# Patient Record
Sex: FEMALE | Race: BLACK OR AFRICAN AMERICAN | Employment: UNEMPLOYED
[De-identification: names, ages, dates, MRNs, and addresses within clinical notes are randomized per-mention and may not be internally consistent; named-entity substitution may affect disease eponyms.]

---

## 2017-01-11 ENCOUNTER — OFFICE VISIT (OUTPATIENT)
Dept: OBGYN | Facility: CLINIC | Age: 32
End: 2017-01-11

## 2017-01-11 VITALS
HEIGHT: 61 IN | WEIGHT: 139.5 LBS | SYSTOLIC BLOOD PRESSURE: 115 MMHG | DIASTOLIC BLOOD PRESSURE: 76 MMHG | HEART RATE: 86 BPM | RESPIRATION RATE: 19 BRPM | TEMPERATURE: 98.1 F | BODY MASS INDEX: 26.34 KG/M2

## 2017-01-11 DIAGNOSIS — Z01.818 TUBAL LIGATION EVALUATION: ICD-10-CM

## 2017-01-11 DIAGNOSIS — Z00.00 PREVENTATIVE HEALTH CARE: ICD-10-CM

## 2017-01-11 DIAGNOSIS — Z11.3 SCREENING FOR STDS (SEXUALLY TRANSMITTED DISEASES): ICD-10-CM

## 2017-01-11 DIAGNOSIS — Z01.419 WELL WOMAN EXAM: Primary | ICD-10-CM

## 2017-01-11 PROCEDURE — 99395 PREV VISIT EST AGE 18-39: CPT | Performed by: OBSTETRICS & GYNECOLOGY

## 2017-01-11 RX ORDER — CLOTRIMAZOLE AND BETAMETHASONE DIPROPIONATE 10; .64 MG/G; MG/G
CREAM TOPICAL
Qty: 1 TUBE | Refills: 1 | Status: SHIPPED | OUTPATIENT
Start: 2017-01-11 | End: 2017-10-17 | Stop reason: SDUPTHER

## 2017-01-12 RX ORDER — METRONIDAZOLE 500 MG/1
500 TABLET ORAL 2 TIMES DAILY
Qty: 14 TABLET | Refills: 0 | Status: SHIPPED | OUTPATIENT
Start: 2017-01-12 | End: 2017-01-19

## 2017-02-01 RX ORDER — IBUPROFEN 800 MG/1
TABLET ORAL
Qty: 40 TABLET | Refills: 0 | Status: SHIPPED | OUTPATIENT
Start: 2017-02-01 | End: 2018-01-09

## 2017-03-24 ENCOUNTER — OFFICE VISIT (OUTPATIENT)
Dept: OBGYN CLINIC | Age: 32
End: 2017-03-24
Payer: COMMERCIAL

## 2017-03-24 VITALS
BODY MASS INDEX: 27.19 KG/M2 | RESPIRATION RATE: 18 BRPM | WEIGHT: 144 LBS | HEIGHT: 61 IN | DIASTOLIC BLOOD PRESSURE: 81 MMHG | SYSTOLIC BLOOD PRESSURE: 134 MMHG | HEART RATE: 86 BPM

## 2017-03-24 DIAGNOSIS — Z30.09 ENCOUNTER FOR OTHER GENERAL COUNSELING OR ADVICE ON CONTRACEPTION: Primary | ICD-10-CM

## 2017-03-24 PROCEDURE — 99212 OFFICE O/P EST SF 10 MIN: CPT | Performed by: SPECIALIST

## 2017-03-24 ASSESSMENT — ENCOUNTER SYMPTOMS
EYE PAIN: 0
DIARRHEA: 0
COUGH: 0
NAUSEA: 0
ABDOMINAL DISTENTION: 0
APNEA: 0
CONSTIPATION: 0
VOMITING: 0
ABDOMINAL PAIN: 0

## 2017-03-29 ENCOUNTER — TELEPHONE (OUTPATIENT)
Dept: OBGYN CLINIC | Age: 32
End: 2017-03-29

## 2017-04-05 ENCOUNTER — TELEPHONE (OUTPATIENT)
Dept: OBGYN CLINIC | Age: 32
End: 2017-04-05

## 2017-06-08 ENCOUNTER — OFFICE VISIT (OUTPATIENT)
Dept: INTERNAL MEDICINE | Age: 32
End: 2017-06-08
Payer: COMMERCIAL

## 2017-06-08 VITALS
DIASTOLIC BLOOD PRESSURE: 78 MMHG | SYSTOLIC BLOOD PRESSURE: 116 MMHG | WEIGHT: 133 LBS | HEIGHT: 61 IN | BODY MASS INDEX: 25.11 KG/M2 | HEART RATE: 68 BPM

## 2017-06-08 DIAGNOSIS — H61.23 EXCESSIVE EAR WAX, BILATERAL: Primary | ICD-10-CM

## 2017-06-08 PROCEDURE — 99213 OFFICE O/P EST LOW 20 MIN: CPT | Performed by: INTERNAL MEDICINE

## 2017-06-08 RX ORDER — TRIAMCINOLONE ACETONIDE 0.25 MG/G
OINTMENT TOPICAL
Refills: 0 | COMMUNITY
Start: 2017-04-22 | End: 2018-01-09

## 2017-06-08 RX ORDER — CAPSAICIN 0.025 %
CREAM (GRAM) TOPICAL
COMMUNITY
Start: 2016-07-11 | End: 2018-01-09

## 2017-06-08 RX ORDER — TIZANIDINE 4 MG/1
TABLET ORAL
COMMUNITY
Start: 2016-07-11 | End: 2018-01-09

## 2017-06-08 RX ORDER — METRONIDAZOLE 500 MG/1
TABLET ORAL
COMMUNITY
Start: 2016-09-07 | End: 2017-06-28 | Stop reason: SDUPTHER

## 2017-06-08 RX ORDER — SERTRALINE HYDROCHLORIDE 25 MG/1
25 TABLET, FILM COATED ORAL
COMMUNITY
Start: 2016-07-11 | End: 2018-01-09

## 2017-06-08 RX ORDER — TRIAMCINOLONE ACETONIDE 0.25 MG/G
OINTMENT TOPICAL
COMMUNITY
Start: 2016-10-10 | End: 2018-02-27 | Stop reason: ALTCHOICE

## 2017-06-28 ENCOUNTER — HOSPITAL ENCOUNTER (OUTPATIENT)
Age: 32
Setting detail: SPECIMEN
Discharge: HOME | End: 2017-06-28
Payer: COMMERCIAL

## 2017-06-28 ENCOUNTER — OFFICE VISIT (OUTPATIENT)
Dept: OBGYN | Age: 32
End: 2017-06-28
Payer: COMMERCIAL

## 2017-06-28 VITALS
HEART RATE: 78 BPM | BODY MASS INDEX: 25.86 KG/M2 | WEIGHT: 137 LBS | SYSTOLIC BLOOD PRESSURE: 140 MMHG | DIASTOLIC BLOOD PRESSURE: 98 MMHG | HEIGHT: 61 IN

## 2017-06-28 DIAGNOSIS — B96.89 BV (BACTERIAL VAGINOSIS): ICD-10-CM

## 2017-06-28 DIAGNOSIS — I10 ESSENTIAL HYPERTENSION: ICD-10-CM

## 2017-06-28 DIAGNOSIS — L73.9 FOLLICULITIS: Primary | ICD-10-CM

## 2017-06-28 DIAGNOSIS — Z11.3 SCREEN FOR STD (SEXUALLY TRANSMITTED DISEASE): ICD-10-CM

## 2017-06-28 DIAGNOSIS — N76.0 BV (BACTERIAL VAGINOSIS): ICD-10-CM

## 2017-06-28 LAB
DIRECT EXAM: ABNORMAL
HEPATITIS B SURFACE ANTIGEN: NONREACTIVE
HIV AG/AB: NONREACTIVE
Lab: ABNORMAL
SPECIMEN DESCRIPTION: ABNORMAL
STATUS: ABNORMAL
T. PALLIDUM, IGG: NONREACTIVE

## 2017-06-28 PROCEDURE — 87491 CHLMYD TRACH DNA AMP PROBE: CPT

## 2017-06-28 PROCEDURE — 86780 TREPONEMA PALLIDUM: CPT

## 2017-06-28 PROCEDURE — 36415 COLL VENOUS BLD VENIPUNCTURE: CPT

## 2017-06-28 PROCEDURE — 99214 OFFICE O/P EST MOD 30 MIN: CPT | Performed by: OBSTETRICS & GYNECOLOGY

## 2017-06-28 PROCEDURE — 87389 HIV-1 AG W/HIV-1&-2 AB AG IA: CPT

## 2017-06-28 PROCEDURE — 87660 TRICHOMONAS VAGIN DIR PROBE: CPT

## 2017-06-28 PROCEDURE — 87510 GARDNER VAG DNA DIR PROBE: CPT

## 2017-06-28 PROCEDURE — 87340 HEPATITIS B SURFACE AG IA: CPT

## 2017-06-28 PROCEDURE — 87591 N.GONORRHOEAE DNA AMP PROB: CPT

## 2017-06-28 PROCEDURE — 87480 CANDIDA DNA DIR PROBE: CPT

## 2017-06-28 RX ORDER — CLINDAMYCIN PHOSPHATE 10 MG/G
GEL TOPICAL
Qty: 1 TUBE | Refills: 1 | Status: SHIPPED | OUTPATIENT
Start: 2017-06-28 | End: 2017-07-05

## 2017-06-28 RX ORDER — METRONIDAZOLE 500 MG/1
500 TABLET ORAL 2 TIMES DAILY
Qty: 14 TABLET | Refills: 0 | Status: SHIPPED | OUTPATIENT
Start: 2017-06-28 | End: 2017-07-05

## 2017-06-29 LAB
C TRACH DNA GENITAL QL NAA+PROBE: NEGATIVE
N. GONORRHOEAE DNA: NEGATIVE

## 2017-07-10 ENCOUNTER — TELEPHONE (OUTPATIENT)
Dept: OBGYN CLINIC | Age: 32
End: 2017-07-10

## 2017-07-12 ENCOUNTER — TELEPHONE (OUTPATIENT)
Dept: OBGYN CLINIC | Age: 32
End: 2017-07-12

## 2017-07-21 ENCOUNTER — TELEPHONE (OUTPATIENT)
Dept: OBGYN CLINIC | Age: 32
End: 2017-07-21

## 2017-08-29 ENCOUNTER — OFFICE VISIT (OUTPATIENT)
Dept: INTERNAL MEDICINE | Age: 32
End: 2017-08-29
Payer: COMMERCIAL

## 2017-08-29 VITALS
WEIGHT: 135 LBS | DIASTOLIC BLOOD PRESSURE: 81 MMHG | BODY MASS INDEX: 25.51 KG/M2 | HEART RATE: 85 BPM | SYSTOLIC BLOOD PRESSURE: 134 MMHG

## 2017-08-29 DIAGNOSIS — H61.22 IMPACTED CERUMEN OF LEFT EAR: Primary | ICD-10-CM

## 2017-08-29 PROCEDURE — 99213 OFFICE O/P EST LOW 20 MIN: CPT | Performed by: HOSPITALIST

## 2017-10-18 RX ORDER — BENZOYL PEROXIDE 2.5 G/100G
GEL TOPICAL
Qty: 15 G | Refills: 1 | Status: SHIPPED | OUTPATIENT
Start: 2017-10-18 | End: 2018-02-27 | Stop reason: ALTCHOICE

## 2018-01-09 ENCOUNTER — OFFICE VISIT (OUTPATIENT)
Dept: INTERNAL MEDICINE | Age: 33
End: 2018-01-09
Payer: COMMERCIAL

## 2018-01-09 ENCOUNTER — HOSPITAL ENCOUNTER (OUTPATIENT)
Age: 33
Setting detail: SPECIMEN
Discharge: HOME | End: 2018-01-09
Payer: COMMERCIAL

## 2018-01-09 VITALS
WEIGHT: 132 LBS | OXYGEN SATURATION: 100 % | SYSTOLIC BLOOD PRESSURE: 125 MMHG | DIASTOLIC BLOOD PRESSURE: 85 MMHG | HEIGHT: 61 IN | HEART RATE: 87 BPM | BODY MASS INDEX: 24.92 KG/M2

## 2018-01-09 DIAGNOSIS — L70.0 ACNE VULGARIS: ICD-10-CM

## 2018-01-09 DIAGNOSIS — L21.0 SEBORRHEA CAPITIS: Primary | ICD-10-CM

## 2018-01-09 DIAGNOSIS — D50.9 IRON DEFICIENCY ANEMIA, UNSPECIFIED IRON DEFICIENCY ANEMIA TYPE: ICD-10-CM

## 2018-01-09 DIAGNOSIS — R63.4 WEIGHT LOSS: ICD-10-CM

## 2018-01-09 LAB
HCT VFR BLD CALC: 32.4 % (ref 36.3–47.1)
HEMOGLOBIN: 9.9 G/DL (ref 11.9–15.1)
MCH RBC QN AUTO: 24.5 PG (ref 25.2–33.5)
MCHC RBC AUTO-ENTMCNC: 30.6 G/DL (ref 28.4–34.8)
MCV RBC AUTO: 80.2 FL (ref 82.6–102.9)
PDW BLD-RTO: 15.6 % (ref 11.8–14.4)
PLATELET # BLD: 249 K/UL (ref 138–453)
PMV BLD AUTO: 10.8 FL (ref 8.1–13.5)
RBC # BLD: 4.04 M/UL (ref 3.95–5.11)
TSH SERPL DL<=0.05 MIU/L-ACNC: 0.7 MIU/L (ref 0.3–5)
WBC # BLD: 3.4 K/UL (ref 3.5–11.3)

## 2018-01-09 PROCEDURE — 85027 COMPLETE CBC AUTOMATED: CPT

## 2018-01-09 PROCEDURE — 99213 OFFICE O/P EST LOW 20 MIN: CPT | Performed by: INTERNAL MEDICINE

## 2018-01-09 PROCEDURE — G8484 FLU IMMUNIZE NO ADMIN: HCPCS | Performed by: INTERNAL MEDICINE

## 2018-01-09 PROCEDURE — G8420 CALC BMI NORM PARAMETERS: HCPCS | Performed by: INTERNAL MEDICINE

## 2018-01-09 PROCEDURE — 84443 ASSAY THYROID STIM HORMONE: CPT

## 2018-01-09 PROCEDURE — 1036F TOBACCO NON-USER: CPT | Performed by: INTERNAL MEDICINE

## 2018-01-09 PROCEDURE — 36415 COLL VENOUS BLD VENIPUNCTURE: CPT

## 2018-01-09 PROCEDURE — G8427 DOCREV CUR MEDS BY ELIG CLIN: HCPCS | Performed by: INTERNAL MEDICINE

## 2018-01-09 RX ORDER — KETOCONAZOLE 20 MG/ML
SHAMPOO TOPICAL
Qty: 1 BOTTLE | Refills: 3 | Status: SHIPPED | OUTPATIENT
Start: 2018-01-09 | End: 2020-08-05

## 2018-01-09 ASSESSMENT — PATIENT HEALTH QUESTIONNAIRE - PHQ9
9. THOUGHTS THAT YOU WOULD BE BETTER OFF DEAD, OR OF HURTING YOURSELF: 0
8. MOVING OR SPEAKING SO SLOWLY THAT OTHER PEOPLE COULD HAVE NOTICED. OR THE OPPOSITE, BEING SO FIGETY OR RESTLESS THAT YOU HAVE BEEN MOVING AROUND A LOT MORE THAN USUAL: 0
6. FEELING BAD ABOUT YOURSELF - OR THAT YOU ARE A FAILURE OR HAVE LET YOURSELF OR YOUR FAMILY DOWN: 0
3. TROUBLE FALLING OR STAYING ASLEEP: 0
10. IF YOU CHECKED OFF ANY PROBLEMS, HOW DIFFICULT HAVE THESE PROBLEMS MADE IT FOR YOU TO DO YOUR WORK, TAKE CARE OF THINGS AT HOME, OR GET ALONG WITH OTHER PEOPLE: 0
1. LITTLE INTEREST OR PLEASURE IN DOING THINGS: 0
SUM OF ALL RESPONSES TO PHQ QUESTIONS 1-9: 0
5. POOR APPETITE OR OVEREATING: 0
7. TROUBLE CONCENTRATING ON THINGS, SUCH AS READING THE NEWSPAPER OR WATCHING TELEVISION: 0
SUM OF ALL RESPONSES TO PHQ9 QUESTIONS 1 & 2: 0
4. FEELING TIRED OR HAVING LITTLE ENERGY: 0
2. FEELING DOWN, DEPRESSED OR HOPELESS: 0

## 2018-01-09 NOTE — PROGRESS NOTES
[facility]    Progress Note    Date of patient's visit: 2018    Patient's Name:  Shelby Rodriguez    YOB: 1985            Patient Care Team:  Eleazar Mckoy MD as PCP - General (Internal Medicine)    REASON FOR VISIT: Routine outpatient follow     Chief Complaint   Patient presents with    Skin Problem     patient would like to see a dermatologist for acne and breakouts     Medication Check     ximena states that she takes her meds sometimes but not currently taking    [facility] Maintenance     patient refused flu vaccine          HISTORY OF PRESENT ILLNESS:    History was obtained from the patient. Shelby Rodriguez is a 33 y.o. is here for Follow-up. She denies any ear pain. She is mostly concerned about her acne and breakouts and wants to see a dermatologist.  She says it's usually around her menstrual cycles that she gets a lot of pimples. She is not taking any medications currently. She is denying depression. She does have a lot of stress. She lost her job. She has been losing weight. She is trying to find another job but has not been successful. She says the father of her daughter is helping with the bills. She has poor appetite and eats only one or 2 times a day. She is complaining of dandruff which has not resolved with over-the-counter head and shoulders and other shampoos.         Past Medical History:   Diagnosis Date    Benign essential hypertension antepartum 2013    Family history of uterine cancer     PGM    Iron deficiency anemia 2016    Prothrombin gene mutation [facility])        Past Surgical History:   Procedure Laterality Date     SECTION  1/10/2007, 2007, 2012    LTCS and classical CS on 12 @ 23 weeks         ALLERGIES    No Known Allergies    MEDICATIONS:      Current Outpatient Prescriptions on File Prior to Visit   Medication Sig Dispense Refill    ferrous sulfate (HARIS-ROSALIND) 325 (65 FE)

## 2018-01-14 ASSESSMENT — ENCOUNTER SYMPTOMS
EYE REDNESS: 0
COUGH: 0
NAUSEA: 0
ABDOMINAL PAIN: 0
HEARTBURN: 0
DIARRHEA: 0
BLURRED VISION: 0
SPUTUM PRODUCTION: 0
CONSTIPATION: 0

## 2018-02-27 ENCOUNTER — OFFICE VISIT (OUTPATIENT)
Dept: DERMATOLOGY | Age: 33
End: 2018-02-27
Payer: COMMERCIAL

## 2018-02-27 VITALS
BODY MASS INDEX: 24.66 KG/M2 | HEART RATE: 95 BPM | OXYGEN SATURATION: 100 % | DIASTOLIC BLOOD PRESSURE: 81 MMHG | SYSTOLIC BLOOD PRESSURE: 122 MMHG | HEIGHT: 61 IN | WEIGHT: 130.6 LBS

## 2018-02-27 DIAGNOSIS — L81.0 POST-INFLAMMATORY HYPERPIGMENTATION: ICD-10-CM

## 2018-02-27 DIAGNOSIS — L70.0 ACNE VULGARIS: Primary | ICD-10-CM

## 2018-02-27 PROCEDURE — G8427 DOCREV CUR MEDS BY ELIG CLIN: HCPCS | Performed by: DERMATOLOGY

## 2018-02-27 PROCEDURE — 99202 OFFICE O/P NEW SF 15 MIN: CPT | Performed by: DERMATOLOGY

## 2018-02-27 PROCEDURE — 1036F TOBACCO NON-USER: CPT | Performed by: DERMATOLOGY

## 2018-02-27 PROCEDURE — G8484 FLU IMMUNIZE NO ADMIN: HCPCS | Performed by: DERMATOLOGY

## 2018-02-27 PROCEDURE — G8420 CALC BMI NORM PARAMETERS: HCPCS | Performed by: DERMATOLOGY

## 2018-02-27 RX ORDER — CLINDAMYCIN PHOSPHATE 10 UG/ML
LOTION TOPICAL
Qty: 60 ML | Refills: 3 | Status: SHIPPED | OUTPATIENT
Start: 2018-02-27 | End: 2018-07-02 | Stop reason: SDUPTHER

## 2018-02-27 NOTE — PROGRESS NOTES
to be applied in a thin layer everywhere the acne comes and not just to the pimples you see. Follow these steps:  Put a chocolate chip size amount of medication cream/gel onto your finger. Dab the medicine onto your forehead, nose, chin, and each cheek. Then gently spread a thin layer across the entire face. Do not wash off this medicine. These medications can also be used on your chest, back and shoulder areas. Do not use over the counter acne creams or gels in addition to your prescribed medications unless directed by your doctor. Topical acne medications can cause irritation, redness, and dryness, especially when you first start them. If your prescribed topical cream or gel is too irritating at first, try using it every other day or once every 3 days instead of every day. As your skin becomes less sensitive, you may be able to start to use it every day. To help soothe the dryness, you can use an oil-free, \"non-comedogenic\" moisturizer. Many acne medications also make the skin more sensitive to sunlight, so it is important to use an oil-free, \"non-comedogenic\" sunscreen if you will be out in the sun for work or fun. Some products available include: Oil of Olay Complete Defense for Sensitive Skin, Purpose Facial Lotion, Cetaphil Lotion, Neutrogenia Moisturizer and Aveeno Moisturizer. Some products contain both moisturizer and sunscreen. Topical acne medications and washes containing Benzoyl Peroxide may bleach your clothing, towels, and bedding. Take care when using these products so that your things don't get ruined. You may want to use white towels and sheets to minimize the bleaching effect. Oral Antibiotics (Pills) for Acne:  Antibiotics are an important part of treating acne. They work from the inside of your body to kill the bacteria that cause the red, inflamed bumps and pus-filled bumps. Oral antibiotics are often used in addition to topical creams and gels.   Many antibiotics can cause nausea, stomach aches, vomiting or diarrhea. It is important to take your antibiotic with a large glass of water. Taking the antibiotic with food may be helpful, except for Tetracycline which must be taken on an empty stomach to be effective. Any antibiotic can cause an allergic reaction or rash. Your doctor will regularly ask you if you are having any side effects. Commonly used acne antibiotic pills include:    Erythromycin                                        Bactrim                                        Doxycycline  Clindamycin                                         Tetracycline                                 Minocycline    If you are on Minocycline, please report any problems with headaches, blurry vision, ringing ears, joint aches or new blue-gray spots on your skin. If you are on Tetracycline or Doxycycline, your skin will be very sensitive to sunlight and will burn easily so sun protection is important. If you are on Clindamycin, please report any problems with persistent diarrhea. Sun Protection     There are two types of sun rays that are harmful to the skin. UVA rays cause skin aging and skin cancer, such as melanoma. UVB rays cause sunburns, cataracts, and also contribute to skin cancer. The American-Academy of Dermatology recommends that all kids wear a broad spectrum, waterproof sunscreen with a Sun Protection Factor (SPF) of 30 or higher. It is important to check the ingredient label to be sure the sunscreen will protect the skin from both UVA and UVB sunrays. Your sunscreen should contain at least one of the following ingredients: titanium dioxide, zinc oxide, or avobenzone. Sunscreen will not be effective unless it is applied to all exposed skin. Sunscreens work best if they are applied 30 minutes before sun exposure. They should be reapplied every 2 hours and after any water exposure. Sunscreen is not perfect.   It is important to use other methods to protect the

## 2018-02-27 NOTE — LETTER
[facility]) Dermatology   [address] [facility]  [address]  [address] [place] [address]  Phone: [phone]  Fax: [phone]     James Montiel MD       February 27, 2018     [facility], [address]     Patient: Puneet Zarco   MR Number: [id number]   YOB: 1985   Date of Visit: 2/27/2018     Dear Dr. Carmen Samuels: Thank you for the request for consultation for Ms. Saleem to me for evaluation. Below are the relevant portions of my assessment and plan of care. 1. Acne vulgaris  1. Start benzoyl peroxide wash every morning  2. Start clindamycin lotion every morning  3. Start tretinoin 0.05% daily at bedtime  4. Follow-up in 3 months. 2. Post-inflammatory hyperpigmentation  Discussed that hydroquinone sometimes helps (she didn't feel it did), but generally sunscreen and time are most effective       Patient Instructions   It is possible your prescription(s) from today's visit will require a prior authorization. If your insurance requires a prior authorization or is too costly to fill, please notify our office as soon as possible so we may take the appropriate action. 1. Can try OTC Cerave Am and PM for moisturizer  2. Use benzoyl peroxide wash and clindamycin wash in groin area also    Your Acne Treatment Prescribed by your Doctor: It is important to remember that oil glands respond very slowly and your skin will not change overnight. Your skin may get worse during the first weeks of treatment because all of the clogged pores are opening to the surface. Try not to get frustrated with your skin's slow response. Try to be consistent and follow these instructions from your doctor. If your acne has not responded to these treatments in 2-3 months, your doctor may recommend other medications. MORNING  Wash your face and other areas of acne with benzoyl peroxide. Apply clindamycin (Cleocin, Clindagel) to areas of acne in a thin layer. gently spread a thin layer across the entire face. Do not wash off this medicine. These medications can also be used on your chest, back and shoulder areas. Do not use over the counter acne creams or gels in addition to your prescribed medications unless directed by your doctor. Topical acne medications can cause irritation, redness, and dryness, especially when you first start them. If your prescribed topical cream or gel is too irritating at first, try using it every other day or once every 3 days instead of every day. As your skin becomes less sensitive, you may be able to start to use it every day. To help soothe the dryness, you can use an oil-free, \"non-comedogenic\" moisturizer. Many acne medications also make the skin more sensitive to sunlight, so it is important to use an oil-free, \"non-comedogenic\" sunscreen if you will be out in the sun for work or fun. Some products available include: Oil of Olay Complete Defense for Sensitive Skin, Purpose Facial Lotion, Cetaphil Lotion, Neutrogenia Moisturizer and Aveeno Moisturizer. Some products contain both moisturizer and sunscreen. Topical acne medications and washes containing Benzoyl Peroxide may bleach your clothing, towels, and bedding. Take care when using these products so that your things don't get ruined. You may want to use white towels and sheets to minimize the bleaching effect. Oral Antibiotics (Pills) for Acne:  Antibiotics are an important part of treating acne. They work from the inside of your body to kill the bacteria that cause the red, inflamed bumps and pus-filled bumps. Oral antibiotics are often used in addition to topical creams and gels. Many antibiotics can cause nausea, stomach aches, vomiting or diarrhea. It is important to take your antibiotic with a large glass of water.   Taking the antibiotic with food may be helpful, except for Tetracycline which must be taken on an empty stomach to be effective. Any antibiotic can cause an allergic reaction or rash. Your doctor will regularly ask you if you are having any side effects. Commonly used acne antibiotic pills include:    Erythromycin                                        Bactrim                                        Doxycycline  Clindamycin                                         Tetracycline                                 Minocycline    If you are on Minocycline, please report any problems with headaches, blurry vision, ringing ears, joint aches or new blue-gray spots on your skin. If you are on Tetracycline or Doxycycline, your skin will be very sensitive to sunlight and will burn easily so sun protection is important. If you are on Clindamycin, please report any problems with persistent diarrhea. Sun Protection     There are two types of sun rays that are harmful to the skin. UVA rays cause skin aging and skin cancer, such as melanoma. UVB rays cause sunburns, cataracts, and also contribute to skin cancer. The American-Academy of Dermatology recommends that all kids wear a broad spectrum, waterproof sunscreen with a Sun Protection Factor (SPF) of 30 or higher. It is important to check the ingredient label to be sure the sunscreen will protect the skin from both UVA and UVB sunrays. Your sunscreen should contain at least one of the following ingredients: titanium dioxide, zinc oxide, or avobenzone. Sunscreen will not be effective unless it is applied to all exposed skin. Sunscreens work best if they are applied 30 minutes before sun exposure. They should be reapplied every 2 hours and after any water exposure. Sunscreen is not perfect. It is important to use other methods to protect the skin from sun exposure also. Wear hats, sunglasses and other sun protective clothing when outdoors.   Stay in the shade during the peak hours of sun exposure between 10 AM and 4 PM.

## 2018-02-27 NOTE — PATIENT INSTRUCTIONS
It is possible your prescription(s) from today's visit will require a prior authorization. If your insurance requires a prior authorization or is too costly to fill, please notify our office as soon as possible so we may take the appropriate action. 1. Can try OTC Cerave Am and PM for moisturizer  2. Use benzoyl peroxide wash and clindamycin wash in groin area also    Your Acne Treatment Prescribed by your Doctor: It is important to remember that oil glands respond very slowly and your skin will not change overnight. Your skin may get worse during the first weeks of treatment because all of the clogged pores are opening to the surface. Try not to get frustrated with your skin's slow response. Try to be consistent and follow these instructions from your doctor. If your acne has not responded to these treatments in 2-3 months, your doctor may recommend other medications. MORNING  Wash your face and other areas of acne with benzoyl peroxide. Apply clindamycin (Cleocin, Clindagel) to areas of acne in a thin layer. May include face, shoulders, back, and chest.    NIGHT    Apply tretinoin (Avita, Retin-A) to areas of acne in a thin layer. May include face, shoulders, back, and chest.    It is important to apply the right topical medication (cream or gel) at the right time of the day, so please follow the instructions written above. Products with benzoyl peroxide in them can bleach towels and clothes, so use them carefully. It is important to avoid moisturizers, make-up, hair products and sunscreens made with oil. These products can contribute to acne breakouts by plugging your pores. Look for \"oil-free\" and \"non-comedogenic\" products. Many brands such as Neutrogena, Aveeno, Cetaphil, Purpose, Eucerin and Olay make moisturizers and sunscreens that are oil-free. Washing your Face:  Wash your face 2 times a day with a mild soap or prescribed facial cleanser.   Be gentle in washing your face and

## 2018-05-07 ENCOUNTER — OFFICE VISIT (OUTPATIENT)
Dept: INTERNAL MEDICINE | Age: 33
End: 2018-05-07
Payer: COMMERCIAL

## 2018-05-07 VITALS
DIASTOLIC BLOOD PRESSURE: 82 MMHG | BODY MASS INDEX: 24.34 KG/M2 | WEIGHT: 128.8 LBS | SYSTOLIC BLOOD PRESSURE: 126 MMHG | HEART RATE: 80 BPM

## 2018-05-07 DIAGNOSIS — H11.32 TRAUMATIC SUBCONJUNCTIVAL HEMORRHAGE OF LEFT EYE: Primary | ICD-10-CM

## 2018-05-07 DIAGNOSIS — S05.92XA LEFT EYE INJURY, INITIAL ENCOUNTER: ICD-10-CM

## 2018-05-07 PROCEDURE — G8420 CALC BMI NORM PARAMETERS: HCPCS | Performed by: INTERNAL MEDICINE

## 2018-05-07 PROCEDURE — 99212 OFFICE O/P EST SF 10 MIN: CPT | Performed by: INTERNAL MEDICINE

## 2018-05-07 PROCEDURE — G8427 DOCREV CUR MEDS BY ELIG CLIN: HCPCS | Performed by: INTERNAL MEDICINE

## 2018-05-07 PROCEDURE — 1036F TOBACCO NON-USER: CPT | Performed by: INTERNAL MEDICINE

## 2018-05-07 PROCEDURE — 99213 OFFICE O/P EST LOW 20 MIN: CPT | Performed by: INTERNAL MEDICINE

## 2018-05-07 RX ORDER — ACETAMINOPHEN 500 MG
500 TABLET ORAL EVERY 6 HOURS PRN
Qty: 120 TABLET | Refills: 0 | Status: SHIPPED | OUTPATIENT
Start: 2018-05-07 | End: 2018-10-14

## 2018-05-10 ENCOUNTER — TELEPHONE (OUTPATIENT)
Dept: INTERNAL MEDICINE | Age: 33
End: 2018-05-10

## 2018-05-10 DIAGNOSIS — R21 RASH: Primary | ICD-10-CM

## 2018-05-11 RX ORDER — CLOTRIMAZOLE 1 %
CREAM (GRAM) TOPICAL
Qty: 30 G | Refills: 1 | Status: SHIPPED | OUTPATIENT
Start: 2018-05-11 | End: 2018-05-18

## 2018-05-13 ASSESSMENT — ENCOUNTER SYMPTOMS
EYE REDNESS: 1
ABDOMINAL PAIN: 0
DOUBLE VISION: 0
EYE PAIN: 1
SHORTNESS OF BREATH: 0
PHOTOPHOBIA: 0
BLURRED VISION: 0
COUGH: 0
NAUSEA: 0
SPUTUM PRODUCTION: 0
EYE DISCHARGE: 0

## 2018-07-02 ENCOUNTER — OFFICE VISIT (OUTPATIENT)
Dept: DERMATOLOGY | Age: 33
End: 2018-07-02
Payer: COMMERCIAL

## 2018-07-02 VITALS
WEIGHT: 126 LBS | BODY MASS INDEX: 23.79 KG/M2 | HEIGHT: 61 IN | HEART RATE: 77 BPM | DIASTOLIC BLOOD PRESSURE: 81 MMHG | SYSTOLIC BLOOD PRESSURE: 122 MMHG | OXYGEN SATURATION: 98 %

## 2018-07-02 DIAGNOSIS — L70.0 ACNE VULGARIS: Primary | ICD-10-CM

## 2018-07-02 PROCEDURE — 99212 OFFICE O/P EST SF 10 MIN: CPT | Performed by: DERMATOLOGY

## 2018-07-02 PROCEDURE — G8427 DOCREV CUR MEDS BY ELIG CLIN: HCPCS | Performed by: DERMATOLOGY

## 2018-07-02 PROCEDURE — G8420 CALC BMI NORM PARAMETERS: HCPCS | Performed by: DERMATOLOGY

## 2018-07-02 PROCEDURE — 1036F TOBACCO NON-USER: CPT | Performed by: DERMATOLOGY

## 2018-07-02 RX ORDER — CLINDAMYCIN PHOSPHATE 10 UG/ML
LOTION TOPICAL
Qty: 60 ML | Refills: 3 | Status: SHIPPED | OUTPATIENT
Start: 2018-07-02 | End: 2019-03-25 | Stop reason: SDUPTHER

## 2018-07-02 RX ORDER — TRETINOIN 0.5 MG/G
CREAM TOPICAL
Qty: 20 G | Refills: 3 | Status: SHIPPED | OUTPATIENT
Start: 2018-07-02 | End: 2019-03-25 | Stop reason: SDUPTHER

## 2018-07-02 NOTE — PATIENT INSTRUCTIONS
1. Continue Benzoyl peroxide wash in the morning  2. Continue Clindamycin lotion in the morning  3.  Increase to tretinoin 0.05% cream at bedtme

## 2018-07-03 NOTE — PROGRESS NOTES
Dermatology Patient Note  [facility] DERMATOLOGY  [address] [facility] [address]  [address] [place] [address] [place] [place] [address]  Dept: [phone]  Dept Fax: [phone]      VISIT DATE: 7/2/2018   REFERRING PROVIDER: No ref. provider found      Ronnie Fiore is a 29 y.o. female  who presents today in the office for:    3 Month Follow-Up (acne still the same no changes.)      HISTORY OF PRESENT ILLNESS:  HPI Acne Followup    Taina Carbone was seen today for follow-up evaluation of acne. Interim Course: Improving    Areas of Involvement: face    Associated Symptoms: Pustules/Boils     Exacerbating Factors: Menstrual Cycles    Current Skin Care Regimen: BPO, clindamycin, tretinoin    Acne Treatment Compliance:  Using all topical medications as prescribed at last visit    Side Effects from Topical Treatments: non    Side Effects from Systemic Treatments: oni    Interim Laboratory/Radiologic Evaluation: None          CURRENT MEDICATIONS:   Current Outpatient Prescriptions   Medication Sig Dispense Refill    benzoyl peroxide 5 % external liquid Wash face, chest and back 1-2 times daily 227 g 3    clindamycin (CLEOCIN T) 1 % lotion Apply to face, chest and back daily 60 mL 3    tretinoin (RETIN-A) 0.05 % cream Apply a pea sized amount to face, chest and back nightly 20 g 3    acetaminophen (APAP EXTRA STRENGTH) 500 MG tablet Take 1 tablet by mouth every 6 hours as needed for Pain 120 tablet 0    Prenatal Multivit-Min-Fe-FA (MYNATAL) CAPS Take 1 tablet by mouth daily 30 capsule 12    Carboxymethylcellul-Glycerin (EQ LUBRICATING EYE DROPS) 0.5-0.9 % SOLN To left eye as needed 1 Bottle 1    ketoconazole (NIZORAL) 2 % shampoo Apply topically daily as needed. 1 Bottle 3    ferrous sulfate (HARIS-ROSALIND) 325 (65 FE) MG tablet Take 1 tablet by mouth 3 times daily (with meals) 90 tablet 6     No current facility-administered medications for this visit.         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

## 2018-07-26 ENCOUNTER — OFFICE VISIT (OUTPATIENT)
Dept: OBGYN | Age: 33
End: 2018-07-26
Payer: COMMERCIAL

## 2018-07-26 ENCOUNTER — HOSPITAL ENCOUNTER (OUTPATIENT)
Age: 33
Setting detail: SPECIMEN
Discharge: HOME | End: 2018-07-26
Payer: COMMERCIAL

## 2018-07-26 VITALS
TEMPERATURE: 98.9 F | HEART RATE: 78 BPM | DIASTOLIC BLOOD PRESSURE: 77 MMHG | SYSTOLIC BLOOD PRESSURE: 121 MMHG | WEIGHT: 129.1 LBS | BODY MASS INDEX: 24.37 KG/M2 | HEIGHT: 61 IN

## 2018-07-26 DIAGNOSIS — Z11.3 SCREENING FOR STD (SEXUALLY TRANSMITTED DISEASE): ICD-10-CM

## 2018-07-26 DIAGNOSIS — N96 MULTIPLE PREGNANCY LOSS, NOT CURRENTLY PREGNANT: ICD-10-CM

## 2018-07-26 DIAGNOSIS — Z01.419 WELL WOMAN EXAM WITH ROUTINE GYNECOLOGICAL EXAM: Primary | ICD-10-CM

## 2018-07-26 DIAGNOSIS — D50.9 IRON DEFICIENCY ANEMIA, UNSPECIFIED IRON DEFICIENCY ANEMIA TYPE: ICD-10-CM

## 2018-07-26 LAB
HAV IGM SER IA-ACNC: NONREACTIVE
HEPATITIS B CORE IGM ANTIBODY: NONREACTIVE
HEPATITIS B SURFACE ANTIGEN: NONREACTIVE
HEPATITIS C ANTIBODY: NONREACTIVE
HIV AG/AB: NONREACTIVE
T. PALLIDUM, IGG: NONREACTIVE

## 2018-07-26 PROCEDURE — 86780 TREPONEMA PALLIDUM: CPT

## 2018-07-26 PROCEDURE — 36415 COLL VENOUS BLD VENIPUNCTURE: CPT

## 2018-07-26 PROCEDURE — 87389 HIV-1 AG W/HIV-1&-2 AB AG IA: CPT

## 2018-07-26 PROCEDURE — 87591 N.GONORRHOEAE DNA AMP PROB: CPT

## 2018-07-26 PROCEDURE — 80074 ACUTE HEPATITIS PANEL: CPT

## 2018-07-26 PROCEDURE — 87660 TRICHOMONAS VAGIN DIR PROBE: CPT

## 2018-07-26 PROCEDURE — 87491 CHLMYD TRACH DNA AMP PROBE: CPT

## 2018-07-26 PROCEDURE — 87510 GARDNER VAG DNA DIR PROBE: CPT

## 2018-07-26 PROCEDURE — 99395 PREV VISIT EST AGE 18-39: CPT | Performed by: OBSTETRICS & GYNECOLOGY

## 2018-07-26 PROCEDURE — 87480 CANDIDA DNA DIR PROBE: CPT

## 2018-07-26 PROCEDURE — 99213 OFFICE O/P EST LOW 20 MIN: CPT | Performed by: OBSTETRICS & GYNECOLOGY

## 2018-07-26 RX ORDER — CLOTRIMAZOLE AND BETAMETHASONE DIPROPIONATE 10; .64 MG/G; MG/G
CREAM TOPICAL
Qty: 1 TUBE | Refills: 1 | Status: SHIPPED | OUTPATIENT
Start: 2018-07-26 | End: 2019-03-25 | Stop reason: ALTCHOICE

## 2018-07-26 RX ORDER — PRENATAL VIT/IRON FUM/FOLIC AC 60 MG-1 MG
1 TABLET ORAL DAILY
Qty: 30 CAPSULE | Refills: 12 | Status: SHIPPED | OUTPATIENT
Start: 2018-07-26 | End: 2020-08-05

## 2018-07-26 RX ORDER — CLOTRIMAZOLE 1 %
CREAM (GRAM) TOPICAL
Refills: 0 | COMMUNITY
Start: 2018-05-28 | End: 2019-03-25 | Stop reason: ALTCHOICE

## 2018-07-26 RX ORDER — FERROUS SULFATE 325(65) MG
325 TABLET ORAL
Qty: 90 TABLET | Refills: 6 | Status: SHIPPED | OUTPATIENT
Start: 2018-07-26 | End: 2019-08-30
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
## 2018-07-27 LAB
C TRACH DNA GENITAL QL NAA+PROBE: NEGATIVE
DIRECT EXAM: ABNORMAL
Lab: ABNORMAL
N. GONORRHOEAE DNA: NEGATIVE
SPECIMEN DESCRIPTION: ABNORMAL
STATUS: ABNORMAL

## 2018-07-30 ENCOUNTER — TELEPHONE (OUTPATIENT)
Dept: OBGYN | Age: 33
End: 2018-07-30

## 2018-07-30 RX ORDER — METRONIDAZOLE 500 MG/1
500 TABLET ORAL 2 TIMES DAILY
Qty: 14 TABLET | Refills: 0 | Status: SHIPPED | OUTPATIENT
Start: 2018-07-30 | End: 2018-08-06

## 2018-10-14 ENCOUNTER — APPOINTMENT (OUTPATIENT)
Dept: GENERAL RADIOLOGY | Age: 33
End: 2018-10-14
Payer: COMMERCIAL

## 2018-10-14 ENCOUNTER — HOSPITAL ENCOUNTER (EMERGENCY)
Age: 33
Discharge: HOME | End: 2018-10-14
Attending: EMERGENCY MEDICINE
Payer: COMMERCIAL

## 2018-10-14 VITALS
SYSTOLIC BLOOD PRESSURE: 130 MMHG | HEART RATE: 86 BPM | BODY MASS INDEX: 24.55 KG/M2 | OXYGEN SATURATION: 100 % | DIASTOLIC BLOOD PRESSURE: 92 MMHG | RESPIRATION RATE: 17 BRPM | HEIGHT: 61 IN | TEMPERATURE: 98.1 F | WEIGHT: 130 LBS

## 2018-10-14 DIAGNOSIS — S05.92XA LEFT EYE INJURY, INITIAL ENCOUNTER: ICD-10-CM

## 2018-10-14 DIAGNOSIS — M25.512 ACUTE PAIN OF LEFT SHOULDER: ICD-10-CM

## 2018-10-14 DIAGNOSIS — H11.32 TRAUMATIC SUBCONJUNCTIVAL HEMORRHAGE OF LEFT EYE: ICD-10-CM
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
## 2018-10-14 PROCEDURE — 73080 X-RAY EXAM OF ELBOW: CPT

## 2018-10-14 PROCEDURE — 73110 X-RAY EXAM OF WRIST: CPT

## 2018-10-14 PROCEDURE — 73030 X-RAY EXAM OF SHOULDER: CPT

## 2018-10-14 PROCEDURE — G0383 LEV 4 HOSP TYPE B ED VISIT: HCPCS

## 2018-10-14 PROCEDURE — 6370000000 HC RX 637 (ALT 250 FOR IP): Performed by: STUDENT IN AN ORGANIZED HEALTH CARE EDUCATION/TRAINING PROGRAM

## 2018-10-14 PROCEDURE — 6360000002 HC RX W HCPCS: Performed by: STUDENT IN AN ORGANIZED HEALTH CARE EDUCATION/TRAINING PROGRAM

## 2018-10-14 PROCEDURE — 73090 X-RAY EXAM OF FOREARM: CPT

## 2018-10-14 RX ORDER — ACETAMINOPHEN 500 MG
500 TABLET ORAL EVERY 6 HOURS PRN
Qty: 90 TABLET | Refills: 0 | Status: SHIPPED | OUTPATIENT
Start: 2018-10-14 | End: 2020-02-17 | Stop reason: SDUPTHER

## 2018-10-14 RX ORDER — KETOROLAC TROMETHAMINE 15 MG/ML
15 INJECTION, SOLUTION INTRAMUSCULAR; INTRAVENOUS ONCE
Status: DISCONTINUED | OUTPATIENT
Start: 2018-10-14 | End: 2018-10-14

## 2018-10-14 RX ORDER — ACETAMINOPHEN 325 MG/1
650 TABLET ORAL ONCE
Status: COMPLETED | OUTPATIENT
Start: 2018-10-14 | End: 2018-10-14

## 2018-10-14 RX ORDER — KETOROLAC TROMETHAMINE 30 MG/ML
30 INJECTION, SOLUTION INTRAMUSCULAR; INTRAVENOUS ONCE
Status: DISCONTINUED | OUTPATIENT
Start: 2018-10-14 | End: 2018-10-14

## 2018-10-14 RX ORDER — IBUPROFEN 400 MG/1
400 TABLET ORAL EVERY 8 HOURS PRN
Qty: 90 TABLET | Refills: 0 | Status: SHIPPED | OUTPATIENT
Start: 2018-10-14 | End: 2019-01-15 | Stop reason: ALTCHOICE

## 2018-10-14 RX ORDER — CYCLOBENZAPRINE HCL 10 MG
10 TABLET ORAL 2 TIMES DAILY PRN
Qty: 10 TABLET | Refills: 0 | Status: SHIPPED | OUTPATIENT
Start: 2018-10-14 | End: 2018-10-24

## 2018-10-14 RX ORDER — KETOROLAC TROMETHAMINE 15 MG/ML
15 INJECTION, SOLUTION INTRAMUSCULAR; INTRAVENOUS ONCE
Status: COMPLETED | OUTPATIENT
Start: 2018-10-14 | End: 2018-10-14

## 2018-10-14 RX ADMIN — KETOROLAC TROMETHAMINE 15 MG: 15 INJECTION, SOLUTION INTRAMUSCULAR; INTRAVENOUS at 14:31

## 2018-10-14 RX ADMIN — ACETAMINOPHEN 650 MG: 325 TABLET ORAL at 14:30

## 2018-10-14 ASSESSMENT — ENCOUNTER SYMPTOMS
BACK PAIN: 0
DIARRHEA: 0
NAUSEA: 0
VOICE CHANGE: 0
TROUBLE SWALLOWING: 0
SHORTNESS OF BREATH: 0
VOMITING: 0
ABDOMINAL PAIN: 0
WHEEZING: 0
COUGH: 0
CONSTIPATION: 0

## 2018-10-14 ASSESSMENT — PAIN DESCRIPTION - FREQUENCY: FREQUENCY: CONTINUOUS

## 2018-10-14 ASSESSMENT — PAIN DESCRIPTION - PROGRESSION: CLINICAL_PROGRESSION: GRADUALLY WORSENING

## 2018-10-14 ASSESSMENT — PAIN DESCRIPTION - ORIENTATION: ORIENTATION: LEFT

## 2018-10-14 ASSESSMENT — PAIN DESCRIPTION - PAIN TYPE: TYPE: ACUTE PAIN

## 2018-10-14 ASSESSMENT — PAIN DESCRIPTION - ONSET: ONSET: PROGRESSIVE

## 2018-10-14 ASSESSMENT — PAIN DESCRIPTION - DESCRIPTORS: DESCRIPTORS: SHARP;SORE

## 2018-10-14 ASSESSMENT — PAIN SCALES - GENERAL
PAINLEVEL_OUTOF10: 10

## 2018-10-14 ASSESSMENT — PAIN DESCRIPTION - LOCATION: LOCATION: ARM;HEAD;SHOULDER

## 2018-10-14 NOTE — ED NOTES
Pt resting on cart with call light within reach. NAD noted, RR even and NL.  Will continue to monitor     Sophy Caldera RN  10/14/18 [id number]

## 2018-10-14 NOTE — ED NOTES
Writer at bedside, pt discharged home at this time  Pt given script for motrin, tylenol and flexeril      Dirk Hutton RN  10/14/18 6092
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
## 2018-10-14 NOTE — ED NOTES
Pt resting on cart with call light within reach. NAD noted, RR even and NL.  Will continue to monitor     Bimal Sexton RN  10/14/18 [id number]

## 2018-10-16 ENCOUNTER — OFFICE VISIT (OUTPATIENT)
Dept: ORTHOPEDIC SURGERY | Age: 33
End: 2018-10-16
Payer: OTHER MISCELLANEOUS

## 2018-10-16 VITALS — WEIGHT: 120 LBS | BODY MASS INDEX: 22.66 KG/M2 | HEIGHT: 61 IN

## 2018-10-16 DIAGNOSIS — S62.015A CLOSED NONDISPLACED FRACTURE OF DISTAL POLE OF SCAPHOID BONE OF LEFT WRIST, INITIAL ENCOUNTER: Primary | ICD-10-CM

## 2018-10-16 PROCEDURE — 25622 CLTX CARPL SCPHD FX W/O MNPJ: CPT | Performed by: ORTHOPAEDIC SURGERY

## 2018-10-16 PROCEDURE — 99024 POSTOP FOLLOW-UP VISIT: CPT | Performed by: ORTHOPAEDIC SURGERY

## 2018-10-24 ENCOUNTER — OFFICE VISIT (OUTPATIENT)
Dept: INTERNAL MEDICINE | Age: 33
End: 2018-10-24
Payer: COMMERCIAL

## 2018-10-24 VITALS
WEIGHT: 124 LBS | SYSTOLIC BLOOD PRESSURE: 127 MMHG | HEIGHT: 61 IN | BODY MASS INDEX: 23.41 KG/M2 | DIASTOLIC BLOOD PRESSURE: 85 MMHG | HEART RATE: 74 BPM

## 2018-10-24 DIAGNOSIS — M54.9 UPPER BACK PAIN ON LEFT SIDE: ICD-10-CM
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
## 2018-10-24 DIAGNOSIS — M25.512 ACUTE PAIN OF LEFT SHOULDER: ICD-10-CM

## 2018-10-24 PROCEDURE — G8484 FLU IMMUNIZE NO ADMIN: HCPCS | Performed by: INTERNAL MEDICINE

## 2018-10-24 PROCEDURE — 99213 OFFICE O/P EST LOW 20 MIN: CPT | Performed by: INTERNAL MEDICINE

## 2018-10-24 PROCEDURE — 99211 OFF/OP EST MAY X REQ PHY/QHP: CPT | Performed by: INTERNAL MEDICINE

## 2018-10-24 PROCEDURE — G8427 DOCREV CUR MEDS BY ELIG CLIN: HCPCS | Performed by: INTERNAL MEDICINE

## 2018-10-24 PROCEDURE — 1036F TOBACCO NON-USER: CPT | Performed by: INTERNAL MEDICINE

## 2018-10-24 PROCEDURE — G8420 CALC BMI NORM PARAMETERS: HCPCS | Performed by: INTERNAL MEDICINE

## 2018-10-24 RX ORDER — HYDROCODONE BITARTRATE AND ACETAMINOPHEN 5; 325 MG/1; MG/1
1 TABLET ORAL EVERY 8 HOURS PRN
Qty: 15 TABLET | Refills: 0 | Status: SHIPPED | OUTPATIENT
Start: 2018-10-24 | End: 2018-10-29

## 2018-10-24 ASSESSMENT — ENCOUNTER SYMPTOMS: BACK PAIN: 1
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
## 2018-10-28 ASSESSMENT — ENCOUNTER SYMPTOMS
PHOTOPHOBIA: 0
BLOOD IN STOOL: 0
CONSTIPATION: 0
EYE REDNESS: 0
COUGH: 0
SHORTNESS OF BREATH: 0
EYE PAIN: 0
WHEEZING: 0
ABDOMINAL PAIN: 0

## 2018-10-30 PROBLEM — S62.015A CLOSED NONDISPLACED FRACTURE OF DISTAL POLE OF NAVICULAR BONE OF LEFT WRIST: Status: ACTIVE | Noted: 2018-10-30
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
## 2018-11-06 ENCOUNTER — OFFICE VISIT (OUTPATIENT)
Dept: ORTHOPEDIC SURGERY | Age: 33
End: 2018-11-06

## 2018-11-06 ENCOUNTER — HOSPITAL ENCOUNTER (OUTPATIENT)
Dept: PHYSICAL THERAPY | Age: 33
Setting detail: THERAPIES SERIES
Discharge: HOME | End: 2018-11-06
Payer: COMMERCIAL

## 2018-11-06 VITALS — WEIGHT: 123.46 LBS | BODY MASS INDEX: 23.31 KG/M2 | HEIGHT: 61 IN

## 2018-11-06 DIAGNOSIS — S62.015A CLOSED NONDISPLACED FRACTURE OF DISTAL POLE OF SCAPHOID BONE OF LEFT WRIST, INITIAL ENCOUNTER: Primary | ICD-10-CM

## 2018-11-06 PROCEDURE — 99024 POSTOP FOLLOW-UP VISIT: CPT | Performed by: ORTHOPAEDIC SURGERY

## 2018-11-06 PROCEDURE — 97110 THERAPEUTIC EXERCISES: CPT

## 2018-11-06 PROCEDURE — 97161 PT EVAL LOW COMPLEX 20 MIN: CPT
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
## 2018-11-15 ENCOUNTER — HOSPITAL ENCOUNTER (OUTPATIENT)
Dept: PHYSICAL THERAPY | Age: 33
Setting detail: THERAPIES SERIES
End: 2018-11-15
Payer: COMMERCIAL

## 2018-11-20 ENCOUNTER — HOSPITAL ENCOUNTER (OUTPATIENT)
Dept: PHYSICAL THERAPY | Age: 33
Setting detail: THERAPIES SERIES
Discharge: HOME | End: 2018-11-20
Payer: COMMERCIAL

## 2018-11-20 PROCEDURE — 97140 MANUAL THERAPY 1/> REGIONS: CPT

## 2018-11-20 PROCEDURE — 97110 THERAPEUTIC EXERCISES: CPT

## 2018-11-20 NOTE — FLOWSHEET NOTE
[x] Karina Wills       Outpatient Physical        Therapy       [address]       Phone: [phone]       Fax: [phone]     Physical Therapy Daily Treatment Note    Date:  2018  Patient Name:  Odilia Morales      :  1985    MRN: [id number]  Physician: Yenni Brock MD                   Insurance: [facility]  Medical Diagnosis: Acute pain or left shoulder M25.512                  Rehab Codes: M25.512  Onset Date: 10/14/18                                  Next 's appt: 18 Ortho    Visit# / total visits: 2    Cancels/No Shows: 0/1    Subjective:    Pain:  [x] Yes  [] No Location: mid/low trap (scapular region)   Pain Rating: (0-10 scale) 6/10  Pain altered Tx:  [x] No  [] Yes  Action:  Comments: pt reports pain present in back (points to upper/iner scapula. Objective:  Modalities: Cervical HP to L UT, seated at end of Tx  Precautions: left distal pole scaphoid fracture - in cast  Exercises:  Exercise Reps/ Time Weight/ Level Comments   UBE 2 min  Stopped due to pain reproted         Seated      Shoulder Flexion 10x 1 lb Ankle weight around wrist   Shoulder Abduction 10x 1 lb Ankle weight around wrist         Supine      Shoulder Flexion 10x 1 lb Ankle weight around wrist   Horizontal Abduction 10x 1 lb Ankle weight around wrist   Chest Press 10x 1 lb Ankle weight around wrist   Tricep Press 10x 1 lb Ankle weight around wrist   Protraction 10x 1 lb Ankle weight around wrist         Sidelying      Abduction 10x 1 lb Ankle weight around wrist   Horizontal Abduction 10x 1 lb Ankle weight around wrist   ER 10x 1 lb Ankle weight around wrist   Manual X           Other:    Manual: scapular mobs, STM pericapular mm's/UT/levator    Specific Instructions for next treatment: Shoulder ROM and strengthening, Manual therapy and modalities as needed.     Treatment Charges: Mins Units   []  HP 10 1   [x]  Ther Exercise 30 2   []  Manual Therapy 10 1   []  Ther Activities     []  [employer]

## 2018-11-25 ENCOUNTER — HOSPITAL ENCOUNTER (EMERGENCY)
Age: 33
Discharge: HOME | End: 2018-11-25
Attending: EMERGENCY MEDICINE
Payer: COMMERCIAL

## 2018-11-25 VITALS
SYSTOLIC BLOOD PRESSURE: 124 MMHG | RESPIRATION RATE: 17 BRPM | TEMPERATURE: 98.1 F | HEART RATE: 76 BPM | OXYGEN SATURATION: 99 % | DIASTOLIC BLOOD PRESSURE: 80 MMHG

## 2018-11-25 DIAGNOSIS — L02.91 ABSCESS: Primary | ICD-10-CM

## 2018-11-25 PROCEDURE — 99282 EMERGENCY DEPT VISIT SF MDM: CPT

## 2018-11-25 PROCEDURE — 6370000000 HC RX 637 (ALT 250 FOR IP): Performed by: PHYSICIAN ASSISTANT

## 2018-11-25 PROCEDURE — 10060 I&D ABSCESS SIMPLE/SINGLE: CPT

## 2018-11-25 RX ORDER — SULFAMETHOXAZOLE AND TRIMETHOPRIM 800; 160 MG/1; MG/1
1 TABLET ORAL 2 TIMES DAILY
Qty: 14 TABLET | Refills: 0 | Status: SHIPPED | OUTPATIENT
Start: 2018-11-25 | End: 2018-12-02

## 2018-11-25 RX ORDER — CEPHALEXIN 500 MG/1
500 CAPSULE ORAL 4 TIMES DAILY
Qty: 28 CAPSULE | Refills: 0 | Status: SHIPPED | OUTPATIENT
Start: 2018-11-25 | End: 2018-12-02

## 2018-11-25 RX ADMIN — Medication: at 11:37

## 2018-11-25 ASSESSMENT — PAIN DESCRIPTION - ORIENTATION: ORIENTATION: LEFT

## 2018-11-25 ASSESSMENT — PAIN DESCRIPTION - FREQUENCY: FREQUENCY: CONTINUOUS

## 2018-11-25 ASSESSMENT — ENCOUNTER SYMPTOMS
COUGH: 0
SORE THROAT: 0
ABDOMINAL PAIN: 0
DIARRHEA: 0
SHORTNESS OF BREATH: 0
BACK PAIN: 0
VOMITING: 0
COLOR CHANGE: 0
NAUSEA: 0

## 2018-11-25 ASSESSMENT — PAIN DESCRIPTION - ONSET: ONSET: PROGRESSIVE

## 2018-11-25 ASSESSMENT — PAIN DESCRIPTION - PROGRESSION: CLINICAL_PROGRESSION: GRADUALLY WORSENING

## 2018-11-25 ASSESSMENT — PAIN DESCRIPTION - PAIN TYPE: TYPE: ACUTE PAIN

## 2018-11-25 ASSESSMENT — PAIN DESCRIPTION - DESCRIPTORS: DESCRIPTORS: SORE

## 2018-11-25 ASSESSMENT — PAIN SCALES - GENERAL: PAINLEVEL_OUTOF10: 6

## 2018-11-25 ASSESSMENT — PAIN DESCRIPTION - LOCATION: LOCATION: EAR

## 2018-11-25 NOTE — ED PROVIDER NOTES
SURGICAL HISTORY           Procedure Laterality Date     SECTION  1/10/2007, 2007, 2012    LTCS and classical CS on 12 @ 23 weeks       CURRENT MEDICATIONS       Discharge Medication List as of 2018 11:23 AM      CONTINUE these medications which have NOT CHANGED    Details   acetaminophen (APAP EXTRA STRENGTH) 500 MG tablet Take 1 tablet by mouth every 6 hours as needed for Pain, Disp-90 tablet, R-0Print      ibuprofen (ADVIL;MOTRIN) 400 MG tablet Take 1 tablet by mouth every 8 hours as needed for Pain or Fever, Disp-90 tablet, R-0Print      clotrimazole (LOTRIMIN) 1 % cream apply to affected area twice a day, R-0, Historical Med      clotrimazole-betamethasone (LOTRISONE) 1-0.05 % cream Apply to affected area bid externally x 4 days then daily for 3 days, Disp-1 Tube, R-1, Normal      Prenatal Multivit-Min-Fe-FA (MYNATAL) CAPS Take 1 tablet by mouth daily, Disp-30 capsule, R-12Normal      ferrous sulfate (HARIS-ROSALIND) 325 (65 Fe) MG tablet Take 1 tablet by mouth 3 times daily (with meals), Disp-90 tablet, R-6Normal      benzoyl peroxide 5 % external liquid Wash face, chest and back 1-2 times daily, Disp-227 g, R-3, Normal      clindamycin (CLEOCIN T) 1 % lotion Apply to face, chest and back daily, Disp-60 mL, R-3, Normal      tretinoin (RETIN-A) 0.05 % cream Apply a pea sized amount to face, chest and back nightly, Disp-20 g, R-3, Normal      Carboxymethylcellul-Glycerin (EQ LUBRICATING EYE DROPS) 0.5-0.9 % SOLN To left eye as needed, Disp-1 Bottle, R-1Normal      ketoconazole (NIZORAL) 2 % shampoo Apply topically daily as needed. , Disp-1 Bottle, R-3, Normal             ALLERGIES     Patient has no known allergies.   Reviewed  FAMILY HISTORY       Family History   Problem Relation Age of Onset    Diabetes Paternal Grandmother     Endometrial Cancer Paternal Grandmother     Hypertension Maternal Grandmother     Breast Cancer Neg Hx     Cancer Neg Hx     Colon Cancer Neg Hx     Eclampsia Neg Hx     Ovarian Cancer Neg Hx      Labor Neg Hx     Spont Abortions Neg Hx     Stroke Neg Hx      Family Status   Relation Status    PGM     Mother Alive    Father Alive   [place] Brother Alive    Sister Alive    MGM (Not Specified)    Neg Hx (Not Specified)        SOCIAL HISTORY      reports that she has never smoked. She has never used smokeless tobacco. She reports that she drinks alcohol. She reports that she does not use drugs. PHYSICAL EXAM    (up to 7 for level 4, 8 or more for level 5)     Vitals:    18 1101   BP: 124/80   Pulse: 76   Resp: 17   Temp: 98.1 °F (36.7 °C)   TempSrc: Oral   SpO2: 99%       Physical Exam   Constitutional: She is oriented to person, place, and time. She appears well-developed and well-nourished. No distress. HENT:   Head: Normocephalic and atraumatic. Right Ear: Hearing, tympanic membrane and ear canal normal. Tympanic membrane is not erythematous. Left Ear: Hearing, tympanic membrane and ear canal normal. Tympanic membrane is not erythematous. Ears:    Mouth/Throat: Oropharynx is clear and moist.   Eyes: Pupils are equal, round, and reactive to light. Conjunctivae and EOM are normal.   Neck: Normal range of motion. Neck supple. Cardiovascular: Normal rate, regular rhythm, normal heart sounds and intact distal pulses. Exam reveals no gallop and no friction rub. No murmur heard. Pulmonary/Chest: Effort normal and breath sounds normal. No respiratory distress. She has no wheezes. She has no rales. Abdominal: Soft. Bowel sounds are normal. She exhibits no distension and no mass. There is no tenderness. There is no rebound and no guarding. No hernia. Musculoskeletal: Normal range of motion. Neurological: She is alert and oriented to person, place, and time. No cranial nerve deficit. Skin: Skin is warm and dry. Capillary refill takes less than 2 seconds. She is not diaphoretic.    Psychiatric: She has a normal mood and

## 2018-11-28 ENCOUNTER — HOSPITAL ENCOUNTER (OUTPATIENT)
Dept: PHYSICAL THERAPY | Age: 33
Setting detail: THERAPIES SERIES
Discharge: HOME | End: 2018-11-28
Payer: COMMERCIAL

## 2018-12-03 ENCOUNTER — HOSPITAL ENCOUNTER (OUTPATIENT)
Dept: PHYSICAL THERAPY | Age: 33
Setting detail: THERAPIES SERIES
Discharge: HOME | End: 2018-12-03
Payer: COMMERCIAL

## 2018-12-03 PROCEDURE — 97110 THERAPEUTIC EXERCISES: CPT

## 2018-12-03 NOTE — FLOWSHEET NOTE
12/3   Manual 8 mins      educaiton  x   posture/positing with cell phone use for neutral spine and level eye contact with screen. Held cervical roll in pillow for sleeping due to prone sleeper. Other: * added 12/3/18. Wanted to wait til 12/6 session to schedule for wk of 12/10 due to work training schedule. Manual: L UT TPR,  scapular mobs, STM pericapular mm's/UT/levator. Education  on self TRP of upper traps. x 8 mins    Specific Instructions for next treatment: Shoulder ROM and strengthening, Manual therapy and modalities as needed. VERIFY AND OR ISSUE WRITTEN  HEP FOR SITTING NECK/SCAPULAR EXERCISES. Treatment Charges: Mins Units   []  HP      [x]  Ther Exercise 28 2   [x]  Manual Therapy   8 -   []  Ther Activities     []  Aquatics     []  Vasocompression     []  Other     Total Treatment time  36 2       Assessment: [x] Progressing toward goals added scapular exercises and UT stretching and TPR,  Progressed strengthening exercises a listed in log. Encouragement to complete reps. [] No change. [x] Other pt with flat affect throughout session. Demonstrated fwd head and torso with cell phone use. Held some exercises due to pt request for shorter session. STG: (to be met in 8 treatments)  1. ? Pain: 4/10 L shoulder pain with AROM. 2. ? Strength: 4+/5 L shoulder flexion, abduction, and scapular strength to improve lifting. 3. ? Function: Optimal 30% impaired   4. Independent with Home Exercise Programs     LTG: (to be met in 12 treatments)  1. Patient will be able to lift and carry at work. 2. Patient will be able to care for chil with left UE.      Patient goals: Reduce shoulder pain     Pt. Education:  [x] Yes  [] No  [x] Reviewed Prior HEP/Ed  Method of Education: [x] Verbal  [x] Demo  [x] Written  Comprehension of Education:  [x] Verbalizes understanding. [] Demonstrates understanding. [x] Needs review.   [] Demonstrates/verbalizes HEP/Ed previously

## 2018-12-04 ENCOUNTER — OFFICE VISIT (OUTPATIENT)
Dept: ORTHOPEDIC SURGERY | Age: 33
End: 2018-12-04

## 2018-12-04 DIAGNOSIS — S62.015A CLOSED NONDISPLACED FRACTURE OF DISTAL POLE OF SCAPHOID BONE OF LEFT WRIST, INITIAL ENCOUNTER: Primary | ICD-10-CM

## 2018-12-04 PROCEDURE — 99024 POSTOP FOLLOW-UP VISIT: CPT | Performed by: ORTHOPAEDIC SURGERY

## 2018-12-04 NOTE — PROGRESS NOTES
[address] [place] [address]  Dept: [phone]  Dept Fax: [phone]        Ambulatory Follow Up      Subjective:   Olga Montero is a 29y.o. year old female who presents to our office today for routine followup regarding her   1. Closed nondisplaced fracture of distal pole of scaphoid bone of left wrist, initial encounter    . Chief Complaint   Patient presents with    Wrist Pain       HPI   Patient is here for follow-up regarding her left scaphoid fracture. Patient has been doing well in a cast. Cast is removed in clinic today. Patient is still complaining of pain over her radial styloid and snuffbox. She denies any numbness or tingling. Denies any other orthopedic complaints. Review of Systems    I have reviewed the CC, HPI, ROS, PMH, FHX, Social History. I agree with the documentation provided by other staff, residents, and/or medical students and have reviewed their documentation prior to providing my signature indicating agreement. Objective :   General: Olga Montero is a 29 y.o. female who is alert and oriented and sitting comfortably in our office. Neuro: alert. oriented  Eyes: Extra-ocular muscles intact  Mouth: Oral mucosa moist. No perioral lesions  Pulm: Respirations unlabored and regular. Skin: warm, well perfused  Psych:   Patient has good fund of knowledge and displays understanging of exam, diagnosis, and plan. MS:  Tenderness to palpation over the anatomic snuffbox. Wrist motion limited due to pain but able to flex and extend her wrists. 10° of extension and 10° of flexion noted. Pain with radial deviation. Sensation grossly intact to the thumb. Radial pulse 2+. Radiology:     History:   See above    Findings:   Views: 3 views of the left wrist  Findings: Demonstrates evidence of distal pole scaphoid fracture. Fracture unchanged from previous images. No other gross osseous abnormalities noted.   Previous

## 2018-12-06 ENCOUNTER — HOSPITAL ENCOUNTER (OUTPATIENT)
Dept: PHYSICAL THERAPY | Age: 33
Setting detail: THERAPIES SERIES
Discharge: HOME | End: 2018-12-06

## 2018-12-11 ENCOUNTER — HOSPITAL ENCOUNTER (OUTPATIENT)
Dept: PHYSICAL THERAPY | Age: 33
Setting detail: THERAPIES SERIES
Discharge: HOME | End: 2018-12-11
Payer: COMMERCIAL

## 2018-12-11 PROCEDURE — 97110 THERAPEUTIC EXERCISES: CPT

## 2018-12-13 ENCOUNTER — HOSPITAL ENCOUNTER (OUTPATIENT)
Dept: PHYSICAL THERAPY | Age: 33
Setting detail: THERAPIES SERIES
Discharge: HOME | End: 2018-12-13
Payer: COMMERCIAL

## 2018-12-13 PROCEDURE — 97110 THERAPEUTIC EXERCISES: CPT

## 2018-12-13 NOTE — FLOWSHEET NOTE
strength to improve lifting. 3. ? Function: Optimal 30% impaired   4. Independent with Home Exercise Programs     LTG: (to be met in 12 treatments)  1. Patient will be able to lift and carry at work. 2. Patient will be able to care for chil with left UE.      Patient goals: Reduce shoulder pain     Pt. Education:  [x] Yes  [] No  [x] Reviewed Prior HEP/Ed  Method of Education: [x] Verbal  [x] Demo  [x] Written  Comprehension of Education:  [x] Verbalizes understanding. [] Demonstrates understanding. [x] Needs review. [] Demonstrates/verbalizes HEP/Ed previously given. 11/20/18 - Issued written HO for UT stretches, cervical ROM, shoulder shrugs, scapular retraction, supine/sidelying shoulder ex's       Plan: [x] Continue per plan of care. [x] Other: pt states she will schedule Thursday.    Frequency:  2 x/week for 12 visits      Time In: 158 PM            Time Out: 228 PM    Electronically signed by:  Slime Burdick PTA

## 2018-12-18 ENCOUNTER — HOSPITAL ENCOUNTER (OUTPATIENT)
Dept: PHYSICAL THERAPY | Age: 33
Setting detail: THERAPIES SERIES
Discharge: HOME | End: 2018-12-18
Payer: COMMERCIAL

## 2018-12-20 ENCOUNTER — HOSPITAL ENCOUNTER (OUTPATIENT)
Dept: PHYSICAL THERAPY | Age: 33
Setting detail: THERAPIES SERIES
Discharge: HOME | End: 2018-12-20
Payer: COMMERCIAL

## 2018-12-27 ENCOUNTER — HOSPITAL ENCOUNTER (OUTPATIENT)
Dept: PHYSICAL THERAPY | Age: 33
Setting detail: THERAPIES SERIES
Discharge: HOME | End: 2018-12-27
Payer: COMMERCIAL

## 2018-12-27 ENCOUNTER — OFFICE VISIT (OUTPATIENT)
Dept: INTERNAL MEDICINE | Age: 33
End: 2018-12-27
Payer: COMMERCIAL

## 2018-12-27 VITALS
DIASTOLIC BLOOD PRESSURE: 84 MMHG | HEART RATE: 81 BPM | BODY MASS INDEX: 24.17 KG/M2 | HEIGHT: 61 IN | WEIGHT: 128 LBS | TEMPERATURE: 97.6 F | SYSTOLIC BLOOD PRESSURE: 128 MMHG

## 2018-12-27 DIAGNOSIS — H61.23 BILATERAL IMPACTED CERUMEN: Primary | ICD-10-CM

## 2018-12-27 DIAGNOSIS — H60.503 ACUTE OTITIS EXTERNA OF BOTH EARS, UNSPECIFIED TYPE: ICD-10-CM

## 2018-12-27 PROCEDURE — 1036F TOBACCO NON-USER: CPT | Performed by: STUDENT IN AN ORGANIZED HEALTH CARE EDUCATION/TRAINING PROGRAM

## 2018-12-27 PROCEDURE — 99211 OFF/OP EST MAY X REQ PHY/QHP: CPT | Performed by: INTERNAL MEDICINE

## 2018-12-27 PROCEDURE — 99213 OFFICE O/P EST LOW 20 MIN: CPT | Performed by: STUDENT IN AN ORGANIZED HEALTH CARE EDUCATION/TRAINING PROGRAM

## 2018-12-27 PROCEDURE — 4130F TOPICAL PREP RX AOE: CPT | Performed by: STUDENT IN AN ORGANIZED HEALTH CARE EDUCATION/TRAINING PROGRAM

## 2018-12-27 PROCEDURE — G8484 FLU IMMUNIZE NO ADMIN: HCPCS | Performed by: STUDENT IN AN ORGANIZED HEALTH CARE EDUCATION/TRAINING PROGRAM

## 2018-12-27 PROCEDURE — G8427 DOCREV CUR MEDS BY ELIG CLIN: HCPCS | Performed by: STUDENT IN AN ORGANIZED HEALTH CARE EDUCATION/TRAINING PROGRAM

## 2018-12-27 PROCEDURE — G8420 CALC BMI NORM PARAMETERS: HCPCS | Performed by: STUDENT IN AN ORGANIZED HEALTH CARE EDUCATION/TRAINING PROGRAM

## 2018-12-27 ASSESSMENT — ENCOUNTER SYMPTOMS
ABDOMINAL DISTENTION: 0
EYE DISCHARGE: 0
COLOR CHANGE: 0
EYE ITCHING: 0
EYE REDNESS: 0
VOMITING: 0
CHEST TIGHTNESS: 0
ANAL BLEEDING: 0
BLOOD IN STOOL: 0
WHEEZING: 0
SINUS PAIN: 0
COUGH: 0
DIARRHEA: 0
STRIDOR: 0
NAUSEA: 0
SHORTNESS OF BREATH: 0
CONSTIPATION: 0
RHINORRHEA: 0
EYE PAIN: 0
BACK PAIN: 0
ABDOMINAL PAIN: 0
CHOKING: 0
APNEA: 0
SORE THROAT: 0
SINUS PRESSURE: 0
TROUBLE SWALLOWING: 0
PHOTOPHOBIA: 0

## 2018-12-27 NOTE — PROGRESS NOTES
90 tablet 0    ibuprofen (ADVIL;MOTRIN) 400 MG tablet Take 1 tablet by mouth every 8 hours as needed for Pain or Fever 90 tablet 0    clotrimazole (LOTRIMIN) 1 % cream apply to affected area twice a day  0    clotrimazole-betamethasone (LOTRISONE) 1-0.05 % cream Apply to affected area bid externally x 4 days then daily for 3 days 1 Tube 1    Prenatal Multivit-Min-Fe-FA (MYNATAL) CAPS Take 1 tablet by mouth daily 30 capsule 12    ferrous sulfate (HARIS-ROSALIND) 325 (65 Fe) MG tablet Take 1 tablet by mouth 3 times daily (with meals) 90 tablet 6    benzoyl peroxide 5 % external liquid Wash face, chest and back 1-2 times daily 227 g 3    clindamycin (CLEOCIN T) 1 % lotion Apply to face, chest and back daily 60 mL 3    tretinoin (RETIN-A) 0.05 % cream Apply a pea sized amount to face, chest and back nightly 20 g 3    Carboxymethylcellul-Glycerin (EQ LUBRICATING EYE DROPS) 0.5-0.9 % SOLN To left eye as needed 1 Bottle 1    ketoconazole (NIZORAL) 2 % shampoo Apply topically daily as needed. 1 Bottle 3     No current facility-administered medications for this visit. Social History   Substance Use Topics    Smoking status: Never Smoker    Smokeless tobacco: Never Used    Alcohol use 0.0 oz/week      Comment: occasionally       Family History   Problem Relation Age of Onset    Diabetes Paternal Grandmother     Endometrial Cancer Paternal Grandmother     Hypertension Maternal Grandmother     Breast Cancer Neg Hx     Cancer Neg Hx     Colon Cancer Neg Hx     Eclampsia Neg Hx     Ovarian Cancer Neg Hx      Labor Neg Hx     Spont Abortions Neg Hx     Stroke Neg Hx         REVIEW OF SYSTEMS:  Review of Systems   Constitutional: Negative for activity change, appetite change, fatigue and fever. HENT: Positive for hearing loss. Negative for congestion, dental problem, ear discharge, ear pain, postnasal drip, rhinorrhea, sinus pain, sinus pressure, sore throat, tinnitus and trouble swallowing.

## 2018-12-28 ENCOUNTER — TELEPHONE (OUTPATIENT)
Dept: INTERNAL MEDICINE | Age: 33
End: 2018-12-28

## 2018-12-28 DIAGNOSIS — H66.90 EAR INFECTION: Primary | ICD-10-CM

## 2018-12-28 RX ORDER — OFLOXACIN 3 MG/ML
10 SOLUTION AURICULAR (OTIC) DAILY
Qty: 1 BOTTLE | Refills: 0 | Status: SHIPPED | OUTPATIENT
Start: 2018-12-28 | End: 2019-01-04

## 2019-01-03 ENCOUNTER — HOSPITAL ENCOUNTER (OUTPATIENT)
Dept: PHYSICAL THERAPY | Age: 34
Setting detail: THERAPIES SERIES
Discharge: HOME | End: 2019-01-03
Payer: COMMERCIAL

## 2019-01-03 PROCEDURE — 97110 THERAPEUTIC EXERCISES: CPT

## 2019-01-10 ENCOUNTER — HOSPITAL ENCOUNTER (OUTPATIENT)
Dept: PHYSICAL THERAPY | Age: 34
Setting detail: THERAPIES SERIES
Discharge: HOME | End: 2019-01-10
Payer: COMMERCIAL

## 2019-01-11 DIAGNOSIS — S62.015D CLOSED NONDISPLACED FRACTURE OF DISTAL POLE OF SCAPHOID OF LEFT WRIST WITH ROUTINE HEALING, SUBSEQUENT ENCOUNTER: Primary | ICD-10-CM

## 2019-01-15 ENCOUNTER — HOSPITAL ENCOUNTER (OUTPATIENT)
Dept: PHYSICAL THERAPY | Age: 34
Setting detail: THERAPIES SERIES
Discharge: HOME | End: 2019-01-15
Payer: COMMERCIAL

## 2019-01-15 ENCOUNTER — OFFICE VISIT (OUTPATIENT)
Dept: ORTHOPEDIC SURGERY | Age: 34
End: 2019-01-15
Payer: COMMERCIAL

## 2019-01-15 VITALS — WEIGHT: 128.09 LBS | HEIGHT: 61 IN | BODY MASS INDEX: 24.18 KG/M2

## 2019-01-15 DIAGNOSIS — S62.015D CLOSED NONDISPLACED FRACTURE OF DISTAL POLE OF SCAPHOID OF LEFT WRIST WITH ROUTINE HEALING, SUBSEQUENT ENCOUNTER: Primary | ICD-10-CM

## 2019-01-15 PROCEDURE — G8484 FLU IMMUNIZE NO ADMIN: HCPCS | Performed by: ORTHOPAEDIC SURGERY

## 2019-01-15 PROCEDURE — G8420 CALC BMI NORM PARAMETERS: HCPCS | Performed by: ORTHOPAEDIC SURGERY

## 2019-01-15 PROCEDURE — 99212 OFFICE O/P EST SF 10 MIN: CPT | Performed by: ORTHOPAEDIC SURGERY

## 2019-01-15 PROCEDURE — 1036F TOBACCO NON-USER: CPT | Performed by: ORTHOPAEDIC SURGERY

## 2019-01-15 PROCEDURE — G8427 DOCREV CUR MEDS BY ELIG CLIN: HCPCS | Performed by: ORTHOPAEDIC SURGERY

## 2019-01-15 PROCEDURE — 97110 THERAPEUTIC EXERCISES: CPT

## 2019-02-05 ENCOUNTER — OFFICE VISIT (OUTPATIENT)
Dept: INTERNAL MEDICINE | Age: 34
End: 2019-02-05
Payer: COMMERCIAL

## 2019-02-05 VITALS
DIASTOLIC BLOOD PRESSURE: 68 MMHG | WEIGHT: 130.4 LBS | BODY MASS INDEX: 24.62 KG/M2 | HEIGHT: 61 IN | SYSTOLIC BLOOD PRESSURE: 103 MMHG | HEART RATE: 76 BPM

## 2019-02-05 DIAGNOSIS — G89.29 CHRONIC BILATERAL LOW BACK PAIN WITHOUT SCIATICA: Primary | ICD-10-CM

## 2019-02-05 DIAGNOSIS — M54.50 CHRONIC BILATERAL LOW BACK PAIN WITHOUT SCIATICA: Primary | ICD-10-CM

## 2019-02-05 PROCEDURE — 99213 OFFICE O/P EST LOW 20 MIN: CPT | Performed by: INTERNAL MEDICINE

## 2019-02-05 PROCEDURE — 1036F TOBACCO NON-USER: CPT | Performed by: INTERNAL MEDICINE

## 2019-02-05 PROCEDURE — G8484 FLU IMMUNIZE NO ADMIN: HCPCS | Performed by: INTERNAL MEDICINE

## 2019-02-05 PROCEDURE — G8427 DOCREV CUR MEDS BY ELIG CLIN: HCPCS | Performed by: INTERNAL MEDICINE

## 2019-02-05 PROCEDURE — G8420 CALC BMI NORM PARAMETERS: HCPCS | Performed by: INTERNAL MEDICINE

## 2019-02-05 ASSESSMENT — PATIENT HEALTH QUESTIONNAIRE - PHQ9
2. FEELING DOWN, DEPRESSED OR HOPELESS: 0
SUM OF ALL RESPONSES TO PHQ QUESTIONS 1-9: 0
1. LITTLE INTEREST OR PLEASURE IN DOING THINGS: 0
SUM OF ALL RESPONSES TO PHQ QUESTIONS 1-9: 0
SUM OF ALL RESPONSES TO PHQ9 QUESTIONS 1 & 2: 0

## 2019-02-11 ASSESSMENT — ENCOUNTER SYMPTOMS
SHORTNESS OF BREATH: 0
BACK PAIN: 1
ABDOMINAL PAIN: 0
NAUSEA: 0
CONSTIPATION: 0
WHEEZING: 0
COUGH: 0
BLOOD IN STOOL: 0

## 2019-02-20 ENCOUNTER — HOSPITAL ENCOUNTER (EMERGENCY)
Age: 34
Discharge: HOME | End: 2019-02-20
Attending: EMERGENCY MEDICINE
Payer: COMMERCIAL

## 2019-02-20 VITALS
BODY MASS INDEX: 24.29 KG/M2 | TEMPERATURE: 98.1 F | HEART RATE: 77 BPM | WEIGHT: 132 LBS | SYSTOLIC BLOOD PRESSURE: 118 MMHG | DIASTOLIC BLOOD PRESSURE: 74 MMHG | OXYGEN SATURATION: 98 % | RESPIRATION RATE: 18 BRPM | HEIGHT: 62 IN

## 2019-02-20 DIAGNOSIS — N76.0 BV (BACTERIAL VAGINOSIS): Primary | ICD-10-CM

## 2019-02-20 DIAGNOSIS — R10.2 SUPRAPUBIC ABDOMINAL PAIN: ICD-10-CM

## 2019-02-20 DIAGNOSIS — B96.89 BV (BACTERIAL VAGINOSIS): Primary | ICD-10-CM

## 2019-02-20 LAB
BILIRUBIN URINE: NEGATIVE
COLOR: YELLOW
COMMENT UA: ABNORMAL
DIRECT EXAM: ABNORMAL
GLUCOSE URINE: NEGATIVE
HCG(URINE) PREGNANCY TEST: NEGATIVE
KETONES, URINE: ABNORMAL
LEUKOCYTE ESTERASE, URINE: NEGATIVE
Lab: ABNORMAL
NITRITE, URINE: NEGATIVE
PH UA: 6.5 (ref 5–8)
PROTEIN UA: NEGATIVE
SPECIFIC GRAVITY UA: 1.02 (ref 1–1.03)
SPECIMEN DESCRIPTION: ABNORMAL
TURBIDITY: CLEAR
URINE HGB: NEGATIVE
UROBILINOGEN, URINE: NORMAL

## 2019-02-20 PROCEDURE — 81003 URINALYSIS AUTO W/O SCOPE: CPT

## 2019-02-20 PROCEDURE — 87086 URINE CULTURE/COLONY COUNT: CPT

## 2019-02-20 PROCEDURE — 87591 N.GONORRHOEAE DNA AMP PROB: CPT

## 2019-02-20 PROCEDURE — 87491 CHLMYD TRACH DNA AMP PROBE: CPT

## 2019-02-20 PROCEDURE — 87660 TRICHOMONAS VAGIN DIR PROBE: CPT

## 2019-02-20 PROCEDURE — 84703 CHORIONIC GONADOTROPIN ASSAY: CPT

## 2019-02-20 PROCEDURE — 87510 GARDNER VAG DNA DIR PROBE: CPT

## 2019-02-20 PROCEDURE — 87480 CANDIDA DNA DIR PROBE: CPT

## 2019-02-20 PROCEDURE — G0383 LEV 4 HOSP TYPE B ED VISIT: HCPCS

## 2019-02-20 RX ORDER — METRONIDAZOLE 500 MG/1
500 TABLET ORAL 2 TIMES DAILY
Qty: 14 TABLET | Refills: 0 | Status: SHIPPED | OUTPATIENT
Start: 2019-02-20 | End: 2019-02-27

## 2019-02-20 RX ORDER — IBUPROFEN 800 MG/1
800 TABLET ORAL EVERY 8 HOURS PRN
Qty: 20 TABLET | Refills: 0 | Status: SHIPPED | OUTPATIENT
Start: 2019-02-20 | End: 2020-02-17 | Stop reason: ALTCHOICE

## 2019-02-20 ASSESSMENT — ENCOUNTER SYMPTOMS
EYE PAIN: 0
EYE DISCHARGE: 0
BACK PAIN: 0
SORE THROAT: 0
WHEEZING: 0
NAUSEA: 0
EYE ITCHING: 0
ABDOMINAL PAIN: 1
COUGH: 0
VOMITING: 0
COLOR CHANGE: 0
RHINORRHEA: 0

## 2019-02-20 ASSESSMENT — PAIN DESCRIPTION - LOCATION: LOCATION: PELVIS

## 2019-02-20 ASSESSMENT — PAIN DESCRIPTION - ORIENTATION: ORIENTATION: MID;LEFT

## 2019-02-20 ASSESSMENT — PAIN SCALES - GENERAL: PAINLEVEL_OUTOF10: 10

## 2019-02-20 ASSESSMENT — PAIN DESCRIPTION - ONSET: ONSET: SUDDEN

## 2019-02-20 ASSESSMENT — PAIN DESCRIPTION - PAIN TYPE: TYPE: ACUTE PAIN

## 2019-02-20 ASSESSMENT — PAIN DESCRIPTION - DESCRIPTORS: DESCRIPTORS: STABBING

## 2019-02-20 ASSESSMENT — PAIN DESCRIPTION - FREQUENCY: FREQUENCY: CONTINUOUS

## 2019-02-21 ENCOUNTER — CARE COORDINATION (OUTPATIENT)
Dept: CARE COORDINATION | Age: 34
End: 2019-02-21

## 2019-02-21 LAB
C TRACH DNA GENITAL QL NAA+PROBE: NEGATIVE
CULTURE: NORMAL
Lab: NORMAL
N. GONORRHOEAE DNA: NEGATIVE
SPECIMEN DESCRIPTION: NORMAL
SPECIMEN DESCRIPTION: NORMAL

## 2019-03-25 ENCOUNTER — OFFICE VISIT (OUTPATIENT)
Dept: DERMATOLOGY | Age: 34
End: 2019-03-25
Payer: COMMERCIAL

## 2019-03-25 VITALS
SYSTOLIC BLOOD PRESSURE: 130 MMHG | WEIGHT: 131.2 LBS | BODY MASS INDEX: 24.77 KG/M2 | HEIGHT: 61 IN | OXYGEN SATURATION: 97 % | HEART RATE: 73 BPM | DIASTOLIC BLOOD PRESSURE: 85 MMHG

## 2019-03-25 DIAGNOSIS — L70.0 ACNE VULGARIS: Primary | ICD-10-CM

## 2019-03-25 PROCEDURE — G8484 FLU IMMUNIZE NO ADMIN: HCPCS | Performed by: DERMATOLOGY

## 2019-03-25 PROCEDURE — 1036F TOBACCO NON-USER: CPT | Performed by: DERMATOLOGY

## 2019-03-25 PROCEDURE — 99213 OFFICE O/P EST LOW 20 MIN: CPT | Performed by: DERMATOLOGY

## 2019-03-25 PROCEDURE — G8420 CALC BMI NORM PARAMETERS: HCPCS | Performed by: DERMATOLOGY

## 2019-03-25 PROCEDURE — G8427 DOCREV CUR MEDS BY ELIG CLIN: HCPCS | Performed by: DERMATOLOGY

## 2019-03-25 RX ORDER — SPIRONOLACTONE 50 MG/1
TABLET, FILM COATED ORAL
Qty: 30 TABLET | Refills: 3 | Status: SHIPPED | OUTPATIENT
Start: 2019-03-25 | End: 2019-08-30

## 2019-03-25 RX ORDER — CLINDAMYCIN PHOSPHATE 10 UG/ML
LOTION TOPICAL
Qty: 60 ML | Refills: 3 | Status: SHIPPED | OUTPATIENT
Start: 2019-03-25 | End: 2019-08-30 | Stop reason: ALTCHOICE

## 2019-03-25 RX ORDER — TRETINOIN 0.5 MG/G
CREAM TOPICAL
Qty: 20 G | Refills: 3 | Status: SHIPPED | OUTPATIENT
Start: 2019-03-25 | End: 2019-08-30 | Stop reason: ALTCHOICE

## 2019-06-06 ENCOUNTER — NURSE ONLY (OUTPATIENT)
Dept: OBGYN | Age: 34
End: 2019-06-06
Payer: COMMERCIAL

## 2019-06-06 ENCOUNTER — HOSPITAL ENCOUNTER (OUTPATIENT)
Age: 34
Setting detail: SPECIMEN
Discharge: HOME | End: 2019-06-06
Payer: COMMERCIAL

## 2019-06-06 VITALS — WEIGHT: 133.8 LBS | BODY MASS INDEX: 25.26 KG/M2 | HEIGHT: 61 IN

## 2019-06-06 DIAGNOSIS — N91.2 AMENORRHEA: Primary | ICD-10-CM

## 2019-06-06 DIAGNOSIS — N91.2 AMENORRHEA: ICD-10-CM

## 2019-06-06 LAB
CONTROL: PRESENT
HCG QUANTITATIVE: <1 IU/L
PREGNANCY TEST URINE, POC: NEGATIVE

## 2019-06-06 PROCEDURE — 84702 CHORIONIC GONADOTROPIN TEST: CPT

## 2019-06-06 PROCEDURE — 81025 URINE PREGNANCY TEST: CPT

## 2019-06-06 PROCEDURE — 99211 OFF/OP EST MAY X REQ PHY/QHP: CPT | Performed by: OBSTETRICS & GYNECOLOGY

## 2019-06-06 PROCEDURE — 36415 COLL VENOUS BLD VENIPUNCTURE: CPT

## 2019-08-30 ENCOUNTER — OFFICE VISIT (OUTPATIENT)
Dept: DERMATOLOGY | Age: 34
End: 2019-08-30
Payer: COMMERCIAL

## 2019-08-30 VITALS
BODY MASS INDEX: 26.13 KG/M2 | OXYGEN SATURATION: 98 % | HEART RATE: 80 BPM | SYSTOLIC BLOOD PRESSURE: 133 MMHG | DIASTOLIC BLOOD PRESSURE: 86 MMHG | HEIGHT: 61 IN | WEIGHT: 138.4 LBS

## 2019-08-30 DIAGNOSIS — L70.0 ACNE VULGARIS: Primary | ICD-10-CM

## 2019-08-30 DIAGNOSIS — L82.1 DERMATOSIS PAPULOSA NIGRA: ICD-10-CM

## 2019-08-30 PROCEDURE — 99213 OFFICE O/P EST LOW 20 MIN: CPT | Performed by: DERMATOLOGY

## 2019-08-30 PROCEDURE — 1036F TOBACCO NON-USER: CPT | Performed by: DERMATOLOGY

## 2019-08-30 PROCEDURE — G8427 DOCREV CUR MEDS BY ELIG CLIN: HCPCS | Performed by: DERMATOLOGY

## 2019-08-30 PROCEDURE — G8419 CALC BMI OUT NRM PARAM NOF/U: HCPCS | Performed by: DERMATOLOGY

## 2019-08-30 RX ORDER — SPIRONOLACTONE 50 MG/1
TABLET, FILM COATED ORAL
Qty: 30 TABLET | Refills: 6 | Status: SHIPPED | OUTPATIENT
Start: 2019-08-30 | End: 2020-08-05

## 2019-08-31 NOTE — PROGRESS NOTES
this visit. ALLERGIES:   No Known Allergies    SOCIAL HISTORY:  Social History     Tobacco Use    Smoking status: Never Smoker    Smokeless tobacco: Never Used   Substance Use Topics    Alcohol use: Yes     Alcohol/week: 0.0 standard drinks     Comment: occasionally       REVIEW OF SYSTEMS:  Review of Systems   Constitutional: Negative. Skin:Denies any new changing, growing or bleeding lesions or rashes except as described in the HPI     PHYSICAL EXAM:   /86 (Site: Left Upper Arm, Position: Sitting, Cuff Size: Medium Adult)   Pulse 80   Ht 5' 1\" (1.549 m)   Wt 138 lb 6.4 oz (62.8 kg)   LMP 08/30/2019   SpO2 98%   BMI 26.15 kg/m²     General Exam:  General Appearance: No acute distress, Well nourished     Neuro: Alert and oriented to person, place and time  Psych: Normal affect   Lymph Node: Not performed    Cutaneous Exam: Performed as documented in clinic note below. Sun-exposed skin,which includes the head/face, neck, both arms, digits and/or nails was examined. Pertinent Physical Exam Findings:  Physical Exam   Skin:            Medical Necessity of Exam Performed:   Distribution of patient concerns    Additional Diagnostic Testing performed during exam: Not performed ,  Not performed    ASSESSMENT:   Diagnosis Orders   1. Acne vulgaris     2. Dermatosis papulosa nigra         Plan of Action is as Follows:  Assessment 1. Acne vulgaris  - Overall well controlled with 2 acneiform papules on exam today and no SE of aldactone reported - likely could be even better with more regular use of medications/better compliance  -Continue Benzoyl Peroxide wash. -Discontinue the Clindamycin.   -Continue the Tretinoin.   -Continue the Spironolactone reviewed side effects including lightheadedness, dizziness, menstrual spotting, breast tenderness, increased urination and teratogenicity as well as elevated potassium and the need to stay away from potassium supplements while on the medication    2.

## 2019-12-06 ENCOUNTER — TELEPHONE (OUTPATIENT)
Dept: OBGYN | Age: 34
End: 2019-12-06

## 2020-02-17 ENCOUNTER — HOSPITAL ENCOUNTER (EMERGENCY)
Age: 35
Discharge: HOME | End: 2020-02-17
Attending: EMERGENCY MEDICINE
Payer: COMMERCIAL

## 2020-02-17 ENCOUNTER — APPOINTMENT (OUTPATIENT)
Dept: GENERAL RADIOLOGY | Age: 35
End: 2020-02-17
Payer: COMMERCIAL

## 2020-02-17 VITALS
BODY MASS INDEX: 27.94 KG/M2 | DIASTOLIC BLOOD PRESSURE: 78 MMHG | OXYGEN SATURATION: 98 % | WEIGHT: 148 LBS | RESPIRATION RATE: 18 BRPM | HEIGHT: 61 IN | SYSTOLIC BLOOD PRESSURE: 130 MMHG | HEART RATE: 97 BPM | TEMPERATURE: 99.1 F

## 2020-02-17 LAB
ABSOLUTE EOS #: 0 K/UL (ref 0–0.4)
ABSOLUTE IMMATURE GRANULOCYTE: 0 K/UL (ref 0–0.3)
ABSOLUTE LYMPH #: 0.3 K/UL (ref 1–4.8)
ABSOLUTE MONO #: 0.18 K/UL (ref 0.1–0.8)
ALBUMIN SERPL-MCNC: 4 G/DL (ref 3.5–5.2)
ALBUMIN/GLOBULIN RATIO: 1.1 (ref 1–2.5)
ALP BLD-CCNC: 43 U/L (ref 35–104)
ALT SERPL-CCNC: 13 U/L (ref 5–33)
ANION GAP SERPL CALCULATED.3IONS-SCNC: 13 MMOL/L (ref 9–17)
AST SERPL-CCNC: 25 U/L
BASOPHILS # BLD: 0 % (ref 0–2)
BASOPHILS ABSOLUTE: 0 K/UL (ref 0–0.2)
BILIRUB SERPL-MCNC: 1.01 MG/DL (ref 0.3–1.2)
BUN BLDV-MCNC: 9 MG/DL (ref 6–20)
BUN/CREAT BLD: NORMAL (ref 9–20)
CALCIUM SERPL-MCNC: 9.1 MG/DL (ref 8.6–10.4)
CHLORIDE BLD-SCNC: 101 MMOL/L (ref 98–107)
CO2: 22 MMOL/L (ref 20–31)
CREAT SERPL-MCNC: 0.65 MG/DL (ref 0.5–0.9)
DIFFERENTIAL TYPE: ABNORMAL
EOSINOPHILS RELATIVE PERCENT: 0 % (ref 1–4)
GFR AFRICAN AMERICAN: >60 ML/MIN
GFR NON-AFRICAN AMERICAN: >60 ML/MIN
GFR SERPL CREATININE-BSD FRML MDRD: NORMAL ML/MIN/{1.73_M2}
GFR SERPL CREATININE-BSD FRML MDRD: NORMAL ML/MIN/{1.73_M2}
GLUCOSE BLD-MCNC: 88 MG/DL (ref 70–99)
HCG QUALITATIVE: NEGATIVE
HCT VFR BLD CALC: 29.7 % (ref 36.3–47.1)
HEMOGLOBIN: 8.8 G/DL (ref 11.9–15.1)
IMMATURE GRANULOCYTES: 0 %
LYMPHOCYTES # BLD: 5 % (ref 24–44)
MCH RBC QN AUTO: 21.7 PG (ref 25.2–33.5)
MCHC RBC AUTO-ENTMCNC: 29.6 G/DL (ref 28.4–34.8)
MCV RBC AUTO: 73.3 FL (ref 82.6–102.9)
MONOCYTES # BLD: 3 % (ref 1–7)
MORPHOLOGY: ABNORMAL
NRBC AUTOMATED: 0 PER 100 WBC
PDW BLD-RTO: 17.2 % (ref 11.8–14.4)
PLATELET # BLD: 207 K/UL (ref 138–453)
PLATELET ESTIMATE: ABNORMAL
PMV BLD AUTO: 10.1 FL (ref 8.1–13.5)
POTASSIUM SERPL-SCNC: 3.8 MMOL/L (ref 3.7–5.3)
RBC # BLD: 4.05 M/UL (ref 3.95–5.11)
RBC # BLD: ABNORMAL 10*6/UL
SEG NEUTROPHILS: 92 % (ref 36–66)
SEGMENTED NEUTROPHILS ABSOLUTE COUNT: 5.42 K/UL (ref 1.8–7.7)
SODIUM BLD-SCNC: 136 MMOL/L (ref 135–144)
TOTAL PROTEIN: 7.6 G/DL (ref 6.4–8.3)
TROPONIN INTERP: NORMAL
TROPONIN INTERP: NORMAL
TROPONIN T: NORMAL NG/ML
TROPONIN T: NORMAL NG/ML
TROPONIN, HIGH SENSITIVITY: <6 NG/L (ref 0–14)
TROPONIN, HIGH SENSITIVITY: <6 NG/L (ref 0–14)
WBC # BLD: 5.9 K/UL (ref 3.5–11.3)
WBC # BLD: ABNORMAL 10*3/UL

## 2020-02-17 PROCEDURE — 96375 TX/PRO/DX INJ NEW DRUG ADDON: CPT

## 2020-02-17 PROCEDURE — 84703 CHORIONIC GONADOTROPIN ASSAY: CPT

## 2020-02-17 PROCEDURE — 99285 EMERGENCY DEPT VISIT HI MDM: CPT

## 2020-02-17 PROCEDURE — 6370000000 HC RX 637 (ALT 250 FOR IP): Performed by: NURSE PRACTITIONER

## 2020-02-17 PROCEDURE — 84484 ASSAY OF TROPONIN QUANT: CPT

## 2020-02-17 PROCEDURE — 6360000002 HC RX W HCPCS: Performed by: NURSE PRACTITIONER

## 2020-02-17 PROCEDURE — 93005 ELECTROCARDIOGRAM TRACING: CPT | Performed by: NURSE PRACTITIONER

## 2020-02-17 PROCEDURE — 80053 COMPREHEN METABOLIC PANEL: CPT

## 2020-02-17 PROCEDURE — 71046 X-RAY EXAM CHEST 2 VIEWS: CPT

## 2020-02-17 PROCEDURE — 85025 COMPLETE CBC W/AUTO DIFF WBC: CPT

## 2020-02-17 PROCEDURE — 96374 THER/PROPH/DIAG INJ IV PUSH: CPT

## 2020-02-17 RX ORDER — BENZONATATE 100 MG/1
100 CAPSULE ORAL 3 TIMES DAILY PRN
Qty: 21 CAPSULE | Refills: 0 | Status: SHIPPED | OUTPATIENT
Start: 2020-02-17 | End: 2020-02-24

## 2020-02-17 RX ORDER — KETOROLAC TROMETHAMINE 15 MG/ML
15 INJECTION, SOLUTION INTRAMUSCULAR; INTRAVENOUS ONCE
Status: COMPLETED | OUTPATIENT
Start: 2020-02-17 | End: 2020-02-17

## 2020-02-17 RX ORDER — IBUPROFEN 800 MG/1
800 TABLET ORAL EVERY 8 HOURS PRN
Qty: 30 TABLET | Refills: 0 | Status: SHIPPED | OUTPATIENT
Start: 2020-02-17 | End: 2020-07-09

## 2020-02-17 RX ORDER — HYDROXYZINE HYDROCHLORIDE 25 MG/1
25 TABLET, FILM COATED ORAL ONCE
Status: COMPLETED | OUTPATIENT
Start: 2020-02-17 | End: 2020-02-17

## 2020-02-17 RX ORDER — ACETAMINOPHEN 500 MG
500 TABLET ORAL EVERY 6 HOURS PRN
Qty: 25 TABLET | Refills: 0 | Status: SHIPPED | OUTPATIENT
Start: 2020-02-17 | End: 2020-07-09

## 2020-02-17 RX ORDER — ONDANSETRON 2 MG/ML
4 INJECTION INTRAMUSCULAR; INTRAVENOUS ONCE
Status: COMPLETED | OUTPATIENT
Start: 2020-02-17 | End: 2020-02-17

## 2020-02-17 RX ORDER — ONDANSETRON 4 MG/1
4 TABLET, FILM COATED ORAL DAILY PRN
Qty: 15 TABLET | Refills: 0 | Status: SHIPPED | OUTPATIENT
Start: 2020-02-17 | End: 2021-02-04

## 2020-02-17 RX ADMIN — ONDANSETRON 4 MG: 2 INJECTION, SOLUTION INTRAMUSCULAR; INTRAVENOUS at 17:41

## 2020-02-17 RX ADMIN — HYDROXYZINE HYDROCHLORIDE 25 MG: 25 TABLET ORAL at 18:16

## 2020-02-17 RX ADMIN — KETOROLAC TROMETHAMINE 15 MG: 15 INJECTION, SOLUTION INTRAMUSCULAR; INTRAVENOUS at 17:41

## 2020-02-17 ASSESSMENT — ENCOUNTER SYMPTOMS
STRIDOR: 0
FACIAL SWELLING: 0
SORE THROAT: 0
DIARRHEA: 0
CHEST TIGHTNESS: 0
SHORTNESS OF BREATH: 0
ABDOMINAL DISTENTION: 0
VOMITING: 0
TROUBLE SWALLOWING: 0
BLOOD IN STOOL: 0
ABDOMINAL PAIN: 0
CONSTIPATION: 0
EYE PAIN: 0
BACK PAIN: 0
RHINORRHEA: 0
NAUSEA: 1
VOICE CHANGE: 0
PHOTOPHOBIA: 0
COUGH: 1
WHEEZING: 0

## 2020-02-17 ASSESSMENT — PAIN DESCRIPTION - ONSET: ONSET: ON-GOING

## 2020-02-17 ASSESSMENT — PAIN SCALES - GENERAL
PAINLEVEL_OUTOF10: 7
PAINLEVEL_OUTOF10: 8

## 2020-02-17 ASSESSMENT — PAIN DESCRIPTION - DESCRIPTORS: DESCRIPTORS: ACHING;CRAMPING

## 2020-02-17 ASSESSMENT — PAIN DESCRIPTION - PROGRESSION: CLINICAL_PROGRESSION: GRADUALLY WORSENING

## 2020-02-17 ASSESSMENT — PAIN DESCRIPTION - ORIENTATION: ORIENTATION: UPPER

## 2020-02-17 ASSESSMENT — PAIN DESCRIPTION - PAIN TYPE: TYPE: ACUTE PAIN

## 2020-02-17 ASSESSMENT — PAIN DESCRIPTION - LOCATION: LOCATION: BACK;HEAD;CHEST

## 2020-02-17 ASSESSMENT — PAIN DESCRIPTION - FREQUENCY: FREQUENCY: INTERMITTENT

## 2020-02-17 NOTE — ED NOTES
Pt presents to the ED with complaints of chest pressure and feeling stressed out, she states she has been having these symptoms for a few days now. She has many home stressors and feels overwhelmed but denies any SI/HI. VSS. Line, lab and EKG done.       Hanna Dacosta RN  02/17/20 50 [facility], JESUS MANUEL  02/17/20 [id number]

## 2020-02-17 NOTE — ED PROVIDER NOTES
[facility]  ED  Emergency Department Encounter  Advanced Practice Provider     Pt Name: Giovany Ryan  MRN: [id number]  [place] 1985  Date of evaluation: 20  PCP:  Jaskaran Baldwin MD    [address] [place] [address]       Chief Complaint   Patient presents with    Chest Pain     since last night    Stress       HISTORY OF PRESENT ILLNESS  (Location/Symptom, Timing/Onset,Context/Setting, Quality, Duration, Modifying Factors, Severity.)      Giovany Ryan is a 36 y.o. female who presents with midsternal chest pain, dry cough and ongoing stress. Patient states the chest pain started last night and went on throughout the day. Patient also reporting frontal headache that is not getting any better. She states she took some Tylenol which did not help. Patient denies any vision changes or photophobia. She reports some nausea but no vomiting or abdominal pain. No dizziness or numbness and tingling. Patient states the chest pain is in the center of her chest and does not radiate. She states that in the past when she has had this it was due to stress. She states she is not a smoker nor is she on any birth control. PAST MEDICAL /SURGICAL / SOCIAL / FAMILY HISTORY      has a past medical history of Benign essential hypertension antepartum, Closed nondisplaced fracture of distal pole of navicular bone of left wrist, Family history of uterine cancer, Iron deficiency anemia, and Prothrombin gene mutation ([facility] [address] has a past surgical history that includes  section (1/10/2007, 2007, 2012).     Social History     Socioeconomic History    Marital status: Single     Spouse name: Not on file    Number of children: Not on file    Years of education: Not on file    Highest education level: Not on file   Occupational History    Not on file   Social Needs    Financial resource strain: Not on file    Food insecurity:     Worry: Not on file     Inability: Not on file   [facility] Allergic/Immunologic: Negative for immunocompromised state. Neurological: Positive for headaches. Negative for dizziness, seizures, syncope, facial asymmetry, weakness, light-headedness and numbness. Hematological: Negative for adenopathy. Psychiatric/Behavioral: Negative for behavioral problems, confusion and decreased concentration. PHYSICAL EXAM  (up to 7 for level 4, 8 or more for level 5)      INITIAL VITALS:  height is 5' 1\" (1.549 m) and weight is 148 lb (67.1 kg). Her oral temperature is 99.1 °F (37.3 °C). Her blood pressure is 130/78 and her pulse is 97. Her respiration is 18 and oxygen saturation is 98%. Physical Exam  Vitals signs and nursing note reviewed. Constitutional:       General: She is not in acute distress. Appearance: Normal appearance. She is well-developed. She is not ill-appearing, toxic-appearing or diaphoretic. HENT:      Head: Normocephalic and atraumatic. Right Ear: Hearing and external ear normal.      Left Ear: Hearing and external ear normal.      Nose: Nose normal.      Mouth/Throat:      Pharynx: Uvula midline. Eyes:      General: Lids are normal.      Conjunctiva/sclera: Conjunctivae normal.   Neck:      Musculoskeletal: Full passive range of motion without pain and normal range of motion. Normal range of motion. Trachea: Trachea normal. No tracheal deviation. Cardiovascular:      Rate and Rhythm: Normal rate and regular rhythm. Pulses: Normal pulses. Heart sounds: Normal heart sounds. Comments: Normal EKG  Pulmonary:      Effort: Pulmonary effort is normal. No respiratory distress. Breath sounds: Normal breath sounds and air entry. No stridor or decreased air movement. No decreased breath sounds, rhonchi or rales. Chest:      Chest wall: No tenderness. Abdominal:      General: Abdomen is flat. Bowel sounds are normal. There is no distension. Palpations: Abdomen is soft. Tenderness:  There is no abdominal completed with a voice recognition program.  Efforts were made to edit thedictations but occasionally words are mis-transcribed.)       KATELYN Camarillo - TIMO  02/17/20 2019

## 2020-02-18 LAB
EKG ATRIAL RATE: 100 BPM
EKG P AXIS: 50 DEGREES
EKG P-R INTERVAL: 162 MS
EKG Q-T INTERVAL: 376 MS
EKG QRS DURATION: 76 MS
EKG QTC CALCULATION (BAZETT): 485 MS
EKG R AXIS: 57 DEGREES
EKG T AXIS: 30 DEGREES
EKG VENTRICULAR RATE: 100 BPM

## 2020-06-24 ENCOUNTER — TELEPHONE (OUTPATIENT)
Dept: OBGYN | Age: 35
End: 2020-06-24

## 2020-06-24 NOTE — TELEPHONE ENCOUNTER
Patient no-showed today's appointment; provider notified for review of record, patient agrees to reschedule missed appointment.

## 2020-06-26 RX ORDER — SPIRONOLACTONE 50 MG/1
TABLET, FILM COATED ORAL
Qty: 30 TABLET | Refills: 6 | OUTPATIENT
Start: 2020-06-26

## 2020-07-09 ENCOUNTER — HOSPITAL ENCOUNTER (EMERGENCY)
Age: 35
Discharge: HOME | End: 2020-07-09
Attending: EMERGENCY MEDICINE
Payer: OTHER MISCELLANEOUS

## 2020-07-09 VITALS
DIASTOLIC BLOOD PRESSURE: 75 MMHG | HEIGHT: 61 IN | RESPIRATION RATE: 19 BRPM | OXYGEN SATURATION: 99 % | TEMPERATURE: 98.2 F | WEIGHT: 155 LBS | HEART RATE: 74 BPM | SYSTOLIC BLOOD PRESSURE: 129 MMHG | BODY MASS INDEX: 29.27 KG/M2

## 2020-07-09 PROCEDURE — 6370000000 HC RX 637 (ALT 250 FOR IP): Performed by: STUDENT IN AN ORGANIZED HEALTH CARE EDUCATION/TRAINING PROGRAM

## 2020-07-09 PROCEDURE — 99283 EMERGENCY DEPT VISIT LOW MDM: CPT

## 2020-07-09 RX ORDER — CYCLOBENZAPRINE HCL 5 MG
5 TABLET ORAL 2 TIMES DAILY PRN
Qty: 10 TABLET | Refills: 0 | Status: SHIPPED | OUTPATIENT
Start: 2020-07-09 | End: 2020-07-19

## 2020-07-09 RX ORDER — CYCLOBENZAPRINE HCL 10 MG
10 TABLET ORAL ONCE
Status: COMPLETED | OUTPATIENT
Start: 2020-07-09 | End: 2020-07-09

## 2020-07-09 RX ORDER — ACETAMINOPHEN 325 MG/1
650 TABLET ORAL ONCE
Status: COMPLETED | OUTPATIENT
Start: 2020-07-09 | End: 2020-07-09

## 2020-07-09 RX ORDER — IBUPROFEN 600 MG/1
600 TABLET ORAL EVERY 6 HOURS PRN
Qty: 30 TABLET | Refills: 0 | Status: SHIPPED | OUTPATIENT
Start: 2020-07-09 | End: 2020-10-13 | Stop reason: SDUPTHER

## 2020-07-09 RX ORDER — ACETAMINOPHEN 500 MG
500 TABLET ORAL 4 TIMES DAILY PRN
Qty: 30 TABLET | Refills: 0 | Status: SHIPPED | OUTPATIENT
Start: 2020-07-09 | End: 2020-10-13 | Stop reason: SDUPTHER

## 2020-07-09 RX ORDER — IBUPROFEN 800 MG/1
800 TABLET ORAL ONCE
Status: COMPLETED | OUTPATIENT
Start: 2020-07-09 | End: 2020-07-09

## 2020-07-09 RX ADMIN — ACETAMINOPHEN 650 MG: 325 TABLET ORAL at 20:30

## 2020-07-09 RX ADMIN — CYCLOBENZAPRINE 10 MG: 10 TABLET, FILM COATED ORAL at 20:30

## 2020-07-09 RX ADMIN — IBUPROFEN 800 MG: 800 TABLET, FILM COATED ORAL at 20:30

## 2020-07-09 ASSESSMENT — PAIN SCALES - GENERAL: PAINLEVEL_OUTOF10: 8

## 2020-07-09 ASSESSMENT — PAIN DESCRIPTION - PAIN TYPE: TYPE: ACUTE PAIN

## 2020-07-09 ASSESSMENT — PAIN DESCRIPTION - ORIENTATION: ORIENTATION: LOWER

## 2020-07-09 ASSESSMENT — PAIN DESCRIPTION - LOCATION: LOCATION: BACK
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
## 2020-07-10 NOTE — ED PROVIDER NOTES
movement. Chest is benign. Abdomen is soft and nontender. Elbows nontender. Motor strength 5/5. She has tenderness in lumbar spine approximately L2 and 3 in the midline greater in the paraspinous muscles. Impression is lumbar strain rule out fracture. I recommend pregnancy testing and CT lumbar spine. Patient refuses this and states she has been here for too long will go to see her doctor tomorrow. She has decision-making capacity understands that she could have lumbar spine fracture that could lead to loss of strength or bowel control. She is willing to take this risks and wants to leave prior to full evaluation. Rick Jeffers.  Tatyana Bagley MD, [address] [facility]  Attending Emergency  Physician                Lanette Miner MD  07/09/20 9723
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
## 2020-07-24 ENCOUNTER — TELEPHONE (OUTPATIENT)
Dept: INTERNAL MEDICINE | Age: 35
End: 2020-07-24

## 2020-07-28 ENCOUNTER — OFFICE VISIT (OUTPATIENT)
Dept: INTERNAL MEDICINE | Age: 35
End: 2020-07-28
Payer: COMMERCIAL

## 2020-07-28 VITALS
HEIGHT: 61 IN | SYSTOLIC BLOOD PRESSURE: 120 MMHG | DIASTOLIC BLOOD PRESSURE: 79 MMHG | WEIGHT: 159.4 LBS | BODY MASS INDEX: 30.09 KG/M2 | HEART RATE: 79 BPM

## 2020-07-28 PROCEDURE — 1036F TOBACCO NON-USER: CPT | Performed by: STUDENT IN AN ORGANIZED HEALTH CARE EDUCATION/TRAINING PROGRAM

## 2020-07-28 PROCEDURE — G8417 CALC BMI ABV UP PARAM F/U: HCPCS | Performed by: STUDENT IN AN ORGANIZED HEALTH CARE EDUCATION/TRAINING PROGRAM

## 2020-07-28 PROCEDURE — G8427 DOCREV CUR MEDS BY ELIG CLIN: HCPCS | Performed by: STUDENT IN AN ORGANIZED HEALTH CARE EDUCATION/TRAINING PROGRAM

## 2020-07-28 PROCEDURE — 99213 OFFICE O/P EST LOW 20 MIN: CPT | Performed by: STUDENT IN AN ORGANIZED HEALTH CARE EDUCATION/TRAINING PROGRAM

## 2020-07-28 RX ORDER — MELOXICAM 7.5 MG/1
7.5 TABLET ORAL DAILY PRN
Qty: 90 TABLET | Refills: 1 | Status: SHIPPED | OUTPATIENT
Start: 2020-07-28 | End: 2021-02-04

## 2020-07-28 ASSESSMENT — PATIENT HEALTH QUESTIONNAIRE - PHQ9
SUM OF ALL RESPONSES TO PHQ QUESTIONS 1-9: 0
SUM OF ALL RESPONSES TO PHQ9 QUESTIONS 1 & 2: 0
1. LITTLE INTEREST OR PLEASURE IN DOING THINGS: 0
SUM OF ALL RESPONSES TO PHQ QUESTIONS 1-9: 0
2. FEELING DOWN, DEPRESSED OR HOPELESS: 0

## 2020-07-28 NOTE — PROGRESS NOTES
Visit Information    Have you changed or started any medications since your last visit including any over-the-counter medicines, vitamins, or herbal medicines? no   Have you stopped taking any of your medications? Is so, why? -  no  Are you having any side effects from any of your medications? - no    Have you seen any other physician or provider since your last visit?  no   Have you had any other diagnostic tests since your last visit?  no   Have you been seen in the emergency room and/or had an admission in a hospital since we last saw you?  yes -    Have you had your routine dental cleaning in the past 6 months?  no     Do you have an active MyChart account? If no, what is the barrier?   Yes    Patient Care Team:  Harvey Abel MD as PCP - General (Internal Medicine)  Harvey Abel MD as PCP - [facility]    Medical History Review  Past Medical, Family, and Social History reviewed and does contribute to the patient presenting condition    Health Maintenance   Topic Date Due    Varicella vaccine (1 of 2 - 2-dose childhood series) 06/17/1987    Flu vaccine (1) 09/01/2020    Potassium monitoring  02/17/2021    Creatinine monitoring  02/17/2021    Cervical cancer screen  01/11/2022    DTaP/Tdap/Td vaccine (8 - Td) 07/19/2023    Hepatitis B vaccine  Completed    Hib vaccine  Completed    HIV screen  Completed    Hepatitis A vaccine  Aged Out    Meningococcal (ACWY) vaccine  Aged Out    Pneumococcal 0-64 years Vaccine  Aged Out

## 2020-07-28 NOTE — PROGRESS NOTES
Visit Information    Have you changed or started any medications since your last visit including any over-the-counter medicines, vitamins, or herbal medicines? {YES/NO DEFAULT:21114}   Have you stopped taking any of your medications? Is so, why? -  {YES/NO DEFAULT:21114}  Are you having any side effects from any of your medications? - {YES/NO DEFAULT:21114}    Have you seen any other physician or provider since your last visit? {YES/NO DEFAULT:21114}   Have you had any other diagnostic tests since your last visit? {YES/NO DEFAULT:21114}   Have you been seen in the emergency room and/or had an admission in a hospital since we last saw you? {YES/NO DEFAULT:21114}   Have you had your routine dental cleaning in the past 6 months? {YES/NO DEFAULT:21114}     Do you have an active MyChart account? If no, what is the barrier? {yes no:[id number]::\"Yes\"}    Patient Care Team:  Dennis Hernandez MD as PCP - General (Internal Medicine)  Dennis Hernandez MD as PCP - [facility] Provider    Medical History Review  Past Medical, Family, and Social History reviewed and {DOES/NOT:73518} contribute to the patient presenting condition    Health Maintenance   Topic Date Due    Varicella vaccine (1 of 2 - 2-dose childhood series) 06/17/1987    Flu vaccine (1) 09/01/2020    Potassium monitoring  02/17/2021    Creatinine monitoring  02/17/2021    Cervical cancer screen  01/11/2022    DTaP/Tdap/Td vaccine (8 - Td) 07/19/2023    Hepatitis B vaccine  Completed    Hib vaccine  Completed    HIV screen  Completed    Hepatitis A vaccine  Aged Out    Meningococcal (ACWY) vaccine  Aged Out    Pneumococcal 0-64 years Vaccine  Aged Out           Visit Information    Have you changed or started any medications since your last visit including any over-the-counter medicines, vitamins, or herbal medicines? {YES/NO DEFAULT:21114}   Have you stopped taking any of your medications?  Is so, why? -  {YES/NO DEFAULT:21114}  Are you having any side effects from any of your medications? - {YES/NO DEFAULT:21114}    Have you seen any other physician or provider since your last visit? {YES/NO DEFAULT:21114}   Have you had any other diagnostic tests since your last visit? {YES/NO DEFAULT:21114}   Have you been seen in the emergency room and/or had an admission in a hospital since we last saw you? {YES/NO DEFAULT:21114}   Have you had your routine dental cleaning in the past 6 months? {YES/NO DEFAULT:21114}     Do you have an active MyChart account? If no, what is the barrier?   {yes no:[id number]::\"Yes\"}    Patient Care Team:  Radha Newton MD as PCP - General (Internal Medicine)  Radha Newton MD as PCP - [facility] Provider    Medical History Review  Past Medical, Family, and Social History reviewed and {DOES/NOT:19883} contribute to the patient presenting condition    Health Maintenance   Topic Date Due    Varicella vaccine (1 of 2 - 2-dose childhood series) 06/17/1987    Flu vaccine (1) 09/01/2020    Potassium monitoring  02/17/2021    Creatinine monitoring  02/17/2021    Cervical cancer screen  01/11/2022    DTaP/Tdap/Td vaccine (8 - Td) 07/19/2023    Hepatitis B vaccine  Completed    Hib vaccine  Completed    HIV screen  Completed    Hepatitis A vaccine  Aged Out    Meningococcal (ACWY) vaccine  Aged Out    Pneumococcal 0-64 years Vaccine  Aged Out
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
## 2020-07-28 NOTE — PATIENT INSTRUCTIONS
Patient Education        Rhomboid Muscle Strain: Rehab Exercises  Introduction  Here are some examples of exercises for you to try. The exercises may be suggested for a condition or for rehabilitation. Start each exercise slowly. Ease off the exercises if you start to have pain. You will be told when to start these exercises and which ones will work best for you. How to do the exercises  Lower neck and upper back (rhomboid) stretch   1. Stretch your arms out in front of your body. Clasp one hand on top of your other hand. 2. Gently reach out so that you feel your shoulder blades stretching away from each other. 3. Gently bend your head forward. 4. Hold for 15 to 30 seconds. 5. Repeat 2 to 4 times. Resisted rows   For this exercise, you will need elastic exercise material, such as surgical tubing or Thera-Band. 1. Put the band around a solid object, such as a bedpost, at about waist level. Stand facing where you have placed the band. Hold equal lengths of the band in each hand. 2. Start with your arms held out in front of you. 3. Pull the bands back, and move your shoulder blades together. As you finish, your elbows should be at your side and bent at 90 degrees (like the angle of the letter \"L\"). 4. Return to the starting position. 5. Repeat 8 to 12 times. Neck stretches   1. Look straight ahead, and tip your right ear to your right shoulder. Do not let your left shoulder rise as you tip your head to the right. 2. Hold for 15 to 30 seconds. 3. Tilt your head to the left. Do not let your right shoulder rise as you tip your head to the left. 4. Hold for 15 to 30 seconds. 5. Repeat 2 to 4 times to each side. Neck rotation   1. Sit in a firm chair, or stand up straight. 2. Keeping your chin level, turn your head to the right, and hold for 15 to 30 seconds. 3. Turn your head to the left, and hold for 15 to 30 seconds. 4. Repeat 2 to 4 times to each side.     Follow-up care is a key part of your treatment and safety. Be sure to make and go to all appointments, and call your doctor if you are having problems. It's also a good idea to know your test results and keep a list of the medicines you take. Where can you learn more? Go to https://toño.[employer]. org and sign in to your [employer] account. Enter G080 in the [employer] box to learn more about \"Rhomboid Muscle Strain: Rehab Exercises. \"     If you do not have an account, please click on the \"Sign Up Now\" link. Current as of: March 2, 2020               Content Version: 12.5  © 3243-5849 [employer]. Care instructions adapted under license by [facility] ([facility]). If you have questions about a medical condition or this instruction, always ask your healthcare professional. [address] any warranty or liability for your use of this information. Patient Education        Learning About Relief for Back Pain  What is back strain? Back strain is an injury that happens when you overstretch, or pull, a muscle in your back. You may hurt your back in an accident or when you exercise or lift something. Most back pain gets better with rest and time. You can take care of yourself at home to help your back heal.  What can you do first to relieve back pain? When you first feel back pain, try these steps:  · Walk. Take a short walk (10 to 20 minutes) on a level surface (no slopes, hills, or stairs) every 2 to 3 hours. Walk only distances you can manage without pain, especially leg pain. · Relax. Find a comfortable position for rest. Some people are comfortable on the floor or a medium-firm bed with a small pillow under their head and another under their knees. Some people prefer to lie on their side with a pillow between their knees. Don't stay in one position for too long. · Try heat or ice.  Try using a heating pad on a low or medium setting, or take a warm shower, for 15 to 20 minutes every 2 to 3 [employer], [employer] disclaims any warranty or liability for your use of this information. Medications e-scribe to pharmacy of pt's choice. Referral for Physical Therapy given to pt along with some locations, pt will have to chose location and make their own appt. Please take Referral to appt. Patient to return to clinic (office will call and schedule appt). AVS reviewed and given to pt. It is very important for your care that you keep your appointment. If for some reason you are unable to keep your appointment it is equally important that you call our office at [phone] to cancel your appointment and reschedule. Failure to do so may result in your termination from our practice.   MB

## 2020-08-05 ENCOUNTER — OFFICE VISIT (OUTPATIENT)
Dept: DERMATOLOGY | Age: 35
End: 2020-08-05
Payer: COMMERCIAL

## 2020-08-05 VITALS
WEIGHT: 160 LBS | TEMPERATURE: 99.3 F | HEIGHT: 61 IN | SYSTOLIC BLOOD PRESSURE: 131 MMHG | HEART RATE: 75 BPM | DIASTOLIC BLOOD PRESSURE: 81 MMHG | BODY MASS INDEX: 30.21 KG/M2

## 2020-08-05 PROCEDURE — 1036F TOBACCO NON-USER: CPT | Performed by: DERMATOLOGY

## 2020-08-05 PROCEDURE — G8427 DOCREV CUR MEDS BY ELIG CLIN: HCPCS | Performed by: DERMATOLOGY

## 2020-08-05 PROCEDURE — G8417 CALC BMI ABV UP PARAM F/U: HCPCS | Performed by: DERMATOLOGY

## 2020-08-05 PROCEDURE — 99213 OFFICE O/P EST LOW 20 MIN: CPT | Performed by: DERMATOLOGY

## 2020-08-05 RX ORDER — TRETINOIN 0.5 MG/G
CREAM TOPICAL
Qty: 45 G | Refills: 11 | Status: SHIPPED | OUTPATIENT
Start: 2020-08-05 | End: 2021-02-04 | Stop reason: SDUPTHER

## 2020-08-05 RX ORDER — SPIRONOLACTONE 50 MG/1
TABLET, FILM COATED ORAL
Qty: 30 TABLET | Refills: 6 | Status: SHIPPED | OUTPATIENT
Start: 2020-08-05 | End: 2020-11-12

## 2020-08-05 NOTE — PATIENT INSTRUCTIONS
Your Acne Treatment Prescribed by your Doctor: It is important to remember that oil glands respond very slowly and your skin will not change overnight. Your skin may get worse during the first weeks of treatment because all of the clogged pores are opening to the surface. Try not to get frustrated with your skin's slow response. Try to be consistent and follow these instructions from your doctor. If your acne has not responded to these treatments in 2-3 months, your doctor may recommend other medications. MORNING  Wash your face and other areas of acne with benzoyl peroxide. NIGHT  Wash your face and other areas of acne with a gentle wash. Apply tretinoin (Avita, Retin-A) to areas of acne in a thin layer. May include face, shoulders, back, and chest.    Take spironlactone  by mouth. It is important to apply the right topical medication (cream or gel) at the right time of the day, so please follow the instructions written above. Products with benzoyl peroxide in them can bleach towels and clothes, so use them carefully. It is important to avoid moisturizers, make-up, hair products and sunscreens made with oil. These products can contribute to acne breakouts by plugging your pores. Look for \"oil-free\" and \"non-comedogenic\" products. Many brands such as Neutrogena, Aveeno, Cetaphil, Purpose, Eucerin and Olay make moisturizers and sunscreens that are oil-free. Washing your Face:  Wash your face 2 times a day with a mild soap or prescribed facial cleanser. Be gentle in washing your face and follow these steps:  Splash water onto your face  Lather the soap in your hands and gently rub onto your face. You do not need to use a washcloth. Splash the face to rinse off the cleanser. Pat your face dry with a towel and, if it is time to, apply your prescription medication. Avoid Astringents.     Please have your pharmacist call our office if you are having trouble getting your You may want to use white towels and sheets to minimize the bleaching effect. Oral Antibiotics (Pills) for Acne:  Antibiotics are an important part of treating acne. They work from the inside of your body to kill the bacteria that cause the red, inflamed bumps and pus-filled bumps. Oral antibiotics are often used in addition to topical creams and gels. Many antibiotics can cause nausea, stomach aches, vomiting or diarrhea. It is important to take your antibiotic with a large glass of water. Taking the antibiotic with food may be helpful, except for Tetracycline which must be taken on an empty stomach to be effective. Any antibiotic can cause an allergic reaction or rash. Your doctor will regularly ask you if you are having any side effects. Commonly used acne antibiotic pills include:    Erythromycin                                        Bactrim                                        Doxycycline  Clindamycin                                         Tetracycline                                 Minocycline    If you are on Minocycline, please report any problems with headaches, blurry vision, ringing ears, joint aches or new blue-gray spots on your skin. If you are on Tetracycline or Doxycycline, your skin will be very sensitive to sunlight and will burn easily so sun protection is important. If you are on Clindamycin, please report any problems with persistent diarrhea.

## 2020-08-05 NOTE — PROGRESS NOTES
Dermatology Patient Note  [facility]  [address]  [address] [facility] [address]  Dept: [phone]  Dept Fax: [phone]      VISIT DATE: 8/5/2020   REFERRING PROVIDER: No ref. provider found      Faheem Lino is a 30 y.o. female  who presents today in the office for:    Follow-up (bpo, tretinoin and spironolactone; having breakthru breakouts and requests something stronger)      HISTORY OF PRESENT ILLNESS:  HPI Acne Followup    Huey Babinski was seen today for follow-up evaluation of acne.     Interim Course: was on BPO, tretinoin and aldactone, but due to missed appts is only on BPO/tretinoin - aldactone helped some with no SE    Areas of Involvement: face    Associated Symptoms: Pustules/Boils     Exacerbating Factors: Menstrual Cycles    Current Skin Care Regimen: BPO, tretinoin    Acne Treatment Compliance:  Not using topical and systemic medications as prescribed at last visit    Side Effects from Topical Treatments: none    Side Effects from Systemic Treatments: none    Interim Laboratory/Radiologic Evaluation: None          CURRENT MEDICATIONS:   Current Outpatient Medications   Medication Sig Dispense Refill    benzoyl peroxide 5 % external liquid Wash face, chest and back 1-2 times daily 227 g 11    spironolactone (ALDACTONE) 50 MG tablet Take 1 pill daily 30 tablet 6    tretinoin (RETIN-A) 0.05 % cream Apply a pea sized amount to face, chest and back nightly 45 g 11    meloxicam (MOBIC) 7.5 MG tablet Take 1 tablet by mouth daily as needed for Pain 90 tablet 1    acetaminophen (TYLENOL) 500 MG tablet Take 1 tablet by mouth 4 times daily as needed for Pain 30 tablet 0    ibuprofen (ADVIL;MOTRIN) 600 MG tablet Take 1 tablet by mouth every 6 hours as needed for Pain 30 tablet 0    tretinoin (RETIN-A) 0.025 % cream Apply pea sized amount to face, chest and back nightly 45 g 6    ondansetron (ZOFRAN) 4 MG tablet Take 1 tablet by mouth daily as needed for Nausea or Vomiting 15 tablet 0     No current facility-administered medications for this visit. ALLERGIES:   No Known Allergies    SOCIAL HISTORY:  Social History     Tobacco Use    Smoking status: Never Smoker    Smokeless tobacco: Never Used   Substance Use Topics    Alcohol use: Yes     Alcohol/week: 0.0 standard drinks     Comment: occasionally       REVIEW OF SYSTEMS:  Review of Systems   Constitutional: Negative. Skin:Denies any new changing, growing or bleeding lesions or rashes except as described in the HPI     PHYSICAL EXAM:   /81 (Site: Left Upper Arm, Position: Sitting, Cuff Size: Medium Adult)   Pulse 75   Temp 99.3 °F (37.4 °C)   Ht 5' 1\" (1.549 m)   Wt 160 lb (72.6 kg)   BMI 30.23 kg/m²     General Exam:  General Appearance: No acute distress, Well nourished     Neuro: Alert and oriented to person, place and time  Psych: Normal affect   Lymph Node: Not performed    Cutaneous Exam: Performed as documented in clinic note below. Sun-exposed skin,which includes the head/face, neck, both arms, digits and/or nails was examined. Pertinent Physical Exam Findings:  Physical Exam  Skin:     Comments: Focal inflammatory acne of cheeks         Medical Necessity of Exam Performed:   Distribution of patient concerns    Additional Diagnostic Testing performed during exam: Not performed ,  Not performed    ASSESSMENT:   Diagnosis Orders   1. Acne vulgaris         Plan of Action is as Follows:  Assessment 1. Acne vulgaris  - BPO wash qAM  - Tretinoin (increase to 0.05%) qHS  - Aldactone 50 mg nightly (restart) reviewed side effects including lightheadedness, dizziness, menstrual spotting, breast tenderness, increased urination and teratogenicity as well as elevated potassium and the need to stay away from potassium supplements while on the medication  - Follow up 6 months          Patient Instructions   Your Acne Treatment Prescribed by your Doctor:     It is important to remember that oil glands respond very slowly and your skin will not change overnight. Your skin may get worse during the first weeks of treatment because all of the clogged pores are opening to the surface. Try not to get frustrated with your skin's slow response. Try to be consistent and follow these instructions from your doctor. If your acne has not responded to these treatments in 2-3 months, your doctor may recommend other medications. MORNING  Wash your face and other areas of acne with benzoyl peroxide. NIGHT  Wash your face and other areas of acne with a gentle wash. Apply tretinoin (Avita, Retin-A) to areas of acne in a thin layer. May include face, shoulders, back, and chest.    Take spironlactone  by mouth. It is important to apply the right topical medication (cream or gel) at the right time of the day, so please follow the instructions written above. Products with benzoyl peroxide in them can bleach towels and clothes, so use them carefully. It is important to avoid moisturizers, make-up, hair products and sunscreens made with oil. These products can contribute to acne breakouts by plugging your pores. Look for \"oil-free\" and \"non-comedogenic\" products. Many brands such as Neutrogena, Aveeno, Cetaphil, Purpose, Eucerin and Olay make moisturizers and sunscreens that are oil-free. Washing your Face:  Wash your face 2 times a day with a mild soap or prescribed facial cleanser. Be gentle in washing your face and follow these steps:  Splash water onto your face  Lather the soap in your hands and gently rub onto your face. You do not need to use a washcloth. Splash the face to rinse off the cleanser. Pat your face dry with a towel and, if it is time to, apply your prescription medication. Avoid Astringents. Please have your pharmacist call our office if you are having trouble getting your medications or need refills.     Medications for Acne  *Topical and oral acne medications are not safe for pregnant women. No acne medications should be used by pregnant women without first consulting their physician *    Topical Medications (Creams and Gels) for Acne:  Topical medications are those applied to the outside of your skin. For these medications to work well, they need to be applied in a thin layer everywhere the acne comes and not just to the pimples you see. Follow these steps:  Put a chocolate chip size amount of medication cream/gel onto your finger. Dab the medicine onto your forehead, nose, chin, and each cheek. Then gently spread a thin layer across the entire face. Do not wash off this medicine. These medications can also be used on your chest, back and shoulder areas. Do not use over the counter acne creams or gels in addition to your prescribed medications unless directed by your doctor. Topical acne medications can cause irritation, redness, and dryness, especially when you first start them. If your prescribed topical cream or gel is too irritating at first, try using it every other day or once every 3 days instead of every day. As your skin becomes less sensitive, you may be able to start to use it every day. To help soothe the dryness, you can use an oil-free, \"non-comedogenic\" moisturizer. Many acne medications also make the skin more sensitive to sunlight, so it is important to use an oil-free, \"non-comedogenic\" sunscreen if you will be out in the sun for work or fun. Some products available include: Oil of Olay Complete Defense for Sensitive Skin, Purpose Facial Lotion, Cetaphil Lotion, Neutrogenia Moisturizer and Aveeno Moisturizer. Some products contain both moisturizer and sunscreen. Topical acne medications and washes containing Benzoyl Peroxide may bleach your clothing, towels, and bedding. Take care when using these products so that your things don't get ruined. You may want to use white towels and sheets to minimize the bleaching effect.     Oral Antibiotics

## 2020-10-13 RX ORDER — IBUPROFEN 600 MG/1
600 TABLET ORAL EVERY 8 HOURS PRN
Qty: 30 TABLET | Refills: 0 | Status: SHIPPED | OUTPATIENT
Start: 2020-10-13 | End: 2021-08-17

## 2020-10-13 RX ORDER — ACETAMINOPHEN 500 MG
500 TABLET ORAL 4 TIMES DAILY PRN
Qty: 30 TABLET | Refills: 0 | Status: SHIPPED | OUTPATIENT
Start: 2020-10-13 | End: 2021-09-07 | Stop reason: SDUPTHER

## 2020-11-12 ENCOUNTER — NURSE ONLY (OUTPATIENT)
Dept: OBGYN | Age: 35
End: 2020-11-12
Payer: COMMERCIAL

## 2020-11-12 ENCOUNTER — TELEPHONE (OUTPATIENT)
Dept: OBGYN | Age: 35
End: 2020-11-12

## 2020-11-12 VITALS
DIASTOLIC BLOOD PRESSURE: 88 MMHG | BODY MASS INDEX: 31.45 KG/M2 | HEIGHT: 61 IN | HEART RATE: 82 BPM | WEIGHT: 166.56 LBS | SYSTOLIC BLOOD PRESSURE: 126 MMHG

## 2020-11-12 LAB
CONTROL: PRESENT
PREGNANCY TEST URINE, POC: NEGATIVE

## 2020-11-12 PROCEDURE — 81025 URINE PREGNANCY TEST: CPT

## 2020-11-12 NOTE — TELEPHONE ENCOUNTER
Bridget Morrison was in today for a urine pregnancy test and is asking for a refill on a cream for folliculitis. Called patient. He states he will stop in today to have me look at his medication bottle.

## 2021-02-04 ENCOUNTER — OFFICE VISIT (OUTPATIENT)
Dept: DERMATOLOGY | Age: 36
End: 2021-02-04
Payer: COMMERCIAL

## 2021-02-04 VITALS
DIASTOLIC BLOOD PRESSURE: 88 MMHG | HEIGHT: 61 IN | OXYGEN SATURATION: 97 % | BODY MASS INDEX: 31.15 KG/M2 | WEIGHT: 165 LBS | HEART RATE: 85 BPM | TEMPERATURE: 98.8 F | SYSTOLIC BLOOD PRESSURE: 124 MMHG

## 2021-02-04 DIAGNOSIS — L70.0 ACNE VULGARIS: ICD-10-CM

## 2021-02-04 DIAGNOSIS — L81.0 POSTINFLAMMATORY HYPERPIGMENTATION: Primary | ICD-10-CM

## 2021-02-04 PROCEDURE — G8427 DOCREV CUR MEDS BY ELIG CLIN: HCPCS | Performed by: DERMATOLOGY

## 2021-02-04 PROCEDURE — G8417 CALC BMI ABV UP PARAM F/U: HCPCS | Performed by: DERMATOLOGY

## 2021-02-04 PROCEDURE — 99214 OFFICE O/P EST MOD 30 MIN: CPT | Performed by: DERMATOLOGY

## 2021-02-04 PROCEDURE — G8484 FLU IMMUNIZE NO ADMIN: HCPCS | Performed by: DERMATOLOGY

## 2021-02-04 PROCEDURE — 1036F TOBACCO NON-USER: CPT | Performed by: DERMATOLOGY

## 2021-02-04 RX ORDER — HYDROQUINONE 40 MG/G
CREAM TOPICAL
Qty: 28 G | Refills: 2 | Status: SHIPPED | OUTPATIENT
Start: 2021-02-04 | End: 2021-08-05 | Stop reason: SDUPTHER

## 2021-02-04 RX ORDER — TRETINOIN 0.5 MG/G
CREAM TOPICAL
Qty: 45 G | Refills: 11 | Status: SHIPPED | OUTPATIENT
Start: 2021-02-04 | End: 2021-08-05 | Stop reason: SDUPTHER

## 2021-02-04 RX ORDER — SPIRONOLACTONE 100 MG/1
100 TABLET, FILM COATED ORAL DAILY
Qty: 30 TABLET | Refills: 5 | Status: SHIPPED | OUTPATIENT
Start: 2021-02-04 | End: 2021-08-05 | Stop reason: SDUPTHER

## 2021-02-04 NOTE — PATIENT INSTRUCTIONS
- Apply hydroquinone focally to dark spots; 2 months on, 2 months off. NOT COVERED BY INSURANCE  - Be consistent with morning/night routine for best results  - Follow up in 6 months      Mornin. Wash with over the counter benzoyl peroxide wash (examples include: Panoxyl Wash, Acne Free brand oil-free acne cleanser, Neutrogena Clear Pore Cleanser/Mask, Clean and Clear advantage 3 in 1 exfoliating cleanser, Clean and Clear Continuous Control Acne Cleanser, Oxy maximum face wash). Dry your face with white towel to prevent bleaching of clothing. 2. Apply clindamycin 1% lotion to the face. 3. Apply an oil-free, non-comedogenic moisturizer, ideally with SPF 15+ in it. Night time:   1. Wash with a gentle face wash (such as Cerave or Cetaphil)  2. Apply a pea-sized amount of Tretinoin 0.025% cream onto your finger. Dab the medicine onto your forehead, nose, chin, and each cheek. Then gently spread a thin layer across the entire face. Do not wash off this medicine. These medications can also be used on your chest, back and shoulder areas. 3. Apply an oil-free, non-comedogenic moisturizer. 4. Take spironolactone by mouth. Common side effects of spironolactone include increased urination, irregular periods with mid-cycle spotting, breast tenderness, decreased sex drive, fatigue, headache, and dizziness. Stop taking medication and to immediately report any new onset muscle cramps or weakness, or palpitations. Pregnancy needs to be avoided by use of birth control in order to prevent feminization of a male fetus. Stay away from potassium supplements while on the medication as there is a risk of high potassium levels. It is important to remember that oil glands respond very slowly and your skin will not change overnight. Your skin may get worse during the first weeks of treatment because all of the clogged pores are opening to the surface. Try to be consistent and follow these instructions from your doctor. If your acne has not responded to these treatments in 2-3 months, your doctor may recommend other medications. Topical acne medications can cause irritation, redness, and dryness, especially when you first start them. If your prescribed topical cream or gel is too irritating at first, try using it every other day or once every 3 days instead of every day. As your skin becomes less sensitive, you may be able to start to use it every day. To help soothe the dryness, you can use an oil-free, \"non-comedogenic\" moisturizer, ideally with SPF in it. Some products available include: Cetaphil Lotion, Cerave Lotion, Neutrogenia Moisturizer and Aveeno Moisturizer. Some people find that applying their moisturizer immediately prior to the topical medication helps, and studies suggest that it does not reduce effectiveness. Please have your pharmacist call our office if you are having trouble getting your medications or need refills. Some insurance companies will not cover tretinoin; however, you may shop around for the best out-of-pocket price using the coupon website goodrx.com. Alternatively, you may purchase Differin (adapalene) gel over the counter and use in the same way. Topical and oral acne medications are not safe for pregnant women. No acne medications should be used by pregnant women without first consulting their physician.

## 2021-02-07 NOTE — PROGRESS NOTES
Dermatology Patient Note  [facility] [facility]  [address]  [address]  Dept: [phone]  Dept Fax: [phone]      VISITDATE: 2/4/2021   REFERRING PROVIDER: No ref. provider found      Augustine Watson is a 30 y.o. female  who presents today in the office for:    Follow-up (BPO, aldactone and tretinoin with no relief; needs refills)      PERTINENT HISTORY NOT LISTED ABOVE:  Patient with acne vulgaris presents for f/u  She is currently taking spironolactone 50 mg daily, bpo wash, and tretinoin cream. Thinks it isn't working well. Needs refills. Still flaring around her period    CURRENT MEDICATIONS:   Current Outpatient Medications   Medication Sig Dispense Refill    hydroquinone 4 % cream Apply twice daily 2 months on, 2 months off to dark spots 28 g 2    tretinoin (RETIN-A) 0.05 % cream Apply a pea sized amount to face, chest and back nightly 45 g 11    benzoyl peroxide 5 % external liquid Wash face, chest and back 1-2 times daily 227 g 11    spironolactone (ALDACTONE) 100 MG tablet Take 1 tablet by mouth daily 30 tablet 5    ibuprofen (ADVIL;MOTRIN) 600 MG tablet Take 1 tablet by mouth every 8 hours as needed for Pain 30 tablet 0    acetaminophen (TYLENOL) 500 MG tablet Take 1 tablet by mouth 4 times daily as needed for Pain 30 tablet 0     No current facility-administered medications for this visit. ALLERGIES:   No Known Allergies    SOCIAL HISTORY:  Social History     Tobacco Use    Smoking status: Never Smoker    Smokeless tobacco: Never Used   Substance Use Topics    Alcohol use: Yes     Alcohol/week: 0.0 standard drinks     Comment: occasionally       Pertinent ROS:  Review of Systems  Skin: Denies any new changing, growing or bleeding lesions or rashes except as described in the HPI   Constitutional: Denies fevers, chills, and malaise.     PHYSICAL EXAM: /88   Pulse 85   Temp 98.8 °F (37.1 °C)   Ht 5' 1\" (1.549 m)   Wt 165 lb (74.8 kg)   LMP 01/19/2021 (Exact Date)   SpO2 97%   BMI 31.18 kg/m²     The patient is generally well appearing, well nourished, alert and conversational. Affect is normal.    Cutaneous Exam:  Physical Exam  Sun-exposed skin, which includes the head/face, neck, both arms, digits and/or nails was examined. Diagnoses/exam findings/medical history pertinent to this visit are listed below:    Assessment and Plan:  Assessment   1. Postinflammatory hyperpigmentation due to acne  - discussed diagnosis, etiology, natural course, and treatment options   Counseled on sun protection: avoidance, seek shade; use clothing/hats/scarves; use generous quantity of sunscreen, re-apply every 2 hours. - discussed risk of ochronosis  - hydroquinone 4 % cream; Apply twice daily 2 months on, 2 months off to dark spots  Dispense: 28 g; Refill: 2    2. Acne vulgaris, lower face with papules and menstrual flares, significant PIH  - chronic illness, responding to treatment but not yet at goal   - increase dose of spironolactone to 100 mg daily, reviewed SE  - tretinoin (RETIN-A) 0.05 % cream; Apply a pea sized amount to face, chest and back nightly  Dispense: 45 g; Refill: 11  - benzoyl peroxide 5 % external liquid; Wash face, chest and back 1-2 times daily  Dispense: 227 g; Refill: 11  - spironolactone (ALDACTONE) 100 MG tablet; Take 1 tablet by mouth daily  Dispense: 30 tablet; Refill: 5          RTC 6 months    Problems:  Moderate: 1 or more chronic illnesses with exacerbation, progression, or side effects of treatment  Risk: Moderate risk of morbidity from additional diagnostic testing or treatment (  Risk of permanent scarring and/or disfigurement, and psychosocial distress if untreated,\" \"Risk of chronic pruritus and/or pain, and psychosocial distress if untreated      Patient Instructions - Apply hydroquinone focally to dark spots; 2 months on, 2 months off. NOT COVERED BY INSURANCE  - Be consistent with morning/night routine for best results  - Follow up in 6 months      Mornin. Wash with over the counter benzoyl peroxide wash (examples include: Panoxyl Wash, Acne Free brand oil-free acne cleanser, Neutrogena Clear Pore Cleanser/Mask, Clean and Clear advantage 3 in 1 exfoliating cleanser, Clean and Clear Continuous Control Acne Cleanser, Oxy maximum face wash). Dry your face with white towel to prevent bleaching of clothing. 2. Apply clindamycin 1% lotion to the face. 3. Apply an oil-free, non-comedogenic moisturizer, ideally with SPF 15+ in it. Night time:   1. Wash with a gentle face wash (such as Cerave or Cetaphil)  2. Apply a pea-sized amount of Tretinoin 0.025% cream onto your finger. Dab the medicine onto your forehead, nose, chin, and each cheek. Then gently spread a thin layer across the entire face. Do not wash off this medicine. These medications can also be used on your chest, back and shoulder areas. 3. Apply an oil-free, non-comedogenic moisturizer. 4. Take spironolactone by mouth. Common side effects of spironolactone include increased urination, irregular periods with mid-cycle spotting, breast tenderness, decreased sex drive, fatigue, headache, and dizziness. Stop taking medication and to immediately report any new onset muscle cramps or weakness, or palpitations. Pregnancy needs to be avoided by use of birth control in order to prevent feminization of a male fetus. Stay away from potassium supplements while on the medication as there is a risk of high potassium levels. It is important to remember that oil glands respond very slowly and your skin will not change overnight. Your skin may get worse during the first weeks of treatment because all of the clogged pores are opening to the surface. Try to be consistent and follow these instructions from your doctor. If your acne has not responded to these treatments in 2-3 months, your doctor may recommend other medications. Topical acne medications can cause irritation, redness, and dryness, especially when you first start them. If your prescribed topical cream or gel is too irritating at first, try using it every other day or once every 3 days instead of every day. As your skin becomes less sensitive, you may be able to start to use it every day. To help soothe the dryness, you can use an oil-free, \"non-comedogenic\" moisturizer, ideally with SPF in it. Some products available include: Cetaphil Lotion, Cerave Lotion, Neutrogenia Moisturizer and Aveeno Moisturizer. Some people find that applying their moisturizer immediately prior to the topical medication helps, and studies suggest that it does not reduce effectiveness. Please have your pharmacist call our office if you are having trouble getting your medications or need refills. Some insurance companies will not cover tretinoin; however, you may shop around for the best out-of-pocket price using the coupon website goodrx.com. Alternatively, you may purchase Differin (adapalene) gel over the counter and use in the same way. Topical and oral acne medications are not safe for pregnant women. No acne medications should be used by pregnant women without first consulting their physician. This note was created with the assistance of a speech-recognition program.  Although the intention is to generate a document that actually reflects the content of the visit, no guarantees can be provided that every mistake has been identified and corrected byediting. Electronically signed by Priscilla Pulido MD on 2/7/21 at 1:09 PM EST

## 2021-05-19 ENCOUNTER — OFFICE VISIT (OUTPATIENT)
Dept: OBGYN | Age: 36
End: 2021-05-19
Payer: COMMERCIAL

## 2021-05-19 ENCOUNTER — HOSPITAL ENCOUNTER (OUTPATIENT)
Age: 36
Setting detail: SPECIMEN
Discharge: HOME | End: 2021-05-19
Payer: COMMERCIAL

## 2021-05-19 VITALS
SYSTOLIC BLOOD PRESSURE: 127 MMHG | WEIGHT: 165 LBS | DIASTOLIC BLOOD PRESSURE: 89 MMHG | HEIGHT: 61 IN | HEART RATE: 111 BPM | BODY MASS INDEX: 31.15 KG/M2

## 2021-05-19 DIAGNOSIS — R10.2 PELVIC PAIN: ICD-10-CM

## 2021-05-19 DIAGNOSIS — Z01.419 WELL WOMAN EXAM: Primary | ICD-10-CM

## 2021-05-19 DIAGNOSIS — L73.9 FOLLICULITIS: ICD-10-CM

## 2021-05-19 DIAGNOSIS — N64.52 BILATERAL NIPPLE DISCHARGE: ICD-10-CM

## 2021-05-19 DIAGNOSIS — Z11.3 SCREENING EXAMINATION FOR STD (SEXUALLY TRANSMITTED DISEASE): ICD-10-CM

## 2021-05-19 DIAGNOSIS — N93.9 ABNORMAL UTERINE BLEEDING: ICD-10-CM

## 2021-05-19 PROCEDURE — 99395 PREV VISIT EST AGE 18-39: CPT | Performed by: STUDENT IN AN ORGANIZED HEALTH CARE EDUCATION/TRAINING PROGRAM

## 2021-05-19 RX ORDER — BENZOYL PEROXIDE 50 MG/ML
LIQUID TOPICAL
Qty: 1 BOTTLE | Refills: 0 | Status: SHIPPED | OUTPATIENT
Start: 2021-05-19 | End: 2021-12-28
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
## 2021-05-19 NOTE — PATIENT INSTRUCTIONS
Patient Education        Intrauterine Device (IUD) for Birth Control: Care Instructions  Your Care Instructions     The intrauterine device (IUD) is used to prevent pregnancy. It's a small, plastic, T-shaped device. Your doctor places the IUD in your uterus. If you and your doctor discuss it before you give birth, this can be done right after you have your baby. You are using either a hormonal IUD or a copper IUD. · Hormonal IUDs prevent pregnancy for 3 to 6 years, depending on which IUD is used. But your doctor may talk to you about leaving it in for longer. Once you have it, you don't have to do anything else to prevent pregnancy. · The copper IUD prevents pregnancy for 10 years. But your doctor may talk to you about leaving it in for longer. Once you have it, you don't have to do anything to prevent pregnancy. The IUD usually stays in the uterus until your doctor removes it. Follow-up care is a key part of your treatment and safety. Be sure to make and go to all appointments, and call your doctor if you are having problems. It's also a good idea to know your test results and keep a list of the medicines you take. How can you care for yourself at home? How do you use the IUD? · Your doctor inserts the IUD. This takes only a few minutes and can be done at your doctor's office. If you and your doctor discuss it before you give birth, the IUD can also be inserted right after you have your baby. · Your doctor may have you feel for the IUD string right after insertion, to be sure you know what it feels like. · If you want to check for the string on your own:  ? Insert a finger into your vagina and feel for the cervix, which is at the top of the vagina and feels harder than the rest of your vagina (some women say it feels like the tip of your nose). ? You should be able to feel the thin, plastic string coming out of the opening of your cervix.  If you cannot feel the string, it doesn't always mean that the IUD is out of place. Sometimes the string is just difficult to feel or has been pulled up into the cervical canal (which will not harm you). · Your doctor may want to see you 4 to 6 weeks after the IUD insertion, to make sure it is in place. What if you think the IUD is not in place? Always read the label for specific instructions. Here are some basic guidelines:  · Call your doctor and use backup birth control, such as a condom, or don't have intercourse until you know the IUD is working. · If you had intercourse, you can use emergency contraception to help prevent pregnancy. The most effective emergency contraception is prescribed by a doctor. This includes a prescription pill. If your IUD is no longer in place, a doctor may be able to insert a copper IUD as emergency contraception. You can also get emergency contraceptive pills without a prescription at most drugstores. What about side effects, safe sex, and breastfeeding? · The IUD can have side effects. ? The hormonal IUD usually reduces menstrual flow and cramping over time. It can also cause spotting, mood swings, and breast tenderness. ? The copper IUD can cause longer and heavier periods. · After an IUD is first put in, you may have some mild cramping and light spotting for 1 to 2 days. · The IUD doesn't protect against sexually transmitted infections (STIs), such as herpes and HIV/AIDS. If you're not sure whether your sex partner might have an STI, use a condom to protect against disease. · It's safe to use while breastfeeding. When should you call for help? Call your doctor now or seek immediate medical care if:    · You have pain in your belly or pelvis.     · You have severe vaginal bleeding. This means that you are soaking through your usual pads or tampons each hour for 2 or more hours.     · You have vaginal discharge that smells bad.     · You have a fever.    Watch closely for changes in your health, and be sure to contact your doctor cycle.  How can you care for the insertion site? · Remove the pressure bandage after 24 hours. Keep the area dry. · Keep an adhesive bandage on the site for 3 to 5 days after the procedure. · If you have pain, use an ice pack or take an over-the-counter pain medicine. Some soreness or bruising is normal.  What else do you need to know? · It's safe to use while breastfeeding. · The implant has side effects. ? You may have changes in your period. Your period may stop. You may also have spotting or bleeding between periods. ? You may have mood changes, less interest in sex, or weight gain. · The implant can stay in place for up to 3 years to prevent pregnancy. But your doctor may talk to you about leaving it in for longer. ? If you don't replace the implant and don't use another form of birth control, you could get pregnant. ? If you have the implant removed, you'll have to find another method of birth control. If you don't, you may get pregnant. ? Even if you are planning to get pregnant, you have to have the implant removed. · Check with your doctor before you use any other medicines. This includes over-the-counter medicines, vitamins, herbal products, and supplements. Birth control hormones may not work as well to prevent pregnancy when combined with other medicines. · The implant doesn't protect against sexually transmitted infections (STIs), such as herpes or HIV/AIDS. If you're not sure if your sex partner might have an STI, use a condom to protect against infection. When should you call for help? Call 911 anytime you think you may need emergency care. For example, call if:    · You have shortness of breath.     · You have chest pain.     · You passed out (lost consciousness).     · You cough up blood.    Call your doctor now or seek immediate medical care if:    · You have severe pain or numbness and tingling in the arm where the implant was inserted.     · You have increased pain, swelling, warmth, or redness at the insertion site.     · You have signs of a blood clot in your leg (called a deep vein thrombosis), such as:  ? Pain in your calf, back of the knee, thigh, or groin. ? Redness and swelling in your leg. Watch closely for changes in your health, and be sure to contact your doctor if:    · You can't feel your implant.     · You think your implant might be bent or broken in your arm.     · You think you might be pregnant.     · You have any problems with your birth control method. Where can you learn more? Go to https://[email or web address].[email or web address] org and sign in to your [employer] account. Enter P865 in the N-of-One box to learn more about \"Implant for Birth Control: Care Instructions. \"     If you do not have an account, please click on the \"Sign Up Now\" link. Current as of: October 8, 2020               Content Version: 12.8  © 2006-2021 [employer]. Care instructions adapted under license by [facility] ([facility]). If you have questions about a medical condition or this instruction, always ask your healthcare professional. [place] [address] any warranty or liability for your use of this information. Patient Education        Learning About Birth Control: The Shot  What is the shot? The shot is used to prevent pregnancy. You get the shot in your upper arm or rear end (buttocks). The shot gives you a dose of the hormone progestin. The shot is often called by its brand name, Depo-Provera. Progestin prevents pregnancy in these ways: It thickens the mucus in the cervix. This makes it hard for sperm to travel into the uterus. It also thins the lining of the uterus, which makes it harder for a fertilized egg to attach to the uterus. Progestin can sometimes stop the ovaries from releasing an egg each month (ovulation). The shot provides birth control for 3 months at a time. You then need another shot. The shot may cause bone loss.  Talk to your doctor about the risks and benefits. How well does it work? In the first year of use:  · When the shot is used exactly as directed, fewer than 1 woman out of 100 has an unplanned pregnancy. · When the shot is not used exactly as directed, 6 women out of 100 have an unplanned pregnancy. Be sure to tell your doctor about any health problems you have or medicines you take. He or she can help you choose the birth control method that is right for you. What are the advantages of the shot? · The shot is one of the most effective methods of birth control. · It's convenient. You need to get a shot only once every 3 months to prevent pregnancy. You don't have to interrupt sex to protect against pregnancy. · It prevents pregnancy for 3 months at a time. You don't have to worry about birth control for this time. · It's safe to use while breastfeeding. · The shot may reduce heavy bleeding and cramping. · The shot doesn't contain estrogen. So you can use it if you don't want to take estrogen or can't take estrogen because you have certain health problems or concerns. What are the disadvantages of the shot? · The shot doesn't protect against sexually transmitted infections (STIs), such as herpes or HIV/AIDS. If you aren't sure if your sex partner might have an STI, use a condom to protect against disease. · The shot may cause bone loss in some women. Talk to your doctor about the risks and benefits. · The shot is needed every 3 months. Any side effects may last 3 months or longer. ? The shot may cause irregular periods, or you may have spotting between periods. You may also stop getting a period. Some women see having no period as an advantage. ? It may cause mood changes, less interest in sex, or weight gain. · If you want to get pregnant, it may take up to 18 months after you stop getting the shot.  This is because the hormones the shot provided have to leave your system, and your body has to readjust.  Where can you learn more? Go to https://chpepiceweb.[email or web address] and sign in to your [employer] account. Enter W440 in the BoomTown box to learn more about \"Learning About Birth Control: The Shot. \"     If you do not have an account, please click on the \"Sign Up Now\" link. Current as of: October 8, 2020               Content Version: 12.8  © 2006-2021 [employer]wise[employer]orporated. Care instructions adapted under license by [facility] ([facility]). If you have questions about a medical condition or this instruction, always ask your healthcare professional. [address] any warranty or liability for your use of this information.

## 2021-05-20 ENCOUNTER — HOSPITAL ENCOUNTER (OUTPATIENT)
Age: 36
Setting detail: SPECIMEN
Discharge: HOME | End: 2021-05-20
Payer: COMMERCIAL

## 2021-05-20 DIAGNOSIS — N93.9 ABNORMAL UTERINE BLEEDING: ICD-10-CM

## 2021-05-20 DIAGNOSIS — N64.52 BILATERAL NIPPLE DISCHARGE: ICD-10-CM

## 2021-05-20 DIAGNOSIS — Z11.3 SCREENING EXAMINATION FOR STD (SEXUALLY TRANSMITTED DISEASE): ICD-10-CM

## 2021-05-20 DIAGNOSIS — Z01.419 WELL WOMAN EXAM: ICD-10-CM

## 2021-05-20 LAB
ABSOLUTE EOS #: 0.04 K/UL (ref 0–0.44)
ABSOLUTE IMMATURE GRANULOCYTE: <0.03 K/UL (ref 0–0.3)
ABSOLUTE LYMPH #: 1.3 K/UL (ref 1.1–3.7)
ABSOLUTE MONO #: 0.41 K/UL (ref 0.1–1.2)
BASOPHILS # BLD: 1 % (ref 0–2)
BASOPHILS ABSOLUTE: 0.03 K/UL (ref 0–0.2)
DIFFERENTIAL TYPE: ABNORMAL
DIRECT EXAM: ABNORMAL
EOSINOPHILS RELATIVE PERCENT: 1 % (ref 1–4)
HCG QUANTITATIVE: <1 IU/L
HCT VFR BLD CALC: 33.6 % (ref 36.3–47.1)
HEMOGLOBIN: 10 G/DL (ref 11.9–15.1)
HEPATITIS B SURFACE ANTIGEN: NONREACTIVE
HIV AG/AB: NONREACTIVE
IMMATURE GRANULOCYTES: 0 %
LYMPHOCYTES # BLD: 30 % (ref 24–43)
Lab: ABNORMAL
MCH RBC QN AUTO: 23.4 PG (ref 25.2–33.5)
MCHC RBC AUTO-ENTMCNC: 29.8 G/DL (ref 28.4–34.8)
MCV RBC AUTO: 78.7 FL (ref 82.6–102.9)
MONOCYTES # BLD: 9 % (ref 3–12)
NRBC AUTOMATED: 0 PER 100 WBC
PDW BLD-RTO: 15.8 % (ref 11.8–14.4)
PLATELET # BLD: 324 K/UL (ref 138–453)
PLATELET ESTIMATE: ABNORMAL
PMV BLD AUTO: 10.3 FL (ref 8.1–13.5)
PROLACTIN: 16.76 UG/L (ref 4.79–23.3)
RBC # BLD: 4.27 M/UL (ref 3.95–5.11)
RBC # BLD: ABNORMAL 10*6/UL
SEG NEUTROPHILS: 59 % (ref 36–65)
SEGMENTED NEUTROPHILS ABSOLUTE COUNT: 2.59 K/UL (ref 1.5–8.1)
SPECIMEN DESCRIPTION: ABNORMAL
T. PALLIDUM, IGG: NONREACTIVE
TSH SERPL DL<=0.05 MIU/L-ACNC: 0.49 MIU/L (ref 0.3–5)
WBC # BLD: 4.4 K/UL (ref 3.5–11.3)
WBC # BLD: ABNORMAL 10*3/UL

## 2021-05-21 LAB
C TRACH DNA GENITAL QL NAA+PROBE: NEGATIVE
N. GONORRHOEAE DNA: NEGATIVE
SPECIMEN DESCRIPTION: NORMAL

## 2021-05-21 RX ORDER — METRONIDAZOLE 500 MG/1
500 TABLET ORAL 2 TIMES DAILY
Qty: 14 TABLET | Refills: 0 | Status: SHIPPED | OUTPATIENT
Start: 2021-05-21 | End: 2021-05-28

## 2021-05-26 NOTE — PROGRESS NOTES
Attending Physician Statement  I have discussed the care of [facility], including pertinent history and exam findings,  with the resident. I have reviewed the key elements of all parts of the encounter with the resident. I agree with the assessment, plan and orders as documented by the resident.   (GE Modifier)

## 2021-08-05 ENCOUNTER — OFFICE VISIT (OUTPATIENT)
Dept: DERMATOLOGY | Age: 36
End: 2021-08-05
Payer: COMMERCIAL

## 2021-08-05 VITALS
DIASTOLIC BLOOD PRESSURE: 78 MMHG | HEIGHT: 61 IN | BODY MASS INDEX: 31.68 KG/M2 | TEMPERATURE: 98.8 F | HEART RATE: 79 BPM | OXYGEN SATURATION: 98 % | WEIGHT: 167.8 LBS | SYSTOLIC BLOOD PRESSURE: 113 MMHG

## 2021-08-05 DIAGNOSIS — L81.0 POSTINFLAMMATORY HYPERPIGMENTATION: ICD-10-CM

## 2021-08-05 DIAGNOSIS — L70.0 ACNE VULGARIS: ICD-10-CM

## 2021-08-05 PROCEDURE — G8428 CUR MEDS NOT DOCUMENT: HCPCS | Performed by: DERMATOLOGY

## 2021-08-05 PROCEDURE — G8417 CALC BMI ABV UP PARAM F/U: HCPCS | Performed by: DERMATOLOGY

## 2021-08-05 PROCEDURE — 1036F TOBACCO NON-USER: CPT | Performed by: DERMATOLOGY

## 2021-08-05 PROCEDURE — 99214 OFFICE O/P EST MOD 30 MIN: CPT | Performed by: DERMATOLOGY

## 2021-08-05 RX ORDER — DOXYCYCLINE HYCLATE 100 MG/1
CAPSULE ORAL
Qty: 60 CAPSULE | Refills: 2 | Status: SHIPPED | OUTPATIENT
Start: 2021-08-05 | End: 2021-12-28

## 2021-08-05 RX ORDER — HYDROQUINONE 40 MG/G
CREAM TOPICAL
Qty: 28 G | Refills: 2 | Status: SHIPPED | OUTPATIENT
Start: 2021-08-05 | End: 2022-01-31 | Stop reason: ALTCHOICE

## 2021-08-05 RX ORDER — SPIRONOLACTONE 100 MG/1
100 TABLET, FILM COATED ORAL DAILY
Qty: 30 TABLET | Refills: 5 | Status: SHIPPED | OUTPATIENT
Start: 2021-08-05 | End: 2021-12-28

## 2021-08-05 RX ORDER — CLINDAMYCIN PHOSPHATE 10 UG/ML
LOTION TOPICAL
Qty: 60 ML | Refills: 3 | Status: SHIPPED | OUTPATIENT
Start: 2021-08-05 | End: 2021-12-28

## 2021-08-05 RX ORDER — TRETINOIN 0.5 MG/G
CREAM TOPICAL
Qty: 45 G | Refills: 11 | Status: SHIPPED | OUTPATIENT
Start: 2021-08-05 | End: 2022-01-31 | Stop reason: ALTCHOICE
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
## 2021-08-05 NOTE — PATIENT INSTRUCTIONS
- Labs today    Mornin. Wash with over the counter benzoyl peroxide wash (examples include: Cerave Acne Foaming Cream Cleanser, Panoxyl Wash, Acne Free brand oil-free acne cleanser, Neutrogena Clear Pore Cleanser/Mask, Clean and Clear advantage 3 in 1 exfoliating cleanser, Clean and Clear Continuous Control Acne Cleanser, Oxy maximum face wash). Dry your face with white towel to prevent bleaching of clothing. 2. Apply clindamycin 1% lotion to the face. 3. Apply an oil-free, non-comedogenic moisturizer, ideally with SPF 30+ in it. 4. Take Doxycycline pill with a full glass of water and a meal.    Night time:   1. Wash with a gentle face wash (such as Cerave or Cetaphil)  2. Apply a pea-sized amount of Tretinoin 0.025% cream/onto your finger. Dab the medicine onto your forehead, nose, chin, and each cheek. Then gently spread a thin layer across the entire face. Do not wash off this medicine. These medications can also be used on your chest, back and shoulder areas. 3. Apply an oil-free, non-comedogenic moisturizes (may do this prior to tretinoin if skin is sensitive)  4. Take Doxycycline pill with a full glass of water and a meal, at least one hour prior to bedtime. 5. Spironolactone at bedtime    Do not lay down for at least 1 hour after taking doxycycline, as it can cause a pill esophagitis. Avoid excessive dairy products for at least 2 hours after taking doxycycline as it will cause the medication to become inactive. You can not get pregnant while on this medication as it can cause birth defects. This medication can make your skin sensitive to the sun so be sure to use sunscreen. If you develop a persistent headache or vision changes, stop the medication and call the office. It is important to remember that oil glands respond very slowly and your skin will not change overnight. Your skin may get worse during the first weeks of treatment because all of the clogged pores are opening to the surface.  Try Patient states she continues to have pressure and urge to urinate intermittently. She states she has felt 100% improved when she has been on antibiotics the past two times. Once she stops the antibiotic she states the pressure returns.     Will offer suppression Keflex 250 mg QHS x 3 months to evaluate if cystitis improves. Can extend to 6 months if improved but not completely.    to be consistent and follow these instructions from your doctor. If your acne has not responded to these treatments in 2-3 months, your doctor may recommend other medications. Topical acne medications can cause irritation, redness, and dryness, especially when you first start them. If your prescribed topical cream or gel is too irritating at first, try using it every other day or once every 3 days instead of every day. As your skin becomes less sensitive, you may be able to start to use it every day. To help soothe the dryness, you can use an oil-free, \"non-comedogenic\" moisturizer, ideally with SPF in it. Some products available include: Cetaphil Lotion, Cerave Lotion, Neutrogenia Moisturizer and Aveeno Moisturizer. Some people find that applying their moisturizer immediately prior to the topical medication helps, and studies suggest that it does not reduce effectiveness. Please have your pharmacist call our office if you are having trouble getting your medications or need refills. Some insurance companies will not cover tretinoin; however, you may shop around for the best out-of-pocket price using the coupon website goodrx.com. Alternatively, you may purchase Differin (adapalene) gel over the counter and use in the same way. Topical and oral acne medications are not safe for pregnant women. No acne medications should be used by pregnant women without first consulting their physician. Patient Education Regarding Isotretinion    Isotretinoin is a good medication for many people struggling with severe acne. Most people tolerate it well. However, isotretinoin is a medication that does have some potentially serious side effects. The most serious side effect occurs when someone who is pregnant takes isotretinoin; therefore, someone who is pregnant must never be exposed to isotretinoin. Taking isotretinoin while pregnant has a high chance of causing severe birth defects or deformities in the baby. Because of this serious effect, a national program called I-pledge was developed to closely monitor the use of Isotretinoin. Everyone started on isotretinoin, male or female, must be enrolled in the I pledge program which pledges to prevent pregnancy in those taking isotretinoin. I-pledge Counseling Reviewed: If you are a female and become pregnant on isotretinoin, there is a high likelihood that the baby will have serious birth defects. Therefore, in order to be started on isotretinoin, females must be on 2 forms of birth control. The types of birth control acceptable for isotretinoin use will be discussed with you by your physician. It is important that you remain on the 2 forms of birth control the entire time that you are on isotretinoin and for one month after stopping isotretinoin. If you have sexual intercourse without using the 2 forms of birth control or if there is any chance that you could be pregnant, it is important that you immediately stop your isotretinoin and notify your physician. You must also have a pregnancy test monthly. While on isotretinoin, you must never share your medication with anyone else. You must also never donate your blood while on isotretinoin and for one month after. Prior to filling your prescription each month, you will need to take an online test to verify your knowledge regarding the dangers of becoming pregnant while on isotretinoin. If you are a male, you should use condoms if you are sexually active to prevent pregnancy in your partner while on isotretinoin. A small amount of the isotretinoin drug is present in the semen of men who take it, and it is important not to expose females to this isotretinoin during sexual intercourse. You must never share your medication with anyone else. You must also never donate your blood while on isotretinoin and for one month after.     Other potential side effects:  Common side effects that may occur during the course of treatment include severely chapped lips, nose bleeds, eye dryness, dry skin, hair thinning, joint aches, and muscle aches. While patients are on isotretinoin, their skin may heal poorly or more slowly. Patients are also very sensitive to the sun and at risk of having severe sunburns while taking isotretinoin. Regular use of a lip balm, Vaseline, or Aquaphor to the lips is important to prevent excess chapping. Vaseline can be put in the nostrils at night to prevent nose bleeds. Facial moisturizers that are noncomedogenic (wont clog pores) can be used on the face and regular moisturizers can be used on the body. Patients can sometimes not use their contact lenses while on isotretinoin and may need to use eye lubricants or artificial tears. Patients can sometimes experience nearsightedness when driving at night. Over the counter ibuprofen is fine to take for muscle and joint pain. Avoid Tylenol or acetaminophen. Please notify your physician if muscle or joint pain is not controlled with over the counter ibuprofen. Avoid body piercing, and elective procedures that involve cutting or lasering the skin while on isotretinoin. Avoid prolonged sun exposure and wear sunscreen and sun protective clothing to prevent sunburns especially during spring and summer months. Other rare but more serious side effects may occur on isotretinoin. These include depression or other mood disorders, increased production of spinal fluid, inflammation of the liver, inflammation of the pancreas, elevated cholesterol or triglyceride levels, and blood cell abnormalities. Although these side effects are rare and most patients do not develop them, patients on isotretinoin need to be monitored closely with monthly labs and monthly visits with your doctor. It is important to never drink alcohol while on isotretinoin as this may increase the likelihood of inflammation of the liver.  It is important to be honest with us about any changes in your mood, depression, or suicidal thoughts while on isotretinion. We also need to be made aware of recurrent or severe headaches that do not respond to over the counter medications or are associated with blurry vision. The labs must be obtained after fasting, meaning no food or drink other than water for 12 hours before the test. We cannot refill your isotretinoin unless you return for your monthly appointments and have your monthly labs performed. If you have inflammatory bowel disease, taking isotretinoin can worsen your symptoms. Some people have developed inflammatory bowel disease while on isotretinoin or after stopping it. Studies have not concluded that there is a direct causative relationship between isotretinoin and inflammatory bowel disease. Other medications:   Patients should stop all other acne medications while on isotretinoin including both oral and topical medications. Your dermatologist will review your other medications to make sure these are safe to continue while on isotretinoin. Please notify all physicians that treat you that you are on isotretinoin so that they are aware of this when prescribing new medications. Do not take a multivitamin or vitamin A supplement while on isotretinoin.

## 2021-08-17 ENCOUNTER — APPOINTMENT (OUTPATIENT)
Dept: CT IMAGING | Age: 36
End: 2021-08-17
Payer: COMMERCIAL

## 2021-08-17 ENCOUNTER — HOSPITAL ENCOUNTER (EMERGENCY)
Age: 36
Discharge: HOME | End: 2021-08-17
Attending: EMERGENCY MEDICINE
Payer: COMMERCIAL

## 2021-08-17 VITALS
TEMPERATURE: 98.3 F | RESPIRATION RATE: 18 BRPM | DIASTOLIC BLOOD PRESSURE: 94 MMHG | SYSTOLIC BLOOD PRESSURE: 148 MMHG | OXYGEN SATURATION: 99 % | BODY MASS INDEX: 31.53 KG/M2 | HEIGHT: 61 IN | HEART RATE: 77 BPM | WEIGHT: 167 LBS

## 2021-08-17 DIAGNOSIS — R10.9 UNDIFFERENTIATED ABDOMINAL PAIN: Primary | ICD-10-CM

## 2021-08-17 DIAGNOSIS — K52.9 ENTERITIS: ICD-10-CM

## 2021-08-17 LAB
-: NORMAL
ABSOLUTE EOS #: 0.05 K/UL (ref 0–0.44)
ABSOLUTE IMMATURE GRANULOCYTE: 0.01 K/UL (ref 0–0.3)
ABSOLUTE LYMPH #: 1.29 K/UL (ref 1.1–3.7)
ABSOLUTE MONO #: 0.48 K/UL (ref 0.1–1.2)
ALBUMIN SERPL-MCNC: 4.2 G/DL (ref 3.5–5.2)
ALBUMIN/GLOBULIN RATIO: NORMAL (ref 1–2.5)
ALP BLD-CCNC: 65 U/L (ref 35–104)
ALT SERPL-CCNC: 11 U/L (ref 5–33)
AMORPHOUS: NORMAL
ANION GAP SERPL CALCULATED.3IONS-SCNC: 11 MMOL/L (ref 9–17)
AST SERPL-CCNC: 27 U/L
BACTERIA: NORMAL
BASOPHILS # BLD: 0 % (ref 0–2)
BASOPHILS ABSOLUTE: <0.03 K/UL (ref 0–0.2)
BILIRUB SERPL-MCNC: 0.45 MG/DL (ref 0.3–1.2)
BILIRUBIN URINE: NEGATIVE
BUN BLDV-MCNC: 10 MG/DL (ref 6–20)
BUN/CREAT BLD: 14 (ref 9–20)
CALCIUM SERPL-MCNC: 9.1 MG/DL (ref 8.6–10.4)
CASTS UA: NORMAL /LPF
CHLORIDE BLD-SCNC: 104 MMOL/L (ref 98–107)
CHP ED QC CHECK: YES
CO2: 24 MMOL/L (ref 20–31)
COLOR: YELLOW
COMMENT UA: ABNORMAL
CREAT SERPL-MCNC: 0.69 MG/DL (ref 0.5–0.9)
CRYSTALS, UA: NORMAL /HPF
DIFFERENTIAL TYPE: ABNORMAL
EOSINOPHILS RELATIVE PERCENT: 1 % (ref 1–4)
EPITHELIAL CELLS UA: NORMAL /HPF (ref 0–5)
GFR AFRICAN AMERICAN: >60 ML/MIN
GFR NON-AFRICAN AMERICAN: >60 ML/MIN
GFR SERPL CREATININE-BSD FRML MDRD: NORMAL ML/MIN/{1.73_M2}
GFR SERPL CREATININE-BSD FRML MDRD: NORMAL ML/MIN/{1.73_M2}
GLUCOSE BLD-MCNC: 84 MG/DL (ref 70–99)
GLUCOSE URINE: NEGATIVE
HCT VFR BLD CALC: 32.7 % (ref 36.3–47.1)
HEMOGLOBIN: 9.7 G/DL (ref 11.9–15.1)
IMMATURE GRANULOCYTES: 0 %
KETONES, URINE: NEGATIVE
LEUKOCYTE ESTERASE, URINE: NEGATIVE
LYMPHOCYTES # BLD: 25 % (ref 24–43)
MCH RBC QN AUTO: 23.2 PG (ref 25.2–33.5)
MCHC RBC AUTO-ENTMCNC: 29.7 G/DL (ref 28.4–34.8)
MCV RBC AUTO: 78.2 FL (ref 82.6–102.9)
MONOCYTES # BLD: 9 % (ref 3–12)
MUCUS: NORMAL
NITRITE, URINE: NEGATIVE
NRBC AUTOMATED: 0 PER 100 WBC
OTHER OBSERVATIONS UA: NORMAL
PDW BLD-RTO: 17.2 % (ref 11.8–14.4)
PH UA: 7 (ref 5–8)
PLATELET # BLD: 304 K/UL (ref 138–453)
PLATELET ESTIMATE: ABNORMAL
PMV BLD AUTO: 10 FL (ref 8.1–13.5)
POTASSIUM SERPL-SCNC: 4.2 MMOL/L (ref 3.7–5.3)
PREGNANCY TEST URINE, POC: NEGATIVE
PROTEIN UA: NEGATIVE
RBC # BLD: 4.18 M/UL (ref 3.95–5.11)
RBC # BLD: ABNORMAL 10*6/UL
RBC UA: NORMAL /HPF (ref 0–2)
RENAL EPITHELIAL, UA: NORMAL /HPF
SEG NEUTROPHILS: 65 % (ref 36–65)
SEGMENTED NEUTROPHILS ABSOLUTE COUNT: 3.28 K/UL (ref 1.5–8.1)
SODIUM BLD-SCNC: 139 MMOL/L (ref 135–144)
SPECIFIC GRAVITY UA: 1.01 (ref 1–1.03)
TOTAL PROTEIN: 7.8 G/DL (ref 6.4–8.3)
TRICHOMONAS: NORMAL
TURBIDITY: ABNORMAL
URINE HGB: NEGATIVE
UROBILINOGEN, URINE: NORMAL
WBC # BLD: 5.1 K/UL (ref 3.5–11.3)
WBC # BLD: ABNORMAL 10*3/UL
WBC UA: NORMAL /HPF (ref 0–5)
YEAST: NORMAL

## 2021-08-17 PROCEDURE — 85025 COMPLETE CBC W/AUTO DIFF WBC: CPT

## 2021-08-17 PROCEDURE — 6370000000 HC RX 637 (ALT 250 FOR IP): Performed by: EMERGENCY MEDICINE

## 2021-08-17 PROCEDURE — 81001 URINALYSIS AUTO W/SCOPE: CPT

## 2021-08-17 PROCEDURE — 96374 THER/PROPH/DIAG INJ IV PUSH: CPT

## 2021-08-17 PROCEDURE — 6360000004 HC RX CONTRAST MEDICATION: Performed by: EMERGENCY MEDICINE

## 2021-08-17 PROCEDURE — 99283 EMERGENCY DEPT VISIT LOW MDM: CPT

## 2021-08-17 PROCEDURE — 80053 COMPREHEN METABOLIC PANEL: CPT

## 2021-08-17 PROCEDURE — 2580000003 HC RX 258: Performed by: EMERGENCY MEDICINE

## 2021-08-17 PROCEDURE — 87491 CHLMYD TRACH DNA AMP PROBE: CPT

## 2021-08-17 PROCEDURE — 87591 N.GONORRHOEAE DNA AMP PROB: CPT

## 2021-08-17 PROCEDURE — 6360000002 HC RX W HCPCS: Performed by: EMERGENCY MEDICINE

## 2021-08-17 PROCEDURE — 81025 URINE PREGNANCY TEST: CPT

## 2021-08-17 PROCEDURE — 74177 CT ABD & PELVIS W/CONTRAST: CPT

## 2021-08-17 RX ORDER — 0.9 % SODIUM CHLORIDE 0.9 %
80 INTRAVENOUS SOLUTION INTRAVENOUS ONCE
Status: COMPLETED | OUTPATIENT
Start: 2021-08-17 | End: 2021-08-17

## 2021-08-17 RX ORDER — ACETAMINOPHEN 500 MG
1000 TABLET ORAL ONCE
Status: COMPLETED | OUTPATIENT
Start: 2021-08-17 | End: 2021-08-17

## 2021-08-17 RX ORDER — IBUPROFEN 800 MG/1
800 TABLET ORAL 2 TIMES DAILY PRN
Qty: 180 TABLET | Refills: 0 | Status: SHIPPED | OUTPATIENT
Start: 2021-08-17 | End: 2021-09-07 | Stop reason: SDUPTHER

## 2021-08-17 RX ORDER — KETOROLAC TROMETHAMINE 30 MG/ML
60 INJECTION, SOLUTION INTRAMUSCULAR; INTRAVENOUS ONCE
Status: DISCONTINUED | OUTPATIENT
Start: 2021-08-17 | End: 2021-08-17

## 2021-08-17 RX ORDER — 0.9 % SODIUM CHLORIDE 0.9 %
1000 INTRAVENOUS SOLUTION INTRAVENOUS ONCE
Status: COMPLETED | OUTPATIENT
Start: 2021-08-17 | End: 2021-08-17

## 2021-08-17 RX ORDER — SODIUM CHLORIDE 0.9 % (FLUSH) 0.9 %
10 SYRINGE (ML) INJECTION PRN
Status: DISCONTINUED | OUTPATIENT
Start: 2021-08-17 | End: 2021-08-18 | Stop reason: HOSPADM

## 2021-08-17 RX ORDER — KETOROLAC TROMETHAMINE 15 MG/ML
15 INJECTION, SOLUTION INTRAMUSCULAR; INTRAVENOUS ONCE
Status: COMPLETED | OUTPATIENT
Start: 2021-08-17 | End: 2021-08-17

## 2021-08-17 RX ADMIN — SODIUM CHLORIDE 80 ML: 9 INJECTION, SOLUTION INTRAVENOUS at 20:42

## 2021-08-17 RX ADMIN — SODIUM CHLORIDE 1000 ML: 9 INJECTION, SOLUTION INTRAVENOUS at 20:31

## 2021-08-17 RX ADMIN — SODIUM CHLORIDE, PRESERVATIVE FREE 10 ML: 5 INJECTION INTRAVENOUS at 20:43

## 2021-08-17 RX ADMIN — KETOROLAC TROMETHAMINE 15 MG: 15 INJECTION, SOLUTION INTRAMUSCULAR; INTRAVENOUS at 20:31

## 2021-08-17 RX ADMIN — IOPAMIDOL 75 ML: 755 INJECTION, SOLUTION INTRAVENOUS at 20:43

## 2021-08-17 RX ADMIN — ACETAMINOPHEN 1000 MG: 500 TABLET ORAL at 20:19

## 2021-08-17 ASSESSMENT — PAIN DESCRIPTION - LOCATION: LOCATION: ABDOMEN;PELVIS

## 2021-08-17 ASSESSMENT — PAIN DESCRIPTION - PAIN TYPE: TYPE: ACUTE PAIN

## 2021-08-17 ASSESSMENT — PAIN DESCRIPTION - DESCRIPTORS: DESCRIPTORS: CONSTANT;DISCOMFORT

## 2021-08-17 ASSESSMENT — PAIN SCALES - GENERAL
PAINLEVEL_OUTOF10: 10

## 2021-08-17 ASSESSMENT — PAIN DESCRIPTION - ORIENTATION: ORIENTATION: LEFT;RIGHT;LOWER

## 2021-08-17 ASSESSMENT — PAIN DESCRIPTION - FREQUENCY: FREQUENCY: CONTINUOUS

## 2021-08-17 NOTE — ED PROVIDER NOTES
EMERGENCY DEPARTMENT ENCOUNTER    Pt Name: Devorah Mike  MRN: [id number]  [place] 1985  Date of evaluation: 21  CHIEF COMPLAINT       Chief Complaint   Patient presents with    Abdominal Pain     started today at approximately 1500    Pelvic Pain     denies vaginal bleeding/ denies vaginal discharge     HISTORY OF PRESENT ILLNESS   Patient is a 28-year-old female who presents ED complaining of abdominal pain x3 hours. No fever, vomiting, vaginal discharge, vaginal bleeding, diarrhea. LMP was 2 weeks ago. She typically has crampy abdominal pain 1 to 2 weeks post menstrual cycle. No other issues at this time. No cough, chest pain, changes in urine. REVIEW OF SYSTEMS     Review of Systems   All other systems reviewed and are negative. PASTMEDICAL HISTORY     Past Medical History:   Diagnosis Date    Benign essential hypertension antepartum 2013    Closed nondisplaced fracture of distal pole of navicular bone of left wrist 10/30/2018    Family history of uterine cancer     PGM    Iron deficiency anemia 2016    Prothrombin gene mutation [facility])      SURGICAL HISTORY       Past Surgical History:   Procedure Laterality Date     SECTION  1/10/2007, 2007, 2012    LTCS and classical CS on 12 @ 23 weeks     CURRENT MEDICATIONS       Previous Medications    ACETAMINOPHEN (TYLENOL) 500 MG TABLET    Take 1 tablet by mouth 4 times daily as needed for Pain    BENZOYL PEROXIDE (BENZOYL PEROXIDE) 5 % EXTERNAL LIQUID    Use twice daily.     BENZOYL PEROXIDE 5 % EXTERNAL LIQUID    Wash face, chest and back 1-2 times daily    CLINDAMYCIN (CLEOCIN T) 1 % LOTION    Apply to affected areas daily    DOXYCYCLINE HYCLATE (VIBRAMYCIN) 100 MG CAPSULE    Take 1 pill twice daily with food    HYDROQUINONE 4 % CREAM    Apply twice daily 2 months on, 2 months off to dark spots    SPIRONOLACTONE (ALDACTONE) 100 MG TABLET    Take 1 tablet by mouth daily    TRETINOIN (RETIN-A) 0.05 % CREAM    Apply a pea sized amount to face, chest and back nightly     ALLERGIES     has No Known Allergies. FAMILY HISTORY     She indicated that her mother is alive. She indicated that her father is alive. She indicated that her sister is alive. She indicated that her brother is alive. She indicated that the status of her maternal grandmother is unknown. She indicated that her paternal grandmother is . She indicated that the status of her neg hx is unknown. SOCIAL HISTORY       Social History     Tobacco Use    Smoking status: Never Smoker    Smokeless tobacco: Never Used   Vaping Use    Vaping Use: Never used   Substance Use Topics    Alcohol use: Yes     Alcohol/week: 0.0 standard drinks     Comment: occasionally    Drug use: Yes     Frequency: 2.0 times per week     Types: Marijuana     PHYSICAL EXAM     INITIAL VITALS: BP (!) 148/94   Pulse 77   Temp 98.3 °F (36.8 °C) (Oral)   Resp 18   Ht 5' 1\" (1.549 m)   Wt 167 lb (75.8 kg)   LMP 08/10/2021 (Approximate)   SpO2 99%   BMI 31.55 kg/m²    Physical Exam  HENT:      Head: Normocephalic. Right Ear: External ear normal.      Left Ear: External ear normal.      Nose: Nose normal.   Eyes:      Conjunctiva/sclera: Conjunctivae normal.   Cardiovascular:      Rate and Rhythm: Normal rate. Pulmonary:      Effort: Pulmonary effort is normal.   Abdominal:      General: Abdomen is flat. Tenderness: There is abdominal tenderness. There is no guarding or rebound. Skin:     General: Skin is dry. Neurological:      Mental Status: She is alert. Mental status is at baseline. Psychiatric:         Mood and Affect: Mood normal.         Behavior: Behavior normal.         MEDICAL DECISION MAKING:   The patient is hemodynamically stable, afebrile, nontoxic-appearing. Physical exam notable for suprapubic abdominal tenderness, without rebound or guarding. Based on history and exam differential is pregnancy, UTI, STI.   ED plan for basic labs, UA, GC chlamydia DNA probe, reassess. CT scan shows evidence of enteritis. Given Rx for ibuprofen. No further work-up indicated at this time. Nursing notes reviewed. At this time this is what I find, the patient appears well and does not appear sick or toxic. I gave my usual and customary discussion of the risks and benefits of discharge versus admission. I answered the patient's questions. I gave the patient strict return precautions. Patient expressed understanding of the discharge instructions. Dictated but not reviewed. DIAGNOSTIC RESULTS   EKG:All EKG's are interpreted by the Emergency Department Physician who either signs or Co-signs this chart in the absence of a cardiologist.        RADIOLOGY:All plain film, CT, MRI, and formal ultrasound images (except ED bedside ultrasound) are read by the radiologist, see reports below, unless otherwisenoted in MDM or here. CT ABDOMEN PELVIS W IV CONTRAST Additional Contrast? None   Final Result   Mild concentric mural thickening involving several small bowel loops in the   left abdomen suggests an acute nonspecific enteritis. Correlate clinically. No other acute finding in the abdomen or pelvis. LABS: All lab results were reviewed by myself, and all abnormals are listed below. Labs Reviewed   URINE RT REFLEX TO CULTURE - Abnormal; Notable for the following components:       Result Value    Turbidity UA SLIGHTLY CLOUDY (*)     All other components within normal limits   CBC WITH AUTO DIFFERENTIAL - Abnormal; Notable for the following components:    Hemoglobin 9.7 (*)     Hematocrit 32.7 (*)     MCV 78.2 (*)     MCH 23.2 (*)     RDW 17.2 (*)     All other components within normal limits   POCT URINE PREGNANCY - Normal   C.TRACHOMATIS N.GONORRHOEAE DNA, URINE   COMPREHENSIVE METABOLIC PANEL W/ REFLEX TO MG FOR LOW K   MICROSCOPIC URINALYSIS       EMERGENCY DEPARTMENTCOURSE:   Patient did well in the ED.   Symptoms improved with Toradol. Labs:  Potassium 4.2  Creatinine 0.69  WBC 5.1  UA negative for infectious process      Vitals:    Vitals:    08/17/21 1907   BP: (!) 148/94   Pulse: 77   Resp: 18   Temp: 98.3 °F (36.8 °C)   TempSrc: Oral   SpO2: 99%   Weight: 167 lb (75.8 kg)   Height: 5' 1\" (1.549 m)       The patient was given the following medications while in the emergency department:  Orders Placed This Encounter   Medications    acetaminophen (TYLENOL) tablet 1,000 mg    DISCONTD: ketorolac (TORADOL) injection 60 mg    0.9 % sodium chloride bolus    ketorolac (TORADOL) injection 15 mg    0.9 % sodium chloride bolus    sodium chloride flush 0.9 % injection 10 mL    iopamidol (ISOVUE-370) 76 % injection 75 mL    ibuprofen (ADVIL;MOTRIN) 800 MG tablet     Sig: Take 1 tablet by mouth 2 times daily as needed for Pain     Dispense:  180 tablet     Refill:  0     CONSULTS:  None    FINAL IMPRESSION      1. Undifferentiated abdominal pain    2.  Enteritis          DISPOSITION/PLAN   DISPOSITION        PATIENT REFERRED TO:  MD Jewell [facility] [address] [facility] [address] [phone]    In 2 days      DISCHARGE MEDICATIONS:  New Prescriptions    IBUPROFEN (ADVIL;MOTRIN) 800 MG TABLET    Take 1 tablet by mouth 2 times daily as needed for Pain     Adele Cuevas MD  Attending Emergency Physician                    Regina Shaffer MD  08/17/21 [id number]

## 2021-08-18 NOTE — ED NOTES
Discharge instructions and follow up care reviewed with patient and all questions answered at this time. Patient stable and ambulatory at time of discharge.       Kassy Petit RN  08/17/21 [id number]

## 2021-08-19 LAB
C. TRACHOMATIS DNA ,URINE: NEGATIVE
N. GONORRHOEAE DNA, URINE: NEGATIVE
SPECIMEN DESCRIPTION: NORMAL

## 2021-08-20 LAB — HCG, PREGNANCY URINE (POC): NEGATIVE

## 2021-09-07 ENCOUNTER — OFFICE VISIT (OUTPATIENT)
Dept: OBGYN | Age: 36
End: 2021-09-07
Payer: COMMERCIAL

## 2021-09-07 VITALS
HEIGHT: 61 IN | BODY MASS INDEX: 30.58 KG/M2 | SYSTOLIC BLOOD PRESSURE: 122 MMHG | WEIGHT: 162 LBS | HEART RATE: 76 BPM | DIASTOLIC BLOOD PRESSURE: 78 MMHG

## 2021-09-07 DIAGNOSIS — R10.2 PELVIC PAIN IN FEMALE: Primary | ICD-10-CM

## 2021-09-07 PROCEDURE — 1036F TOBACCO NON-USER: CPT | Performed by: OBSTETRICS & GYNECOLOGY

## 2021-09-07 PROCEDURE — G8427 DOCREV CUR MEDS BY ELIG CLIN: HCPCS | Performed by: OBSTETRICS & GYNECOLOGY

## 2021-09-07 PROCEDURE — 99213 OFFICE O/P EST LOW 20 MIN: CPT | Performed by: OBSTETRICS & GYNECOLOGY

## 2021-09-07 PROCEDURE — G8417 CALC BMI ABV UP PARAM F/U: HCPCS | Performed by: OBSTETRICS & GYNECOLOGY

## 2021-09-07 RX ORDER — IBUPROFEN 600 MG/1
600 TABLET ORAL EVERY 6 HOURS PRN
Qty: 90 TABLET | Refills: 1 | Status: SHIPPED | OUTPATIENT
Start: 2021-09-07 | End: 2021-10-30 | Stop reason: SDUPTHER

## 2021-09-07 RX ORDER — ACETAMINOPHEN 500 MG
1000 TABLET ORAL EVERY 6 HOURS PRN
Qty: 240 TABLET | Refills: 1 | Status: SHIPPED | OUTPATIENT
Start: 2021-09-07 | End: 2021-10-30 | Stop reason: SDUPTHER
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
## 2021-09-09 ENCOUNTER — TELEPHONE (OUTPATIENT)
Dept: OBGYN | Age: 36
End: 2021-09-09

## 2021-09-09 NOTE — TELEPHONE ENCOUNTER
Idalia Ny called today due to the pain she is having. She called off today and would like to know if she can be tested for Endometriosis. She is asking for a note excusing her for today. Idalia Ny also is requesting a restrictions letter to work only 3 days a week until further testing is completed.

## 2021-09-09 NOTE — TELEPHONE ENCOUNTER
Letter completed. Additional documentation will need to be provided for the patient at visit on 9/29/21, e.g. FMLA if applicable.

## 2021-10-05 ENCOUNTER — TELEPHONE (OUTPATIENT)
Dept: OBGYN | Age: 36
End: 2021-10-05

## 2021-10-05 NOTE — TELEPHONE ENCOUNTER
Adina Harrington called and she has missed work today due to abdominal pain after her period has stopped 1 days ago. She would like to know what to due. Adina Harrington stated that her insurance would not pay for ultrasounds.

## 2021-10-06 ENCOUNTER — OFFICE VISIT (OUTPATIENT)
Dept: OBGYN | Age: 36
End: 2021-10-06
Payer: COMMERCIAL

## 2021-10-06 VITALS
WEIGHT: 168 LBS | BODY MASS INDEX: 31.74 KG/M2 | HEART RATE: 72 BPM | DIASTOLIC BLOOD PRESSURE: 75 MMHG | SYSTOLIC BLOOD PRESSURE: 111 MMHG

## 2021-10-06 DIAGNOSIS — R10.2 PELVIC PAIN IN FEMALE: Primary | ICD-10-CM

## 2021-10-06 PROCEDURE — G8427 DOCREV CUR MEDS BY ELIG CLIN: HCPCS | Performed by: OBSTETRICS & GYNECOLOGY

## 2021-10-06 PROCEDURE — G8417 CALC BMI ABV UP PARAM F/U: HCPCS | Performed by: OBSTETRICS & GYNECOLOGY

## 2021-10-06 PROCEDURE — G8484 FLU IMMUNIZE NO ADMIN: HCPCS | Performed by: OBSTETRICS & GYNECOLOGY

## 2021-10-06 PROCEDURE — 99213 OFFICE O/P EST LOW 20 MIN: CPT | Performed by: OBSTETRICS & GYNECOLOGY

## 2021-10-06 PROCEDURE — 1036F TOBACCO NON-USER: CPT | Performed by: OBSTETRICS & GYNECOLOGY

## 2021-10-06 RX ORDER — NAPROXEN 500 MG/1
500 TABLET ORAL 2 TIMES DAILY WITH MEALS
Qty: 60 TABLET | Refills: 3 | OUTPATIENT
Start: 2021-10-06 | End: 2021-10-30

## 2021-10-06 NOTE — LETTER
[address] [address]  Phone: [phone]  Fax: [phone] Lakisha Beard DO        October 6, 2021     Patient: Jessica Presley   YOB: 1985   Date of Visit: 10/6/2021       To Whom It May Concern: It is my medical opinion that [facility] can return to work on 10/8/2021. If you have any questions or concerns, please don't hesitate to call.     Sincerely,        Archana Preciado DO
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
## 2021-10-25 ENCOUNTER — TELEPHONE (OUTPATIENT)
Dept: OBGYN | Age: 36
End: 2021-10-25

## 2021-10-25 ENCOUNTER — TELEPHONE (OUTPATIENT)
Dept: INTERNAL MEDICINE | Age: 36
End: 2021-10-25

## 2021-10-25 DIAGNOSIS — R10.2 PELVIC PAIN IN FEMALE: ICD-10-CM

## 2021-10-25 NOTE — TELEPHONE ENCOUNTER
Pt callled said tylenol advil and naproxen not helping her pain pelvic area  In and is asking for something else     Also pt asking for return to work note, please advise

## 2021-10-25 NOTE — TELEPHONE ENCOUNTER
----- Message from Jose Miguel Piedra sent at 10/25/2021 10:45 AM EDT -----  Subject: Message to Provider    QUESTIONS  Information for Provider? Pt says naproxen, Tylenol and ibuprofen are not   helping with the pain and is asking if there is something else she can   take. please advise  ---------------------------------------------------------------------------  --------------  CALL BACK INFO  What is the best way for the office to contact you? OK to leave message on   voicemail  Preferred Call Back Phone Number? [phone]  ---------------------------------------------------------------------------  --------------  SCRIPT ANSWERS  Relationship to Patient?  Self

## 2021-10-26 RX ORDER — NAPROXEN 500 MG/1
500 TABLET ORAL 2 TIMES DAILY WITH MEALS
Qty: 60 TABLET | Refills: 3 | Status: CANCELLED | OUTPATIENT
Start: 2021-10-26

## 2021-10-26 RX ORDER — IBUPROFEN 600 MG/1
600 TABLET ORAL EVERY 6 HOURS PRN
Qty: 90 TABLET | Refills: 1 | Status: CANCELLED | OUTPATIENT
Start: 2021-10-26

## 2021-10-26 NOTE — TELEPHONE ENCOUNTER
Per dr. Effie Farmer last office note, patient is to call us prior to calling off work to request a note. Patient was informed of this. She states she called off 10/21/21-10/26/21. Patient was scheduled a follow up appt to discuss surgical options. She is requesting medication to be sent.

## 2021-10-26 NOTE — TELEPHONE ENCOUNTER
Patient called again in regards to this, Darrius Hawley said to forward to you to see what should be done. Thank you.

## 2021-10-26 NOTE — TELEPHONE ENCOUNTER
Pt called again, wanting someone to reach out on this matter in the above note, please call pt back to advise

## 2021-10-27 NOTE — TELEPHONE ENCOUNTER
Patient called the office stating she was supposed to be to work at 5am and because the office was not open to  her work note, she is requesting today to be added onto her work note to be off 10/21/21-10/27/21 and to return to work 10/28/21.

## 2021-10-28 NOTE — TELEPHONE ENCOUNTER
Called patient to let her know that off work letter was approved to be off through 10/27/21. She states she missed work again today. Letter rewritten for off work from 10/21/21-10/28/21 ok per Dr. Christofer Gipson.   Patient notified and letter placed at Registration

## 2021-10-30 ENCOUNTER — APPOINTMENT (OUTPATIENT)
Dept: GENERAL RADIOLOGY | Age: 36
End: 2021-10-30
Payer: COMMERCIAL

## 2021-10-30 ENCOUNTER — HOSPITAL ENCOUNTER (EMERGENCY)
Age: 36
Discharge: HOME | End: 2021-10-30
Attending: EMERGENCY MEDICINE
Payer: COMMERCIAL

## 2021-10-30 VITALS
SYSTOLIC BLOOD PRESSURE: 116 MMHG | HEART RATE: 86 BPM | RESPIRATION RATE: 18 BRPM | WEIGHT: 168 LBS | DIASTOLIC BLOOD PRESSURE: 68 MMHG | HEIGHT: 61 IN | BODY MASS INDEX: 31.72 KG/M2 | TEMPERATURE: 98.3 F | OXYGEN SATURATION: 99 %

## 2021-10-30 DIAGNOSIS — R10.2 PELVIC PAIN IN FEMALE: ICD-10-CM

## 2021-10-30 DIAGNOSIS — R51.9 ACUTE NONINTRACTABLE HEADACHE, UNSPECIFIED HEADACHE TYPE: ICD-10-CM

## 2021-10-30 DIAGNOSIS — R11.2 NON-INTRACTABLE VOMITING WITH NAUSEA, UNSPECIFIED VOMITING TYPE: ICD-10-CM

## 2021-10-30 DIAGNOSIS — R42 DIZZINESS: Primary | ICD-10-CM

## 2021-10-30 DIAGNOSIS — R07.89 CHEST WALL PAIN: ICD-10-CM

## 2021-10-30 LAB
ABSOLUTE EOS #: 0 K/UL (ref 0–0.4)
ABSOLUTE IMMATURE GRANULOCYTE: 0 K/UL (ref 0–0.3)
ABSOLUTE LYMPH #: 0.71 K/UL (ref 1–4.8)
ABSOLUTE MONO #: 0.28 K/UL (ref 0.2–0.8)
ALBUMIN SERPL-MCNC: 4.5 G/DL (ref 3.5–5.2)
ALBUMIN/GLOBULIN RATIO: ABNORMAL (ref 1–2.5)
ALP BLD-CCNC: 53 U/L (ref 35–104)
ALT SERPL-CCNC: 10 U/L (ref 5–33)
ANION GAP SERPL CALCULATED.3IONS-SCNC: 11 MMOL/L (ref 9–17)
AST SERPL-CCNC: 18 U/L
BASOPHILS # BLD: 0 %
BASOPHILS ABSOLUTE: 0 K/UL (ref 0–0.2)
BILIRUB SERPL-MCNC: 0.24 MG/DL (ref 0.3–1.2)
BUN BLDV-MCNC: 9 MG/DL (ref 6–20)
BUN/CREAT BLD: 13 (ref 9–20)
CALCIUM SERPL-MCNC: 8.9 MG/DL (ref 8.6–10.4)
CHLORIDE BLD-SCNC: 106 MMOL/L (ref 98–107)
CO2: 23 MMOL/L (ref 20–31)
CREAT SERPL-MCNC: 0.68 MG/DL (ref 0.5–0.9)
DIFFERENTIAL TYPE: ABNORMAL
EOSINOPHILS RELATIVE PERCENT: 0 % (ref 1–4)
GFR AFRICAN AMERICAN: >60 ML/MIN
GFR NON-AFRICAN AMERICAN: >60 ML/MIN
GFR SERPL CREATININE-BSD FRML MDRD: ABNORMAL ML/MIN/{1.73_M2}
GFR SERPL CREATININE-BSD FRML MDRD: ABNORMAL ML/MIN/{1.73_M2}
GLUCOSE BLD-MCNC: 99 MG/DL (ref 70–99)
HCG QUALITATIVE: NEGATIVE
HCT VFR BLD CALC: 31.4 % (ref 36.3–47.1)
HEMOGLOBIN: 9.6 G/DL (ref 11.9–15.1)
IMMATURE GRANULOCYTES: 0 %
LIPASE: 21 U/L (ref 13–60)
LYMPHOCYTES # BLD: 10 % (ref 24–44)
MCH RBC QN AUTO: 23.5 PG (ref 25.2–33.5)
MCHC RBC AUTO-ENTMCNC: 30.6 G/DL (ref 28.4–34.8)
MCV RBC AUTO: 77 FL (ref 82.6–102.9)
MONOCYTES # BLD: 4 % (ref 1–7)
MORPHOLOGY: ABNORMAL
NRBC AUTOMATED: 0 PER 100 WBC
PDW BLD-RTO: 16.3 % (ref 11.8–14.4)
PLATELET # BLD: 281 K/UL (ref 138–453)
PLATELET ESTIMATE: ABNORMAL
PMV BLD AUTO: 10 FL (ref 8.1–13.5)
POTASSIUM SERPL-SCNC: 3.9 MMOL/L (ref 3.7–5.3)
RBC # BLD: 4.08 M/UL (ref 3.95–5.11)
RBC # BLD: ABNORMAL 10*6/UL
SEG NEUTROPHILS: 86 % (ref 36–66)
SEGMENTED NEUTROPHILS ABSOLUTE COUNT: 6.11 K/UL (ref 1.8–7.7)
SODIUM BLD-SCNC: 140 MMOL/L (ref 135–144)
TOTAL PROTEIN: 7.8 G/DL (ref 6.4–8.3)
TROPONIN INTERP: NORMAL
TROPONIN T: NORMAL NG/ML
TROPONIN, HIGH SENSITIVITY: <6 NG/L (ref 0–14)
WBC # BLD: 7.1 K/UL (ref 3.5–11.3)
WBC # BLD: ABNORMAL 10*3/UL

## 2021-10-30 PROCEDURE — 2580000003 HC RX 258: Performed by: EMERGENCY MEDICINE

## 2021-10-30 PROCEDURE — 85025 COMPLETE CBC W/AUTO DIFF WBC: CPT

## 2021-10-30 PROCEDURE — 83690 ASSAY OF LIPASE: CPT

## 2021-10-30 PROCEDURE — 96374 THER/PROPH/DIAG INJ IV PUSH: CPT

## 2021-10-30 PROCEDURE — 6360000002 HC RX W HCPCS: Performed by: EMERGENCY MEDICINE

## 2021-10-30 PROCEDURE — 93005 ELECTROCARDIOGRAM TRACING: CPT | Performed by: EMERGENCY MEDICINE

## 2021-10-30 PROCEDURE — 84484 ASSAY OF TROPONIN QUANT: CPT

## 2021-10-30 PROCEDURE — 84703 CHORIONIC GONADOTROPIN ASSAY: CPT

## 2021-10-30 PROCEDURE — 71045 X-RAY EXAM CHEST 1 VIEW: CPT

## 2021-10-30 PROCEDURE — 99285 EMERGENCY DEPT VISIT HI MDM: CPT

## 2021-10-30 PROCEDURE — 96375 TX/PRO/DX INJ NEW DRUG ADDON: CPT

## 2021-10-30 PROCEDURE — 80053 COMPREHEN METABOLIC PANEL: CPT

## 2021-10-30 RX ORDER — 0.9 % SODIUM CHLORIDE 0.9 %
1000 INTRAVENOUS SOLUTION INTRAVENOUS ONCE
Status: COMPLETED | OUTPATIENT
Start: 2021-10-30 | End: 2021-10-30

## 2021-10-30 RX ORDER — ACETAMINOPHEN 500 MG
1000 TABLET ORAL EVERY 8 HOURS PRN
Qty: 21 TABLET | Refills: 0 | Status: SHIPPED | OUTPATIENT
Start: 2021-10-30 | End: 2022-01-31

## 2021-10-30 RX ORDER — DIPHENHYDRAMINE HYDROCHLORIDE 50 MG/ML
25 INJECTION INTRAMUSCULAR; INTRAVENOUS ONCE
Status: COMPLETED | OUTPATIENT
Start: 2021-10-30 | End: 2021-10-30

## 2021-10-30 RX ORDER — ONDANSETRON 2 MG/ML
4 INJECTION INTRAMUSCULAR; INTRAVENOUS ONCE
Status: COMPLETED | OUTPATIENT
Start: 2021-10-30 | End: 2021-10-30

## 2021-10-30 RX ORDER — IBUPROFEN 800 MG/1
800 TABLET ORAL EVERY 8 HOURS PRN
Qty: 340 TABLET | Refills: 0 | Status: SHIPPED | OUTPATIENT
Start: 2021-10-30 | End: 2022-08-09 | Stop reason: SDUPTHER

## 2021-10-30 RX ORDER — ONDANSETRON 4 MG/1
4 TABLET, FILM COATED ORAL EVERY 8 HOURS PRN
Qty: 9 TABLET | Refills: 0 | Status: SHIPPED | OUTPATIENT
Start: 2021-10-30 | End: 2021-11-02

## 2021-10-30 RX ORDER — PROCHLORPERAZINE EDISYLATE 5 MG/ML
10 INJECTION INTRAMUSCULAR; INTRAVENOUS ONCE
Status: COMPLETED | OUTPATIENT
Start: 2021-10-30 | End: 2021-10-30

## 2021-10-30 RX ORDER — KETOROLAC TROMETHAMINE 30 MG/ML
30 INJECTION, SOLUTION INTRAMUSCULAR; INTRAVENOUS ONCE
Status: COMPLETED | OUTPATIENT
Start: 2021-10-30 | End: 2021-10-30

## 2021-10-30 RX ADMIN — SODIUM CHLORIDE 1000 ML: 9 INJECTION, SOLUTION INTRAVENOUS at 15:35

## 2021-10-30 RX ADMIN — KETOROLAC TROMETHAMINE 30 MG: 30 INJECTION, SOLUTION INTRAMUSCULAR at 15:59

## 2021-10-30 RX ADMIN — ONDANSETRON 4 MG: 2 INJECTION INTRAMUSCULAR; INTRAVENOUS at 16:39

## 2021-10-30 RX ADMIN — DIPHENHYDRAMINE HYDROCHLORIDE 25 MG: 50 INJECTION INTRAMUSCULAR; INTRAVENOUS at 15:59

## 2021-10-30 RX ADMIN — PROCHLORPERAZINE EDISYLATE 10 MG: 5 INJECTION INTRAMUSCULAR; INTRAVENOUS at 15:59

## 2021-10-30 ASSESSMENT — ENCOUNTER SYMPTOMS
ABDOMINAL PAIN: 0
VOICE CHANGE: 0
TROUBLE SWALLOWING: 0
DIARRHEA: 1
BACK PAIN: 0
VOMITING: 1
SHORTNESS OF BREATH: 0
NAUSEA: 1

## 2021-10-30 ASSESSMENT — PAIN SCALES - GENERAL
PAINLEVEL_OUTOF10: 10
PAINLEVEL_OUTOF10: 5

## 2021-10-30 ASSESSMENT — PAIN DESCRIPTION - LOCATION: LOCATION: HEAD

## 2021-10-30 NOTE — ED PROVIDER NOTES
[facility]  Emergency Department Encounter     Pt Name: Danielle Linder  MRN: [id number]  [place] 1985  Date of evaluation: 10/30/21  PCP:  Geo Angela MD    [address] [place] [address]       Chief Complaint   Patient presents with    Emesis     ETOH and marijuana intake last night, onset this morning    Dizziness       HISTORY OF PRESENT ILLNESS  (Location/Symptom, Timing/Onset, Context/Setting, Quality, Duration, Modifying Factors, Severity.)    Danielle Linder is a 29 y.o. female who presents with left-sided chest pressure underneath her breast, lightheadedness and dizziness, nausea vomiting diarrhea that is all been going on since she woke up this morning. Patient does admit to drinking alcohol and smoking marijuana last night. She states that whenever she drinks alcohol she has loose stools and this is not different for her. She is also currently on her menses. Has no cardiac or lung history that she is aware of. Has not had any shortness of breath, changes in her vision, numbness tingling or weakness. No blood in her vomiting or diarrhea. Is currently on her menses. PAST MEDICAL / SURGICAL / SOCIAL / FAMILY HISTORY    has a past medical history of Benign essential hypertension antepartum, Closed nondisplaced fracture of distal pole of navicular bone of left wrist, Family history of uterine cancer, Iron deficiency anemia, and Prothrombin gene mutation ([facility] [address] has a past surgical history that includes  section (1/10/2007, 2007, 2012).     Social History     Socioeconomic History    Marital status: Single     Spouse name: Not on file    Number of children: Not on file    Years of education: Not on file    Highest education level: Not on file   Occupational History    Not on file   Tobacco Use    Smoking status: Never Smoker    Smokeless tobacco: Never Used   Vaping Use    Vaping Use: Never used   Substance and Sexual Activity    Alcohol use: Yes     Alcohol/week: 0.0 standard drinks     Comment: occasionally    Drug use: Yes     Frequency: 2.0 times per week     Types: Marijuana    Sexual activity: Yes     Partners: Male   Other Topics Concern    Not on file   Social History Narrative    Not on file     Social Determinants of Health     Financial Resource Strain:     Difficulty of Paying Living Expenses:    Food Insecurity:     Worried About Running Out of Food in the Last Year:     [address] in the Last Year:    Transportation Needs:     Lack of Transportation (Medical):  Lack of Transportation (Non-Medical):    Physical Activity:     Days of Exercise per Week:     Minutes of Exercise per Session:    Stress:     Feeling of Stress :    Social Connections:     Frequency of Communication with Friends and Family:     Frequency of Social Gatherings with Friends and Family:     Attends Nondenominational Services:     Active Member of Clubs or Organizations:     Attends Club or Organization Meetings:     Marital Status:    Intimate Partner Violence:     Fear of Current or Ex-Partner:     Emotionally Abused:     Physically Abused:     Sexually Abused:        Family History   Problem Relation Age of Onset    Diabetes Paternal Grandmother     Endometrial Cancer Paternal Grandmother     Hypertension Maternal Grandmother     Breast Cancer Neg Hx     Cancer Neg Hx     Colon Cancer Neg Hx     Eclampsia Neg Hx     Ovarian Cancer Neg Hx      Labor Neg Hx     Spont Abortions Neg Hx     Stroke Neg Hx        Allergies:    Patient has no known allergies. Home Medications:  Prior to Admission medications    Medication Sig Start Date End Date Taking?  Authorizing Provider   acetaminophen (TYLENOL) 500 MG tablet Take 2 tablets by mouth every 8 hours as needed for Pain 10/30/21  Yes Elvira Farley Stands, DO   ibuprofen (ADVIL;MOTRIN) 800 MG tablet Take 1 tablet by mouth every 8 hours as needed for Pain 10/30/21  Yes Elvira NUR 1640   BP: (!) 144/95 124/68   Pulse: 77 72   Resp: 16 17   Temp: 98.3 °F (36.8 °C)    TempSrc: Oral    SpO2: 100% 100%   Weight: 168 lb (76.2 kg)    Height: 5' 1\" (1.549 m)        Physical Exam  Vitals and nursing note reviewed. Constitutional:       General: She is not in acute distress. Appearance: She is well-developed. She is not diaphoretic. HENT:      Head: Normocephalic and atraumatic. Nose: Nose normal.      Mouth/Throat:      Mouth: Mucous membranes are moist.   Eyes:      Extraocular Movements: Extraocular movements intact. Conjunctiva/sclera: Conjunctivae normal.      Pupils: Pupils are equal, round, and reactive to light. Cardiovascular:      Rate and Rhythm: Normal rate and regular rhythm. Heart sounds: Normal heart sounds. No murmur heard. Pulmonary:      Effort: Pulmonary effort is normal. No respiratory distress. Breath sounds: Normal breath sounds. No wheezing, rhonchi or rales. Abdominal:      General: There is no distension. Palpations: Abdomen is soft. Tenderness: There is no abdominal tenderness. There is no guarding or rebound. Musculoskeletal:         General: Normal range of motion. Cervical back: Normal range of motion and neck supple. Right lower leg: No edema. Left lower leg: No edema. Skin:     General: Skin is warm and dry. Neurological:      General: No focal deficit present. Mental Status: She is alert and oriented to person, place, and time. GCS: GCS eye subscore is 4. GCS verbal subscore is 5. GCS motor subscore is 6. Cranial Nerves: Cranial nerves are intact. Sensory: Sensation is intact. Motor: Motor function is intact. Coordination: Coordination is intact. Gait: Gait is intact.    Psychiatric:         Behavior: Behavior normal.         DIFFERENTIAL  DIAGNOSIS   PLAN (LABS / IMAGING / EKG):  Orders Placed This Encounter   Procedures    XR CHEST 1 VIEW    CBC with DIFF    Comprehensive Metabolic Panel w/ Reflex to MG    Lipase    Troponin    HCG Qualitative, Serum    Telemetry monitoring - continuous duration    EKG 12 Lead    Insert peripheral IV       MEDICATIONS ORDERED:  Orders Placed This Encounter   Medications    0.9 % sodium chloride bolus    ketorolac (TORADOL) injection 30 mg    prochlorperazine (COMPAZINE) injection 10 mg    diphenhydrAMINE (BENADRYL) injection 25 mg    ondansetron (ZOFRAN) injection 4 mg    acetaminophen (TYLENOL) 500 MG tablet     Sig: Take 2 tablets by mouth every 8 hours as needed for Pain     Dispense:  21 tablet     Refill:  0    ibuprofen (ADVIL;MOTRIN) 800 MG tablet     Sig: Take 1 tablet by mouth every 8 hours as needed for Pain     Dispense:  340 tablet     Refill:  0    ondansetron (ZOFRAN) 4 MG tablet     Sig: Take 1 tablet by mouth every 8 hours as needed for Nausea     Dispense:  9 tablet     Refill:  0     DIAGNOSTIC RESULTS / EMERGENCYDEPARTMENT COURSE / MDM   LABS:  Labs Reviewed   CBC WITH AUTO DIFFERENTIAL - Abnormal; Notable for the following components:       Result Value    Hemoglobin 9.6 (*)     Hematocrit 31.4 (*)     MCV 77.0 (*)     MCH 23.5 (*)     RDW 16.3 (*)     Seg Neutrophils 86 (*)     Lymphocytes 10 (*)     Eosinophils % 0 (*)     Absolute Lymph # 0.71 (*)     All other components within normal limits   COMPREHENSIVE METABOLIC PANEL W/ REFLEX TO MG FOR LOW K - Abnormal; Notable for the following components:     Total Bilirubin 0.24 (*)     All other components within normal limits   LIPASE   TROPONIN   HCG, SERUM, QUALITATIVE       RADIOLOGY:  XR CHEST 1 VIEW    Result Date: 10/30/2021  EXAMINATION: ONE XRAY VIEW OF THE CHEST 10/30/2021 3:25 pm COMPARISON: 02/17/2020 HISTORY: ORDERING SYSTEM PROVIDED HISTORY: L sided chest pain, dizziness TECHNOLOGIST PROVIDED HISTORY: L sided chest pain, dizziness Reason for Exam: cough, SOB, left side chest pain Acuity: Unknown Type of Exam: Unknown FINDINGS: The lungs are without acute focal process. There is no effusion or pneumothorax. The cardiomediastinal silhouette is stable. The osseous structures are stable. No acute process.        EKG    EKG Interpretation    Interpreted by emergency department physician    Rhythm: normal sinus   Rate: normal  Axis: normal  Ectopy: none  Conduction: QTc 457  ST Segments: no acute change  T Waves: inversion in  v2, v3 and v4  Q Waves: none    Clinical Impression: non-specific EKG    All EKG's are interpreted by the Emergency Department Physician whoeither signs or Co-signs this chart in the absence of a cardiologist.    EMERGENCY DEPARTMENT COURSE:  ED Course as of Oct 30 1712   Sat Oct 30, 2021   1536 XR CHEST 1 VIEW [AO]   1606 Baseline anemia   CBC with DIFF(!):    WBC 7.1   RBC 4.08   Hemoglobin Quant 9.6(!)   Hematocrit 31.4(!)   MCV 77.0(!)   MCH 23.5(!)   MCHC 30.6   RDW 16.3(!)   Platelet Count 358   MPV 10.0   NRBC Automated 0.0   Differential Type NOT REPORTED   Seg Neutrophils PENDING   Lymphocytes PENDING   Monocytes PENDING   Eosinophils % PENDING   Basophils PENDING   Immature Granulocytes PENDING   Segs Absolute PENDING   Absolute Lymph # PENDING   Absolute Mono # PENDING   Absolute Eos # PENDING   Basophils Absolute P... [AO]   1606 Comprehensive Metabolic Panel w/ Reflex to MG(!):    Glucose 99   BUN 9   Creatinine 0.68   Bun/Cre Ratio 13   Calcium 8.9   Sodium 140   Potassium 3.9   Chloride 106   CO2 23   Anion Gap 11   Alk Phos 53   ALT 10   AST 18   Bilirubin 0.24(!)   Total Protein 7.8   Albumin 4.5   Albumin/Globulin Ratio NOT REPORTED   GFR Non- >60   GFR  >60   GFR Comment        GFR Staging NOT REPORTED [AO]   1606 hCG Qual: NEGATIVE [AO]   1606 Lipase: 21 [AO]   1606 Troponin, High Sensitivity: <6 [AO]   1611 Still nauseated     [AO]      ED Course User Index  [AO] Mara Lancaster Boucher 1721, DO       MDM  Number of Diagnoses or Management Options  Acute nonintractable headache, unspecified headache type  Chest wall pain  Dizziness  Non-intractable vomiting with nausea, unspecified vomiting type  Diagnosis management comments: HEART SCORE: 2  History: 0  EK  Age: 0  Risk Factors: 1  Troponin: 0    NIH 0       Amount and/or Complexity of Data Reviewed  Clinical lab tests: ordered and reviewed  Tests in the radiology section of CPT®: ordered and reviewed  Review and summarize past medical records: yes  Independent visualization of images, tracings, or specimens: yes    Patient Progress  Patient progress: stable      PROCEDURES:  Procedures     CONSULTS:  None    CRITICAL CARE:  NONE    FINAL IMPRESSION     1. Dizziness    2. Non-intractable vomiting with nausea, unspecified vomiting type    3. Acute nonintractable headache, unspecified headache type    4. Chest wall pain    5. Pelvic pain in female         DISPOSITION / PLAN   DISPOSITION Decision To Discharge 10/30/2021 05:05:31 PM      Evaluation and treatment course in the ED, and plan of care upon discharge was discussed in length with the patient. Patient had no further questions prior to being discharged and was instructed to return to the ED for new or worsening symptoms. Any changes to existing medications or new prescriptions were reviewed with patient and they expressed understanding of how to correctly take their medications and the possible side effects. PATIENT REFERRED TO:  MD Jewell Damon [address] [facility] [address]  [address] [place] [address] ED  [address] [place] [address]  [phone]    As needed, If symptoms worsen      DISCHARGE MEDICATIONS:  New Prescriptions    ONDANSETRON (ZOFRAN) 4 MG TABLET    Take 1 tablet by mouth every 8 hours as needed for Nausea       Elvira Guillen, [place]  Emergency Medicine Physician    (Please note that portions of this note were completed with a voice recognition program.  Efforts were made to edit the dictations but occasionally words are mis-transcribed.)        Yaritza Haque DO  10/30/21 3909

## 2021-10-31 LAB
EKG ATRIAL RATE: 82 BPM
EKG P AXIS: 65 DEGREES
EKG P-R INTERVAL: 176 MS
EKG Q-T INTERVAL: 392 MS
EKG QRS DURATION: 80 MS
EKG QTC CALCULATION (BAZETT): 457 MS
EKG R AXIS: 60 DEGREES
EKG T AXIS: 42 DEGREES
EKG VENTRICULAR RATE: 82 BPM

## 2021-10-31 PROCEDURE — 93010 ELECTROCARDIOGRAM REPORT: CPT | Performed by: INTERNAL MEDICINE

## 2021-11-05 ENCOUNTER — OFFICE VISIT (OUTPATIENT)
Dept: OBGYN | Age: 36
End: 2021-11-05
Payer: COMMERCIAL

## 2021-11-05 VITALS
BODY MASS INDEX: 31.34 KG/M2 | SYSTOLIC BLOOD PRESSURE: 127 MMHG | HEART RATE: 82 BPM | WEIGHT: 166 LBS | HEIGHT: 61 IN | DIASTOLIC BLOOD PRESSURE: 84 MMHG

## 2021-11-05 DIAGNOSIS — N94.6 DYSMENORRHEA: Primary | ICD-10-CM

## 2021-11-05 DIAGNOSIS — L73.2 HIDRADENITIS SUPPURATIVA: ICD-10-CM

## 2021-11-05 PROCEDURE — G8427 DOCREV CUR MEDS BY ELIG CLIN: HCPCS | Performed by: STUDENT IN AN ORGANIZED HEALTH CARE EDUCATION/TRAINING PROGRAM

## 2021-11-05 PROCEDURE — 99212 OFFICE O/P EST SF 10 MIN: CPT | Performed by: STUDENT IN AN ORGANIZED HEALTH CARE EDUCATION/TRAINING PROGRAM

## 2021-11-05 PROCEDURE — G8417 CALC BMI ABV UP PARAM F/U: HCPCS | Performed by: STUDENT IN AN ORGANIZED HEALTH CARE EDUCATION/TRAINING PROGRAM

## 2021-11-05 PROCEDURE — 1036F TOBACCO NON-USER: CPT | Performed by: STUDENT IN AN ORGANIZED HEALTH CARE EDUCATION/TRAINING PROGRAM

## 2021-11-05 PROCEDURE — 99211 OFF/OP EST MAY X REQ PHY/QHP: CPT | Performed by: STUDENT IN AN ORGANIZED HEALTH CARE EDUCATION/TRAINING PROGRAM

## 2021-11-05 PROCEDURE — G8484 FLU IMMUNIZE NO ADMIN: HCPCS | Performed by: STUDENT IN AN ORGANIZED HEALTH CARE EDUCATION/TRAINING PROGRAM

## 2021-11-05 RX ORDER — CLINDAMYCIN PHOSPHATE 10 MG/G
GEL TOPICAL
Qty: 1 EACH | Refills: 3 | Status: SHIPPED | OUTPATIENT
Start: 2021-11-05 | End: 2021-11-12
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
## 2021-11-05 NOTE — PATIENT INSTRUCTIONS
Patient Education        Pelvic Laparoscopy: Before Your Surgery  What is pelvic laparoscopy? Pelvic laparoscopy (say \"tsl-dm-SCVE-fransisca-debby\") is a type of surgery. It can help a doctor diagnose or treat a problem with your pelvic organs. These include the uterus, intestines, or bladder. This kind of surgery uses very small cuts. These cuts are called incisions. To do this surgery, a doctor puts a lighted tube through incisions in your belly. This tube is called a scope. It lets your doctor see your organs. Then the doctor inflates your belly with gas. The gas makes it easier and safer to see your organs. After the doctor puts special tools through the scope, he or she can see or remove what is needed. Next, the doctor releases most of the gas from your belly and closes your incisions with stitches. These incisions leave scars that fade with time. You will probably be asleep during the surgery. But if you are awake, you may feel some stretching and discomfort in your belly. Either way, you will not feel any pain. After the surgery, you will stay in the hospital for about 1 to 4 hours. You may be able to go back to work the next day. But some people need to rest for a few days to a few weeks before they can go back to work. It depends on the type of surgery you had, the type of work you do, and how you feel. Some people need more surgeries or treatments after this surgery. It depends on what the doctor finds. Follow-up care is a key part of your treatment and safety. Be sure to make and go to all appointments, and call your doctor if you are having problems. It's also a good idea to know your test results and keep a list of the medicines you take. How do you prepare for surgery? Surgery can be stressful. This information will help you understand what you can expect. And it will help you safely prepare for surgery. Preparing for surgery    · Be sure you have someone to take you home.  Anesthesia and pain medicine will make it unsafe for you to drive or get home on your own.     · Understand exactly what surgery is planned, along with the risks, benefits, and other options.     · Tell your doctor ALL the medicines, vitamins, supplements, and herbal remedies you take. Some may increase the risk of problems during your surgery. Your doctor will tell you if you should stop taking any of them before the surgery and how soon to do it.     · If you take aspirin or some other blood thinner, ask your doctor if you should stop taking it before your surgery. Make sure that you understand exactly what your doctor wants you to do. These medicines increase the risk of bleeding.     · Make sure your doctor and the hospital have a copy of your advance directive. If you don't have one, you may want to prepare one. It lets others know your health care wishes. It's a good thing to have before any type of surgery or procedure. What happens on the day of surgery? · Follow the instructions exactly about when to stop eating and drinking. If you don't, your surgery may be canceled. If your doctor told you to take your medicines on the day of surgery, take them with only a sip of water.     · Take a bath or shower before you come in for your surgery. Do not apply lotions, perfumes, deodorants, or nail polish.     · Do not shave the surgical site yourself.     · Take off all jewelry and piercings. And take out contact lenses, if you wear them. At the hospital or surgery center   · Bring a picture ID.     · The area for surgery is often marked to make sure there are no errors.     · You will be kept comfortable and safe by your anesthesia provider. The anesthesia may make you sleep. Or it may just numb the area being worked on.     · The surgery will take about 1 hour. When should you call your doctor?    · You have questions or concerns.     · You don't understand how to prepare for your surgery.     · You become ill before the surgery (such as fever, flu, or a cold).     · You need to reschedule or have changed your mind about having the surgery. Where can you learn more? Go to [email or web address] org and sign in to your [employer] account. Enter [phone] in the [facility] box to learn more about \"Pelvic Laparoscopy: Before Your Surgery. \"     If you do not have an account, please click on the \"Sign Up Now\" link. Current as of: February 11, 2021               Content Version: 13.0  © 3349-1386 [employer]. Care instructions adapted under license by [facility] ([facility]). If you have questions about a medical condition or this instruction, always ask your healthcare professional. [address] any warranty or liability for your use of this information. Surgeon: Dr. Loyda Andrews    Resident: ANY if possible. Schedule in January if possible.     2nd provider needed: No    OR time needed: 1 hour    Procedure(s): Diagnostic Laparoscopy    Diagnosis: AUB    PATs needed: No    Labs needed: CBC, 8 weeks before; hCG day of surgery    Clearance needed: No    Is patient aware they need clearance and/or referral sent: No    Surgery consent: Surgical consent will be done day of surgery    Pre op appt needed: None needed    Post op appt needed: 2 weeks

## 2021-11-06 ENCOUNTER — HOSPITAL ENCOUNTER (EMERGENCY)
Age: 36
Discharge: HOME | End: 2021-11-06
Attending: EMERGENCY MEDICINE
Payer: COMMERCIAL

## 2021-11-06 VITALS
TEMPERATURE: 98 F | HEIGHT: 61 IN | WEIGHT: 166 LBS | OXYGEN SATURATION: 100 % | RESPIRATION RATE: 18 BRPM | BODY MASS INDEX: 31.34 KG/M2 | HEART RATE: 74 BPM | SYSTOLIC BLOOD PRESSURE: 125 MMHG | DIASTOLIC BLOOD PRESSURE: 89 MMHG

## 2021-11-06 DIAGNOSIS — S63.501A SPRAIN OF RIGHT WRIST, INITIAL ENCOUNTER: Primary | ICD-10-CM

## 2021-11-06 PROCEDURE — 99283 EMERGENCY DEPT VISIT LOW MDM: CPT

## 2021-11-06 ASSESSMENT — PAIN SCALES - GENERAL: PAINLEVEL_OUTOF10: 7

## 2021-11-06 NOTE — ED PROVIDER NOTES
eMERGENCY dEPARTMENT eNCOUnter   Independent Attestation     Pt Name: Nereida Connors  MRN: [id number]  [place] 1985  Date of evaluation: 11/6/21     Nereida Connors is a 29 y.o. female with CC: Wrist Pain (Right arm pain; denies injury; reports she works at [employer] and uses her hand/arm) and Arm Pain      Based on the medical record the care appears appropriate. I was personally available for consultation in the Emergency Department. The care is provided during an unprecedented national emergency due to the novel coronavirus, COVID 19.     Ivone Oneil DO  Attending Emergency Physician                    Larry Jennings DO  11/06/21 1953       Dell Jerez PA-C  11/11/21 9037

## 2021-11-06 NOTE — LETTER
[facility] ED  [address] [address]  Phone: [phone]               November 6, 2021    Patient: Yevgeniy Machuca   YOB: 1985   Date of Visit: 11/6/2021       To Whom It May Concern:    Natalio Beasley was seen and treated in our emergency department on 11/6/2021. She may return to work on 11/9/21.       Sincerely,       Julissa Blair RN         Signature:__________________________________

## 2021-11-08 ENCOUNTER — TELEPHONE (OUTPATIENT)
Dept: INTERNAL MEDICINE | Age: 36
End: 2021-11-08

## 2021-11-08 NOTE — TELEPHONE ENCOUNTER
PC to pt-- LM to contact office for hospital f/u appt-- Will need scheduled with PCR or resident as PCP is unavailable.

## 2021-11-11 ENCOUNTER — OFFICE VISIT (OUTPATIENT)
Dept: INTERNAL MEDICINE | Age: 36
End: 2021-11-11
Payer: COMMERCIAL

## 2021-11-11 VITALS
BODY MASS INDEX: 30.78 KG/M2 | TEMPERATURE: 98.4 F | HEIGHT: 61 IN | WEIGHT: 163 LBS | SYSTOLIC BLOOD PRESSURE: 132 MMHG | HEART RATE: 99 BPM | DIASTOLIC BLOOD PRESSURE: 85 MMHG

## 2021-11-11 DIAGNOSIS — S69.91XA INJURY OF RIGHT HAND, INITIAL ENCOUNTER: Primary | ICD-10-CM

## 2021-11-11 PROCEDURE — G8427 DOCREV CUR MEDS BY ELIG CLIN: HCPCS | Performed by: STUDENT IN AN ORGANIZED HEALTH CARE EDUCATION/TRAINING PROGRAM

## 2021-11-11 PROCEDURE — 99213 OFFICE O/P EST LOW 20 MIN: CPT | Performed by: STUDENT IN AN ORGANIZED HEALTH CARE EDUCATION/TRAINING PROGRAM

## 2021-11-11 PROCEDURE — G8484 FLU IMMUNIZE NO ADMIN: HCPCS | Performed by: STUDENT IN AN ORGANIZED HEALTH CARE EDUCATION/TRAINING PROGRAM

## 2021-11-11 PROCEDURE — 1036F TOBACCO NON-USER: CPT | Performed by: STUDENT IN AN ORGANIZED HEALTH CARE EDUCATION/TRAINING PROGRAM

## 2021-11-11 PROCEDURE — G8417 CALC BMI ABV UP PARAM F/U: HCPCS | Performed by: STUDENT IN AN ORGANIZED HEALTH CARE EDUCATION/TRAINING PROGRAM

## 2021-11-11 SDOH — ECONOMIC STABILITY: FOOD INSECURITY: WITHIN THE PAST 12 MONTHS, THE FOOD YOU BOUGHT JUST DIDN'T LAST AND YOU DIDN'T HAVE MONEY TO GET MORE.: NEVER TRUE

## 2021-11-11 SDOH — ECONOMIC STABILITY: FOOD INSECURITY: WITHIN THE PAST 12 MONTHS, YOU WORRIED THAT YOUR FOOD WOULD RUN OUT BEFORE YOU GOT MONEY TO BUY MORE.: NEVER TRUE

## 2021-11-11 ASSESSMENT — PATIENT HEALTH QUESTIONNAIRE - PHQ9
1. LITTLE INTEREST OR PLEASURE IN DOING THINGS: 0
SUM OF ALL RESPONSES TO PHQ QUESTIONS 1-9: 0
SUM OF ALL RESPONSES TO PHQ QUESTIONS 1-9: 0
2. FEELING DOWN, DEPRESSED OR HOPELESS: 0
SUM OF ALL RESPONSES TO PHQ QUESTIONS 1-9: 0
SUM OF ALL RESPONSES TO PHQ9 QUESTIONS 1 & 2: 0

## 2021-11-11 ASSESSMENT — SOCIAL DETERMINANTS OF HEALTH (SDOH): HOW HARD IS IT FOR YOU TO PAY FOR THE VERY BASICS LIKE FOOD, HOUSING, MEDICAL CARE, AND HEATING?: NOT HARD AT ALL

## 2021-11-11 ASSESSMENT — ENCOUNTER SYMPTOMS: COLOR CHANGE: 0

## 2021-11-11 NOTE — LETTER
[address]  [address] [facility] [address] [address]  Phone: [phone]  Fax: [phone]    Tanvir Carey MD        November 11, 2021     Patient: Sonny Larson   YOB: 1985   Date of Visit: 11/11/2021       To Whom It May Concern: It is my medical opinion that [facility] may return to light duty immediately with the following restrictions: no work involving right hand, must wear splint/sling. If you have any questions or concerns, please don't hesitate to call.     Sincerely,        Tanvir Carey MD

## 2021-11-11 NOTE — PROGRESS NOTES
Attending Physician Statement  I have discussed the care of [facility] including pertinent history and exam findings,  with the resident. I have reviewed the key elements of all parts of the encounter with the resident. I agree with the assessment, plan and orders as documented by the resident.   (GE Modifier)    MD HALYEY Diamond  Attending Physician, [facility]   Faculty, Internal Medicine Residency Program  [address] [place] [address]  11/11/2021, 3:57 PM

## 2021-11-11 NOTE — ED PROVIDER NOTES
[address] [place] [address] ED  eMERGENCY dEPARTMENT eNCOUnter      Pt Name: Mitali Valle  MRN: [id number]  [place] 1985  Date of evaluation: 11/6/2021  Provider: Domitila MAURER PA-C    CHIEF COMPLAINT       Chief Complaint   Patient presents with    Wrist Pain     Right arm pain; denies injury; reports she works at [employer] and uses her hand/arm    Arm Pain         HISTORY OF PRESENT ILLNESS  (Location/Symptom, Timing/Onset, Context/Setting, Quality, Duration, Modifying Factors, Severity.)   Mitali Valle is a 29 y.o. female who presents to the emergency department with complaint of right wrist pain. Patient states that she works in a [employer] and does a lot of repetitive movements. She began having pain a couple of days ago while at work. Patient denies any injuries or falls. She has had no numbness tingling or weakness in the extremity. Nursing Notes were reviewed. ALLERGIES     Patient has no known allergies. CURRENT MEDICATIONS       Discharge Medication List as of 11/6/2021  7:51 PM      CONTINUE these medications which have NOT CHANGED    Details   Benzoyl Peroxide 2.5 % CREA Apply 1 applicator topically daily, Disp-1 each, R-3Normal      clindamycin (CLEOCIN-T) 1 % gel Apply topically 2 times daily. , Disp-1 each, R-3, Normal      acetaminophen (TYLENOL) 500 MG tablet Take 2 tablets by mouth every 8 hours as needed for Pain, Disp-21 tablet, R-0Print      ibuprofen (ADVIL;MOTRIN) 800 MG tablet Take 1 tablet by mouth every 8 hours as needed for Pain, Disp-340 tablet, R-0Print      doxycycline hyclate (VIBRAMYCIN) 100 MG capsule Take 1 pill twice daily with food, Disp-60 capsule, R-2Normal      spironolactone (ALDACTONE) 100 MG tablet Take 1 tablet by mouth daily, Disp-30 tablet, R-5Normal      !! benzoyl peroxide 5 % external liquid Wash face, chest and back 1-2 times daily, Disp-227 g, R-11, Normal      hydroquinone 4 % cream Apply twice daily 2 months on, 2 months off to dark spots, Disp-28 g, R-2, Normal      tretinoin (RETIN-A) 0.05 % cream Apply a pea sized amount to face, chest and back nightly, Disp-45 g, R-11, Normal      clindamycin (CLEOCIN T) 1 % lotion Apply to affected areas daily, Disp-60 mL, R-3, Normal      !! benzoyl peroxide (BENZOYL PEROXIDE) 5 % external liquid Use twice daily. , Disp-1 Bottle, R-0, Normal       !! - Potential duplicate medications found. Please discuss with provider. PAST MEDICAL HISTORY         Diagnosis Date    Benign essential hypertension antepartum 2013    Closed nondisplaced fracture of distal pole of navicular bone of left wrist 10/30/2018    Family history of uterine cancer     PGM    Iron deficiency anemia 2016    Prothrombin gene mutation [facility])        SURGICAL HISTORY           Procedure Laterality Date     SECTION  1/10/2007, 2007, 2012    LTCS and classical CS on 12 @ 23 weeks         FAMILY HISTORY           Problem Relation Age of Onset    Diabetes Paternal Grandmother     Endometrial Cancer Paternal Grandmother     Hypertension Maternal Grandmother     Breast Cancer Neg Hx     Cancer Neg Hx     Colon Cancer Neg Hx     Eclampsia Neg Hx     Ovarian Cancer Neg Hx      Labor Neg Hx     Spont Abortions Neg Hx     Stroke Neg Hx      Family Status   Relation Name Status    PGM      Mother  Alive    Father  Alive   Xavier Brother x's 9 Alive    Sister x's6 Alive    MGM  (Not Specified)    Neg Hx  (Not Specified)        SOCIAL HISTORY      reports that she has never smoked. She has never used smokeless tobacco. She reports current alcohol use. She reports current drug use. Frequency: 2.00 times per week. Drug: Marijuana Lucianne Bars). REVIEW OF SYSTEMS    (2-9 systems for level 4, 10 or more for level 5)     Review of Systems   Musculoskeletal: Positive for arthralgias ( Right wrist and forearm). Negative for joint swelling. Skin: Negative for color change, pallor, rash and wound. Neurological: Negative for weakness and numbness. Except as noted above the remainder of the review of systems was reviewed and negative. PHYSICAL EXAM    (up to 7 for level 4, 8 or more for level 5)     ED Triage Vitals [11/06/21 1850]   BP Temp Temp src Pulse Resp SpO2 Height Weight   125/89 98 °F (36.7 °C) -- 74 18 100 % 5' 1\" (1.549 m) 166 lb (75.3 kg)       Physical Exam  Vitals and nursing note reviewed. Constitutional:       Appearance: Normal appearance. Cardiovascular:      Rate and Rhythm: Normal rate and regular rhythm. Heart sounds: Normal heart sounds. Musculoskeletal:         General: Normal range of motion. Arms:       Comments: Patient has tenderness and swelling to the right forearm. She is nontender over the elbow, wrist and hand. There is no deformity, bruising, erythema or warmth. Patient has  strength 5 out of 5 and normal distal sensation 2+ radial pulse palpable. Skin:     General: Skin is warm and dry. Neurological:      General: No focal deficit present. Mental Status: She is alert and oriented to person, place, and time. Sensory: No sensory deficit. Motor: No weakness. Psychiatric:         Mood and Affect: Mood normal.         Behavior: Behavior normal.         Thought Content:  Thought content normal.         Judgment: Judgment normal.             DIAGNOSTIC RESULTS     EKG: All EKG's are interpreted by the Emergency Department Physician who either signs or Co-signs this chart in the absence of a cardiologist.    None indicated    RADIOLOGY:   Non-plain film images such as CT, Ultrasound and MRI are read by the radiologist. Plain radiographic images are visualized and preliminarily interpreted by the emergency physician with the below findings:    None indicated    Interpretation per the Radiologist below, if available at the time of this note:        ED BEDSIDE ULTRASOUND:   Performed by ED Physician - none    LABS:  No results found for this visit on 11/06/21. All other labs were within normal range or not returned as of this dictation. EMERGENCY DEPARTMENT COURSE and DIFFERENTIAL DIAGNOSIS/MDM:   Vitals:    Vitals:    11/06/21 1850   BP: 125/89   Pulse: 74   Resp: 18   Temp: 98 °F (36.7 °C)   SpO2: 100%   Weight: 166 lb (75.3 kg)   Height: 5' 1\" (1.549 m)           Medical Decision Making patient is a 43-year-old female who works at [employer] and does a lot of repetitive movements and has had pain and mild swelling in the right forearm. She has full range of motion and neurovascularly intact. No anatomical snuffbox tenderness to palpation. Patient has been using an Ace wrap with minimal relief of symptoms. She is right-handed. Patient is instructed to ice, continue Ace wrap, ibuprofen and rest.  Follow-up with primary care doctor and recommended occupational therapy. Patient states understanding and agrees with plan    CONSULTS:  None    PROCEDURES:  None    FINAL IMPRESSION      1.  Sprain of right wrist, initial encounter          DISPOSITION/PLAN   DISPOSITION Decision To Discharge 11/06/2021 07:49:53 PM      PATIENT REFERRED TO:   MD [facility] [address]  [phone]    Schedule an appointment as soon as possible for a visit in 2 days  return, If symptoms worsen      DISCHARGE MEDICATIONS:     Discharge Medication List as of 11/6/2021  7:51 PM            (Please note that portions of this note were completed with a voice recognition program.  Efforts were made to edit the dictations but occasionally words are mis-transcribed.)    Magnus MAURER PA-C  Attending Emergency Physician         Bienvenido Collado PA-C  11/11/21 9182

## 2021-11-11 NOTE — PATIENT INSTRUCTIONS
A printed script for Durable Medical Equipment was ordered for the patient. The script was given to patient with list of DME companies. -Xray order given to the patient, this test does not need to be scheduled, please take order with you to registration. Letter given to patient. Patient was put on a wait list for 2 months and will be contacted to schedule their next follow up appointment once the schedule is available. If the patient is in need of an appointment before their next visit please call the office at [phone]. After Visit Summary  given and reviewed.
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
## 2021-11-12 ENCOUNTER — HOSPITAL ENCOUNTER (OUTPATIENT)
Dept: GENERAL RADIOLOGY | Age: 36
Discharge: HOME | End: 2021-11-14
Payer: COMMERCIAL

## 2021-11-12 ENCOUNTER — HOSPITAL ENCOUNTER (OUTPATIENT)
Age: 36
Discharge: HOME | End: 2021-11-14
Payer: COMMERCIAL

## 2021-11-12 DIAGNOSIS — S69.91XA INJURY OF RIGHT HAND, INITIAL ENCOUNTER: ICD-10-CM

## 2021-11-12 PROCEDURE — 73130 X-RAY EXAM OF HAND: CPT

## 2021-11-17 ENCOUNTER — TELEPHONE (OUTPATIENT)
Dept: INTERNAL MEDICINE CLINIC | Age: 36
End: 2021-11-17

## 2021-12-30 ENCOUNTER — HOSPITAL ENCOUNTER (OUTPATIENT)
Dept: LAB | Age: 36
Setting detail: SPECIMEN
Discharge: HOME | End: 2021-12-30
Payer: COMMERCIAL

## 2021-12-30 DIAGNOSIS — Z20.822 COVID-19 RULED OUT BY LABORATORY TESTING: Primary | ICD-10-CM

## 2021-12-30 PROCEDURE — U0005 INFEC AGEN DETEC AMPLI PROBE: HCPCS

## 2021-12-30 PROCEDURE — U0003 INFECTIOUS AGENT DETECTION BY NUCLEIC ACID (DNA OR RNA); SEVERE ACUTE RESPIRATORY SYNDROME CORONAVIRUS 2 (SARS-COV-2) (CORONAVIRUS DISEASE [COVID-19]), AMPLIFIED PROBE TECHNIQUE, MAKING USE OF HIGH THROUGHPUT TECHNOLOGIES AS DESCRIBED BY CMS-2020-01-R: HCPCS

## 2021-12-31 LAB
SARS-COV-2: ABNORMAL
SARS-COV-2: DETECTED
SOURCE: ABNORMAL

## 2022-01-01 ENCOUNTER — HOSPITAL ENCOUNTER (EMERGENCY)
Age: 37
Discharge: HOME | End: 2022-01-01
Attending: EMERGENCY MEDICINE
Payer: COMMERCIAL

## 2022-01-01 VITALS
OXYGEN SATURATION: 99 % | TEMPERATURE: 98.2 F | DIASTOLIC BLOOD PRESSURE: 91 MMHG | HEIGHT: 61 IN | SYSTOLIC BLOOD PRESSURE: 135 MMHG | HEART RATE: 68 BPM | RESPIRATION RATE: 20 BRPM | BODY MASS INDEX: 31.15 KG/M2 | WEIGHT: 165 LBS

## 2022-01-01 DIAGNOSIS — R10.30 LOWER ABDOMINAL PAIN: Primary | ICD-10-CM

## 2022-01-01 DIAGNOSIS — U07.1 LAB TEST POSITIVE FOR DETECTION OF COVID-19 VIRUS: ICD-10-CM

## 2022-01-01 LAB
ABSOLUTE EOS #: <0.03 K/UL (ref 0–0.44)
ABSOLUTE IMMATURE GRANULOCYTE: <0.03 K/UL (ref 0–0.3)
ABSOLUTE LYMPH #: 1.1 K/UL (ref 1.1–3.7)
ABSOLUTE MONO #: 0.3 K/UL (ref 0.1–1.2)
ANION GAP SERPL CALCULATED.3IONS-SCNC: 14 MMOL/L (ref 9–17)
BASOPHILS # BLD: 0 % (ref 0–2)
BASOPHILS ABSOLUTE: <0.03 K/UL (ref 0–0.2)
BUN BLDV-MCNC: 9 MG/DL (ref 6–20)
BUN/CREAT BLD: ABNORMAL (ref 9–20)
CALCIUM SERPL-MCNC: 8.5 MG/DL (ref 8.6–10.4)
CHLORIDE BLD-SCNC: 103 MMOL/L (ref 98–107)
CO2: 22 MMOL/L (ref 20–31)
CREAT SERPL-MCNC: 0.56 MG/DL (ref 0.5–0.9)
DIFFERENTIAL TYPE: ABNORMAL
EOSINOPHILS RELATIVE PERCENT: 0 % (ref 1–4)
GFR AFRICAN AMERICAN: >60 ML/MIN
GFR NON-AFRICAN AMERICAN: >60 ML/MIN
GFR SERPL CREATININE-BSD FRML MDRD: ABNORMAL ML/MIN/{1.73_M2}
GFR SERPL CREATININE-BSD FRML MDRD: ABNORMAL ML/MIN/{1.73_M2}
GLUCOSE BLD-MCNC: 89 MG/DL (ref 70–99)
HCG QUALITATIVE: NEGATIVE
HCT VFR BLD CALC: 30 % (ref 36.3–47.1)
HEMOGLOBIN: 9.3 G/DL (ref 11.9–15.1)
IMMATURE GRANULOCYTES: 0 %
LYMPHOCYTES # BLD: 33 % (ref 24–43)
MAGNESIUM: 2 MG/DL (ref 1.6–2.6)
MCH RBC QN AUTO: 23.3 PG (ref 25.2–33.5)
MCHC RBC AUTO-ENTMCNC: 31 G/DL (ref 28.4–34.8)
MCV RBC AUTO: 75.2 FL (ref 82.6–102.9)
MONOCYTES # BLD: 9 % (ref 3–12)
NRBC AUTOMATED: 0 PER 100 WBC
PDW BLD-RTO: 15.9 % (ref 11.8–14.4)
PLATELET # BLD: 237 K/UL (ref 138–453)
PLATELET ESTIMATE: ABNORMAL
PMV BLD AUTO: 10 FL (ref 8.1–13.5)
POTASSIUM SERPL-SCNC: 3.5 MMOL/L (ref 3.7–5.3)
RBC # BLD: 3.99 M/UL (ref 3.95–5.11)
RBC # BLD: ABNORMAL 10*6/UL
SEG NEUTROPHILS: 58 % (ref 36–65)
SEGMENTED NEUTROPHILS ABSOLUTE COUNT: 1.86 K/UL (ref 1.5–8.1)
SODIUM BLD-SCNC: 139 MMOL/L (ref 135–144)
WBC # BLD: 3.3 K/UL (ref 3.5–11.3)
WBC # BLD: ABNORMAL 10*3/UL

## 2022-01-01 PROCEDURE — 6370000000 HC RX 637 (ALT 250 FOR IP): Performed by: STUDENT IN AN ORGANIZED HEALTH CARE EDUCATION/TRAINING PROGRAM

## 2022-01-01 PROCEDURE — 99283 EMERGENCY DEPT VISIT LOW MDM: CPT

## 2022-01-01 PROCEDURE — 80048 BASIC METABOLIC PNL TOTAL CA: CPT

## 2022-01-01 PROCEDURE — 96374 THER/PROPH/DIAG INJ IV PUSH: CPT

## 2022-01-01 PROCEDURE — 85025 COMPLETE CBC W/AUTO DIFF WBC: CPT

## 2022-01-01 PROCEDURE — 6360000002 HC RX W HCPCS: Performed by: STUDENT IN AN ORGANIZED HEALTH CARE EDUCATION/TRAINING PROGRAM

## 2022-01-01 PROCEDURE — 83735 ASSAY OF MAGNESIUM: CPT

## 2022-01-01 PROCEDURE — 84703 CHORIONIC GONADOTROPIN ASSAY: CPT

## 2022-01-01 RX ORDER — POTASSIUM CHLORIDE 20 MEQ/1
40 TABLET, EXTENDED RELEASE ORAL 2 TIMES DAILY WITH MEALS
Status: DISCONTINUED | OUTPATIENT
Start: 2022-01-01 | End: 2022-01-01 | Stop reason: HOSPADM

## 2022-01-01 RX ORDER — KETOROLAC TROMETHAMINE 30 MG/ML
30 INJECTION, SOLUTION INTRAMUSCULAR; INTRAVENOUS ONCE
Status: COMPLETED | OUTPATIENT
Start: 2022-01-01 | End: 2022-01-01

## 2022-01-01 RX ADMIN — KETOROLAC TROMETHAMINE 30 MG: 30 INJECTION, SOLUTION INTRAMUSCULAR; INTRAVENOUS at 14:31

## 2022-01-01 RX ADMIN — POTASSIUM CHLORIDE 40 MEQ: 1500 TABLET, EXTENDED RELEASE ORAL at 16:17

## 2022-01-01 ASSESSMENT — ENCOUNTER SYMPTOMS
VOMITING: 0
BACK PAIN: 0
CONSTIPATION: 0
DIARRHEA: 0
RHINORRHEA: 1
BLOOD IN STOOL: 0
SHORTNESS OF BREATH: 0
ABDOMINAL PAIN: 1
NAUSEA: 0
COUGH: 0

## 2022-01-01 ASSESSMENT — PAIN SCALES - GENERAL: PAINLEVEL_OUTOF10: 10

## 2022-01-01 NOTE — ED NOTES
Pt to ed with c/o pelvic pain. Pt reports this is on going, chronic issue x 6 months. Pt states she had pre surgical testing done yesterday for surgery on Monday. Dr. Johanna Kothari evaluated medical record and states OB is doing laparoscopic exploratory to evaluate for endometriosis. Pt states she has been taking motrin at home for pain, did not take anything today. Pt appears uncomfortable, rates pain 10/10. Dr. Johanna Kothari did inform pt that she is covid +, pt denies any symptoms. Pt denies nausea, vomiting, just c/o pain.       Janet Garcia, RN  01/01/22 9472

## 2022-01-01 NOTE — ED NOTES
Pt resting on cot, alert, oriented, speaking in complete sentences. Pt tearful, states she is stressed out, has a lot of personal issues going on and unsure how to deal with it all. Pt states she is unable to urinate at this time, has not had anything to eat or drink today, tolerating small amount of ice chips.       Fanny Seals RN  01/01/22 9077

## 2022-01-01 NOTE — Clinical Note
Phuc Bernard was seen and treated in our emergency department on 1/1/2022. She may return to work on 01/05/2022. If you have any questions or concerns, please don't hesitate to call.       Adina Monte, DO

## 2022-01-01 NOTE — Clinical Note
Dean Barreto was seen and treated in our emergency department on 1/1/2022. She may return to work on 01/05/2022. If you have any questions or concerns, please don't hesitate to call.       Viral Talbert, DO

## 2022-01-01 NOTE — ED PROVIDER NOTES
[facility] ED  Emergency Department Encounter  EmergencyMedicine Resident     Pt Name:Jose Martinez  MRN: [id number]  [place] 1985  Date of evaluation: 1/1/22  PCP:  Osbaldo Wong MD    [address] [place] [address]       Chief Complaint   Patient presents with    Pelvic Pain       HISTORY OF PRESENT ILLNESS  (Location/Symptom, Timing/Onset, Context/Setting, Quality, Duration, Modifying Factors, Severity.)    This patient was evaluated in the Emergency Department for symptoms described in the history of present illness. He/she was evaluated in the context of the global COVID-19 pandemic, which necessitated consideration that the patient might be at risk for infection with the SARS-CoV-2 virus that causes COVID-19. Institutional protocols and algorithms that pertain to the evaluation of patients at risk for COVID-19 are in a state of rapid change based on information released by regulatory bodies including the CDC and federal and state organizations. These policies and algorithms were followed during the patient's care in the ED. Marlin Hoffman is a 29 y.o. female who presents to the ED today with severe suprapubic abdominal pain with worsening symptoms of started 11 AM today and it been constant. Severe in severity. No modifying factors noted. Described as sharp, nonradiating. Has had ongoing symptoms intermittently for the past 6 months with plans to have diagnostic laparoscopy in 2 days with OB team.  Concern is that patient may have endometriosis. Does note that she had a normal menstrual cycle the week before [place]. Denies any vaginal bleeding or discharge. No nausea or vomiting. Did have screening labs and Covid test done yesterday. Chart review showing that Covid test is positive. Patient denies any URI-like symptoms. Specifically denies any fevers, chills, sweats, cough, congestion, body aches.     PAST MEDICAL / SURGICAL / SOCIAL / FAMILY HISTORY      has a past medical history of Benign essential hypertension antepartum, Closed nondisplaced fracture of distal pole of navicular bone of left wrist, Family history of uterine cancer, Iron deficiency anemia, Prothrombin gene mutation ([address] Under care of team, and Under care of team.     has a past surgical history that includes  section (1/10/2007, 2007, 2012). Social History     Socioeconomic History    Marital status: Single     Spouse name: Not on file    Number of children: Not on file    Years of education: Not on file    Highest education level: Not on file   Occupational History    Not on file   Tobacco Use    Smoking status: Never Smoker    Smokeless tobacco: Never Used   Vaping Use    Vaping Use: Never used   Substance and Sexual Activity    Alcohol use: Yes     Alcohol/week: 0.0 standard drinks     Comment: occasionally    Drug use: Yes     Frequency: 2.0 times per week     Types: Marijuana [place])    Sexual activity: Yes     Partners: Male   Other Topics Concern    Not on file   Social History Narrative    Not on file     Social Determinants of Health     Financial Resource Strain: Low Risk     Difficulty of Paying Living Expenses: Not hard at all   Food Insecurity: No Food Insecurity    Worried About [facility] in the Last Year: Never true    [facility] in the Last Year: Never true   Transportation Needs:     Lack of Transportation (Medical): Not on file    Lack of Transportation (Non-Medical):  Not on file   Physical Activity:     Days of Exercise per Week: Not on file    Minutes of Exercise per Session: Not on file   Stress:     Feeling of Stress : Not on file   Social Connections:     Frequency of Communication with Friends and Family: Not on file    Frequency of Social Gatherings with Friends and Family: Not on file    Attends Hinduism Services: Not on file    Active Member of Clubs or Organizations: Not on file    Attends Club or Organization Meetings: Not on file  Marital Status: Not on file   Intimate Partner Violence:     Fear of Current or Ex-Partner: Not on file    Emotionally Abused: Not on file    Physically Abused: Not on file    Sexually Abused: Not on file   Housing Stability:     Unable to Pay for Housing in the Last Year: Not on file    Number of [place] in the Last Year: Not on file    Unstable Housing in the Last Year: Not on file       Family History   Problem Relation Age of Onset    Diabetes Paternal Grandmother     Endometrial Cancer Paternal Grandmother     No Known Problems Mother     No Known Problems Father     Hypertension Maternal Grandmother     Breast Cancer Neg Hx     Cancer Neg Hx     Colon Cancer Neg Hx     Eclampsia Neg Hx     Ovarian Cancer Neg Hx      Labor Neg Hx     Spont Abortions Neg Hx     Stroke Neg Hx        Allergies:  Patient has no known allergies. Home Medications:  Prior to Admission medications    Medication Sig Start Date End Date Taking?  Authorizing Provider   Benzoyl Peroxide 2.5 % CREA Apply 1 applicator topically daily 21   Lila Melinda, DO   acetaminophen (TYLENOL) 500 MG tablet Take 2 tablets by mouth every 8 hours as needed for Pain 10/30/21   Elvira Vallecillo, DO   ibuprofen (ADVIL;MOTRIN) 800 MG tablet Take 1 tablet by mouth every 8 hours as needed for Pain 10/30/21   Elvira Vallecillo, DO   naproxen (NAPROSYN) 500 MG tablet Take 1 tablet by mouth 2 times daily (with meals) 10/6/21 10/30/21  Maria Kerns, DO   benzoyl peroxide 5 % external liquid Wash face, chest and back 1-2 times daily 21   Nina Greene MD   hydroquinone 4 % cream Apply twice daily 2 months on, 2 months off to dark spots 21   Kim Greene MD   tretinoin (RETIN-A) 0.05 % cream Apply a pea sized amount to face, chest and back nightly 21   Weston Rey MD       REVIEW OF SYSTEMS    (2-9 systems for level 4, 10 or more for level 5)      Review of Systems   Constitutional: Negative for chills and fever. HENT: Positive for rhinorrhea (Only after crying secondary to abdominal pain). Negative for congestion. Eyes: Negative for visual disturbance. Respiratory: Negative for cough and shortness of breath. Cardiovascular: Negative for chest pain. Gastrointestinal: Positive for abdominal pain. Negative for blood in stool, constipation, diarrhea, nausea and vomiting. Genitourinary: Negative for dysuria, hematuria, vaginal bleeding, vaginal discharge and vaginal pain. Musculoskeletal: Negative for back pain, neck pain and neck stiffness. Skin: Negative for rash. Neurological: Negative for dizziness, weakness, numbness and headaches. Hematological: Does not bruise/bleed easily. PHYSICAL EXAM   (up to 7 for level 4, 8 or more for level 5)      INITIAL VITALS:   BP (!) 135/91   Pulse 68   Temp 98.2 °F (36.8 °C)   Resp 20   Ht 5' 1\" (1.549 m)   Wt 165 lb (74.8 kg)   SpO2 99%   BMI 31.18 kg/m²     Physical Exam  Vitals reviewed. Constitutional:       General: She is not in acute distress. Appearance: She is obese. She is ill-appearing. She is not toxic-appearing. Comments: Ill-appearing, uncomfortable, writhing around in bed. Alert oriented answering questions appropriately   HENT:      Head: Normocephalic and atraumatic. Eyes:      General: No scleral icterus. Conjunctiva/sclera: Conjunctivae normal.   Cardiovascular:      Rate and Rhythm: Normal rate and regular rhythm. Pulses: Normal pulses. Pulmonary:      Effort: Pulmonary effort is normal. No respiratory distress. Breath sounds: No stridor. No wheezing, rhonchi or rales. Abdominal:      General: There is no distension. Palpations: Abdomen is soft. Tenderness: There is abdominal tenderness (Suprapubic, moderate). There is no guarding or rebound.       Comments: No upper quadrant tenderness or epigastric tenderness on exam   Genitourinary:     Comments: Deferred pelvic exam  Musculoskeletal:         General: No swelling or deformity. Normal range of motion. Cervical back: Normal range of motion. No muscular tenderness. Skin:     General: Skin is warm and dry. Coloration: Skin is not jaundiced. Findings: No rash. Neurological:      Mental Status: She is alert and oriented to person, place, and time.    Psychiatric:         Behavior: Behavior normal.         DIFFERENTIAL  DIAGNOSIS     PLAN (LABS / IMAGING / EKG):  Orders Placed This Encounter   Procedures    CBC Auto Differential    Basic Metabolic Panel w/ Reflex to MG    Urinalysis Reflex to Culture    HCG Qualitative, Serum    Magnesium    Inpatient consult to Obstetrics / Gynecology       MEDICATIONS ORDERED:  Orders Placed This Encounter   Medications    ketorolac (TORADOL) injection 30 mg    DISCONTD: potassium chloride (KLOR-CON M) extended release tablet 40 mEq         DIAGNOSTIC RESULTS / EMERGENCY DEPARTMENT COURSE / MDM     Results for orders placed or performed during the hospital encounter of 01/01/22   CBC Auto Differential   Result Value Ref Range    WBC 3.3 (L) 3.5 - 11.3 k/uL    RBC 3.99 3.95 - 5.11 m/uL    Hemoglobin 9.3 (L) 11.9 - 15.1 g/dL    Hematocrit 30.0 (L) 36.3 - 47.1 %    MCV 75.2 (L) 82.6 - 102.9 fL    MCH 23.3 (L) 25.2 - 33.5 pg    MCHC 31.0 28.4 - 34.8 g/dL    RDW 15.9 (H) 11.8 - 14.4 %    Platelets 214 928 - 112 k/uL    MPV 10.0 8.1 - 13.5 fL    NRBC Automated 0.0 0.0 per 100 WBC    Differential Type NOT REPORTED     Seg Neutrophils 58 36 - 65 %    Lymphocytes 33 24 - 43 %    Monocytes 9 3 - 12 %    Eosinophils % 0 (L) 1 - 4 %    Basophils 0 0 - 2 %    Immature Granulocytes 0 0 %    Segs Absolute 1.86 1.50 - 8.10 k/uL    Absolute Lymph # 1.10 1.10 - 3.70 k/uL    Absolute Mono # 0.30 0.10 - 1.20 k/uL    Absolute Eos # <0.03 0.00 - 0.44 k/uL    Basophils Absolute <0.03 0.00 - 0.20 k/uL    Absolute Immature Granulocyte <0.03 0.00 - 0.30 k/uL    WBC Morphology NOT REPORTED RBC Morphology ANISOCYTOSIS PRESENT     Platelet Estimate NOT REPORTED    Basic Metabolic Panel w/ Reflex to MG   Result Value Ref Range    Glucose 89 70 - 99 mg/dL    BUN 9 6 - 20 mg/dL    CREATININE 0.56 0.50 - 0.90 mg/dL    Bun/Cre Ratio NOT REPORTED 9 - 20    Calcium 8.5 (L) 8.6 - 10.4 mg/dL    Sodium 139 135 - 144 mmol/L    Potassium 3.5 (L) 3.7 - 5.3 mmol/L    Chloride 103 98 - 107 mmol/L    CO2 22 20 - 31 mmol/L    Anion Gap 14 9 - 17 mmol/L    GFR Non-African American >60 >60 mL/min    GFR African American >60 >60 mL/min    GFR Comment          GFR Staging NOT REPORTED    HCG Qualitative, Serum   Result Value Ref Range    hCG Qual NEGATIVE NEGATIVE   Magnesium   Result Value Ref Range    Magnesium 2.0 1.6 - 2.6 mg/dL         RADIOLOGY:  No orders to display          IMPRESSION/MDM/EMERGENCY DEPARTMENT COURSE:  Patient came to emergency department, HPI and physical exam were conducted. All nursing notes were reviewed. 80-year-old female present emergency department with acute on chronic abdominal pain that is been ongoing for the past 6 months but acutely worse since 11:00 today. Plans for laparoscopy with OB team later this week due to concern for endometriosis. Had outpatient labs that were within normal limits with exception of Covid testing came back positive. Patient has no URI-like symptoms. Hypertensive 144/102 vitals otherwise within normal limits. Patient peers uncomfortable secondary to abdominal pain. Moderate suprapubic tenderness. Sclera nonicteric. No jaundice. Heart regular rate and without murmur. Lungs are clear to auscultate. Differential includes endometriosis, acute on chronic abdominal pain, pregnancy, UTI, electrolyte abnormality. Plan for Toradol, narcotics as needed. Repeat basic labs including CBC BMP. Also obtain urinalysis and qualitative hCG.   Likely discussed case with OB team especially in the setting that surgery will likely be delayed secondary to positive Covid test.    Results grossly unremarkable including negative urinalysis. Does have some mild leukopenia with unclear etiology. Hemoglobin appears to be at baseline. patient's pain is well controlled after Toradol. Did discuss with OB team.  They will have clinic call patient to reschedule procedure. Discussed with patient that she should still quarantine until at least 5 days post positive test with plans to wear a mask for an additional 5 days as per new CDC guidelines. She is agreeable. Pain is well controlled at this time. We offered oral contraceptive which patient is declining. She is comfortable plan discharge and follow-up outpatient with no further questions or concerns. Discharged home. FINAL IMPRESSION      1. Lower abdominal pain    2.  Lab test positive for detection of COVID-19 virus          DISPOSITION / PLAN     DISPOSITION Decision To Discharge 01/01/2022 04:03:19 PM      PATIENT REFERRED TO:  MD Jewell [facility] [address] [facility] [address]  [phone]    Schedule an appointment as soon as possible for a visit       [facility] ED  [address] [facility] [address]  [phone]    As needed, If symptoms worsen    [facility]  [address] [place] [address]  [phone]  Schedule an appointment as soon as possible for a visit         DISCHARGE MEDICATIONS:  Discharge Medication List as of 1/1/2022  4:05 PM          Trinity Torres DO  Emergency Medicine Resident    (Please note that portions of thisnote were completed with a voice recognition program.  Efforts were made to edit the dictations but occasionally words are mis-transcribed.)       Trinity Torres DO  Resident  01/01/22 1296

## 2022-01-01 NOTE — ED PROVIDER NOTES
[facility] ED     Emergency Department     Faculty Attestation    I performed a history and physical examination of the patient and discussed management with the resident. I reviewed the residents note and agree with the documented findings and plan of care. Any areas of disagreement are noted on the chart. I was personally present for the key portions of any procedures. I have documented in the chart those procedures where I was not present during the key portions. I have reviewed the emergency nurses triage note. I agree with the chief complaint, past medical history, past surgical history, allergies, medications, social and family history as documented unless otherwise noted below. For Physician Assistant/ Nurse Practitioner cases/documentation I have personally evaluated this patient and have completed at least one if not all key elements of the E/M (history, physical exam, and MDM). Additional findings are as noted. Patient presents with lower abdominal pain and cramping. Patient has a history of endometriosis and is scheduled to go to the OR on Monday. Patient did have preop labs done a couple of days ago and is Covid positive which patient did not know. She denies any active Covid symptoms at this time. She denies fever, chest pain, shortness of breath, cough, nausea, vomiting, vaginal bleeding or discharge. On exam, patient is lying in the bed and appears uncomfortable. Lungs clear to auscultation bilaterally heart sounds are normal.  Abdomen is soft with moderate lower abdominal tenderness. No rebound or guarding is present. Will recheck labs and treat patient's pain and plan to speak with gynecology.       Elicia Croft MD  Attending Emergency  Physician              Kimberlee Robins MD  01/01/22 2995

## 2022-01-01 NOTE — CONSULTS
OB/GYN Consult Note    Spoke with ER resident regarding patient. She is 29years old with a history of endometriosis and intermittent abdominal pain. She has a diagnostic laparoscopy scheduled for 1/3/21 with Dr. Sandie Hartmann however on her pre admission testing tested positive for COVID 19. Patient is stable for discharge and outpatient follow up. Senior resident and attending updated and in agreement with plan.     Manjula Reyes DO, PGY-2  Ob/Gyn Resident  [address]  01/01/22

## 2022-01-06 ENCOUNTER — TELEPHONE (OUTPATIENT)
Dept: INTERNAL MEDICINE | Age: 37
End: 2022-01-06

## 2022-01-06 NOTE — TELEPHONE ENCOUNTER
----- Message from Jorge Luis Stephanie sent at 1/6/2022  3:48 PM EST -----  Subject: Referral Request    QUESTIONS   Reason for referral request? patient would like to book a surgery   appointment. Has the physician seen you for this condition before? No   Preferred Specialist (if applicable)? Do you already have an appointment scheduled? No  Additional Information for Provider?   ---------------------------------------------------------------------------  --------------  CALL BACK INFO  What is the best way for the office to contact you? Do not leave any   message, patient will call back for answer  Preferred Call Back Phone Number?  [phone]

## 2022-01-06 NOTE — TELEPHONE ENCOUNTER
PC to pt-- Lm to contact office in regards to appt-- unsure of what she is wanting to schedule as we do not do surgery in our office

## 2022-01-13 ENCOUNTER — TELEPHONE (OUTPATIENT)
Dept: OBGYN | Age: 37
End: 2022-01-13

## 2022-01-25 ENCOUNTER — VIRTUAL VISIT (OUTPATIENT)
Dept: INTERNAL MEDICINE | Age: 37
End: 2022-01-25
Payer: COMMERCIAL

## 2022-01-25 DIAGNOSIS — D50.0 IRON DEFICIENCY ANEMIA DUE TO CHRONIC BLOOD LOSS: ICD-10-CM

## 2022-01-25 DIAGNOSIS — H92.01 RIGHT EAR PAIN: Primary | ICD-10-CM

## 2022-01-25 PROCEDURE — 99442 PR PHYS/QHP TELEPHONE EVALUATION 11-20 MIN: CPT | Performed by: INTERNAL MEDICINE

## 2022-01-25 RX ORDER — FERROUS SULFATE 325(65) MG
325 TABLET ORAL
Qty: 30 TABLET | Refills: 5 | Status: SHIPPED | OUTPATIENT
Start: 2022-01-25 | End: 2022-08-09 | Stop reason: SDUPTHER

## 2022-01-25 NOTE — PATIENT INSTRUCTIONS
-Pt due for 3 month f/u in April-- pt to call in March  to set up an appt--reminder in [facility] to contact patient as well--AVS given to patient    -STEVEN Arenas

## 2022-01-25 NOTE — PROGRESS NOTES
Poonam Seals is a 29 y.o. female evaluated via telephone on 1/25/2022. Consent:  She and/or health care decision maker is aware that that she may receive a bill for this telephone service, which includes applicable co-pays, depending on her insurance coverage, and has provided verbal consent to proceed. Documentation:  I communicated with the patient and/or health care decision maker about her c/o right ear pain. She had some pain and slight blood on her Q tip after she tried to clean her ear and states she jammed in her q tip. Since then no more bleeding from ear. She denies any ear pain, ear discharge, mastoid pain or difficulty hearing. ear does not feel hot to touch and she states there is no pain on pulling the pinna. Advised to call if pain, redness or discharge from ear. Advised to avoid q tips or sharp objects inside ear canal.  She is also scheduled to have diagnostic laparoscopy with gyn next month for chronic pelvic pain. She had recent labs showing chronic anemia. She is not taking any iron or vitamins. .   Details of this discussion including any medical advice provided:   Advised ear precautions  Call if any changes  Iron supplements  Follow up with Gyn  Follow up for repeat las in 3 months      I affirm this is a Patient Initiated Episode with a Patient who has not had a related appointment within my department in the past 7 days or scheduled within the next 24 hours. Patient identification was verified at the start of the visit: Yes    Total Time: minutes: 11-20 minutes    Poonam Seals, was evaluated through a synchronous (real-time) audio-video encounter. The patient (or guardian if applicable) is aware that this is a billable service, which includes applicable co-pays. This Virtual Visit was conducted with patient's (and/or legal guardian's) consent.  The visit was conducted pursuant to the emergency declaration under the [facility] waiver authority and the [employer] Act. Patient identification was verified, and a caregiver was present when appropriate. The patient was located at home in a state where the provider was licensed to provide care.        Note: not billable if this call serves to triage the patient into an appointment for the relevant concern      Alvino Chino MD

## 2022-01-26 RX ORDER — SODIUM CHLORIDE, SODIUM LACTATE, POTASSIUM CHLORIDE, CALCIUM CHLORIDE 600; 310; 30; 20 MG/100ML; MG/100ML; MG/100ML; MG/100ML
1000 INJECTION, SOLUTION INTRAVENOUS CONTINUOUS
Status: CANCELLED | OUTPATIENT
Start: 2022-01-26

## 2022-01-31 ENCOUNTER — TELEPHONE (OUTPATIENT)
Dept: OBGYN | Age: 37
End: 2022-01-31

## 2022-01-31 ENCOUNTER — HOSPITAL ENCOUNTER (OUTPATIENT)
Dept: PREADMISSION TESTING | Age: 37
Discharge: HOME | End: 2022-02-04
Payer: COMMERCIAL

## 2022-01-31 VITALS
OXYGEN SATURATION: 100 % | DIASTOLIC BLOOD PRESSURE: 76 MMHG | SYSTOLIC BLOOD PRESSURE: 123 MMHG | TEMPERATURE: 98.3 F | HEART RATE: 78 BPM | WEIGHT: 163 LBS | BODY MASS INDEX: 30.78 KG/M2 | RESPIRATION RATE: 16 BRPM | HEIGHT: 61 IN

## 2022-01-31 LAB
HCT VFR BLD CALC: 29.1 % (ref 36.3–47.1)
HEMOGLOBIN: 8.9 G/DL (ref 11.9–15.1)
MCH RBC QN AUTO: 23.7 PG (ref 25.2–33.5)
MCHC RBC AUTO-ENTMCNC: 30.6 G/DL (ref 28.4–34.8)
MCV RBC AUTO: 77.4 FL (ref 82.6–102.9)
NRBC AUTOMATED: 0 PER 100 WBC
PDW BLD-RTO: 17.2 % (ref 11.8–14.4)
PLATELET # BLD: 337 K/UL (ref 138–453)
PMV BLD AUTO: 10 FL (ref 8.1–13.5)
RBC # BLD: 3.76 M/UL (ref 3.95–5.11)
TOTAL PROTEIN, URINE: 21 MG/DL
WBC # BLD: 4.7 K/UL (ref 3.5–11.3)

## 2022-01-31 PROCEDURE — 85027 COMPLETE CBC AUTOMATED: CPT

## 2022-01-31 PROCEDURE — 36415 COLL VENOUS BLD VENIPUNCTURE: CPT

## 2022-01-31 PROCEDURE — 84156 ASSAY OF PROTEIN URINE: CPT

## 2022-01-31 RX ORDER — ACETAMINOPHEN 325 MG/1
650 TABLET ORAL EVERY 6 HOURS PRN
Status: ON HOLD | COMMUNITY
End: 2022-03-05 | Stop reason: HOSPADM

## 2022-01-31 ASSESSMENT — PAIN DESCRIPTION - DESCRIPTORS: DESCRIPTORS: CRAMPING

## 2022-01-31 ASSESSMENT — PAIN SCALES - GENERAL: PAINLEVEL_OUTOF10: 1

## 2022-01-31 ASSESSMENT — PAIN DESCRIPTION - PAIN TYPE: TYPE: CHRONIC PAIN

## 2022-01-31 ASSESSMENT — PAIN DESCRIPTION - ORIENTATION: ORIENTATION: RIGHT;LOWER

## 2022-01-31 ASSESSMENT — PAIN DESCRIPTION - LOCATION: LOCATION: ABDOMEN

## 2022-01-31 NOTE — PROGRESS NOTES
Dr. Fanny Kc office [facility] notified of abnormal CBC results with HGB 8.9, results faxed to office also

## 2022-01-31 NOTE — TELEPHONE ENCOUNTER
I spoke to the patient regarding abnormal H/H on preop labs. She reports LMP 1/14/22.  12/19/21. Periods last about 7 days and are generally heavy, at times changes pads more frequently than every 1 hours. Pt is scheduled for Laparoscopy on 2/14/22 to evaluate chronic pelvic pain. Recommend performing H-scope and D&C at the time of laparoscopy. Pt is in agreement with Hysterosocpy / D&C. She was offered Mirena IUD insertion at the time of H-scope. She will consider and let me know the morning of surgery.

## 2022-01-31 NOTE — PROGRESS NOTES
Anesthesia Focused Assessment    Has patient ever tested positive for COVID? Yes, 12/31/21. STOP-BANG Sleep Apnea Questionnaire    SNORE loudly (heard through closed doors)? No  TIRED, fatigued, sleepy during daytime? No  OBSERVED stopping breathing during sleep? No  High blood PRESSURE being treated? No    BMI over 35? No  AGE over 48? No  NECK circumference over 16\"? No  GENDER (male)? No             Total 0  High risk 5-8  Intermediate risk 3-4  Low risk 0-2    Obstructive Sleep Apnea: denies  If YES, machine used: no     Type 1 DM:   no  T2DM:  no    Coronary Artery Disease:  no  Hypertension:  no    Active smoker:  no  Drinks Alcohol:  Occasionally  marijuana    Dentition: benign    Defib / AICD / Pacemaker: NO      Renal Failure/dialysis:  NO    Patient was evaluated in PAT & anesthesia guidelines were applied. NPO guidelines, medication instructions and scheduled arrival time were reviewed with patient. Hx of anesthesia complications:  no  Family hx of anesthesia complications:  no                                                                                                                     Anesthesia contacted:   Patient was sent for post PAT anesthesia interview due to COVID protocol (positive 12/30/21). Medical or cardiac clearance ordered:     PLEASE REVIEW EKG 10/2021, CLEARANCE NEEDED OR REPEAT EKG TODAY? Patient is without cardiac or pulmonary complaint. Willye Force  1/31/22  2:11 PM    Patient seen by Dr. David Gongora, no further orders requested.

## 2022-02-01 NOTE — PROGRESS NOTES
Spoke with Zara Coyle from [facility] office about abnormal hemoglobin and hematocrit and doctor added a D&C Hysteroscopy, possible Mirena IUD to surgery schedule.

## 2022-02-14 ENCOUNTER — HOSPITAL ENCOUNTER (OUTPATIENT)
Age: 37
Setting detail: OUTPATIENT SURGERY
Discharge: HOME | End: 2022-02-14
Attending: OBSTETRICS & GYNECOLOGY | Admitting: OBSTETRICS & GYNECOLOGY
Payer: COMMERCIAL

## 2022-02-14 ENCOUNTER — ANESTHESIA EVENT (OUTPATIENT)
Dept: OPERATING ROOM | Age: 37
End: 2022-02-14
Payer: COMMERCIAL

## 2022-02-14 ENCOUNTER — ANESTHESIA (OUTPATIENT)
Dept: OPERATING ROOM | Age: 37
End: 2022-02-14
Payer: COMMERCIAL

## 2022-02-14 VITALS
HEART RATE: 92 BPM | WEIGHT: 162 LBS | HEIGHT: 61 IN | TEMPERATURE: 97.5 F | DIASTOLIC BLOOD PRESSURE: 90 MMHG | RESPIRATION RATE: 15 BRPM | OXYGEN SATURATION: 100 % | BODY MASS INDEX: 30.58 KG/M2 | SYSTOLIC BLOOD PRESSURE: 144 MMHG

## 2022-02-14 VITALS — TEMPERATURE: 97.1 F | OXYGEN SATURATION: 100 % | DIASTOLIC BLOOD PRESSURE: 95 MMHG | SYSTOLIC BLOOD PRESSURE: 155 MMHG

## 2022-02-14 DIAGNOSIS — G89.18 POST-OP PAIN: Primary | ICD-10-CM

## 2022-02-14 PROBLEM — Z97.5 IUD (INTRAUTERINE DEVICE) IN PLACE: Status: ACTIVE | Noted: 2022-02-14

## 2022-02-14 PROBLEM — N73.6 PELVIC ADHESIVE DISEASE: Status: ACTIVE | Noted: 2022-02-14

## 2022-02-14 PROBLEM — Z98.890 S/P LAPAROSCOPY: Status: ACTIVE | Noted: 2022-02-14

## 2022-02-14 LAB
ABO/RH: NORMAL
ANTIBODY SCREEN: NEGATIVE
ARM BAND NUMBER: NORMAL
EXPIRATION DATE: NORMAL
HCG, PREGNANCY URINE (POC): NEGATIVE
HCG, PREGNANCY URINE (POC): NEGATIVE

## 2022-02-14 PROCEDURE — 6360000002 HC RX W HCPCS: Performed by: NURSE ANESTHETIST, CERTIFIED REGISTERED

## 2022-02-14 PROCEDURE — 86901 BLOOD TYPING SEROLOGIC RH(D): CPT

## 2022-02-14 PROCEDURE — 81025 URINE PREGNANCY TEST: CPT

## 2022-02-14 PROCEDURE — 7100000001 HC PACU RECOVERY - ADDTL 15 MIN: Performed by: OBSTETRICS & GYNECOLOGY

## 2022-02-14 PROCEDURE — 88305 TISSUE EXAM BY PATHOLOGIST: CPT

## 2022-02-14 PROCEDURE — 2709999900 HC NON-CHARGEABLE SUPPLY: Performed by: OBSTETRICS & GYNECOLOGY

## 2022-02-14 PROCEDURE — 3600000009 HC SURGERY ROBOT BASE: Performed by: OBSTETRICS & GYNECOLOGY

## 2022-02-14 PROCEDURE — 7100000011 HC PHASE II RECOVERY - ADDTL 15 MIN: Performed by: OBSTETRICS & GYNECOLOGY

## 2022-02-14 PROCEDURE — 6360000002 HC RX W HCPCS: Performed by: ANESTHESIOLOGY

## 2022-02-14 PROCEDURE — 2580000003 HC RX 258: Performed by: ANESTHESIOLOGY

## 2022-02-14 PROCEDURE — 86900 BLOOD TYPING SEROLOGIC ABO: CPT

## 2022-02-14 PROCEDURE — 6360000002 HC RX W HCPCS: Performed by: OBSTETRICS & GYNECOLOGY

## 2022-02-14 PROCEDURE — 58660 LAPAROSCOPY LYSIS: CPT | Performed by: OBSTETRICS & GYNECOLOGY

## 2022-02-14 PROCEDURE — 58558 HYSTEROSCOPY BIOPSY: CPT | Performed by: OBSTETRICS & GYNECOLOGY

## 2022-02-14 PROCEDURE — 2500000003 HC RX 250 WO HCPCS: Performed by: NURSE ANESTHETIST, CERTIFIED REGISTERED

## 2022-02-14 PROCEDURE — S2900 ROBOTIC SURGICAL SYSTEM: HCPCS | Performed by: OBSTETRICS & GYNECOLOGY

## 2022-02-14 PROCEDURE — 87086 URINE CULTURE/COLONY COUNT: CPT

## 2022-02-14 PROCEDURE — 7100000010 HC PHASE II RECOVERY - FIRST 15 MIN: Performed by: OBSTETRICS & GYNECOLOGY

## 2022-02-14 PROCEDURE — 58300 INSERT INTRAUTERINE DEVICE: CPT | Performed by: OBSTETRICS & GYNECOLOGY

## 2022-02-14 PROCEDURE — 6360000002 HC RX W HCPCS

## 2022-02-14 PROCEDURE — 2580000003 HC RX 258: Performed by: UROLOGY

## 2022-02-14 PROCEDURE — A4217 STERILE WATER/SALINE, 500 ML: HCPCS | Performed by: UROLOGY

## 2022-02-14 PROCEDURE — 3600000019 HC SURGERY ROBOT ADDTL 15MIN: Performed by: OBSTETRICS & GYNECOLOGY

## 2022-02-14 PROCEDURE — 86850 RBC ANTIBODY SCREEN: CPT

## 2022-02-14 PROCEDURE — 3700000001 HC ADD 15 MINUTES (ANESTHESIA): Performed by: OBSTETRICS & GYNECOLOGY

## 2022-02-14 PROCEDURE — 6370000000 HC RX 637 (ALT 250 FOR IP): Performed by: ANESTHESIOLOGY

## 2022-02-14 PROCEDURE — 7100000000 HC PACU RECOVERY - FIRST 15 MIN: Performed by: OBSTETRICS & GYNECOLOGY

## 2022-02-14 PROCEDURE — 3700000000 HC ANESTHESIA ATTENDED CARE: Performed by: OBSTETRICS & GYNECOLOGY

## 2022-02-14 RX ORDER — IBUPROFEN 600 MG/1
600 TABLET ORAL EVERY 6 HOURS PRN
Qty: 60 TABLET | Refills: 1 | Status: SHIPPED | OUTPATIENT
Start: 2022-02-14 | End: 2022-02-14 | Stop reason: SDUPTHER

## 2022-02-14 RX ORDER — ONDANSETRON 2 MG/ML
INJECTION INTRAMUSCULAR; INTRAVENOUS
Status: COMPLETED
Start: 2022-02-14 | End: 2022-02-14

## 2022-02-14 RX ORDER — SIMETHICONE 80 MG
80 TABLET,CHEWABLE ORAL 4 TIMES DAILY PRN
Qty: 60 TABLET | Refills: 1 | Status: SHIPPED | OUTPATIENT
Start: 2022-02-14 | End: 2022-08-09 | Stop reason: ALTCHOICE

## 2022-02-14 RX ORDER — SIMETHICONE 80 MG
80 TABLET,CHEWABLE ORAL 4 TIMES DAILY PRN
Qty: 60 TABLET | Refills: 1 | Status: SHIPPED | OUTPATIENT
Start: 2022-02-14 | End: 2022-02-14 | Stop reason: SDUPTHER

## 2022-02-14 RX ORDER — FENTANYL CITRATE 50 UG/ML
25 INJECTION, SOLUTION INTRAMUSCULAR; INTRAVENOUS EVERY 5 MIN PRN
Status: DISCONTINUED | OUTPATIENT
Start: 2022-02-14 | End: 2022-02-14 | Stop reason: HOSPADM

## 2022-02-14 RX ORDER — FENTANYL CITRATE 50 UG/ML
INJECTION, SOLUTION INTRAMUSCULAR; INTRAVENOUS PRN
Status: DISCONTINUED | OUTPATIENT
Start: 2022-02-14 | End: 2022-02-14 | Stop reason: SDUPTHER

## 2022-02-14 RX ORDER — DOCUSATE SODIUM 100 MG/1
100 CAPSULE, LIQUID FILLED ORAL 2 TIMES DAILY PRN
Qty: 60 CAPSULE | Refills: 0 | Status: SHIPPED | OUTPATIENT
Start: 2022-02-14 | End: 2022-08-09 | Stop reason: ALTCHOICE

## 2022-02-14 RX ORDER — LIDOCAINE HYDROCHLORIDE 10 MG/ML
INJECTION, SOLUTION EPIDURAL; INFILTRATION; INTRACAUDAL; PERINEURAL PRN
Status: DISCONTINUED | OUTPATIENT
Start: 2022-02-14 | End: 2022-02-14 | Stop reason: SDUPTHER

## 2022-02-14 RX ORDER — MIDAZOLAM HYDROCHLORIDE 2 MG/2ML
2 INJECTION, SOLUTION INTRAMUSCULAR; INTRAVENOUS ONCE
Status: COMPLETED | OUTPATIENT
Start: 2022-02-14 | End: 2022-02-14

## 2022-02-14 RX ORDER — ONDANSETRON 2 MG/ML
4 INJECTION INTRAMUSCULAR; INTRAVENOUS EVERY 6 HOURS PRN
Status: DISCONTINUED | OUTPATIENT
Start: 2022-02-14 | End: 2022-02-14 | Stop reason: HOSPADM

## 2022-02-14 RX ORDER — ACETAMINOPHEN 500 MG
1000 TABLET ORAL EVERY 6 HOURS PRN
Qty: 80 TABLET | Refills: 1 | Status: SHIPPED | OUTPATIENT
Start: 2022-02-14 | End: 2022-02-14 | Stop reason: SDUPTHER

## 2022-02-14 RX ORDER — ACETAMINOPHEN 500 MG
1000 TABLET ORAL EVERY 6 HOURS PRN
Qty: 80 TABLET | Refills: 1 | Status: ON HOLD | OUTPATIENT
Start: 2022-02-14 | End: 2022-03-05 | Stop reason: SDUPTHER

## 2022-02-14 RX ORDER — SODIUM CHLORIDE, SODIUM LACTATE, POTASSIUM CHLORIDE, CALCIUM CHLORIDE 600; 310; 30; 20 MG/100ML; MG/100ML; MG/100ML; MG/100ML
1000 INJECTION, SOLUTION INTRAVENOUS CONTINUOUS
Status: DISCONTINUED | OUTPATIENT
Start: 2022-02-14 | End: 2022-02-14 | Stop reason: HOSPADM

## 2022-02-14 RX ORDER — CEFAZOLIN SODIUM 1 G/3ML
INJECTION, POWDER, FOR SOLUTION INTRAMUSCULAR; INTRAVENOUS PRN
Status: DISCONTINUED | OUTPATIENT
Start: 2022-02-14 | End: 2022-02-14 | Stop reason: SDUPTHER

## 2022-02-14 RX ORDER — MAGNESIUM HYDROXIDE 1200 MG/15ML
LIQUID ORAL PRN
Status: DISCONTINUED | OUTPATIENT
Start: 2022-02-14 | End: 2022-02-14 | Stop reason: ALTCHOICE

## 2022-02-14 RX ORDER — MAGNESIUM HYDROXIDE 1200 MG/15ML
LIQUID ORAL CONTINUOUS PRN
Status: COMPLETED | OUTPATIENT
Start: 2022-02-14 | End: 2022-02-14

## 2022-02-14 RX ORDER — DIPHENHYDRAMINE HYDROCHLORIDE 50 MG/ML
INJECTION INTRAMUSCULAR; INTRAVENOUS PRN
Status: DISCONTINUED | OUTPATIENT
Start: 2022-02-14 | End: 2022-02-14 | Stop reason: SDUPTHER

## 2022-02-14 RX ORDER — ONDANSETRON 2 MG/ML
INJECTION INTRAMUSCULAR; INTRAVENOUS PRN
Status: DISCONTINUED | OUTPATIENT
Start: 2022-02-14 | End: 2022-02-14 | Stop reason: SDUPTHER

## 2022-02-14 RX ORDER — IBUPROFEN 600 MG/1
600 TABLET ORAL EVERY 6 HOURS PRN
Qty: 60 TABLET | Refills: 1 | Status: SHIPPED | OUTPATIENT
Start: 2022-02-14 | End: 2022-08-09 | Stop reason: ALTCHOICE

## 2022-02-14 RX ORDER — HYDROCODONE BITARTRATE AND ACETAMINOPHEN 5; 325 MG/1; MG/1
1 TABLET ORAL ONCE
Status: COMPLETED | OUTPATIENT
Start: 2022-02-14 | End: 2022-02-14

## 2022-02-14 RX ORDER — PROPOFOL 10 MG/ML
INJECTION, EMULSION INTRAVENOUS PRN
Status: DISCONTINUED | OUTPATIENT
Start: 2022-02-14 | End: 2022-02-14 | Stop reason: SDUPTHER

## 2022-02-14 RX ORDER — SIMETHICONE 80 MG
80 TABLET,CHEWABLE ORAL ONCE
Status: COMPLETED | OUTPATIENT
Start: 2022-02-14 | End: 2022-02-14

## 2022-02-14 RX ORDER — HYDROCODONE BITARTRATE AND ACETAMINOPHEN 5; 325 MG/1; MG/1
1 TABLET ORAL EVERY 6 HOURS PRN
Qty: 15 TABLET | Refills: 0 | Status: SHIPPED | OUTPATIENT
Start: 2022-02-14 | End: 2022-02-19

## 2022-02-14 RX ORDER — ONDANSETRON 4 MG/1
4 TABLET, FILM COATED ORAL EVERY 8 HOURS PRN
Qty: 20 TABLET | Refills: 0 | Status: SHIPPED | OUTPATIENT
Start: 2022-02-14 | End: 2022-02-14 | Stop reason: SDUPTHER

## 2022-02-14 RX ORDER — FENTANYL CITRATE 50 UG/ML
50 INJECTION, SOLUTION INTRAMUSCULAR; INTRAVENOUS EVERY 5 MIN PRN
Status: DISCONTINUED | OUTPATIENT
Start: 2022-02-14 | End: 2022-02-14 | Stop reason: HOSPADM

## 2022-02-14 RX ORDER — ONDANSETRON 4 MG/1
4 TABLET, FILM COATED ORAL EVERY 8 HOURS PRN
Qty: 20 TABLET | Refills: 0 | Status: SHIPPED | OUTPATIENT
Start: 2022-02-14 | End: 2022-08-09 | Stop reason: ALTCHOICE

## 2022-02-14 RX ORDER — DOCUSATE SODIUM 100 MG/1
100 CAPSULE, LIQUID FILLED ORAL 2 TIMES DAILY PRN
Qty: 60 CAPSULE | Refills: 0 | Status: SHIPPED | OUTPATIENT
Start: 2022-02-14 | End: 2022-02-14 | Stop reason: SDUPTHER

## 2022-02-14 RX ORDER — KETOROLAC TROMETHAMINE 30 MG/ML
INJECTION, SOLUTION INTRAMUSCULAR; INTRAVENOUS PRN
Status: DISCONTINUED | OUTPATIENT
Start: 2022-02-14 | End: 2022-02-14 | Stop reason: SDUPTHER

## 2022-02-14 RX ORDER — SULFAMETHOXAZOLE AND TRIMETHOPRIM 800; 160 MG/1; MG/1
1 TABLET ORAL 2 TIMES DAILY
Qty: 10 TABLET | Refills: 0 | Status: SHIPPED | OUTPATIENT
Start: 2022-02-14 | End: 2022-02-19

## 2022-02-14 RX ORDER — DEXAMETHASONE SODIUM PHOSPHATE 10 MG/ML
INJECTION INTRAMUSCULAR; INTRAVENOUS PRN
Status: DISCONTINUED | OUTPATIENT
Start: 2022-02-14 | End: 2022-02-14 | Stop reason: SDUPTHER

## 2022-02-14 RX ORDER — OXYBUTYNIN CHLORIDE 5 MG/1
5 TABLET ORAL 2 TIMES DAILY PRN
Qty: 20 TABLET | Refills: 0 | Status: SHIPPED | OUTPATIENT
Start: 2022-02-14 | End: 2022-08-09 | Stop reason: ALTCHOICE

## 2022-02-14 RX ORDER — ROCURONIUM BROMIDE 10 MG/ML
INJECTION, SOLUTION INTRAVENOUS PRN
Status: DISCONTINUED | OUTPATIENT
Start: 2022-02-14 | End: 2022-02-14 | Stop reason: SDUPTHER

## 2022-02-14 RX ORDER — HYDROCODONE BITARTRATE AND ACETAMINOPHEN 5; 325 MG/1; MG/1
1 TABLET ORAL EVERY 6 HOURS PRN
Qty: 15 TABLET | Refills: 0 | Status: SHIPPED | OUTPATIENT
Start: 2022-02-14 | End: 2022-02-14 | Stop reason: SDUPTHER

## 2022-02-14 RX ADMIN — PROPOFOL 150 MG: 10 INJECTION, EMULSION INTRAVENOUS at 12:07

## 2022-02-14 RX ADMIN — LIDOCAINE HYDROCHLORIDE 50 MG: 10 INJECTION, SOLUTION EPIDURAL; INFILTRATION; INTRACAUDAL; PERINEURAL at 10:17

## 2022-02-14 RX ADMIN — FENTANYL CITRATE 100 MCG: 50 INJECTION, SOLUTION INTRAMUSCULAR; INTRAVENOUS at 10:17

## 2022-02-14 RX ADMIN — PROPOFOL 160 MG: 10 INJECTION, EMULSION INTRAVENOUS at 10:17

## 2022-02-14 RX ADMIN — DIPHENHYDRAMINE HYDROCHLORIDE 12.5 MG: 50 INJECTION INTRAMUSCULAR; INTRAVENOUS at 10:29

## 2022-02-14 RX ADMIN — FENTANYL CITRATE 50 MCG: 50 INJECTION, SOLUTION INTRAMUSCULAR; INTRAVENOUS at 11:02

## 2022-02-14 RX ADMIN — FENTANYL CITRATE 50 MCG: 50 INJECTION, SOLUTION INTRAMUSCULAR; INTRAVENOUS at 13:24

## 2022-02-14 RX ADMIN — FENTANYL CITRATE 100 MCG: 50 INJECTION, SOLUTION INTRAMUSCULAR; INTRAVENOUS at 10:25

## 2022-02-14 RX ADMIN — ONDANSETRON 4 MG: 2 INJECTION INTRAMUSCULAR; INTRAVENOUS at 14:55

## 2022-02-14 RX ADMIN — ROCURONIUM BROMIDE 50 MG: 10 INJECTION INTRAVENOUS at 11:58

## 2022-02-14 RX ADMIN — FENTANYL CITRATE 25 MCG: 50 INJECTION, SOLUTION INTRAMUSCULAR; INTRAVENOUS at 13:54

## 2022-02-14 RX ADMIN — ONDANSETRON 4 MG: 2 INJECTION INTRAMUSCULAR; INTRAVENOUS at 12:58

## 2022-02-14 RX ADMIN — CEFAZOLIN 2000 MG: 330 INJECTION, POWDER, FOR SOLUTION INTRAMUSCULAR; INTRAVENOUS at 12:31

## 2022-02-14 RX ADMIN — FENTANYL CITRATE 25 MCG: 50 INJECTION, SOLUTION INTRAMUSCULAR; INTRAVENOUS at 14:21

## 2022-02-14 RX ADMIN — FENTANYL CITRATE 25 MCG: 50 INJECTION, SOLUTION INTRAMUSCULAR; INTRAVENOUS at 14:06

## 2022-02-14 RX ADMIN — SUGAMMADEX 200 MG: 100 INJECTION, SOLUTION INTRAVENOUS at 13:06

## 2022-02-14 RX ADMIN — DEXAMETHASONE SODIUM PHOSPHATE 10 MG: 10 INJECTION INTRAMUSCULAR; INTRAVENOUS at 10:29

## 2022-02-14 RX ADMIN — HYDROCODONE BITARTRATE AND ACETAMINOPHEN 1 TABLET: 5; 325 TABLET ORAL at 14:07

## 2022-02-14 RX ADMIN — MIDAZOLAM 2 MG: 1 INJECTION INTRAMUSCULAR; INTRAVENOUS at 10:00

## 2022-02-14 RX ADMIN — FENTANYL CITRATE 50 MCG: 50 INJECTION, SOLUTION INTRAMUSCULAR; INTRAVENOUS at 13:40

## 2022-02-14 RX ADMIN — FENTANYL CITRATE 50 MCG: 50 INJECTION, SOLUTION INTRAMUSCULAR; INTRAVENOUS at 12:39

## 2022-02-14 RX ADMIN — SODIUM CHLORIDE, POTASSIUM CHLORIDE, SODIUM LACTATE AND CALCIUM CHLORIDE: 600; 310; 30; 20 INJECTION, SOLUTION INTRAVENOUS at 10:12

## 2022-02-14 RX ADMIN — SIMETHICONE 80 MG: 80 TABLET, CHEWABLE ORAL at 14:07

## 2022-02-14 RX ADMIN — ROCURONIUM BROMIDE 40 MG: 10 INJECTION INTRAVENOUS at 10:17

## 2022-02-14 RX ADMIN — KETOROLAC TROMETHAMINE 30 MG: 30 INJECTION, SOLUTION INTRAMUSCULAR at 12:58

## 2022-02-14 RX ADMIN — FENTANYL CITRATE 50 MCG: 50 INJECTION, SOLUTION INTRAMUSCULAR; INTRAVENOUS at 13:35

## 2022-02-14 RX ADMIN — FENTANYL CITRATE 50 MCG: 50 INJECTION, SOLUTION INTRAMUSCULAR; INTRAVENOUS at 13:48

## 2022-02-14 RX ADMIN — SODIUM CHLORIDE, POTASSIUM CHLORIDE, SODIUM LACTATE AND CALCIUM CHLORIDE 1000 ML: 600; 310; 30; 20 INJECTION, SOLUTION INTRAVENOUS at 08:09

## 2022-02-14 ASSESSMENT — PULMONARY FUNCTION TESTS
PIF_VALUE: 29
PIF_VALUE: 19
PIF_VALUE: 26
PIF_VALUE: 32
PIF_VALUE: 21
PIF_VALUE: 18
PIF_VALUE: 21
PIF_VALUE: 1
PIF_VALUE: 22
PIF_VALUE: 27
PIF_VALUE: 27
PIF_VALUE: 19
PIF_VALUE: 27
PIF_VALUE: 1
PIF_VALUE: 2
PIF_VALUE: 18
PIF_VALUE: 21
PIF_VALUE: 19
PIF_VALUE: 5
PIF_VALUE: 0
PIF_VALUE: 1
PIF_VALUE: 31
PIF_VALUE: 18
PIF_VALUE: 21
PIF_VALUE: 31
PIF_VALUE: 26
PIF_VALUE: 17
PIF_VALUE: 21
PIF_VALUE: 21
PIF_VALUE: 32
PIF_VALUE: 17
PIF_VALUE: 19
PIF_VALUE: 17
PIF_VALUE: 19
PIF_VALUE: 27
PIF_VALUE: 18
PIF_VALUE: 19
PIF_VALUE: 18
PIF_VALUE: 26
PIF_VALUE: 18
PIF_VALUE: 0
PIF_VALUE: 26
PIF_VALUE: 18
PIF_VALUE: 21
PIF_VALUE: 21
PIF_VALUE: 18
PIF_VALUE: 18
PIF_VALUE: 19
PIF_VALUE: 19
PIF_VALUE: 22
PIF_VALUE: 31
PIF_VALUE: 26
PIF_VALUE: 0
PIF_VALUE: 18
PIF_VALUE: 19
PIF_VALUE: 27
PIF_VALUE: 28
PIF_VALUE: 21
PIF_VALUE: 27
PIF_VALUE: 17
PIF_VALUE: 1
PIF_VALUE: 32
PIF_VALUE: 26
PIF_VALUE: 18
PIF_VALUE: 21
PIF_VALUE: 19
PIF_VALUE: 32
PIF_VALUE: 1
PIF_VALUE: 19
PIF_VALUE: 2
PIF_VALUE: 26
PIF_VALUE: 19
PIF_VALUE: 0
PIF_VALUE: 17
PIF_VALUE: 23
PIF_VALUE: 18
PIF_VALUE: 2
PIF_VALUE: 20
PIF_VALUE: 32
PIF_VALUE: 27
PIF_VALUE: 21
PIF_VALUE: 18
PIF_VALUE: 11
PIF_VALUE: 18
PIF_VALUE: 32
PIF_VALUE: 26
PIF_VALUE: 18
PIF_VALUE: 26
PIF_VALUE: 19
PIF_VALUE: 18
PIF_VALUE: 1
PIF_VALUE: 31
PIF_VALUE: 27
PIF_VALUE: 21
PIF_VALUE: 17
PIF_VALUE: 27
PIF_VALUE: 26
PIF_VALUE: 27
PIF_VALUE: 2
PIF_VALUE: 18
PIF_VALUE: 30
PIF_VALUE: 21
PIF_VALUE: 27
PIF_VALUE: 4
PIF_VALUE: 27
PIF_VALUE: 21
PIF_VALUE: 19
PIF_VALUE: 19
PIF_VALUE: 4
PIF_VALUE: 22
PIF_VALUE: 32
PIF_VALUE: 21
PIF_VALUE: 32
PIF_VALUE: 0
PIF_VALUE: 27
PIF_VALUE: 26
PIF_VALUE: 28
PIF_VALUE: 0
PIF_VALUE: 19
PIF_VALUE: 17
PIF_VALUE: 23
PIF_VALUE: 32
PIF_VALUE: 19
PIF_VALUE: 27
PIF_VALUE: 23
PIF_VALUE: 23
PIF_VALUE: 26
PIF_VALUE: 21
PIF_VALUE: 27
PIF_VALUE: 25
PIF_VALUE: 29
PIF_VALUE: 21
PIF_VALUE: 21
PIF_VALUE: 18
PIF_VALUE: 27
PIF_VALUE: 18
PIF_VALUE: 27
PIF_VALUE: 21
PIF_VALUE: 19
PIF_VALUE: 23
PIF_VALUE: 27
PIF_VALUE: 32
PIF_VALUE: 19
PIF_VALUE: 0
PIF_VALUE: 21
PIF_VALUE: 0
PIF_VALUE: 17
PIF_VALUE: 27
PIF_VALUE: 18
PIF_VALUE: 25
PIF_VALUE: 27
PIF_VALUE: 0
PIF_VALUE: 18
PIF_VALUE: 21
PIF_VALUE: 18
PIF_VALUE: 2
PIF_VALUE: 4
PIF_VALUE: 17
PIF_VALUE: 18
PIF_VALUE: 27
PIF_VALUE: 27
PIF_VALUE: 1
PIF_VALUE: 18
PIF_VALUE: 27
PIF_VALUE: 19
PIF_VALUE: 4
PIF_VALUE: 0
PIF_VALUE: 32
PIF_VALUE: 19
PIF_VALUE: 0
PIF_VALUE: 21
PIF_VALUE: 19
PIF_VALUE: 27
PIF_VALUE: 4
PIF_VALUE: 21
PIF_VALUE: 32
PIF_VALUE: 27
PIF_VALUE: 31
PIF_VALUE: 28
PIF_VALUE: 27
PIF_VALUE: 31
PIF_VALUE: 32
PIF_VALUE: 11
PIF_VALUE: 19
PIF_VALUE: 19
PIF_VALUE: 25
PIF_VALUE: 19
PIF_VALUE: 26
PIF_VALUE: 28

## 2022-02-14 ASSESSMENT — PAIN DESCRIPTION - PAIN TYPE: TYPE: SURGICAL PAIN

## 2022-02-14 ASSESSMENT — PAIN SCALES - GENERAL
PAINLEVEL_OUTOF10: 5
PAINLEVEL_OUTOF10: 6
PAINLEVEL_OUTOF10: 8
PAINLEVEL_OUTOF10: 6
PAINLEVEL_OUTOF10: 5
PAINLEVEL_OUTOF10: 6
PAINLEVEL_OUTOF10: 5
PAINLEVEL_OUTOF10: 8
PAINLEVEL_OUTOF10: 8
PAINLEVEL_OUTOF10: 5
PAINLEVEL_OUTOF10: 6
PAINLEVEL_OUTOF10: 8

## 2022-02-14 ASSESSMENT — PAIN DESCRIPTION - DESCRIPTORS: DESCRIPTORS: CRAMPING

## 2022-02-14 ASSESSMENT — PAIN DESCRIPTION - LOCATION: LOCATION: ABDOMEN

## 2022-02-14 ASSESSMENT — PAIN - FUNCTIONAL ASSESSMENT: PAIN_FUNCTIONAL_ASSESSMENT: 0-10

## 2022-02-14 NOTE — BRIEF OP NOTE
Brief Operative Note  Department of Obstetrics and Gynecology  [address] [facility]     Patient: Jessica Presley   : 1985  MRN: [id number]       Acct: [de-identified]   Date of Procedure: 22     Pre-operative Diagnosis: 29 y.o. female [id number]  Dysmenorrhea   Abnormal uterine bleeding   History of  section x3  History of classical  section     Post-operative Diagnosis: Same as above, extensive pelvic adhesive disease, intraoperative incidental cystotomy    Procedure: Diagnostic laparoscopy, lysis of adhesions, dilatation and curettage, hysteroscopy, Mirena IUD insertion  Robotic incidental cystotomy repair by Urology    Surgeon: Dr. Tabby Corley MD; Dr. Tory Cardona MD     Assistant(s): Nasra Zapata DO, PGY2; Neelima Alvarez MD, PGY5    Anesthesia: general    Findings: Normal appearing vaginal mucosa with dark red blood from the cervix, normal appearing large cervix, large, bulky uterus with posterior fibroid palpated vaginally, omental adhesions to the anterior abdominal wall, scarring and adhesions of the uterus and bladder to the anterior abdominal wall, normal appearing ovaries and fallopian tubes, normal appearing liver and diaphragm, no evidence of endometriosis within the pelvis or abdomen  Total IV fluids/Blood products: Pending   Urine Output:  150 ml in Rosado bag   Estimated blood loss:  20 ml  Drains:  rosado catheter  Specimens:  Endometrial curettings  Instrument and Sponge Count: Correct  Complications:  Incidental cystotomy, repaired by Urology team  Condition:  stable, transferred to post anesthesia recovery    See full operative report for further details.       Nasra Zapata DO, PGY2  Ob/Gyn Resident   2022, 1:13 PM

## 2022-02-14 NOTE — H&P
OB/GYN Pre-Op H&P  [address] [facility]    Patient Name: Jasiel Rodríguez     Patient : 1985  [facility]  Admission Date/Time: 2022  7:16 AM  Primary Care Physician: Farheen Corbin MD  MRN: [id number]    Date: 2022  Time: 9:44 AM    The patient was seen in pre-op holding. She is here for diagnostic laparoscopy, dilatation and curettage, Mirena IUD insertion. Jasiel Rodríguez is a 29 y.o. [id number] with a history of pelvic pain, dysmenorrhea, and heavy vaginal bleeding resulting in anemia. TVUS completed was unremarkable. She declined hormonal intervention previously due to concern for side effect profiles. Patient requested diagnostic laparoscopy to assess for endometriosis. Decision was also made to proceed with hysteroscopy, D&C for heavy vaginal bleeding. She requested insertion of Mirena IUD at the time of the procedure. The procedure risks and complications were reviewed. The labs, Consent, and H&P were reviewed and updated. The patient was counseled on the possibility of  the need of a second surgery. The patient voiced understanding and had all of her questions answered. The possibility of incomplete removal of abnormal tissue was discussed.     OBSTETRICAL HISTORY:   OB History    Para Term  AB Living   3 3 1 2 0 1   SAB IAB Ectopic Molar Multiple Live Births   0 0 0 0 0 2      # Outcome Date GA Lbr Anastacio/2nd Weight Sex Delivery Anes PTL Lv   3  12 23w4d   M CS-Classical  Y       Birth Comments: breech,       Apgar1: 1  Apgar5: 6   2   25w0d   M CS-LTranv Gen  DEC   1 Term  40w0d   F CS-LTranv EPI  EDY       PAST MEDICAL HISTORY:   has a past medical history of Acne, Benign essential hypertension antepartum, Closed nondisplaced fracture of distal pole of navicular bone of left wrist, COVID-19, Family history of uterine cancer, Iron deficiency anemia, Prothrombin gene mutation ([address] Snores, Under care of team, rhinorrhea  NECK:  supple, symmetrical, trachea midline   LUNGS:  Good air movement bilaterally, unlabored respirations, no wheezes or rhonchi  CARDIOVASCULAR: Regular rate and rhythm, no murmurs rubs or gallops  ABDOMEN: soft, non tender, non distended, no rebound or guarding, no hernias, no hepatomegaly, no splenomegly  MUSCULOSKELETAL:  Equal strength bilaterally, normal muscle tone  SKIN: No abscess or rash  NEUROLOGIC:  Cranial nerves 2-12 grossly intact, no focal deficits  PSYCH: affect appropriate  Pelvic Exam: deferred to OR      LAB RESULTS:  Admission on 02/14/2022   Component Date Value Ref Range Status    Expiration Date 02/14/2022 02/17/2022,2359   Final    Arm Band Number 02/14/2022 BE [id number]   Final    ABO/Rh 02/14/2022 B POSITIVE   Final    Antibody Screen 02/14/2022 NEGATIVE   Final    HCG, Pregnancy Urine (POC) 02/14/2022 NEGATIVE  NEGATIVE Final    Comment: Specimens with hCG levels near the threshold of the test (25 mIU/mL) may give a negative or   indeterminate result. In such cases, another test should be performed with a new specimen   in 48-72 hours. If early pregnancy is suspected clinically in this setting, correlation   with quantitative serum b-hCG level is suggested. Hospital Outpatient Visit on 01/31/2022   Component Date Value Ref Range Status    WBC 01/31/2022 4.7  3.5 - 11.3 k/uL Final    RBC 01/31/2022 3.76* 3.95 - 5.11 m/uL Final    Hemoglobin 01/31/2022 8.9* 11.9 - 15.1 g/dL Final    Hematocrit 01/31/2022 29.1* 36.3 - 47.1 % Final    MCV 01/31/2022 77.4* 82.6 - 102.9 fL Final    MCH 01/31/2022 23.7* 25.2 - 33.5 pg Final    MCHC 01/31/2022 30.6  28.4 - 34.8 g/dL Final    RDW 01/31/2022 17.2* 11.8 - 14.4 % Final    Platelets 76/06/8726 337  138 - 453 k/uL Final    MPV 01/31/2022 10.0  8.1 - 13.5 fL Final    NRBC Automated 01/31/2022 0.0  0.0 per 100 WBC Final    Total Protein, Urine 01/31/2022 21  mg/dL Final    No normal range established. DIAGNOSTICS:  Narrative   EXAMINATION:   PELVIC ULTRASOUND       5/21/2021       TECHNIQUE:   Transabdominal pelvic ultrasound was performed.       COMPARISON:   Pelvic ultrasound July 26, 2016.       HISTORY:   ORDERING SYSTEM PROVIDED HISTORY: Abnormal uterine bleeding   TECHNOLOGIST PROVIDED HISTORY:   This procedure can be scheduled via [employer].  Access your [employer] account by   visiting [employer]. Abnormal uterine bleeding, pelvic pain   Reason for Exam: Pelvic pain. ..per patient for 2 months   Acuity: Unknown   Type of Exam: Initial   Additional signs and symptoms: None   Relevant Medical/Surgical History: None       FINDINGS:       Measurements:       Uterus:  10.9 x 5.6 x 6.4 cm       Endometrial stripe:  8.6 mm       Right Ovary:  3.7 x 1.7 x 3.1 cm       Left Ovary:  4.6 x 2.0 x 3.0 cm           Ultrasound Findings:       Uterus: Uterus demonstrates normal myometrial echotexture.       Endometrial stripe: Endometrial stripe is within normal limits.       Right Ovary: Right ovary is within normal limits.       Left Ovary:  Left ovary is within normal limits.       Free Fluid: No evidence of free fluid.           Impression   Unremarkable pelvic ultrasound. DIAGNOSIS & PLAN:  1. Abnormal uterine bleeding   2. Dysmenorrhea   3 Chronic pelvic pain   - Proceed with planned procedure: Diagnostic laparoscopy, dilatation and curettage, hysteroscopy, Mirena IUD insertion.   - Consent signed, on chart. - The patient is ready for transport to the operative suite. Counseling: The patient was counseled on all options both medical and surgical, conservative as well as definitive. She has elected to proceed with the procedure as stated above. The patient was counseled on the procedure. Risks and complications were reviewed in detail. The patients orders, labs, consents have been completed.  The history and physical as well as all supporting surgical documentation will be forwarded to the pre-operative holding area. The patient is aware that this procedure may not alleviate her symptoms. That there may be a necessity for a second surgery and that there may be an incomplete removal of abnormal tissue.     Jakob Valentine DO  Ob/Gyn Resident  Pager: [phone] [address] [facility]  2/14/2022, 9:44 AM
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
## 2022-02-14 NOTE — PROGRESS NOTES
Writer spoke with pt's friend, Penny Boykin, whom confirmed that he would pick pt up after procedure & would be staying with pt for next 24 hours.

## 2022-02-14 NOTE — PROGRESS NOTES
Pt states she is too dizzy to stand at this time. Pt sat up at side of bed and tolerated well but refuses to stand.

## 2022-02-14 NOTE — PROGRESS NOTES
The patient was seen in the preoperative holding area. Since completion of the preoperative H&P 11/23/22 the patient contracted COVID. She was originally scheduled for 1/4/22. She has completed recovered from the COVID infection and surgery rescheduled for today. In addition to the Operative laparoscopy we will perform H-scope D&C and Mirena IUD insertion to evaluate and treat AUB. I have   personally explained the planned procedure to her. The indication, risks and alternatives were reviewed. All of her questions were answered to the best of my ability. Ok to proceed to the OR.

## 2022-02-14 NOTE — OP NOTE
Operative Note      Patient: Jessica Presley  YOB: 1985  MRN: [id number]    Date of Procedure: 2/14/2022    Pre-Op Diagnosis: INADVERTENT CYSTOTOMY    Post-Op Diagnosis: Same       Procedure(s):  1.  Robotic assisted laparoscopic repair of cystotomy - SIMPLE (2 CM). Surgeon(s):  Tory Cardona MD    Assistant:   Resident: Kailyn Arellano MD-PGY5    Anesthesia: General    Estimated Blood Loss (mL): Minimal    Complications: None    Specimens:   ID Type Source Tests Collected by Time Destination   1 : URINE CULTURE Urine Urine, straight catheter CULTURE, URINE Tory Cardona MD 2/14/2022 1254    A : ENDOMETRIAL CURETTINGS  Tissue Endometrium SURGICAL PATHOLOGY Jensen Lott MD 2/14/2022 1126        Implants:  * No implants in log *      Drains:   Urethral Catheter Double-lumen;Non-latex;Straight-tip 16 fr (Active)       Findings:   -Approximately 2 cm cystotomy at the dome of the bladder, edges freshened and closed in a single watertight layer    Indications:  Patient is a 41-year-old female with a history of chronic pelvic pain, dysmenorrhea, and heavy vaginal bleeding who presented for elective diagnostic laparoscopy, D&C, and Mirena IUD insertion by OB/GYN. During diagnostic laparoscopy an inadvertent cystotomy was made in the bladder. Urology was consulted intraoperatively for assistance. Due to the emergent nature of the procedure, informed consent was not obtained. Detailed Description of Procedure:   After temporary closure of previous incisions by OB/GYN team, patient was taken intubated to the operating room with robotic access and placed in modified supine position on the operating room table. Ancef 2 g IV was given for prophylaxis. Patient's abdomen and genitals were prepped and draped in usual sterile fashion and Campbell catheter previously placed was prepped on the field. Surgical timeout was performed using 2 patient identifiers.   We began by gaining Veress needle access at the previous supraumbilical port site in standard fashion and the abdomen was insufflated to 15 mmHg. We then placed an 8 mm robotic trocar bluntly in the supraumbilical incision. 2 additional 8 mm robotic trochars were then placed in the previous incisions to the right and left of the umbilicus and an additional 8 mm air seal trocar was placed on the patient's right abdomen. All additional ports were placed under direct visualization. Patient was placed in Trendelenburg position. The robot was then docked. We proceeded to identify the area of the cystotomy at the dome of the bladder slightly to the left of midline. This was approximately 2 cm in width. Ureteral orifices were not anywhere near the area of the cystotomy. The edges of the cystotomy were freshened using sharp scissors. We then proceeded to close the cystotomy in standard fashion with 3-0 V-Loc suture in a bidirectional fashion and tied in the middle. The closure was tested with 180 cc of saline backfilled through the catheter and was found to be watertight with no leakage. Once we were satisfied, the robot was undocked. We did not leave a drain. Skin incisions were then closed using 4-0 Monocryl suture in a standard fashion and Dermabond skin glue was affixed. Procedure was then terminated. Patient tolerated our portion of the procedure well without complication and was taken to PACU in good and stable condition. Campbell catheter was hooked up to dependent drainage. Dr. Mack San was present and scrubbed for the duration of the procedure. Plan:  Patient will be discharged from the postoperative recovery area and will follow up next week for catheter removal in the office. She will need to have a catheter for least 1 week.     Electronically signed by Dick Parekh MD on 2/14/2022 at 1:06 PM

## 2022-02-14 NOTE — OP NOTE
Operative Note  Department of Obstetrics and Gynecology  [facility]       Patient: Rusty Bertrand   : 1985  MRN: [id number]       Acct: [de-identified]   PCP: Johanne Wild MD  Date of Procedure: 22    Pre-operative Diagnosis: 29 y.o. female [id number]  Dysmenorrhea   Abnormal uterine bleeding   History of  section x3  History of classical  section      Post-operative Diagnosis: Same as above, extensive pelvic adhesive disease, intraoperative incidental cystotomy     Procedure: Diagnostic laparoscopy, lysis of adhesions, dilatation and curettage, hysteroscopy, Mirena IUD insertion  Robotic incidental cystotomy repair by Urology     Surgeon: Dr. Raudel Renteria MD; Dr. Sully Winston MD     Assistant(s): Patricia Valentin DO, PGY2; Elver Richards MD, PGY5     Anesthesia: general    Indications: Rusty Bertrand is a 29 y.o. [id number] with a history of pelvic pain, dysmenorrhea, and heavy vaginal bleeding resulting in anemia. TVUS completed was unremarkable. She declined hormonal intervention previously due to concern for side effect profiles. Patient requested diagnostic laparoscopy to assess for endometriosis. Decision was also made to proceed with hysteroscopy, D&C for heavy vaginal bleeding. She requested insertion of Mirena IUD at the time of the procedure. Procedure Details   The patient was seen in the pre-op room. The risks, benefits, complications, treatment options, and expected outcomes were discussed with the patient. The patient concurred with the proposed plan, giving informed consent. The patient was taken to the Operating Room and identified as Rusty Bertrand and the procedure was verified. A Time Out was held and the above information confirmed. After administration of general anesthesia, the patient was placed in the dorsolithotomy position with Yellofin stirrups and examination under general anesthesia was performed with findings as noted below. A rosado catheter was inserted into the bladder. The patient was prepped and draped in the usual sterile fashion. A weighted speculum was placed into the vagina to visualize the cervix. The cervix was grasped with a single-tooth tenaculum. The cervix was noted to be approximately 1 cm dilated. An acorn uterine manipulator was inserted into the cervical canal to aid in visualization during the laparoscopic portion of the case. Attention was then turned to the abdominal portion of the case. An approximately 5 mm supraumbilical incision was made and using blunt dissection was carried out down to the fascia with a hemostat. Abdominal entry was achieved under direct visualization using Optiview with 5 mm trocar. CO2 gas was then used to create a pneumoperitoneum. The patient was then placed in trendelenberg position. Survey of the pelvis was then performed, revealing findings as noted below. Two 5 mm ports were then placed in the right and left lower quadrants under direct visualization. A laparoscopic Ligasure device was used to remove omental adhesions from the anterior abdominal wall. The pelvis was surveyed once again and significant fibrotic adhesions of the uterus to the anterior abdominal wall were noted. While ligating and cutting adhesions, an incidental cystotomy was made that measured approximately 1 cm with full thickness entry into the bladder wall. Intraoperative consultation was made to Urology and decision was made to proceed with the vaginal portion of the case and then transfer the patient to an OR equipped with robotic capability for cystotomy repair by Urology. All instruments were then removed. Pneumoperitoneum was evacuated. Port sites were closed with metal staples and cleansed and dressed with Tegaderm sterile dressings. Attention was then turned to the vaginal portion of the case. The weighted speculum was replaced and the acorn manipulator was removed.  The uterus and the cervix were sounded and measured 10 cm. The cervix was confirmed to be sufficiently dilated with a Demario dilator to size 13 [place]. A Myosure operative hysteroscope was inserted into the uterine cavity showing fluffy, proliferative tissue within the endocervical and endometrial cavity with blood clots. Endometrial curettage was carried out with sharp curettage yielding curettings which were collected and sent for pathology examination. A Mirena IUD was brought onto the sterile field. The IUD was loaded into the applicator. The applicator device was passed through the cervical canal and advanced to 10cm. The device was retracted 1 cm and the IUD was deployed. The device was held in place for 10 seconds and then advanced to the fundus. The applicator was removed and the strings were trimmed. All instruments were then removed. Hemostasis was visualized at the tenaculum site on the cervix. The Campbell catheter was left in place. Patient was then transferred to OR  for robotic cystotomy repair. Please see Dr. Regalado Prudent note for further details of the procedure. Instrument, sponge, and needle counts were correct at the conclusion of the case. SCDs for DVT prophylaxis remain in place for the post operative period. Dr. Loyda Andrews was present for the entire operation. Mirena IUD:   Lot #: [id number]  Exp: 03/2024    Findings: Normal appearing vaginal mucosa with dark red blood from the cervix, normal appearing large cervix, large, bulky uterus with posterior fibroid palpated vaginally, omental adhesions to the anterior abdominal wall, scarring and adhesions of the uterus and bladder to the anterior abdominal wall, normal appearing ovaries and fallopian tubes, normal appearing liver and diaphragm, no evidence of endometriosis within the pelvis or abdomen. Fluffy proliferative endometrial tissue with patent tubal ostia, no septa or polyps, posterior fibroid noted to distort the endometrial cavity.   Total IV fluids/Blood products: 1300 mL LR  Urine Output:  150 ml in Rosado bag   Estimated blood loss:  20 ml  Drains:  rosado catheter  Specimens:  Endometrial curettings  Instrument and Sponge Count: Correct  Complications:  Incidental cystotomy, repaired by Urology team  Condition:  stable, transferred to post anesthesia recovery     Nia Mccoy DO  Ob/Gyn Resident  2/14/2022, 1:23 PM

## 2022-02-14 NOTE — ANESTHESIA POSTPROCEDURE EVALUATION
Department of Anesthesiology  Postprocedure Note    Patient: Eden Lira  MRN: [id number]  YOB: 1985  Date of evaluation: 2/14/2022  Time:  5:03 PM     Procedure Summary     Date: 02/14/22 Room / Location: [address]    Anesthesia Start: 6578 Anesthesia Stop: 9975    Procedures:       DIAGNOSTIC LAPAROSCROPY (N/A )      DILATATION AND CURETTAGE HYSTEROSCOPY, POSSIBLE MIRENA IUD (N/A )      XI ROBOTIC ASSISTED LAPAROSCOPIC INCIDENTAL CYSTOTOMY REPAIR (N/A Bladder) Diagnosis: (CHRONIC PELVIC PAIN)    Surgeons: Nohemy Martinez MD; Roseann Alexander MD Responsible Provider: Ben Castellanos MD    Anesthesia Type: general ASA Status: 2          Anesthesia Type: general    Bharath Phase I: Bharath Score: 10    Bharath Phase II: Bharath Score: 10    Last vitals: Reviewed and per EMR flowsheets.        Anesthesia Post Evaluation    Patient location during evaluation: PACU  Patient participation: complete - patient participated  Level of consciousness: awake and alert  Pain score: 3  Airway patency: patent  Nausea & Vomiting: no nausea and no vomiting  Complications: no  Cardiovascular status: hemodynamically stable  Respiratory status: acceptable  Hydration status: euvolemic

## 2022-02-15 ENCOUNTER — TELEPHONE (OUTPATIENT)
Dept: OBGYN | Age: 37
End: 2022-02-15

## 2022-02-15 LAB
CULTURE: NO GROWTH
Lab: NORMAL
SPECIMEN DESCRIPTION: NORMAL
SURGICAL PATHOLOGY REPORT: NORMAL

## 2022-02-15 NOTE — TELEPHONE ENCOUNTER
Post op call to patient. She C/O shoulder pain today. Had questions about rosado bag management that I answered. Shoulder pain discussed S/P laparoscopy. Post op instructions reviewed. Bladder injury discussed. Pt is to expect call from Dr. Eduarda Hdz office to schedule appointment for approximately 2/24 for removal of rosado. Follow up with me 3/1/22.

## 2022-02-16 ENCOUNTER — APPOINTMENT (OUTPATIENT)
Dept: CT IMAGING | Age: 37
End: 2022-02-16
Payer: COMMERCIAL

## 2022-02-16 ENCOUNTER — HOSPITAL ENCOUNTER (EMERGENCY)
Age: 37
Discharge: HOME | End: 2022-02-17
Attending: EMERGENCY MEDICINE
Payer: COMMERCIAL

## 2022-02-16 VITALS
WEIGHT: 162 LBS | BODY MASS INDEX: 30.58 KG/M2 | SYSTOLIC BLOOD PRESSURE: 127 MMHG | HEART RATE: 79 BPM | DIASTOLIC BLOOD PRESSURE: 82 MMHG | OXYGEN SATURATION: 99 % | TEMPERATURE: 98.6 F | HEIGHT: 61 IN | RESPIRATION RATE: 16 BRPM

## 2022-02-16 DIAGNOSIS — G44.89 OTHER HEADACHE SYNDROME: Primary | ICD-10-CM

## 2022-02-16 LAB
ABSOLUTE EOS #: <0.03 K/UL (ref 0–0.44)
ABSOLUTE IMMATURE GRANULOCYTE: <0.03 K/UL (ref 0–0.3)
ABSOLUTE LYMPH #: 1.89 K/UL (ref 1.1–3.7)
ABSOLUTE MONO #: 0.54 K/UL (ref 0.1–1.2)
ALBUMIN SERPL-MCNC: 4.1 G/DL (ref 3.5–5.2)
ALBUMIN/GLOBULIN RATIO: 1.2 (ref 1–2.5)
ALP BLD-CCNC: 53 U/L (ref 35–104)
ALT SERPL-CCNC: 10 U/L (ref 5–33)
ANION GAP SERPL CALCULATED.3IONS-SCNC: 13 MMOL/L (ref 9–17)
AST SERPL-CCNC: 19 U/L
BASOPHILS # BLD: 1 % (ref 0–2)
BASOPHILS ABSOLUTE: 0.04 K/UL (ref 0–0.2)
BILIRUB SERPL-MCNC: 0.35 MG/DL (ref 0.3–1.2)
BUN BLDV-MCNC: 8 MG/DL (ref 6–20)
BUN/CREAT BLD: ABNORMAL (ref 9–20)
CALCIUM SERPL-MCNC: 9.1 MG/DL (ref 8.6–10.4)
CHLORIDE BLD-SCNC: 104 MMOL/L (ref 98–107)
CO2: 21 MMOL/L (ref 20–31)
CREAT SERPL-MCNC: 0.64 MG/DL (ref 0.5–0.9)
DIFFERENTIAL TYPE: ABNORMAL
EOSINOPHILS RELATIVE PERCENT: 0 % (ref 1–4)
GFR AFRICAN AMERICAN: >60 ML/MIN
GFR NON-AFRICAN AMERICAN: >60 ML/MIN
GFR SERPL CREATININE-BSD FRML MDRD: ABNORMAL ML/MIN/{1.73_M2}
GFR SERPL CREATININE-BSD FRML MDRD: ABNORMAL ML/MIN/{1.73_M2}
GLUCOSE BLD-MCNC: 92 MG/DL (ref 70–99)
HCG QUALITATIVE: NEGATIVE
HCT VFR BLD CALC: 31.8 % (ref 36.3–47.1)
HEMOGLOBIN: 10 G/DL (ref 11.9–15.1)
IMMATURE GRANULOCYTES: 0 %
LIPASE: 26 U/L (ref 13–60)
LYMPHOCYTES # BLD: 32 % (ref 24–43)
MAGNESIUM: 1.7 MG/DL (ref 1.6–2.6)
MCH RBC QN AUTO: 23.8 PG (ref 25.2–33.5)
MCHC RBC AUTO-ENTMCNC: 31.4 G/DL (ref 28.4–34.8)
MCV RBC AUTO: 75.5 FL (ref 82.6–102.9)
MONOCYTES # BLD: 9 % (ref 3–12)
NRBC AUTOMATED: 0 PER 100 WBC
PDW BLD-RTO: 17.2 % (ref 11.8–14.4)
PLATELET # BLD: 256 K/UL (ref 138–453)
PLATELET ESTIMATE: ABNORMAL
PMV BLD AUTO: 10.6 FL (ref 8.1–13.5)
POTASSIUM SERPL-SCNC: 3.3 MMOL/L (ref 3.7–5.3)
RBC # BLD: 4.21 M/UL (ref 3.95–5.11)
RBC # BLD: ABNORMAL 10*6/UL
SEG NEUTROPHILS: 58 % (ref 36–65)
SEGMENTED NEUTROPHILS ABSOLUTE COUNT: 3.39 K/UL (ref 1.5–8.1)
SODIUM BLD-SCNC: 138 MMOL/L (ref 135–144)
TOTAL PROTEIN: 7.5 G/DL (ref 6.4–8.3)
WBC # BLD: 5.9 K/UL (ref 3.5–11.3)
WBC # BLD: ABNORMAL 10*3/UL

## 2022-02-16 PROCEDURE — 2580000003 HC RX 258: Performed by: STUDENT IN AN ORGANIZED HEALTH CARE EDUCATION/TRAINING PROGRAM

## 2022-02-16 PROCEDURE — 83735 ASSAY OF MAGNESIUM: CPT

## 2022-02-16 PROCEDURE — 6360000002 HC RX W HCPCS: Performed by: STUDENT IN AN ORGANIZED HEALTH CARE EDUCATION/TRAINING PROGRAM

## 2022-02-16 PROCEDURE — 6370000000 HC RX 637 (ALT 250 FOR IP): Performed by: STUDENT IN AN ORGANIZED HEALTH CARE EDUCATION/TRAINING PROGRAM

## 2022-02-16 PROCEDURE — 96374 THER/PROPH/DIAG INJ IV PUSH: CPT

## 2022-02-16 PROCEDURE — 80053 COMPREHEN METABOLIC PANEL: CPT

## 2022-02-16 PROCEDURE — 6360000004 HC RX CONTRAST MEDICATION: Performed by: STUDENT IN AN ORGANIZED HEALTH CARE EDUCATION/TRAINING PROGRAM

## 2022-02-16 PROCEDURE — 99284 EMERGENCY DEPT VISIT MOD MDM: CPT

## 2022-02-16 PROCEDURE — 70450 CT HEAD/BRAIN W/O DYE: CPT

## 2022-02-16 PROCEDURE — 70498 CT ANGIOGRAPHY NECK: CPT

## 2022-02-16 PROCEDURE — 96375 TX/PRO/DX INJ NEW DRUG ADDON: CPT

## 2022-02-16 PROCEDURE — 83690 ASSAY OF LIPASE: CPT

## 2022-02-16 PROCEDURE — 84703 CHORIONIC GONADOTROPIN ASSAY: CPT

## 2022-02-16 PROCEDURE — 85025 COMPLETE CBC W/AUTO DIFF WBC: CPT

## 2022-02-16 RX ORDER — ACETAMINOPHEN 500 MG
1000 TABLET ORAL ONCE
Status: COMPLETED | OUTPATIENT
Start: 2022-02-16 | End: 2022-02-16

## 2022-02-16 RX ORDER — KETOROLAC TROMETHAMINE 30 MG/ML
30 INJECTION, SOLUTION INTRAMUSCULAR; INTRAVENOUS ONCE
Status: COMPLETED | OUTPATIENT
Start: 2022-02-17 | End: 2022-02-17

## 2022-02-16 RX ORDER — DEXAMETHASONE SODIUM PHOSPHATE 4 MG/ML
4 INJECTION, SOLUTION INTRA-ARTICULAR; INTRALESIONAL; INTRAMUSCULAR; INTRAVENOUS; SOFT TISSUE ONCE
Status: COMPLETED | OUTPATIENT
Start: 2022-02-16 | End: 2022-02-16

## 2022-02-16 RX ORDER — PROCHLORPERAZINE EDISYLATE 5 MG/ML
10 INJECTION INTRAMUSCULAR; INTRAVENOUS ONCE
Status: COMPLETED | OUTPATIENT
Start: 2022-02-16 | End: 2022-02-16

## 2022-02-16 RX ORDER — 0.9 % SODIUM CHLORIDE 0.9 %
1000 INTRAVENOUS SOLUTION INTRAVENOUS ONCE
Status: COMPLETED | OUTPATIENT
Start: 2022-02-16 | End: 2022-02-17

## 2022-02-16 RX ORDER — DIPHENHYDRAMINE HYDROCHLORIDE 50 MG/ML
25 INJECTION INTRAMUSCULAR; INTRAVENOUS ONCE
Status: COMPLETED | OUTPATIENT
Start: 2022-02-16 | End: 2022-02-16

## 2022-02-16 RX ADMIN — DIPHENHYDRAMINE HYDROCHLORIDE 25 MG: 50 INJECTION, SOLUTION INTRAMUSCULAR; INTRAVENOUS at 21:25

## 2022-02-16 RX ADMIN — SODIUM CHLORIDE 1000 ML: 9 INJECTION, SOLUTION INTRAVENOUS at 21:27

## 2022-02-16 RX ADMIN — ACETAMINOPHEN 1000 MG: 500 TABLET ORAL at 21:25

## 2022-02-16 RX ADMIN — IOPAMIDOL 90 ML: 755 INJECTION, SOLUTION INTRAVENOUS at 23:16

## 2022-02-16 RX ADMIN — PROCHLORPERAZINE EDISYLATE 10 MG: 5 INJECTION INTRAMUSCULAR; INTRAVENOUS at 21:25

## 2022-02-16 RX ADMIN — DEXAMETHASONE SODIUM PHOSPHATE 4 MG: 4 INJECTION, SOLUTION INTRAMUSCULAR; INTRAVENOUS at 21:25

## 2022-02-16 ASSESSMENT — PAIN DESCRIPTION - DESCRIPTORS: DESCRIPTORS: ACHING

## 2022-02-16 ASSESSMENT — PAIN SCALES - GENERAL
PAINLEVEL_OUTOF10: 10
PAINLEVEL_OUTOF10: 10

## 2022-02-16 ASSESSMENT — PAIN DESCRIPTION - ORIENTATION: ORIENTATION: LEFT

## 2022-02-16 ASSESSMENT — PAIN DESCRIPTION - LOCATION: LOCATION: HEAD

## 2022-02-17 PROCEDURE — 6360000002 HC RX W HCPCS: Performed by: STUDENT IN AN ORGANIZED HEALTH CARE EDUCATION/TRAINING PROGRAM

## 2022-02-17 RX ADMIN — KETOROLAC TROMETHAMINE 30 MG: 30 INJECTION, SOLUTION INTRAMUSCULAR at 00:08

## 2022-02-17 ASSESSMENT — PAIN SCALES - GENERAL: PAINLEVEL_OUTOF10: 6

## 2022-02-17 NOTE — ED PROVIDER NOTES
[facility]     Emergency Department     Faculty Attestation        I performed a history and physical examination of the patient and discussed management with the resident. I reviewed the residents note and agree with the documented findings and plan of care. Any areas of disagreement are noted on the chart. I was personally present for the key portions of any procedures. I have documented in the chart those procedures where I was not present during the key portions. I have reviewed the emergency nurses triage note. I agree with the chief complaint, past medical history, past surgical history, allergies, medications, social and family history as documented unless otherwise noted below. For Physician Assistant/ Nurse Practitioner cases/documentation I have personally evaluated this patient and have completed at least one if not all key elements of the E/M (history, physical exam, and MDM). Additional findings are as noted. Vital Signs: BP: (!) 146/101  Pulse: 78  Resp: 16  Temp: 98.6 °F (37 °C) SpO2: 100 %  PCP:  Mary Joshi MD    Pertinent Comments:     Patient is a 43-year-old female with past medical history of prothrombin gene mutation and possible easy clotting who had recent diagnostic laparoscopy to assess for endometrial tissue and also had making the bladder at that time that was repaired by urology. Patient 2 hours prior to arrival began having sudden onset headache that was sharp and intermittent on the left side of her head. Strong temporal artery pulse palpated and no tenderness over this area. Denies any unilateral face weakness or body weakness. Has never had a headache like this unfortunately. Denies any neck pain or fever/chills either. On neurologic symptoms patient has 5/5 strength bilateral upper and lower extremities with sensation light touch intact.    Cranial nerves II through XII intact with no facial droop and pupils 3 mm and readily reactive to light and equal bilaterally. DTRs are 2+ and equal bilaterally but downgoing Babinski's bilaterally. Neck is supple and no meningismus whatsoever  Assessment/plan: Patient with not a typical headache for her but also does not seem necessarily consistent with any truly emergent cause of headache. Given that this is the \"worst headache of life however and started only 2 hours ago will obtain CT head as well as CTA head and neck given her past medical history of clotting disorder potentially. Treat symptomatically and reevaluate after    Critical Care  None    This patient was evaluated in the Emergency Department for symptoms described in the history of present illness. He/she was evaluated in the context of the global COVID-19 pandemic, which necessitated consideration that the patient might be at risk for infection with the SARS-CoV-2 virus that causes COVID-19. Institutional protocols and algorithms that pertain to the evaluation of patients at risk for COVID-19 are in a state of rapid change based on information released by regulatory bodies including the CDC and federal and state organizations. These policies and algorithms were followed during the patient's care in the ED. (Please note that portions of this note were completed with a voice recognition program. Efforts were made to edit the dictations but occasionally words are mis-transcribed.  Whenever words are used in this note in any gender, they shall be construed as though they were used in the gender appropriate to the circumstances; and whenever words are used in this note in the singular or plural form, they shall be construed as though they were used in the form appropriate to the circumstances.)    MD Lucas Mendez  Attending Emergency Medicine Physician           Kelsy Montes MD  02/16/22 1103       Kelsy Montes MD  02/16/22 6289

## 2022-02-17 NOTE — ED PROVIDER NOTES
[facility]  ED  Emergency Department Encounter  EmergencyMedicine Resident     Pt Name:Jose Kirk  MRN: [id number]  [place] 1985  Date of evaluation: 2/16/22  PCP:  Lalita Centeno MD    [address] [place] [address]       Chief Complaint   Patient presents with    Respiratory Distress     intubated during an endoscopy on monday, feels like it is difficult to breath    Headache     left side, described as he worst ever had and came on suddenly       HISTORY OF PRESENT ILLNESS  (Location/Symptom, Timing/Onset, Context/Setting, Quality, Duration, Modifying Factors, Severity.)      Antony Dueñas is a 29 y.o. female who presents with multiple chief complaints. Patient had exploratory laparoscopy on Monday, has had a persistent sore throat that patient states was from intubation. Secondary chief complaint of 10/10 sharp electric-like headache, left-sided retro-orbital radiates to back of her head. No exacerbating or relieving symptoms. No phonophobia no photophobia. No nausea vomiting. History of hypercoagulability. States the exploratory laparoscopy was for chronic abdominal pain and rule out endometriosis. States that it was positive for uterine fibroids. Also states that during the procedure there was a complication with her bladder, has a Campbell placed. Denies sick contacts denies fevers, systemic infectious symptoms.   Denies neck stiffness    PAST MEDICAL / SURGICAL / SOCIAL / FAMILY HISTORY      has a past medical history of Acne, Benign essential hypertension antepartum, Closed nondisplaced fracture of distal pole of navicular bone of left wrist, COVID-19, Family history of uterine cancer, Iron deficiency anemia, Mirena IUD inserted 2/14/22, Prothrombin gene mutation [facility]), S/p Diagnostic laparoscopy, hysis of adhesions, hysteroscopy, dilatation and curettage, Mirena IUD insertion, Snores, Under care of team, and Under care of team.     has a past surgical history that includes  section (1/10/2007, 2007, 2012); Cervix surgery; laparoscopy (2022); laparoscopy (N/A, 2022); Dilation and curettage of uterus (N/A, 2022); and Bladder surgery (N/A, 2022). Social History     Socioeconomic History    Marital status: Single     Spouse name: Not on file    Number of children: Not on file    Years of education: Not on file    Highest education level: Not on file   Occupational History    Not on file   Tobacco Use    Smoking status: Never Smoker    Smokeless tobacco: Never Used   Vaping Use    Vaping Use: Never used   Substance and Sexual Activity    Alcohol use: Yes     Alcohol/week: 3.0 standard drinks     Types: 3 Glasses of wine per week     Comment: 3 times per month    Drug use: Yes     Frequency: 2.0 times per week     Types: Marijuana Lanis Afshan)     Comment: last [facility]     Sexual activity: Yes     Partners: Male   Other Topics Concern    Not on file   Social History Narrative    Not on file     Social Determinants of Health     Financial Resource Strain: Low Risk     Difficulty of Paying Living Expenses: Not hard at all   Food Insecurity: No Food Insecurity    Worried About [address] [facility] in the Last Year: Never true    [address] in the Last Year: Never true   Transportation Needs:     Lack of Transportation (Medical): Not on file    Lack of Transportation (Non-Medical):  Not on file   Physical Activity:     Days of Exercise per Week: Not on file    Minutes of Exercise per Session: Not on file   Stress:     Feeling of Stress : Not on file   Social Connections:     Frequency of Communication with Friends and Family: Not on file    Frequency of Social Gatherings with Friends and Family: Not on file    Attends [employer] Services: Not on file    Active Member of Clubs or Organizations: Not on file    Attends Club or Organization Meetings: Not on file    Marital Status: Not on file   Intimate Partner Violence:     Fear of Current or Ex-Partner: Not on file    Emotionally Abused: Not on file    Physically Abused: Not on file    Sexually Abused: Not on file   Housing Stability:     Unable to Pay for Housing in the Last Year: Not on file    Number of Places Lived in the Last Year: Not on file    Unstable Housing in the Last Year: Not on file       Family History   Problem Relation Age of Onset    Diabetes Paternal Grandmother     Endometrial Cancer Paternal Grandmother     No Known Problems Mother     No Known Problems Father     Hypertension Maternal Grandmother     Breast Cancer Neg Hx     Cancer Neg Hx     Colon Cancer Neg Hx     Eclampsia Neg Hx     Ovarian Cancer Neg Hx      Labor Neg Hx     Spont Abortions Neg Hx     Stroke Neg Hx        Allergies:  Seasonal    Home Medications:  Prior to Admission medications    Medication Sig Start Date End Date Taking? Authorizing Provider   oxybutynin (DITROPAN) 5 MG tablet Take 1 tablet by mouth 2 times daily as needed (bladder pain/spasms from stent) 22  Nemesio Benavides MD   sulfamethoxazole-trimethoprim (BACTRIM DS;SEPTRA DS) 800-160 MG per tablet Take 1 tablet by mouth 2 times daily for 5 days 22  Elida Reece MD   acetaminophen (TYLENOL) 500 MG tablet Take 2 tablets by mouth every 6 hours as needed for Pain 2/14/22 3/16/22  Kendal Upton DO   docusate sodium (COLACE) 100 MG capsule Take 1 capsule by mouth 2 times daily as needed for Constipation 22   Kendal Upton DO   HYDROcodone-acetaminophen (NORCO) 5-325 MG per tablet Take 1 tablet by mouth every 6 hours as needed for Pain for up to 5 days. Intended supply: 7 days.  Take lowest dose possible to manage pain 22  Kendal Upton DO   ibuprofen (ADVIL;MOTRIN) 600 MG tablet Take 1 tablet by mouth every 6 hours as needed for Pain 2/14/22 3/16/22  Dillon Land DO   ondansetron (ZOFRAN) 4 MG tablet Take 1 tablet by mouth every 8 hours as needed for Nausea or Vomiting 2/14/22   Zizeus Batters, DO   simethicone (MYLICON) 80 MG chewable tablet Take 1 tablet by mouth 4 times daily as needed for Flatulence 2/14/22   Darshan Batters, DO   acetaminophen (TYLENOL) 325 MG tablet Take 650 mg by mouth every 6 hours as needed for Pain    Historical Provider, MD   ferrous sulfate (IRON 325) 325 (65 Fe) MG tablet Take 1 tablet by mouth daily (with breakfast) 1/25/22   Mary Joshi MD   ibuprofen (ADVIL;MOTRIN) 800 MG tablet Take 1 tablet by mouth every 8 hours as needed for Pain 10/30/21   Elvira Ledezma, DO       REVIEW OF SYSTEMS    (2-9 systems for level 4, 10 or more for level 5)      Review of Systems   Constitutional: Negative for fever. HENT: Positive for sore throat  Eyes: Negative for photophobia. Respiratory: Negative for shortness of breath. Cardiovascular: Negative for chest pain. Gastrointestinal: Negative for abdominal pain and vomiting. Endocrine: Negative for polyuria. Genitourinary: Negative for dysuria. Musculoskeletal: Negative for arthralgias. Skin: Negative for color change. Allergic/Immunologic: Negative for immunocompromised state. Neurological: Positive headache  Hematological: Does not bruise/bleed easily. Psychiatric/Behavioral: Negative for agitation. PHYSICAL EXAM   (up to 7 for level 4, 8 or more for level 5)      INITIAL VITALS:   /82   Pulse 79   Temp 98.6 °F (37 °C) (Oral)   Resp 16   Ht 5' 1\" (1.549 m)   Wt 162 lb (73.5 kg)   SpO2 99%   BMI 30.61 kg/m²     Physical Exam  Constitutional:       General: Not in acute distress. Appearance: Normal appearance. Normal weight. Not toxic-appearing. HENT:      Head: Normocephalic and atraumatic. Nose: Nose normal.      Mouth/Throat: Mucous membranes are moist.  Uvula midline no oropharyngeal edema. Pharynx: Oropharynx is clear. Eyes:      Extraocular Movements: Extraocular movements intact.       Conjunctiva/sclera: Conjunctivae normal.      Pupils: Pupils are equal, round, and reactive to light. Neck:      Musculoskeletal: Normal range of motion and neck supple. No neck rigidity. Cardiovascular:      Rate and Rhythm: Normal rate and regular rhythm. Pulses: Normal pulses. Heart sounds: Normal heart sounds. No murmur. Pulmonary:      Effort: Pulmonary effort is normal.      Breath sounds: Normal breath sounds. No wheezing. Abdominal:      General: Abdomen is flat. Bowel sounds are normal.      Tenderness: There is no abdominal tenderness. Musculoskeletal:     Normal range of motion. General: No swelling or tenderness. No LE edema    Skin:     General: Skin is warm. Capillary Refill: Capillary refill takes less than 2 seconds. Coloration: Skin is not jaundiced. Neurological:      General: No focal deficit present. Mental Status: Alert and oriented to person, place, and time. Mental status is at baseline. Motor: No weakness.        DIFFERENTIAL  DIAGNOSIS     PLAN (LABS / IMAGING / EKG):  Orders Placed This Encounter   Procedures    CT HEAD WO CONTRAST    CTA HEAD NECK W CONTRAST    CBC with Auto Differential    Comprehensive Metabolic Panel w/ Reflex to MG    Lipase    HCG Qualitative, Serum    Magnesium    Insert peripheral IV       MEDICATIONS ORDERED:  Orders Placed This Encounter   Medications    acetaminophen (TYLENOL) tablet 1,000 mg    0.9 % sodium chloride bolus    dexamethasone (DECADRON) injection 4 mg    prochlorperazine (COMPAZINE) injection 10 mg    diphenhydrAMINE (BENADRYL) injection 25 mg    iopamidol (ISOVUE-370) 76 % injection 90 mL    ketorolac (TORADOL) injection 30 mg           DIAGNOSTIC RESULTS / EMERGENCY DEPARTMENT COURSE / MDM     LABS:  Results for orders placed or performed during the hospital encounter of 02/16/22   CBC with Auto Differential   Result Value Ref Range    WBC 5.9 3.5 - 11.3 k/uL    RBC 4.21 3.95 - 5.11 m/uL Hemoglobin 10.0 (L) 11.9 - 15.1 g/dL    Hematocrit 31.8 (L) 36.3 - 47.1 %    MCV 75.5 (L) 82.6 - 102.9 fL    MCH 23.8 (L) 25.2 - 33.5 pg    MCHC 31.4 28.4 - 34.8 g/dL    RDW 17.2 (H) 11.8 - 14.4 %    Platelets 607 134 - 641 k/uL    MPV 10.6 8.1 - 13.5 fL    NRBC Automated 0.0 0.0 per 100 WBC    Differential Type NOT REPORTED     Seg Neutrophils 58 36 - 65 %    Lymphocytes 32 24 - 43 %    Monocytes 9 3 - 12 %    Eosinophils % 0 (L) 1 - 4 %    Basophils 1 0 - 2 %    Immature Granulocytes 0 0 %    Segs Absolute 3.39 1.50 - 8.10 k/uL    Absolute Lymph # 1.89 1.10 - 3.70 k/uL    Absolute Mono # 0.54 0.10 - 1.20 k/uL    Absolute Eos # <0.03 0.00 - 0.44 k/uL    Basophils Absolute 0.04 0.00 - 0.20 k/uL    Absolute Immature Granulocyte <0.03 0.00 - 0.30 k/uL    WBC Morphology NOT REPORTED     RBC Morphology ANISOCYTOSIS PRESENT     Platelet Estimate NOT REPORTED    Comprehensive Metabolic Panel w/ Reflex to MG   Result Value Ref Range    Glucose 92 70 - 99 mg/dL    BUN 8 6 - 20 mg/dL    CREATININE 0.64 0.50 - 0.90 mg/dL    Bun/Cre Ratio NOT REPORTED 9 - 20    Calcium 9.1 8.6 - 10.4 mg/dL    Sodium 138 135 - 144 mmol/L    Potassium 3.3 (L) 3.7 - 5.3 mmol/L    Chloride 104 98 - 107 mmol/L    CO2 21 20 - 31 mmol/L    Anion Gap 13 9 - 17 mmol/L    Alkaline Phosphatase 53 35 - 104 U/L    ALT 10 5 - 33 U/L    AST 19 <32 U/L    Total Bilirubin 0.35 0.3 - 1.2 mg/dL    Total Protein 7.5 6.4 - 8.3 g/dL    Albumin 4.1 3.5 - 5.2 g/dL    Albumin/Globulin Ratio 1.2 1.0 - 2.5    GFR Non-African American >60 >60 mL/min    GFR African American >60 >60 mL/min    GFR Comment          GFR Staging NOT REPORTED    Lipase   Result Value Ref Range    Lipase 26 13 - 60 U/L   HCG Qualitative, Serum   Result Value Ref Range    hCG Qual NEGATIVE NEGATIVE   Magnesium   Result Value Ref Range    Magnesium 1.7 1.6 - 2.6 mg/dL         RADIOLOGY:  CT HEAD WO CONTRAST    Result Date: 2/16/2022  EXAMINATION: CT OF THE HEAD WITHOUT CONTRAST  2/16/2022 11:01 pm TECHNIQUE: CT of the head was performed without the administration of intravenous contrast. Dose modulation, iterative reconstruction, and/or weight based adjustment of the mA/kV was utilized to reduce the radiation dose to as low as reasonably achievable. COMPARISON: None. HISTORY: ORDERING SYSTEM PROVIDED HISTORY: worst headache of life. recent Lap surgery TECHNOLOGIST PROVIDED HISTORY: worst headache of life. recent Lap surgery Decision Support Exception - unselect if not a suspected or confirmed emergency medical condition->Emergency Medical Condition (MA) Is the patient pregnant?->No FINDINGS: BRAIN/VENTRICLES: There is no acute intracranial hemorrhage, mass effect or midline shift. No abnormal extra-axial fluid collection. The gray-white differentiation is maintained without evidence of an acute infarct. There is no evidence of hydrocephalus. ORBITS: The visualized portion of the orbits demonstrate no acute abnormality. SINUSES: The visualized paranasal sinuses and mastoid air cells demonstrate no acute abnormality. SOFT TISSUES/SKULL:  No acute abnormality of the visualized skull or soft tissues. No acute intracranial abnormality. CTA HEAD NECK W CONTRAST    Result Date: 2/16/2022  EXAMINATION: CTA OF THE HEAD AND NECK WITH CONTRAST 2/16/2022 11:01 pm: TECHNIQUE: CTA of the head and neck was performed with the administration of intravenous contrast. Multiplanar reformatted images are provided for review. MIP images are provided for review. Stenosis of the internal carotid arteries measured using NASCET criteria. Dose modulation, iterative reconstruction, and/or weight based adjustment of the mA/kV was utilized to reduce the radiation dose to as low as reasonably achievable. COMPARISON: None. HISTORY: ORDERING SYSTEM PROVIDED HISTORY: headache, hypercoagulable FINDINGS: CTA NECK: AORTIC ARCH/ARCH VESSELS: No dissection or arterial injury.   No significant stenosis of the brachiocephalic or subclavian arteries. CAROTID ARTERIES: No dissection, arterial injury, or hemodynamically significant stenosis by NASCET criteria. VERTEBRAL ARTERIES: No dissection, arterial injury, or significant stenosis. SOFT TISSUES: The lung apices are clear. No cervical or superior mediastinal lymphadenopathy. The larynx and pharynx are unremarkable. No acute abnormality of the salivary and thyroid glands. BONES: No acute osseous abnormality. CTA HEAD: ANTERIOR CIRCULATION: No significant stenosis of the intracranial internal carotid, anterior cerebral, or middle cerebral arteries. No aneurysm. POSTERIOR CIRCULATION: No significant stenosis of the vertebral, basilar, or posterior cerebral arteries. No aneurysm. OTHER: No dural venous sinus thrombosis on this non-dedicated study. BRAIN: No mass effect or midline shift. No extra-axial fluid collection. The gray-white differentiation is maintained. Unremarkable CTA of the head and neck. EKG  None    All EKG's are interpreted by the Emergency Department Physician who either signs or Co-signs this chart in the absence of a cardiologist.    EMERGENCY DEPARTMENT COURSE:  Patient breathing quietly and unlabored on room air. Speech is normal and speaking in full sentences without requiring to pause to take a breath. Vital signs stable afebrile neck supple oropharynx unremarkable no adventitious heart lung sounds cranial nerves II through XII grossly intact no vision disturbances no blurred vision no change in vision. Unlikely subdural venous thrombosis. Given sudden onset concern for possible subarachnoid hemorrhage. Onset of headache was 1-1/2 hours prior to arrival.  Will CT head rule out intracranial bleed, intracranial process. Will also treat with migraine cocktail except for NSAIDs pending negative CT. CT imaging negative laboratory work unremarkable. Giving patient Toradol. Patient reports pain and symptoms have completely unresolved.   Will discharge with PCP follow-up. PROCEDURES:  None    CONSULTS:  None    CRITICAL CARE:  None    FINAL IMPRESSION      1.  Other headache syndrome          DISPOSITION / PLAN     DISPOSITION Decision To Discharge 02/16/2022 11:55:01 PM      PATIENT REFERRED TO:  MD Jewell Nu[facility]ez [facility]  [phone]    Schedule an appointment as soon as possible for a visit in 2 days        DISCHARGE MEDICATIONS:  Discharge Medication List as of 2/16/2022 11:55 PM          Neptali Correia MD  Emergency Medicine Resident    (Please note that portions of thisnote were completed with a voice recognition program.  Efforts were made to edit the dictations but occasionally words are mis-transcribed.)  \     Neptali Correia MD  Resident  02/17/22 6441

## 2022-02-17 NOTE — ED NOTES
Pt reports pain is better rates it 2/10. Resting in bed. Lights low.        Sandra Spine, RN  02/16/22 8740

## 2022-02-17 NOTE — ED PROVIDER NOTES
[address] HANDOFF       Handoff taken on the following patient from prior Attending Physician:  Pt Name: Anjelica Martinez  PCP:  Raegan Duckworth MD    Attestation  I was available and discussed any additional care issues that arose and coordinated the management plans with the resident(s) caring for the patient during my duty period. Any areas of disagreement with resident's documentation of care or procedures are noted on the chart. I was personally present for the key portions of any/all procedures during my duty period. I have documented in the chart those procedures where I was not present during the key portions.             Dagoberto Gramajo MD  02/16/22 0482

## 2022-02-17 NOTE — ED NOTES
Pt states she had a procedure done on Monday due to heavy vaginal bleeding and to check if she had endometriosis or fibroids. Pt states she was intubated and that during the procedure they had nicked her bladder. Pt c/o left side head pain she describes at the worst headache of her life and states that she feels like she is having difficulty breathing d/t the ETT tube she had during the surgery. Pt arrived via EMS with rosado in place. Placed on cardiac monitor. IV established. Awaiting orders.         Marcus Garner RN  02/16/22 5960

## 2022-02-21 ENCOUNTER — OFFICE VISIT (OUTPATIENT)
Dept: OBGYN | Age: 37
End: 2022-02-21

## 2022-02-21 VITALS
SYSTOLIC BLOOD PRESSURE: 141 MMHG | DIASTOLIC BLOOD PRESSURE: 89 MMHG | HEART RATE: 104 BPM | BODY MASS INDEX: 29.48 KG/M2 | WEIGHT: 156 LBS

## 2022-02-21 DIAGNOSIS — Z09 POSTOPERATIVE EXAMINATION: Primary | ICD-10-CM

## 2022-02-21 DIAGNOSIS — Z97.5 IUD (INTRAUTERINE DEVICE) IN PLACE: ICD-10-CM

## 2022-02-21 PROCEDURE — 99024 POSTOP FOLLOW-UP VISIT: CPT | Performed by: STUDENT IN AN ORGANIZED HEALTH CARE EDUCATION/TRAINING PROGRAM

## 2022-02-21 NOTE — PROGRESS NOTES
Attending Physician Statement  I have discussed the care of [facility], including pertinent history and exam findings,  with the resident. I have seen and examined the patient and the key elements of all parts of the encounter have been performed by me. I agree with the assessment, plan and orders as documented by the resident. Treatment options for management of symptomatic uterine fibroids were discussed. Pt states that she does not desire any more pregnancies and would like hysterectomy. Minimally invasive hysterectomy following a course of medical therapy to decrease volume of uterine fibroids discussed vs JOAQUIN. Pt feels she would like to go ahead with abdominal hysterectomy. Surgery will be scheduled at next visit.   (GC Modifier)

## 2022-02-21 NOTE — PROGRESS NOTES
Postoperative Visit    Briseyda Valle is a 29 y.o. female [id number], 1 week Post Operative s/p Diagnostic laparoscopy, lysis of adhesions, hysteroscopy, D&C, Mirena IUD insertion with incidental cystotomy on 2022    The patient was seen. She is complaining of new onset rash since surgery. On Saturday the patient noted full body itching and a rash. At that time she stopped all of the medications prescribed on discharge however the rash has continued. The patient is concerned the rash is due to her IUD and an allergy. She reports in the past she had a similar rash after receiving a Depo shot. Otherwise she is meeting postop milestones. She is not taking any medications and does complain of some constipation. She also reports a small amount of vaginal bleeding since surgery. Catheter remains in place at this time with plan to remove this Thursday with Dr. Dannielle Thomas at 3729. Patient informed of appointment. She denies fever, chills, headache, N/V, or abdominal pain. Incisions are healing well. 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 ([facility] [address]    Family planning education, guidance, and counseling    Family history of uterine cancer    Female pelvic pain    Iron deficiency anemia    Closed nondisplaced fracture of distal pole of navicular bone of left wrist    Abnormal uterine bleeding (AUB)    Bilateral nipple discharge    Folliculitis    S/p Diagnostic laparoscopy, lysis of adhesions, hysteroscopy, dilatation and curettage, Mirena IUD insertion 22    Pelvic adhesive disease    Intramural leiomyoma of uterus       Vitals:   Blood pressure (!) 141/89, pulse 104, weight 156 lb (70.8 kg), not currently breastfeeding.     Physical Exam:  Chaperone for Intimate Exam: Chaperone was present for entire exam, Chaperone Name: Akil Bain    General:  awake, alert, cooperative, no apparent distress, and appears stated age, no apparent distress, alert and cooperative  Lungs:  No increased work of breathing, good air exchange, clear to auscultation bilaterally, no crackles or wheezing  Heart:  regular rate and rhythm and no murmur    Abdomen: Abdomen soft, non-tender. BS normal. No masses,  No organomegaly  Laparoscopic Incisions: clean, dry and intact  Pelvic Exam: External genitalia unremarkable, catheter in place, small amount of dark red blood in vaginal vault, IUD strings visualized protruding from cervix, grasped with ring forceps and easily removed. Extremities:  no calf tenderness, non edematous    Assessment:  Major Bhakta is a 29 y.o. female , 1 week Post Operative s/p Diagnostic laparoscopy, lysis of adhesions, hysteroscopy, D&C, Mirena IUD insertion with incidental cystotomy on 2022   - Doing well, meeting postop milestones, continue routine post operative care   - Follow up with Urology for rosado catheter removal 22 at 0950   - Encourage patient to restart PO medication postoperatively   - IUD removed today due to concern for allergy to progesterone   - Will discuss other medical management for uterine fibroids and pelvic pain however patient desires definitive treatment with hysterectomy. Patient has appointment here 3/1/22, will discuss preop requirements further at that appointment.     Patient Active Problem List    Diagnosis Date Noted    History of  section, classical 2013     Priority: Medium           History of  labor 2013     Priority: Medium    S/p Diagnostic laparoscopy, lysis of adhesions, hysteroscopy, dilatation and curettage, Mirena IUD insertion 22      Complicated by incidental cystotomy repaired by Dr. Bea Dodd MD Urology via [employer]        Pelvic adhesive disease 2022    Intramural leiomyoma of uterus     Abnormal uterine bleeding (AUB) 2021    Bilateral nipple discharge 2021    Folliculitis 32/99/0681    Closed nondisplaced fracture of distal pole of navicular bone of left wrist 10/30/2018    Iron deficiency anemia 07/24/2016    Female pelvic pain 07/18/2016     Pelvic US ordered, appt on 7/21/16: unremarkable       Family history of uterine cancer      PGM      Family planning education, guidance, and counseling 10/15/2014    Heterozygous for prothrombin [id number] mutation ([facility] [address] 04/29/2014     Return in about 8 days (around 3/1/2022) for [address] [place] [address].     Counseling Completed:  · Continue with post operative restrictions until 3 weeks post operative  · No heavy lifting or [place] until 3 weeks post operative  · Strict return precautions with fever, chills, nausea, vomiting, diarrhea, worsening abdominal pain, worsening vaginal bleeding, foul smelling vaginal discharge    Jordyn Gomez DO  Ob/Gyn Resident  [facility] [address]  2/21/2022, 5:42 PM

## 2022-02-25 ENCOUNTER — APPOINTMENT (OUTPATIENT)
Dept: CT IMAGING | Age: 37
DRG: 441 | End: 2022-02-25
Payer: COMMERCIAL

## 2022-02-25 ENCOUNTER — HOSPITAL ENCOUNTER (INPATIENT)
Age: 37
LOS: 7 days | Discharge: HOME | DRG: 441 | End: 2022-03-05
Attending: EMERGENCY MEDICINE
Payer: COMMERCIAL

## 2022-02-25 ENCOUNTER — TELEPHONE (OUTPATIENT)
Dept: OBGYN | Age: 37
End: 2022-02-25

## 2022-02-25 DIAGNOSIS — R10.30 LOWER ABDOMINAL PAIN: Primary | ICD-10-CM

## 2022-02-25 DIAGNOSIS — Z90.710 S/P TOTAL ABDOMINAL HYSTERECTOMY: ICD-10-CM

## 2022-02-25 DIAGNOSIS — Z98.890 S/P LAPAROSCOPY: ICD-10-CM

## 2022-02-25 LAB
-: ABNORMAL
ABSOLUTE EOS #: 0.1 K/UL (ref 0–0.44)
ABSOLUTE IMMATURE GRANULOCYTE: <0.03 K/UL (ref 0–0.3)
ABSOLUTE LYMPH #: 0.81 K/UL (ref 1.1–3.7)
ABSOLUTE MONO #: 0.33 K/UL (ref 0.1–1.2)
ALBUMIN SERPL-MCNC: 4.1 G/DL (ref 3.5–5.2)
ALBUMIN/GLOBULIN RATIO: 1.3 (ref 1–2.5)
ALP BLD-CCNC: 46 U/L (ref 35–104)
ALT SERPL-CCNC: 11 U/L (ref 5–33)
ANION GAP SERPL CALCULATED.3IONS-SCNC: 10 MMOL/L (ref 9–17)
AST SERPL-CCNC: 17 U/L
BACTERIA: ABNORMAL
BASOPHILS # BLD: 0 % (ref 0–2)
BASOPHILS ABSOLUTE: <0.03 K/UL (ref 0–0.2)
BILIRUB SERPL-MCNC: 0.34 MG/DL (ref 0.3–1.2)
BILIRUBIN URINE: NEGATIVE
BUN BLDV-MCNC: 9 MG/DL (ref 6–20)
CALCIUM SERPL-MCNC: 9.3 MG/DL (ref 8.6–10.4)
CASTS UA: ABNORMAL /LPF (ref 0–2)
CASTS UA: ABNORMAL /LPF (ref 0–2)
CHLORIDE BLD-SCNC: 99 MMOL/L (ref 98–107)
CO2: 23 MMOL/L (ref 20–31)
COLOR: ABNORMAL
CREAT SERPL-MCNC: 0.63 MG/DL (ref 0.5–0.9)
EOSINOPHILS RELATIVE PERCENT: 2 % (ref 1–4)
EPITHELIAL CELLS UA: ABNORMAL /HPF (ref 0–5)
GFR AFRICAN AMERICAN: >60 ML/MIN
GFR NON-AFRICAN AMERICAN: >60 ML/MIN
GFR SERPL CREATININE-BSD FRML MDRD: ABNORMAL ML/MIN/{1.73_M2}
GLUCOSE BLD-MCNC: 84 MG/DL (ref 70–99)
GLUCOSE URINE: NEGATIVE
HCG QUALITATIVE: NEGATIVE
HCT VFR BLD CALC: 30.8 % (ref 36.3–47.1)
HEMOGLOBIN: 9.6 G/DL (ref 11.9–15.1)
IMMATURE GRANULOCYTES: 0 %
KETONES, URINE: ABNORMAL
LEUKOCYTE ESTERASE, URINE: NEGATIVE
LIPASE: 25 U/L (ref 13–60)
LYMPHOCYTES # BLD: 17 % (ref 24–43)
MCH RBC QN AUTO: 23.7 PG (ref 25.2–33.5)
MCHC RBC AUTO-ENTMCNC: 31.2 G/DL (ref 28.4–34.8)
MCV RBC AUTO: 76 FL (ref 82.6–102.9)
MONOCYTES # BLD: 7 % (ref 3–12)
NITRITE, URINE: NEGATIVE
NRBC AUTOMATED: 0 PER 100 WBC
PDW BLD-RTO: 17.7 % (ref 11.8–14.4)
PH UA: 5.5 (ref 5–8)
PLATELET # BLD: ABNORMAL K/UL (ref 138–453)
PLATELET, FLUORESCENCE: NORMAL K/UL (ref 138–453)
POTASSIUM SERPL-SCNC: 3.8 MMOL/L (ref 3.7–5.3)
PROTEIN UA: NEGATIVE
RBC # BLD: 4.05 M/UL (ref 3.95–5.11)
RBC # BLD: ABNORMAL 10*6/UL
RBC UA: ABNORMAL /HPF (ref 0–2)
SARS-COV-2, RAPID: NOT DETECTED
SEG NEUTROPHILS: 74 % (ref 36–65)
SEGMENTED NEUTROPHILS ABSOLUTE COUNT: 3.55 K/UL (ref 1.5–8.1)
SODIUM BLD-SCNC: 132 MMOL/L (ref 135–144)
SPECIFIC GRAVITY UA: 1.06 (ref 1–1.03)
SPECIMEN DESCRIPTION: NORMAL
TOTAL PROTEIN: 7.2 G/DL (ref 6.4–8.3)
TURBIDITY: CLEAR
URINE HGB: ABNORMAL
UROBILINOGEN, URINE: NORMAL
WBC # BLD: 4.8 K/UL (ref 3.5–11.3)
WBC UA: ABNORMAL /HPF (ref 0–5)
YEAST: ABNORMAL

## 2022-02-25 PROCEDURE — 74177 CT ABD & PELVIS W/CONTRAST: CPT

## 2022-02-25 PROCEDURE — 85055 RETICULATED PLATELET ASSAY: CPT

## 2022-02-25 PROCEDURE — 85025 COMPLETE CBC W/AUTO DIFF WBC: CPT

## 2022-02-25 PROCEDURE — 6370000000 HC RX 637 (ALT 250 FOR IP): Performed by: STUDENT IN AN ORGANIZED HEALTH CARE EDUCATION/TRAINING PROGRAM

## 2022-02-25 PROCEDURE — 99242 OFF/OP CONSLTJ NEW/EST SF 20: CPT | Performed by: OBSTETRICS & GYNECOLOGY

## 2022-02-25 PROCEDURE — 6360000002 HC RX W HCPCS: Performed by: EMERGENCY MEDICINE

## 2022-02-25 PROCEDURE — 2580000003 HC RX 258: Performed by: EMERGENCY MEDICINE

## 2022-02-25 PROCEDURE — 87086 URINE CULTURE/COLONY COUNT: CPT

## 2022-02-25 PROCEDURE — 84703 CHORIONIC GONADOTROPIN ASSAY: CPT

## 2022-02-25 PROCEDURE — G0378 HOSPITAL OBSERVATION PER HR: HCPCS

## 2022-02-25 PROCEDURE — 96361 HYDRATE IV INFUSION ADD-ON: CPT

## 2022-02-25 PROCEDURE — 83690 ASSAY OF LIPASE: CPT

## 2022-02-25 PROCEDURE — 6360000002 HC RX W HCPCS: Performed by: STUDENT IN AN ORGANIZED HEALTH CARE EDUCATION/TRAINING PROGRAM

## 2022-02-25 PROCEDURE — 96375 TX/PRO/DX INJ NEW DRUG ADDON: CPT

## 2022-02-25 PROCEDURE — 87635 SARS-COV-2 COVID-19 AMP PRB: CPT

## 2022-02-25 PROCEDURE — 99285 EMERGENCY DEPT VISIT HI MDM: CPT

## 2022-02-25 PROCEDURE — 80053 COMPREHEN METABOLIC PANEL: CPT

## 2022-02-25 PROCEDURE — 6370000000 HC RX 637 (ALT 250 FOR IP): Performed by: EMERGENCY MEDICINE

## 2022-02-25 PROCEDURE — 81001 URINALYSIS AUTO W/SCOPE: CPT

## 2022-02-25 PROCEDURE — 2580000003 HC RX 258: Performed by: STUDENT IN AN ORGANIZED HEALTH CARE EDUCATION/TRAINING PROGRAM

## 2022-02-25 PROCEDURE — 6360000004 HC RX CONTRAST MEDICATION: Performed by: STUDENT IN AN ORGANIZED HEALTH CARE EDUCATION/TRAINING PROGRAM

## 2022-02-25 PROCEDURE — 96374 THER/PROPH/DIAG INJ IV PUSH: CPT

## 2022-02-25 RX ORDER — SODIUM CHLORIDE, SODIUM LACTATE, POTASSIUM CHLORIDE, CALCIUM CHLORIDE 600; 310; 30; 20 MG/100ML; MG/100ML; MG/100ML; MG/100ML
INJECTION, SOLUTION INTRAVENOUS CONTINUOUS
Status: DISCONTINUED | OUTPATIENT
Start: 2022-02-25 | End: 2022-02-27

## 2022-02-25 RX ORDER — SIMETHICONE 80 MG
80 TABLET,CHEWABLE ORAL 4 TIMES DAILY PRN
Status: DISCONTINUED | OUTPATIENT
Start: 2022-02-25 | End: 2022-03-03

## 2022-02-25 RX ORDER — OXYCODONE HYDROCHLORIDE 5 MG/1
5 TABLET ORAL ONCE
Status: COMPLETED | OUTPATIENT
Start: 2022-02-25 | End: 2022-02-25

## 2022-02-25 RX ORDER — SODIUM CHLORIDE 0.9 % (FLUSH) 0.9 %
5-40 SYRINGE (ML) INJECTION EVERY 12 HOURS SCHEDULED
Status: DISCONTINUED | OUTPATIENT
Start: 2022-02-25 | End: 2022-03-05 | Stop reason: HOSPADM

## 2022-02-25 RX ORDER — HYDROCODONE BITARTRATE AND ACETAMINOPHEN 5; 325 MG/1; MG/1
2 TABLET ORAL EVERY 6 HOURS PRN
Status: DISCONTINUED | OUTPATIENT
Start: 2022-02-25 | End: 2022-03-02

## 2022-02-25 RX ORDER — ONDANSETRON 4 MG/1
4 TABLET, FILM COATED ORAL EVERY 8 HOURS PRN
Status: CANCELLED | OUTPATIENT
Start: 2022-02-25

## 2022-02-25 RX ORDER — SODIUM CHLORIDE 9 MG/ML
25 INJECTION, SOLUTION INTRAVENOUS PRN
Status: DISCONTINUED | OUTPATIENT
Start: 2022-02-25 | End: 2022-03-02

## 2022-02-25 RX ORDER — HYDROCODONE BITARTRATE AND ACETAMINOPHEN 5; 325 MG/1; MG/1
1 TABLET ORAL EVERY 6 HOURS PRN
Status: DISCONTINUED | OUTPATIENT
Start: 2022-02-25 | End: 2022-02-25

## 2022-02-25 RX ORDER — OXYCODONE HYDROCHLORIDE 5 MG/1
5 TABLET ORAL EVERY 6 HOURS PRN
Qty: 5 TABLET | Refills: 0 | Status: SHIPPED | OUTPATIENT
Start: 2022-02-25 | End: 2022-03-02

## 2022-02-25 RX ORDER — PHENAZOPYRIDINE HYDROCHLORIDE 200 MG/1
200 TABLET, FILM COATED ORAL 3 TIMES DAILY PRN
Qty: 9 TABLET | Refills: 0 | Status: SHIPPED | OUTPATIENT
Start: 2022-02-25 | End: 2022-02-28

## 2022-02-25 RX ORDER — GABAPENTIN 300 MG/1
300 CAPSULE ORAL 3 TIMES DAILY
Qty: 21 CAPSULE | Refills: 0 | Status: SHIPPED | OUTPATIENT
Start: 2022-02-25 | End: 2022-03-05 | Stop reason: HOSPADM

## 2022-02-25 RX ORDER — DOCUSATE SODIUM 100 MG/1
100 CAPSULE, LIQUID FILLED ORAL 2 TIMES DAILY PRN
Status: DISCONTINUED | OUTPATIENT
Start: 2022-02-25 | End: 2022-03-03

## 2022-02-25 RX ORDER — ONDANSETRON 2 MG/ML
4 INJECTION INTRAMUSCULAR; INTRAVENOUS EVERY 6 HOURS PRN
Status: DISCONTINUED | OUTPATIENT
Start: 2022-02-25 | End: 2022-03-02 | Stop reason: SDUPTHER

## 2022-02-25 RX ORDER — MORPHINE SULFATE 4 MG/ML
4 INJECTION, SOLUTION INTRAMUSCULAR; INTRAVENOUS ONCE
Status: COMPLETED | OUTPATIENT
Start: 2022-02-25 | End: 2022-02-25

## 2022-02-25 RX ORDER — MORPHINE SULFATE 4 MG/ML
4 INJECTION, SOLUTION INTRAMUSCULAR; INTRAVENOUS ONCE
Status: DISCONTINUED | OUTPATIENT
Start: 2022-02-25 | End: 2022-02-25

## 2022-02-25 RX ORDER — FAMOTIDINE 20 MG/1
20 TABLET, FILM COATED ORAL 2 TIMES DAILY
Status: DISCONTINUED | OUTPATIENT
Start: 2022-02-25 | End: 2022-03-03

## 2022-02-25 RX ORDER — ACETAMINOPHEN 500 MG
1000 TABLET ORAL ONCE
Status: COMPLETED | OUTPATIENT
Start: 2022-02-25 | End: 2022-02-25

## 2022-02-25 RX ORDER — GABAPENTIN 600 MG/1
300 TABLET ORAL ONCE
Status: COMPLETED | OUTPATIENT
Start: 2022-02-25 | End: 2022-02-25

## 2022-02-25 RX ORDER — FENTANYL CITRATE 50 UG/ML
25 INJECTION, SOLUTION INTRAMUSCULAR; INTRAVENOUS
Status: DISCONTINUED | OUTPATIENT
Start: 2022-02-25 | End: 2022-02-26

## 2022-02-25 RX ORDER — OXYBUTYNIN CHLORIDE 5 MG/1
5 TABLET ORAL 2 TIMES DAILY PRN
Status: DISCONTINUED | OUTPATIENT
Start: 2022-02-25 | End: 2022-03-05 | Stop reason: HOSPADM

## 2022-02-25 RX ORDER — KETOROLAC TROMETHAMINE 30 MG/ML
15 INJECTION, SOLUTION INTRAMUSCULAR; INTRAVENOUS EVERY 6 HOURS PRN
Status: DISCONTINUED | OUTPATIENT
Start: 2022-02-25 | End: 2022-02-27

## 2022-02-25 RX ORDER — PHENAZOPYRIDINE HYDROCHLORIDE 100 MG/1
200 TABLET, FILM COATED ORAL ONCE
Status: COMPLETED | OUTPATIENT
Start: 2022-02-25 | End: 2022-02-25

## 2022-02-25 RX ORDER — 0.9 % SODIUM CHLORIDE 0.9 %
1000 INTRAVENOUS SOLUTION INTRAVENOUS ONCE
Status: COMPLETED | OUTPATIENT
Start: 2022-02-25 | End: 2022-02-25

## 2022-02-25 RX ORDER — ONDANSETRON 4 MG/1
4 TABLET, ORALLY DISINTEGRATING ORAL EVERY 8 HOURS PRN
Status: DISCONTINUED | OUTPATIENT
Start: 2022-02-25 | End: 2022-03-02 | Stop reason: SDUPTHER

## 2022-02-25 RX ORDER — IBUPROFEN 800 MG/1
800 TABLET ORAL EVERY 8 HOURS PRN
Status: DISCONTINUED | OUTPATIENT
Start: 2022-02-25 | End: 2022-02-27

## 2022-02-25 RX ORDER — SODIUM CHLORIDE 0.9 % (FLUSH) 0.9 %
5-40 SYRINGE (ML) INJECTION PRN
Status: DISCONTINUED | OUTPATIENT
Start: 2022-02-25 | End: 2022-03-05 | Stop reason: HOSPADM

## 2022-02-25 RX ORDER — ONDANSETRON 2 MG/ML
4 INJECTION INTRAMUSCULAR; INTRAVENOUS ONCE
Status: COMPLETED | OUTPATIENT
Start: 2022-02-25 | End: 2022-02-25

## 2022-02-25 RX ORDER — ACETAMINOPHEN 325 MG/1
650 TABLET ORAL EVERY 6 HOURS PRN
Status: DISCONTINUED | OUTPATIENT
Start: 2022-02-25 | End: 2022-02-25 | Stop reason: SDUPTHER

## 2022-02-25 RX ORDER — ACETAMINOPHEN 325 MG/1
650 TABLET ORAL EVERY 4 HOURS PRN
Status: DISCONTINUED | OUTPATIENT
Start: 2022-02-25 | End: 2022-03-03

## 2022-02-25 RX ADMIN — IBUPROFEN 800 MG: 800 TABLET, FILM COATED ORAL at 21:15

## 2022-02-25 RX ADMIN — PHENAZOPYRIDINE HYDROCHLORIDE 200 MG: 100 TABLET ORAL at 12:11

## 2022-02-25 RX ADMIN — SODIUM CHLORIDE 1000 ML: 9 INJECTION, SOLUTION INTRAVENOUS at 11:33

## 2022-02-25 RX ADMIN — ONDANSETRON 4 MG: 2 INJECTION, SOLUTION INTRAMUSCULAR; INTRAVENOUS at 13:43

## 2022-02-25 RX ADMIN — IOPAMIDOL 75 ML: 755 INJECTION, SOLUTION INTRAVENOUS at 10:12

## 2022-02-25 RX ADMIN — SODIUM CHLORIDE, POTASSIUM CHLORIDE, SODIUM LACTATE AND CALCIUM CHLORIDE: 600; 310; 30; 20 INJECTION, SOLUTION INTRAVENOUS at 17:56

## 2022-02-25 RX ADMIN — GABAPENTIN 300 MG: 600 TABLET ORAL at 13:29

## 2022-02-25 RX ADMIN — KETOROLAC TROMETHAMINE 15 MG: 30 INJECTION, SOLUTION INTRAMUSCULAR; INTRAVENOUS at 17:53

## 2022-02-25 RX ADMIN — MORPHINE SULFATE 4 MG: 4 INJECTION INTRAVENOUS at 09:43

## 2022-02-25 RX ADMIN — FENTANYL CITRATE 25 MCG: 50 INJECTION, SOLUTION INTRAMUSCULAR; INTRAVENOUS at 21:15

## 2022-02-25 RX ADMIN — HYDROCODONE BITARTRATE AND ACETAMINOPHEN 2 TABLET: 5; 325 TABLET ORAL at 19:52

## 2022-02-25 RX ADMIN — ACETAMINOPHEN 1000 MG: 500 TABLET ORAL at 11:46

## 2022-02-25 RX ADMIN — OXYCODONE 5 MG: 5 TABLET ORAL at 12:07

## 2022-02-25 RX ADMIN — FAMOTIDINE 20 MG: 20 TABLET, FILM COATED ORAL at 19:52

## 2022-02-25 ASSESSMENT — ENCOUNTER SYMPTOMS
SHORTNESS OF BREATH: 0
CONSTIPATION: 0
NAUSEA: 0
COUGH: 0
ABDOMINAL DISTENTION: 0
RHINORRHEA: 0
SORE THROAT: 0
ABDOMINAL PAIN: 1
VOMITING: 0
ANAL BLEEDING: 0
DIARRHEA: 0
BACK PAIN: 0

## 2022-02-25 ASSESSMENT — PAIN SCALES - GENERAL
PAINLEVEL_OUTOF10: 6
PAINLEVEL_OUTOF10: 3
PAINLEVEL_OUTOF10: 10
PAINLEVEL_OUTOF10: 10
PAINLEVEL_OUTOF10: 7
PAINLEVEL_OUTOF10: 10
PAINLEVEL_OUTOF10: 10
PAINLEVEL_OUTOF10: 7
PAINLEVEL_OUTOF10: 8

## 2022-02-25 ASSESSMENT — PAIN DESCRIPTION - PAIN TYPE
TYPE: ACUTE PAIN

## 2022-02-25 ASSESSMENT — PAIN DESCRIPTION - LOCATION
LOCATION: ABDOMEN;PELVIS

## 2022-02-25 ASSESSMENT — PAIN - FUNCTIONAL ASSESSMENT: PAIN_FUNCTIONAL_ASSESSMENT: 0-10

## 2022-02-25 NOTE — ED PROVIDER NOTES
[facility]  Emergency Department  Faculty Attestation     I performed a history and physical examination of the patient and discussed management with the resident. I reviewed the residents note and agree with the documented findings and plan of care. Any areas of disagreement are noted on the chart. I was personally present for the key portions of any procedures. I have documented in the chart those procedures where I was not present during the key portions. I have reviewed the emergency nurses triage note. I agree with the chief complaint, past medical history, past surgical history, allergies, medications, social and family history as documented unless otherwise noted below. For Physician Assistant/ Nurse Practitioner cases/documentation I have personally evaluated this patient and have completed at least one if not all key elements of the E/M (history, physical exam, and MDM). Additional findings are as noted. Primary Care Physician:  Dewayne Chilel MD    Screenings:  [unfilled]    CHIEF COMPLAINT       Chief Complaint   Patient presents with    Abdominal Pain       RECENT VITALS:   Temp: 97.9 °F (36.6 °C),  Pulse: 74, Resp: 18, BP: 131/84    LABS:  Labs Reviewed   CULTURE, URINE   CBC WITH AUTO DIFFERENTIAL   COMPREHENSIVE METABOLIC PANEL   HCG, SERUM, QUALITATIVE   URINALYSIS WITH MICROSCOPIC   LIPASE       Radiology  No orders to display       Attending Physician Additional  Notes    Patient has chronic pelvic pain, recent procedure including laparoscopy, cystotomy repair, Campbell catheter placement which she recently moved, replacement of IUD. She states she has had the same quality of pain ongoing for weeks which is much more severe today, states it is 1000/10 in intensity. No dysuria frequency hematuria. No sensation of inability to void. No flank pain. No fever chills or sweats. No significant vaginal bleeding. No relief with analgesics.   On exam her pain score is elevated, vital signs are normal.  Abdomen is soft. She has suprapubic tenderness with voluntary guarding. No distention. No CVA tenderness. Impression is pelvic pain, fibroids, recent surgery. Plan is analgesics, laboratory and urinary studies, early consultation with gynecology, anticipate imaging. Damián Garcia.  Cece Camacho MD, [address]  Attending Emergency  Physician               Rosanne Elise MD  02/25/22 [id number]

## 2022-02-25 NOTE — CARE COORDINATION
02/25/22 1500   Readmission Assessment   Number of Days since last admission? 8-30 days   Previous Disposition Home with Family   Who is being Interviewed Patient   What was the patient's/caregiver's perception as to why they think they needed to return back to the hospital?   (had rosado removed yesterday, increased pain today)   Did you visit your Primary Care Physician after you left the hospital, before you returned this time? No   Why weren't you able to visit your PCP? Did not have an appointment   Did you see a specialist, such as Cardiac, Pulmonary, Orthopedic Physician, etc. after you left the hospital? Yes  (follow up with OBG yesterday for rosado removal)   Who advised the patient to return to the hospital? Self-referral   Does the patient report anything that got in the way of taking their medications? No   In our efforts to provide the best possible care to you and others like you, can you think of anything that we could have done to help you after you left the hospital the first time, so that you might not have needed to return so soon?  Improved written discharge instructions

## 2022-02-25 NOTE — Clinical Note
Discharge Plan[de-identified] Home with Office Follow-up   Telemetry/Cardiac Monitoring Required?: No   Bed request comments: 3C.  SIRD neg Same Histology In Subsequent Stages Text: The pattern and morphology of the tumor is as described in the first stage.

## 2022-02-25 NOTE — CONSULTS
[address] [facility]    Patient Name: [facility]     Patient : 1985  Room/Bed: 3032/7036-67  Admission Date/Time: 2022  9:04 AM  Primary Care Physician: Rosario Almaguer MD    Consulting Provider: Dr. Chester Loza  Reason for Consult: Abdominal Pain    CC:   Chief Complaint   Patient presents with    Abdominal Pain                HPI: [facility] is a 29 y.o. female [id number] who presents to the emergency room with abdominal pain x24 hours. Of note, the patient is status post diagnostic laparoscopy, lysis of adhesions, dilation and curettage, hysteroscopy with IUD insertion and incidental cystotomy with robotic repair by urology on 2022. She was discharged same day as procedure with a Rosado catheter in place. Rosado catheter was removed yesterday 2022. She was seen at her OB office on 2022 where her IUD was removed due to concern for allergy. She was meeting postop milestones at that time. She states since since rosado removal she has had intense, non-radiating abdominal cramping, specifically in the suprapubic region. She states she has been urinating and having regular bowel movement since her procedure as well since her Rosado catheter was removed 24 hours ago. She is tolerating p.o. But does admit to nausea and vomiting. She states she has tried Motrin Tylenol at home with some relief of her pain. She denies any urinary complaints. Patient states this pain feels like the pain she was having prior to her procedure. She has a long history of pelvic pain. The patient has an appointment on 3/1/2022 to continue outpatient work-up for a possible hysterectomy. CT abdomen pelvis was obtained in the ER that was unremarkable and showed typical postop changes. Bladder was within normal limits. Patient has been seen in the ER numerous times since her procedure for various complaints. All work-ups have been unremarkable.       REVIEW OF SYSTEMS:    Constitutional: negative fever, negative chills  HEENT: negative visual disturbances, negative headaches  Respiratory: negative dyspnea, negative cough  Cardiovascular: negative chest pain,  negative palpitations  Gastrointestinal: positive abdominal pain, negative RUQ pain, negative N/V, negative diarrhea, negative constipation  Genitourinary: negative dysuria, negative vaginal discharge, negative vaginal bleeding  Dermatological: negative rash, negative wounds  Hematologic: negative bleeding/clotting disorder  Immunologic: negative recent illness, negative recent sick contact, negative allergic reactions  Lymphatic: negative lymph nodes  Musculoskeletal: negative back pain, negative myalgias, negative arthralgias  Neurological:  negative dizziness, negative weakness  Behavior/Psych: negative depression, negative anxiety      OBSTETRICAL HISTORY:   OB History    Para Term  AB Living   3 3 1 2 0 1   SAB IAB Ectopic Molar Multiple Live Births   0 0 0 0 0 2      # Outcome Date GA Lbr Anastacio/2nd Weight Sex Delivery Anes PTL Lv   3  12 23w4d   M CS-Classical  Y       Birth Comments: breech,       Apgar1: 1  Apgar5: 6   2   25w0d   M CS-LTranv Gen  DEC   1 Term  40w0d   F CS-LTranv EPI  EDY       PAST MEDICAL HISTORY:   has a past medical history of Acne, Benign essential hypertension antepartum, Closed nondisplaced fracture of distal pole of navicular bone of left wrist, COVID-19, Family history of uterine cancer, Iron deficiency anemia, Mirena IUD inserted 22, Prothrombin gene mutation [facility]), S/p Diagnostic laparoscopy, hysis of adhesions, hysteroscopy, dilatation and curettage, Mirena IUD insertion, Snores, Under care of team, and Under care of team.    PAST SURGICAL HISTORY:   has a past surgical history that includes  section (1/10/2007, 2007, 2012); Cervix surgery; laparoscopy (2022); laparoscopy (N/A, 2022);  Dilation and curettage of uterus (N/A, 2/14/2022); and Bladder surgery (N/A, 2/14/2022). ALLERGIES:  Allergies as of 02/25/2022 - Fully Reviewed 02/25/2022   Allergen Reaction Noted    Seasonal Other (See Comments) 01/31/2022       MEDICATIONS:  Current Facility-Administered Medications   Medication Dose Route Frequency Provider Last Rate Last Admin    docusate sodium (COLACE) capsule 100 mg  100 mg Oral BID PRN Fany Burton MD        ibuprofen (ADVIL;MOTRIN) tablet 800 mg  800 mg Oral Q8H PRN Fany Burton MD        oxybutynin [facility]) tablet 5 mg  5 mg Oral BID PRJYOTI Burton MD        [facility]) chewable tablet 80 mg  80 mg Oral 4x Daily PRN Fany Burton MD        sodium chloride flush 0.9 % injection 5-40 mL  5-40 mL IntraVENous 2 times per day Fany Burton MD        sodium chloride flush 0.9 % injection 5-40 mL  5-40 mL IntraVENous PRN Fany Burton MD        0.9 % sodium chloride infusion  25 mL IntraVENous PRN Fany Burton MD        enoxaparin (LOVENOX) injection 40 mg  40 mg SubCUTAneous Daily Fany Burton MD        acetaminophen (TYLENOL) tablet 650 mg  650 mg Oral Q4H PRN Fany Burton MD        ondansetron (ZOFRAN-ODT) disintegrating tablet 4 mg  4 mg Oral Q8H PRN Fany Burton MD        Or    ondansetron [facility]) injection 4 mg  4 mg IntraVENous Q6H PRN Fany Burton MD        HYDROcodone-acetaminophen (NORCO) 5-325 MG per tablet 1 tablet  1 tablet Oral Q6H PRN Fany Burton MD           FAMILY HISTORY:  Family History of Breast, Ovarian, Colon or Uterine Cancer: No   family history includes Diabetes in her paternal grandmother; Endometrial Cancer in her paternal grandmother; Hypertension in her maternal grandmother; No Known Problems in her father and mother. SOCIAL HISTORY:   reports that she has never smoked.  She has never used smokeless tobacco. She reports current alcohol use of about 3.0 standard drinks of alcohol per week. She reports current drug use. Frequency: 2.00 times per week. Drug: Marijuana Benjamin Mendosa). ________________________________________________________________________                                    Jd Parkeram:  Vitals:    02/25/22 1415 02/25/22 1445 02/25/22 1615 02/25/22 1700   BP: (!) 149/84 (!) 142/86 131/75 131/75   Pulse: 80 81 72 72   Resp: 15 13 20    Temp:   98.2 °F (36.8 °C)    TempSrc:   Oral    SpO2: 100% 97% 99%    Weight:   152 lb (68.9 kg)    Height:   5' 1\" (1.549 m)                                             INPUT/OUTPUT:  I/O this shift:  In: 1200 [P.O.:200; IV Piggyback:1000]  Out: -   In: 1200 [P.O.:200]  Out: -                                                                                                                                PHYSICAL EXAM:   General Appearance: Appears healthy. Alert; in no acute distress. Pleasant. Skin: Skin color, texture, turgor normal. No rashes or lesions. Lymphatic: No abnormally enlarged lymph nodes. HEENT: normocephalic and atraumatic, Thyroid normal to inspection and palpation  Respiratory: Normal expansion. Clear to auscultation. No rales, rhonchi, or wheezing. Cardiovascular: normal rate, normal S1 and S2, no gallops, intact distal pulses and no carotid bruits  Abdomen: soft, non-distended with voluntary guarding and diffuse tenderness    Rectal Exam: exam declined by patient  Musculoskeletal: no gross abnormalities  Extremities: non-tender BLE and non-edematous  Psych:  oriented to time, place and person     LAB RESULTS:  Results for orders placed or performed during the hospital encounter of 02/25/22   COVID-19, Rapid    Specimen: Nasopharyngeal Swab   Result Value Ref Range    Specimen Description . NASOPHARYNGEAL SWAB     SARS-CoV-2, Rapid Not Detected Not Detected   CBC with Auto Differential   Result Value Ref Range    WBC 4.8 3.5 - 11.3 k/uL    RBC 4.05 3.95 - 5.11 m/uL    Hemoglobin 9.6 (L) 11.9 - 15.1 g/dL    Hematocrit 30.8 (L) 36.3 - 47.1 %    MCV 76.0 (L) 82.6 - 102.9 fL    MCH 23.7 (L) 25.2 - 33.5 pg    MCHC 31.2 28.4 - 34.8 g/dL    RDW 17.7 (H) 11.8 - 14.4 %    Platelets See Reflexed IPF Result 138 - 453 k/uL    NRBC Automated 0.0 0.0 per 100 WBC    RBC Morphology ANISOCYTOSIS PRESENT     Seg Neutrophils 74 (H) 36 - 65 %    Lymphocytes 17 (L) 24 - 43 %    Monocytes 7 3 - 12 %    Eosinophils % 2 1 - 4 %    Basophils 0 0 - 2 %    Immature Granulocytes 0 0 %    Segs Absolute 3.55 1.50 - 8.10 k/uL    Absolute Lymph # 0.81 (L) 1.10 - 3.70 k/uL    Absolute Mono # 0.33 0.10 - 1.20 k/uL    Absolute Eos # 0.10 0.00 - 0.44 k/uL    Basophils Absolute <0.03 0.00 - 0.20 k/uL    Absolute Immature Granulocyte <0.03 0.00 - 0.30 k/uL   Comprehensive Metabolic Panel   Result Value Ref Range    Glucose 84 70 - 99 mg/dL    BUN 9 6 - 20 mg/dL    CREATININE 0.63 0.50 - 0.90 mg/dL    Calcium 9.3 8.6 - 10.4 mg/dL    Sodium 132 (L) 135 - 144 mmol/L    Potassium 3.8 3.7 - 5.3 mmol/L    Chloride 99 98 - 107 mmol/L    CO2 23 20 - 31 mmol/L    Anion Gap 10 9 - 17 mmol/L    Alkaline Phosphatase 46 35 - 104 U/L    ALT 11 5 - 33 U/L    AST 17 <32 U/L    Total Bilirubin 0.34 0.3 - 1.2 mg/dL    Total Protein 7.2 6.4 - 8.3 g/dL    Albumin 4.1 3.5 - 5.2 g/dL    Albumin/Globulin Ratio 1.3 1.0 - 2.5    GFR Non-African American >60 >60 mL/min    GFR African American >60 >60 mL/min    GFR Comment         HCG Qualitative, Serum   Result Value Ref Range    hCG Qual NEGATIVE NEGATIVE   Urinalysis with Microscopic   Result Value Ref Range    Color, UA Orange (A) Yellow    Turbidity UA Clear Clear    Glucose, Ur NEGATIVE NEGATIVE    Bilirubin Urine NEGATIVE NEGATIVE    Ketones, Urine MODERATE (A) NEGATIVE    Specific Gravity, UA 1.061 (H) 1.005 - 1.030    Urine Hgb LARGE (A) NEGATIVE    pH, UA 5.5 5.0 - 8.0    Protein, UA NEGATIVE NEGATIVE    Urobilinogen, Urine Normal Normal    Nitrite, Urine NEGATIVE NEGATIVE    Leukocyte Esterase, Urine NEGATIVE NEGATIVE    - WBC, UA 2 TO 5 0 - 5 /HPF    RBC, UA 0 TO 2 0 - 2 /HPF    Casts UA 0 TO 2 0 - 2 /LPF    Casts UA HYALINE 0 - 2 /LPF    Epithelial Cells UA 5 TO 10 0 - 5 /HPF    Bacteria, UA FEW (A) None    Yeast, UA FEW (A) None   Lipase   Result Value Ref Range    Lipase 25 13 - 60 U/L   Immature Platelet Fraction   Result Value Ref Range    Platelet, Fluorescence Platelet clumps present, count appears adequate. 138 - 453 k/uL     DIAGNOSTICS:  CT HEAD WO CONTRAST    Result Date: 2/16/2022  EXAMINATION: CT OF THE HEAD WITHOUT CONTRAST  2/16/2022 11:01 pm TECHNIQUE: CT of the head was performed without the administration of intravenous contrast. Dose modulation, iterative reconstruction, and/or weight based adjustment of the mA/kV was utilized to reduce the radiation dose to as low as reasonably achievable. COMPARISON: None. HISTORY: ORDERING SYSTEM PROVIDED HISTORY: worst headache of life. recent Lap surgery TECHNOLOGIST PROVIDED HISTORY: worst headache of life. recent Lap surgery Decision Support Exception - unselect if not a suspected or confirmed emergency medical condition->Emergency Medical Condition (MA) Is the patient pregnant?->No FINDINGS: BRAIN/VENTRICLES: There is no acute intracranial hemorrhage, mass effect or midline shift. No abnormal extra-axial fluid collection. The gray-white differentiation is maintained without evidence of an acute infarct. There is no evidence of hydrocephalus. ORBITS: The visualized portion of the orbits demonstrate no acute abnormality. SINUSES: The visualized paranasal sinuses and mastoid air cells demonstrate no acute abnormality. SOFT TISSUES/SKULL:  No acute abnormality of the visualized skull or soft tissues. No acute intracranial abnormality.      CTA HEAD NECK W CONTRAST    Result Date: 2/16/2022  EXAMINATION: CTA OF THE HEAD AND NECK WITH CONTRAST 2/16/2022 11:01 pm: TECHNIQUE: CTA of the head and neck was performed with the administration of intravenous contrast. Multiplanar reformatted images are provided for review. MIP images are provided for review. Stenosis of the internal carotid arteries measured using NASCET criteria. Dose modulation, iterative reconstruction, and/or weight based adjustment of the mA/kV was utilized to reduce the radiation dose to as low as reasonably achievable. COMPARISON: None. HISTORY: ORDERING SYSTEM PROVIDED HISTORY: headache, hypercoagulable FINDINGS: CTA NECK: AORTIC ARCH/ARCH VESSELS: No dissection or arterial injury. No significant stenosis of the brachiocephalic or subclavian arteries. CAROTID ARTERIES: No dissection, arterial injury, or hemodynamically significant stenosis by NASCET criteria. VERTEBRAL ARTERIES: No dissection, arterial injury, or significant stenosis. SOFT TISSUES: The lung apices are clear. No cervical or superior mediastinal lymphadenopathy. The larynx and pharynx are unremarkable. No acute abnormality of the salivary and thyroid glands. BONES: No acute osseous abnormality. CTA HEAD: ANTERIOR CIRCULATION: No significant stenosis of the intracranial internal carotid, anterior cerebral, or middle cerebral arteries. No aneurysm. POSTERIOR CIRCULATION: No significant stenosis of the vertebral, basilar, or posterior cerebral arteries. No aneurysm. OTHER: No dural venous sinus thrombosis on this non-dedicated study. BRAIN: No mass effect or midline shift. No extra-axial fluid collection. The gray-white differentiation is maintained. Unremarkable CTA of the head and neck. Narrative   EXAMINATION:   CT OF THE ABDOMEN AND PELVIS WITH CONTRAST 2/25/2022 10:07 am       TECHNIQUE:   CT of the abdomen and pelvis was performed with the administration of   intravenous contrast. Multiplanar reformatted images are provided for review.    Dose modulation, iterative reconstruction, and/or weight based adjustment of   the mA/kV was utilized to reduce the radiation dose to as low as reasonably   achievable.       COMPARISON:   CT abdomen and pelvis dated 08/17/2021.       HISTORY:   ORDERING SYSTEM PROVIDED HISTORY: Abd pain s/p diagnostic lap, hysteroscopy,   lysis of adhesions, d&C, cystotomy repair   TECHNOLOGIST PROVIDED HISTORY:   Abd pain s/p diagnostic lap, hysteroscopy, lysis of adhesions, d&C,   cystotomy repair       Decision Support Exception - unselect if not a suspected or confirmed   emergency medical condition->Emergency Medical Condition (MA)   Reason for Exam: Abd pain s/p diagnostic lap, hysteroscopy, lysis of   adhesions, d&C, cystotomy repair       Pelvic surgery 02/14/2022.       FINDINGS:   Lower Chest: Visualized portion of the lower chest demonstrates no acute   abnormality.       Organs: The liver, spleen, pancreas, and adrenal glands are unremarkable. Gallbladder is unremarkable.       There is symmetric enhancement of the kidneys.  No hydronephrosis or   perinephric inflammation.       GI/Bowel: There is no abnormal bowel distention or pericolonic inflammation. No free intraperitoneal air or fluid.  Appendix is normal.       Pelvis: The urinary bladder is within normal limits.  There is no   perivesicular fluid collection.  No free air in the pelvis.  There is mild   stranding around the uterus and bladder, likely postoperative change.  Tiny   amount of free fluid in the pelvic cul-de-sac.  The uterus is somewhat bulky   and heterogeneous, which is unchanged.  Small nonspecific follicles are noted   in the ovaries.  No pelvic lymphadenopathy.       Peritoneum/Retroperitoneum: Abdominal aorta is normal in caliber.  No   retroperitoneal lymphadenopathy.       Bones/Soft Tissues:  There are a few foci of gas in the abdominal wall, likely   postoperative.  No fluid collection in the abdominal wall.  No acute or   suspicious osseous abnormality.           Impression   1.  Mild stranding around the urinary bladder and uterus as well as tiny   amount of free fluid in the pelvic cul-de-sac, likely expected postoperative change.  No pelvic fluid collection or gas.       2.  No acute findings in the upper abdomen. ASSESSMENT & PLAN:    Major Bhakta is a 29 y.o. female [id number] presenting with abdominal pain   - VSS, afebile    - She is status post diagnostic laparoscopy, lysis of adhesions, dilation and curettage, hysteroscopy with IUD insertion and incidental cystotomy with robotic repair by urology on 2/14/2022   -She admits to abdominal pain for the past 24 hours that she describes as intense cramping specifically over the suprapubic area   - Abdominal exam revealed a soft nondistended diffusely tender abdomen with voluntary guarding. No concern for acute peritoneal signs   -Recommend no surgical interventions at this time   - Labs: CMP and Lipase are unremarkable. Beta Hcg qual is negative. CBC reveals anemia with a Hbg of 9.6. Her Hbg was 10 on 2/16/22   -Urine cultures pending   -Her Campbell catheter was removed yesterday by urology. She has been urinating regularly with no concerns. Bladder was unremarkable and CT abdomen pelvis   - CT A/P: Mild stranding around the urinary bladder and uterus as well as tiny   amount of free fluid in the pelvic cul-de-sac, likely expected postoperative   change.  No pelvic fluid collection or gas.  2.  No acute findings in the   upper abdomen   -Counseled the patient on benefits with pelvic floor therapy. And recommend following up with her OB for referral   -Patient was discharged home on Tylenol, Motrin, simethicone, Zofran from her surgery. Will send Roxicodone #5 tab, Gabapentin, and Pyridium to pharmacy for multimodal pain relief. Counseled the patient on scheduling Motrin, Tylenol and using Roxicodone for potential breakthrough pain.   -Patient has follow-up appointment on 3/1/2022 at her OB office.  Recommended close follow-up for continued work-up for possible hysterectomy in the future for long history of pelvic pain    -Patient is stable for discharge from an OB/GYN perspective. Thank you for the consult and please feel free to reach out with any concern questions or concerns. Patient Active Problem List    Diagnosis Date Noted    History of  section, classical 2013     Priority: Medium           History of  labor 2013     Priority: Medium    Lower abdominal pain 2022    S/p Diagnostic laparoscopy, lysis of adhesions, hysteroscopy, dilatation and curettage, Mirena IUD insertion 22      Complicated by incidental cystotomy repaired by Dr. 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 Nelson Irving, who is agreeable.      Dragan Randhawa DO  Ob/Gyn Resident  Pager: [phone]  2022, 5:10 PM

## 2022-02-25 NOTE — ED NOTES
The following labs were labeled with patient stickers & tubed to lab;    []Lavender   []On Ice  []Blue  []Green/ Yellow  []Green/ Black []On Ice  []Pink  []Red  []Yellow    []COVID-19 Swab []Rapid    [x]Urine Sample  []Pelvic Cultures    []Blood Cultures       Lance Jeffrey LPN  89/07/02 [id number]

## 2022-02-25 NOTE — ED NOTES
Pt A&O x 4, on continuous monitor, does not appear in acute distress, RR even and unlabored, resting comfortably on stretcher with eyes closed and call light in reach. Writer will continue to monitor pt closely.        Joby Haji LPN  45/62/62 [id number]

## 2022-02-25 NOTE — CARE COORDINATION
Case Management Initial Discharge Plan  [facility],             Met with:patient to discuss discharge plans. Information verified: address, contacts, phone number, , insurance Yes  Insurance Provider: Felecia Wayne    Emergency Contact/Next of Kin name & number: Pop Soto, and Geneva Em  Who are involved in patient's support system? family    PCP: Rosario Almaguer MD  Date of last visit: less than one year      Discharge Planning    Living Arrangements:    lives with daughter    [place] has 1 stories  1 stairs to climb to get into front door, no stairs to climb to reach second floor  Location of bedroom/bathroom in home main    Patient able to perform ADL's:Independent    Current Services (outpatient & in home) DME  DME equipment: none  DME provider: na    Is patient receiving oral anticoagulation therapy? No    If indicated:   Physician managing anticoagulation treatment: none  Where does patient obtain lab work for ATC treatment? na      Potential Assistance Needed:       Patient agreeable to home care: No  [place] of choice provided:  n/a    Prior SNF/Rehab Placement and Facility: none  Agreeable to SNF/Rehab: No  [place] of choice provided: n/a     Evaluation: n/a    Expected Discharge date:       Patient expects to be discharged to:   home    If home: is the family and/or caregiver wiling & able to provide support at home? yes  Who will be providing this support? family*    Follow Up Appointment: Best Day/ Time:      Transportation provider: family  Transportation arrangements needed for discharge: No    Readmission Risk              Risk of Unplanned Readmission:  0             Does patient have a readmission risk score greater than 14?: No  If yes, follow-up appointment must be made within 7 days of discharge.      Goals of Care: pain control      Educated pt on transitional options, provided freedom of choice and are agreeable with plan      Discharge Plan: home independent with daughter, has transportation, relates to have hysterectomy soon          Electronically signed by Abdifatah Ortiz RN on 2/25/22 at 2:57 PM EST

## 2022-02-25 NOTE — ED NOTES
Pt A&O x 4, on continuous monitor, does not appear in acute distress, RR even and unlabored, resting comfortably on stretcher with eyes closed and call light in reach. Writer will continue to monitor pt closely.        Naomy Padron LPN  82/55/17 [id number]

## 2022-02-25 NOTE — ED NOTES
Pt presents to the ED c/o pelvic/lower abdominal pain x 2 weeks that worsened today. Pt had an abdominal laprascopy on 02/14/22 with uterine fibroids and adhesion removal, IUD placement and rosado catheter placement. Pt states her IUD was removed on 02/21/22 d/t pain and the rosado catheter was removed by her GYN yesterday. Pt states the pain is a cramping and constant, rated as a 10/10. Pt states she is having some vaginal bleeding but it is lighter than when the IUD was initially removed. Pt denies CP, SOB, nausea or vomiting. Pt A&O x 4, does not appear in acute distress, RR even and unlabored, resting comfortably on stretcher with eyes open and call light in reach. Pt placed in a gown, on continuous monitor with alarms set, vitals obtained. Dr. Aron Esquivel at bedside to assess pt.   Initial assessment performed by physician, [address] [place] [address] will carry out initial orders/tasks and reassess pt.       Bam Hernandez LPN  66/41/88 [id number]

## 2022-02-25 NOTE — ED PROVIDER NOTES
[facility]  ED  Emergency Department Encounter  EmergencyMedicine Resident     Pt Name:Jose Kirk  MRN: [id number]  [place] 1985  Date of evaluation: 2/25/22  PCP:  Lalita Centeno MD    This patient was evaluated in the Emergency Department for symptoms described in the history of present illness. The patient was evaluated in the context of the global COVID-19 pandemic, which necessitated consideration that the patient might be at risk for infection with the SARS-CoV-2 virus that causes COVID-19. Institutional protocols and algorithms that pertain to the evaluation of patients at risk for COVID-19 are in a state of rapid change based on information released by regulatory bodies including the CDC and federal and state organizations. These policies and algorithms were followed during the patient's care in the ED. CHIEF COMPLAINT       Chief Complaint   Patient presents with    Abdominal Pain       HISTORY OF PRESENT ILLNESS  (Location/Symptom, Timing/Onset, Context/Setting, Quality, Duration, Modifying Factors, Severity.)      Antony Dueñas is a 29 y.o. female who presents with worsening pelvic pain. Patient had diagnostic laparoscopy, hysteroscopy, D&C, lysis of adhesions, incidental cystotomy repair, and Mirena IUD insertion on 2/14 for history of pelvic adhesive disease and intramural leiomyoma of uterus. Patient had a Campbell catheter inserted at the time and urology was consulted for incidental cystotomy repair. Patient had the IUD removed on Monday 2/21 after continuing pain. Patient had the Campbell catheter removed yesterday. Patient has had worsening pain since that time, she took 1500 mg ibuprofen yesterday for pain without much symptom improvement. Patient states she has a cramping and stabbing sensation in her lower abdomen and pelvis, and rates her pain 10/10. Patient has been able to urinate on her own since the Campbell catheter removal, denying dysuria or hematuria. Patient is scheduled for outpatient visit with OB/GYN on 3/1 and will be evaluated for scheduled hysterectomy. Denies fevers, chills, nausea, vomiting diarrhea, flatulence, chest pain, shortness of breath, headache, changes in vision, numbness, tingling, weakness. PAST MEDICAL / SURGICAL / SOCIAL / FAMILY HISTORY      has a past medical history of Acne, Benign essential hypertension antepartum, Closed nondisplaced fracture of distal pole of navicular bone of left wrist, COVID-19, Family history of uterine cancer, Iron deficiency anemia, Mirena IUD inserted 22, Prothrombin gene mutation [facility]), S/p Diagnostic laparoscopy, hysis of adhesions, hysteroscopy, dilatation and curettage, Mirena IUD insertion, Snores, Under care of team, and Under care of team.     has a past surgical history that includes  section (1/10/2007, 2007, 2012); Cervix surgery; laparoscopy (2022); laparoscopy (N/A, 2022); Dilation and curettage of uterus (N/A, 2022); and Bladder surgery (N/A, 2022). Social History     Socioeconomic History    Marital status: Single     Spouse name: Not on file    Number of children: Not on file    Years of education: Not on file    Highest education level: Not on file   Occupational History    Not on file   Tobacco Use    Smoking status: Never Smoker    Smokeless tobacco: Never Used   Vaping Use    Vaping Use: Never used   Substance and Sexual Activity    Alcohol use:  Yes     Alcohol/week: 3.0 standard drinks     Types: 3 Glasses of wine per week     Comment: 3 times per month    Drug use: Yes     Frequency: 2.0 times per week     Types: Marijuana Fredick Copping)     Comment: last [facility]     Sexual activity: Yes     Partners: Male   Other Topics Concern    Not on file   Social History Narrative    Not on file     Social Determinants of Health     Financial Resource Strain: Low Risk     Difficulty of Paying Living Expenses: Not hard at all   Food Insecurity: No Food Insecurity    Worried About Running Out of Food in the Last Year: Never true    Yoko of Food in the Last Year: Never true   Transportation Needs:     Lack of Transportation (Medical): Not on file    Lack of Transportation (Non-Medical): Not on file   Physical Activity:     Days of Exercise per Week: Not on file    Minutes of Exercise per Session: Not on file   Stress:     Feeling of Stress : Not on file   Social Connections:     Frequency of Communication with Friends and Family: Not on file    Frequency of Social Gatherings with Friends and Family: Not on file    Attends Ep[employer] Services: Not on file    Active Member of [address] [place] [address] [employer] or Organizations: Not on file    Attends Club or Organization Meetings: Not on file    Marital Status: Not on file   Intimate Partner Violence:     Fear of Current or Ex-Partner: Not on file    Emotionally Abused: Not on file    Physically Abused: Not on file    Sexually Abused: Not on file   Housing Stability:     Unable to Pay for Housing in the Last Year: Not on file    Number of [place] in the Last Year: Not on file    Unstable Housing in the Last Year: Not on file       Family History   Problem Relation Age of Onset    Diabetes Paternal Grandmother     Endometrial Cancer Paternal Grandmother     No Known Problems Mother     No Known Problems Father     Hypertension Maternal Grandmother     Breast Cancer Neg Hx     Cancer Neg Hx     Colon Cancer Neg Hx     Eclampsia Neg Hx     Ovarian Cancer Neg Hx      Labor Neg Hx     Spont Abortions Neg Hx     Stroke Neg Hx        Allergies:  Seasonal    Home Medications:  Prior to Admission medications    Medication Sig Start Date End Date Taking?  Authorizing Provider   oxybutynin (DITROPAN) 5 MG tablet Take 1 tablet by mouth 2 times daily as needed (bladder pain/spasms from stent) 22  Johannah Goldmann, MD   acetaminophen (TYLENOL) 500 MG tablet Take 2 tablets by mouth every 6 hours as needed for Pain 2/14/22 3/16/22  Luisa Estrada, DO   docusate sodium (COLACE) 100 MG capsule Take 1 capsule by mouth 2 times daily as needed for Constipation 2/14/22   Luisa Estrada, DO   ibuprofen (ADVIL;MOTRIN) 600 MG tablet Take 1 tablet by mouth every 6 hours as needed for Pain 2/14/22 3/16/22  Luisa Estrada, DO   ondansetron (ZOFRAN) 4 MG tablet Take 1 tablet by mouth every 8 hours as needed for Nausea or Vomiting 2/14/22   Luisa Estrada, DO   simethicone (MYLICON) 80 MG chewable tablet Take 1 tablet by mouth 4 times daily as needed for Flatulence 2/14/22   Luisa Estrada, DO   acetaminophen (TYLENOL) 325 MG tablet Take 650 mg by mouth every 6 hours as needed for Pain    Historical Provider, MD   ferrous sulfate (IRON 325) 325 (65 Fe) MG tablet Take 1 tablet by mouth daily (with breakfast) 1/25/22   Janet Mcgovern MD   ibuprofen (ADVIL;MOTRIN) 800 MG tablet Take 1 tablet by mouth every 8 hours as needed for Pain 10/30/21   Elvira Wallace, DO       REVIEW OF SYSTEMS    (2-9 systems for level 4, 10 or more for level 5)      Review of Systems   Constitutional: Negative for activity change, appetite change, chills, fatigue and fever. HENT: Negative for congestion, rhinorrhea and sore throat. Eyes: Negative for visual disturbance. Respiratory: Negative for cough and shortness of breath. Cardiovascular: Negative for chest pain. Gastrointestinal: Positive for abdominal pain (Lower abdominal pain, including right lower and left lower quadrants). Negative for abdominal distention, anal bleeding, constipation, diarrhea, nausea and vomiting. Genitourinary: Positive for pelvic pain. Negative for dysuria, flank pain, hematuria, vaginal bleeding and vaginal discharge. Musculoskeletal: Negative for arthralgias, back pain, myalgias, neck pain and neck stiffness. Skin: Negative for pallor and rash.    Neurological: Negative for dizziness, tremors, syncope, weakness, light-headedness, numbness and headaches. All other systems reviewed and are negative. PHYSICAL EXAM   (up to 7 for level 4, 8 or more for level 5)      INITIAL VITALS:   BP (!) 149/98   Pulse 77   Temp 97.9 °F (36.6 °C) (Oral)   Resp 21   Ht 5' 1\" (1.549 m)   Wt 156 lb (70.8 kg)   SpO2 95%   BMI 29.48 kg/m²     Physical Exam  Vitals reviewed. Constitutional:       General: She is not in acute distress. Appearance: She is not toxic-appearing. HENT:      Head: Normocephalic and atraumatic. Mouth/Throat:      Mouth: Mucous membranes are moist.      Pharynx: Oropharynx is clear. Eyes:      Extraocular Movements: Extraocular movements intact. Pupils: Pupils are equal, round, and reactive to light. Cardiovascular:      Rate and Rhythm: Normal rate and regular rhythm. Pulses: Normal pulses. Heart sounds: Normal heart sounds. Pulmonary:      Effort: Pulmonary effort is normal.      Breath sounds: Normal breath sounds. No decreased breath sounds, wheezing, rhonchi or rales. Abdominal:      General: There is no distension. Palpations: Abdomen is soft. Tenderness: There is abdominal tenderness in the right lower quadrant, periumbilical area, suprapubic area and left lower quadrant. There is guarding (Voluntary guarding in all quadrants, difficult abdominal exam). There is no right CVA tenderness, left CVA tenderness or rebound. Negative signs include Castro's sign and Rovsing's sign. Hernia: No hernia is present. Musculoskeletal:         General: Normal range of motion. Cervical back: Normal range of motion and neck supple. Right lower leg: No edema. Left lower leg: No edema. Skin:     Capillary Refill: Capillary refill takes less than 2 seconds. Coloration: Skin is not pale. Findings: No rash. Neurological:      General: No focal deficit present. Mental Status: She is alert and oriented to person, place, and time. Motor: No weakness. DIFFERENTIAL  DIAGNOSIS     PLAN (LABS / IMAGING / EKG):  Orders Placed This Encounter   Procedures    Culture, Urine    CT ABDOMEN PELVIS W IV CONTRAST Additional Contrast? None    CBC with Auto Differential    Comprehensive Metabolic Panel    HCG Qualitative, Serum    Urinalysis with Microscopic    Lipase    Immature Platelet Fraction    Inpatient consult to Obstetrics / Gynecology       MEDICATIONS ORDERED:  Orders Placed This Encounter   Medications    morphine injection 4 mg    iopamidol (ISOVUE-370) 76 % injection 75 mL    0.9 % sodium chloride bolus    DISCONTD: morphine injection 4 mg    acetaminophen (TYLENOL) tablet 1,000 mg    phenazopyridine (PYRIDIUM) tablet 200 mg    oxyCODONE (ROXICODONE) immediate release tablet 5 mg    gabapentin (NEURONTIN) tablet 300 mg    ondansetron (ZOFRAN) injection 4 mg       DDX: GERD, PUD, pancreatitis, cholecystitis, GB colic, cholangitis, [id number]-[id number]-Skqiow, ACS/ MI, pneumonia, SBO, DKA, AAA, mesenteric ischemia, perforated viscous, acute gastroenteritis, NSAP, pyelonephritis, kidney stone, appendicitis, hernia, UTI, constipation, ectopic, ovarian torsion, ovarian cyst, PID, tuboovarian abscess, period/ fibroid    As a part of this differential, I evaluated for the presence of diaphoresis,  chest pain (including the QRST of the chest pain), Shortness of Breath or Dyspnea, BP in both arms, and the presence of systemic and peripheral edema. I evaluated the patient's blood glucose status and urinalysis. I also calculated a Kristel's criteria score, the Avalarado Score, and the BISAP to consider acute pancreatitis and acute appendicitis. The relevant score is documented below.     Ransons criteria: WBC>16, age >49, glucose>200, AST>80, LDH>350  Evaluate for: EtOH abuse, ACS risk factors, point tenderness, rebound, guarding, Castro sign, GB US (stone, sono Castro, wall thick>3mm, CBD>6mm)      Dill Score for appendicitis  Migratory R iliac Fossa Pain 2   Anorexia (loss of appetite) or ketones in the urine 1   Nausea or vomiting  1   Tenderness in R iliac fossa 2   Rebound to R iliac fossa 1   Fever of 37.3 °C/ 99.1 F +  1   Leukocytosis > 43391 WBC 1   Neutrophilia, or an increase in % of neutrophils in WBC 1   TOTAL 2/10      Clinical Decision: Probability of an acute appendicitis   <3 no CT 5-6 is consistent with diagnosis of acute appendicitis    4-6 CT 7-8 indicates a probable appendicitis   7-8 Surgery consult 9-10 indicates a very probable acute appendicitis     BISAP Score for pancreatitis  BUN >25  1   Impaired mental status  1   SIRS criteria (HR >90, T 100.4, 36, RR >20/ CO2 <32, WBC >12, <4) 1   Age >60 1   Pleural Effusion  1   TOTAL 0/5      Probability of an acute appendicitis   > 0 increasing risk of mortality   3 to 4 mortality increasing significantly    > 4 mortality rate of 22%.            DIAGNOSTIC RESULTS / EMERGENCY DEPARTMENT COURSE / MDM   LAB RESULTS:  Results for orders placed or performed during the hospital encounter of 02/25/22   CBC with Auto Differential   Result Value Ref Range    WBC 4.8 3.5 - 11.3 k/uL    RBC 4.05 3.95 - 5.11 m/uL    Hemoglobin 9.6 (L) 11.9 - 15.1 g/dL    Hematocrit 30.8 (L) 36.3 - 47.1 %    MCV 76.0 (L) 82.6 - 102.9 fL    MCH 23.7 (L) 25.2 - 33.5 pg    MCHC 31.2 28.4 - 34.8 g/dL    RDW 17.7 (H) 11.8 - 14.4 %    Platelets See Reflexed IPF Result 138 - 453 k/uL    NRBC Automated 0.0 0.0 per 100 WBC    RBC Morphology ANISOCYTOSIS PRESENT     Seg Neutrophils 74 (H) 36 - 65 %    Lymphocytes 17 (L) 24 - 43 %    Monocytes 7 3 - 12 %    Eosinophils % 2 1 - 4 %    Basophils 0 0 - 2 %    Immature Granulocytes 0 0 %    Segs Absolute 3.55 1.50 - 8.10 k/uL    Absolute Lymph # 0.81 (L) 1.10 - 3.70 k/uL    Absolute Mono # 0.33 0.10 - 1.20 k/uL    Absolute Eos # 0.10 0.00 - 0.44 k/uL    Basophils Absolute <0.03 0.00 - 0.20 k/uL    Absolute Immature Granulocyte <0.03 0.00 - 0.30 k/uL   Comprehensive Metabolic Panel Result Value Ref Range    Glucose 84 70 - 99 mg/dL    BUN 9 6 - 20 mg/dL    CREATININE 0.63 0.50 - 0.90 mg/dL    Calcium 9.3 8.6 - 10.4 mg/dL    Sodium 132 (L) 135 - 144 mmol/L    Potassium 3.8 3.7 - 5.3 mmol/L    Chloride 99 98 - 107 mmol/L    CO2 23 20 - 31 mmol/L    Anion Gap 10 9 - 17 mmol/L    Alkaline Phosphatase 46 35 - 104 U/L    ALT 11 5 - 33 U/L    AST 17 <32 U/L    Total Bilirubin 0.34 0.3 - 1.2 mg/dL    Total Protein 7.2 6.4 - 8.3 g/dL    Albumin 4.1 3.5 - 5.2 g/dL    Albumin/Globulin Ratio 1.3 1.0 - 2.5    GFR Non-African American >60 >60 mL/min    GFR African American >60 >60 mL/min    GFR Comment         HCG Qualitative, Serum   Result Value Ref Range    hCG Qual NEGATIVE NEGATIVE   Urinalysis with Microscopic   Result Value Ref Range    Color, UA Orange (A) Yellow    Turbidity UA Clear Clear    Glucose, Ur NEGATIVE NEGATIVE    Bilirubin Urine NEGATIVE NEGATIVE    Ketones, Urine MODERATE (A) NEGATIVE    Specific Gravity, UA 1.061 (H) 1.005 - 1.030    Urine Hgb LARGE (A) NEGATIVE    pH, UA 5.5 5.0 - 8.0    Protein, UA NEGATIVE NEGATIVE    Urobilinogen, Urine Normal Normal    Nitrite, Urine NEGATIVE NEGATIVE    Leukocyte Esterase, Urine NEGATIVE NEGATIVE    - PENDING     WBC, UA PENDING /HPF    RBC, UA PENDING /HPF    Casts UA PENDING /LPF    Crystals, UA PENDING None /HPF    Epithelial Cells UA PENDING /HPF    Renal Epithelial, UA PENDING 0 /HPF    Bacteria, UA PENDING None    Mucus, UA PENDING None    Trichomonas, UA PENDING None    Amorphous, UA PENDING None    Other Observations UA PENDING NOT REQ. Yeast, UA PENDING None   Lipase   Result Value Ref Range    Lipase 25 13 - 60 U/L   Immature Platelet Fraction   Result Value Ref Range    Platelet, Fluorescence Platelet clumps present, count appears adequate. 138 - 453 k/uL       IMPRESSION: 42-year-old female with lower abdominal pain and pelvic pain.   Patient with recent history of diagnostic laparoscopy, hysteroscopy, D&C, lysis of adhesions, incidental cystotomy repair, Mirena IUD insertion on 2/14 for history of pelvic adhesive disease and intramural leiomyoma of uterus. Patient Campbell catheter inserted at that time. IUD removed on Monday/321, and Campbell catheter removed yesterday for continuing pain. Abdomen soft, nonperitoneal, voluntary guarding in all quadrants, difficult abdominal exam as patient is moving around the bed in pain. Right lower and left lower quadrant tenderness and suprapubic tenderness on palpation. Vital signs stable, afebrile. Patient states pain is 10/10. She took ibuprofen at home without significant symptom improvement. She is able to urinate on her own without dysuria or hematuria. Patient given pain medications and antiemetics without significant symptom improvement. Patient evaluated by OB, feel patient does not require acute surgical intervention, and she is scheduled follow-up appointment on 3/1 with OB outpatient to discuss hysterectomy. On repeat eval, patient is now profusely vomiting despite Zofran and IV fluids. We will continue to attempt to control pain and vomiting, if unable patient will be admitted to observation unit for further antiemetics and pain control. On reevaluation, patient still has pain 9/10 and says she does not feel comfortable going home with her pain and vomiting. Will admit to observation unit for further pain control and anti-emetics and observation. RADIOLOGY:  CT ABDOMEN PELVIS W IV CONTRAST Additional Contrast? None    Result Date: 2/25/2022  EXAMINATION: CT OF THE ABDOMEN AND PELVIS WITH CONTRAST 2/25/2022 10:07 am TECHNIQUE: CT of the abdomen and pelvis was performed with the administration of intravenous contrast. Multiplanar reformatted images are provided for review. Dose modulation, iterative reconstruction, and/or weight based adjustment of the mA/kV was utilized to reduce the radiation dose to as low as reasonably achievable.  COMPARISON: CT abdomen and pelvis dated 08/17/2021. HISTORY: ORDERING SYSTEM PROVIDED HISTORY: Abd pain s/p diagnostic lap, hysteroscopy, lysis of adhesions, d&C, cystotomy repair TECHNOLOGIST PROVIDED HISTORY: Abd pain s/p diagnostic lap, hysteroscopy, lysis of adhesions, d&C, cystotomy repair Decision Support Exception - unselect if not a suspected or confirmed emergency medical condition->Emergency Medical Condition (MA) Reason for Exam: Abd pain s/p diagnostic lap, hysteroscopy, lysis of adhesions, d&C, cystotomy repair Pelvic surgery 02/14/2022. FINDINGS: Lower Chest: Visualized portion of the lower chest demonstrates no acute abnormality. Organs: The liver, spleen, pancreas, and adrenal glands are unremarkable. Gallbladder is unremarkable. There is symmetric enhancement of the kidneys. No hydronephrosis or perinephric inflammation. GI/Bowel: There is no abnormal bowel distention or pericolonic inflammation. No free intraperitoneal air or fluid. Appendix is normal. Pelvis: The urinary bladder is within normal limits. There is no perivesicular fluid collection. No free air in the pelvis. There is mild stranding around the uterus and bladder, likely postoperative change. Tiny amount of free fluid in the pelvic cul-de-sac. The uterus is somewhat bulky and heterogeneous, which is unchanged. Small nonspecific follicles are noted in the ovaries. No pelvic lymphadenopathy. Peritoneum/Retroperitoneum: Abdominal aorta is normal in caliber. No retroperitoneal lymphadenopathy. Bones/Soft Tissues: There are a few foci of gas in the abdominal wall, likely postoperative. No fluid collection in the abdominal wall. No acute or suspicious osseous abnormality. 1.  Mild stranding around the urinary bladder and uterus as well as tiny amount of free fluid in the pelvic cul-de-sac, likely expected postoperative change. No pelvic fluid collection or gas. 2.  No acute findings in the upper abdomen.      EMERGENCY DEPARTMENT COURSE:  ED Course as of 02/25/22 1432   Fri Feb 25, 2022   1018 Sodium(!): 132 [JG]   1018 hCG Qual: NEGATIVE [JG]   1103 CT abd pelvis showing mild stranding around the urinary bladder and uterus as well as tiny  amount of free fluid in the pelvic cul-de-sac, likely expected postoperative change. No pelvic fluid collection or gas. No acute findings in the upper abdomen. [JG]   1323 Patient actively vomiting in the room. Giving zofran. Patient finished 1 L NS bolus [JG]   1346 Patient now able to give a urine sample. Will send to lab and continue symptom control. Patient pain still 10/10 [JG]   1403 UA in process [JG]      ED Course User Index  [JG] Shanon Bashir MD     CONSULTS:  IP CONSULT TO OB GYN      FINAL IMPRESSION      1. Lower abdominal pain          DISPOSITION / PLAN     DISPOSITION  Decision to Admit 2/25/2022 2:33 PM      PATIENT REFERRED TO:  No follow-up provider specified.     DISCHARGE MEDICATIONS:  New Prescriptions    No medications on file       Shanon Bashir MD  Emergency Medicine Resident    (Please note that portions of thisnote were completed with a voice recognition program.  Efforts were made to edit the dictations but occasionally words are mis-transcribed.)       Shanon Basihr MD  Resident  02/25/22 [address] [place] [address] Ld Valencia MD  Resident  02/25/22 1314

## 2022-02-26 ENCOUNTER — APPOINTMENT (OUTPATIENT)
Dept: GENERAL RADIOLOGY | Age: 37
DRG: 441 | End: 2022-02-26
Payer: COMMERCIAL

## 2022-02-26 PROBLEM — R10.9 INTRACTABLE ABDOMINAL PAIN: Status: ACTIVE | Noted: 2022-02-26

## 2022-02-26 LAB
CULTURE: NO GROWTH
SPECIMEN DESCRIPTION: NORMAL

## 2022-02-26 PROCEDURE — 6370000000 HC RX 637 (ALT 250 FOR IP): Performed by: STUDENT IN AN ORGANIZED HEALTH CARE EDUCATION/TRAINING PROGRAM

## 2022-02-26 PROCEDURE — 96372 THER/PROPH/DIAG INJ SC/IM: CPT

## 2022-02-26 PROCEDURE — 0TJB8ZZ INSPECTION OF BLADDER, VIA NATURAL OR ARTIFICIAL OPENING ENDOSCOPIC: ICD-10-PCS | Performed by: UROLOGY

## 2022-02-26 PROCEDURE — 2580000003 HC RX 258: Performed by: STUDENT IN AN ORGANIZED HEALTH CARE EDUCATION/TRAINING PROGRAM

## 2022-02-26 PROCEDURE — 6360000002 HC RX W HCPCS: Performed by: STUDENT IN AN ORGANIZED HEALTH CARE EDUCATION/TRAINING PROGRAM

## 2022-02-26 PROCEDURE — 6370000000 HC RX 637 (ALT 250 FOR IP): Performed by: EMERGENCY MEDICINE

## 2022-02-26 PROCEDURE — 71046 X-RAY EXAM CHEST 2 VIEWS: CPT

## 2022-02-26 PROCEDURE — 6360000002 HC RX W HCPCS: Performed by: EMERGENCY MEDICINE

## 2022-02-26 PROCEDURE — 51798 US URINE CAPACITY MEASURE: CPT

## 2022-02-26 PROCEDURE — 2580000003 HC RX 258: Performed by: EMERGENCY MEDICINE

## 2022-02-26 PROCEDURE — 96376 TX/PRO/DX INJ SAME DRUG ADON: CPT

## 2022-02-26 PROCEDURE — 1200000000 HC SEMI PRIVATE

## 2022-02-26 RX ADMIN — HYDROCODONE BITARTRATE AND ACETAMINOPHEN 2 TABLET: 5; 325 TABLET ORAL at 02:22

## 2022-02-26 RX ADMIN — FENTANYL CITRATE 25 MCG: 50 INJECTION, SOLUTION INTRAMUSCULAR; INTRAVENOUS at 00:30

## 2022-02-26 RX ADMIN — HYDROMORPHONE HYDROCHLORIDE 0.5 MG: 1 INJECTION, SOLUTION INTRAMUSCULAR; INTRAVENOUS; SUBCUTANEOUS at 23:21

## 2022-02-26 RX ADMIN — SODIUM CHLORIDE, PRESERVATIVE FREE 10 ML: 5 INJECTION INTRAVENOUS at 09:14

## 2022-02-26 RX ADMIN — OXYBUTYNIN CHLORIDE 5 MG: 5 TABLET ORAL at 10:33

## 2022-02-26 RX ADMIN — HYDROCODONE BITARTRATE AND ACETAMINOPHEN 2 TABLET: 5; 325 TABLET ORAL at 18:22

## 2022-02-26 RX ADMIN — SODIUM CHLORIDE, POTASSIUM CHLORIDE, SODIUM LACTATE AND CALCIUM CHLORIDE: 600; 310; 30; 20 INJECTION, SOLUTION INTRAVENOUS at 19:55

## 2022-02-26 RX ADMIN — KETOROLAC TROMETHAMINE 15 MG: 30 INJECTION, SOLUTION INTRAMUSCULAR; INTRAVENOUS at 08:35

## 2022-02-26 RX ADMIN — FAMOTIDINE 20 MG: 20 TABLET, FILM COATED ORAL at 10:32

## 2022-02-26 RX ADMIN — ENOXAPARIN SODIUM 40 MG: 100 INJECTION SUBCUTANEOUS at 10:32

## 2022-02-26 RX ADMIN — KETOROLAC TROMETHAMINE 15 MG: 30 INJECTION, SOLUTION INTRAMUSCULAR; INTRAVENOUS at 19:51

## 2022-02-26 RX ADMIN — HYDROCODONE BITARTRATE AND ACETAMINOPHEN 2 TABLET: 5; 325 TABLET ORAL at 10:32

## 2022-02-26 RX ADMIN — FAMOTIDINE 20 MG: 20 TABLET, FILM COATED ORAL at 19:51

## 2022-02-26 RX ADMIN — KETOROLAC TROMETHAMINE 15 MG: 30 INJECTION, SOLUTION INTRAMUSCULAR; INTRAVENOUS at 00:30

## 2022-02-26 RX ADMIN — ONDANSETRON 4 MG: 2 INJECTION INTRAMUSCULAR; INTRAVENOUS at 08:35

## 2022-02-26 RX ADMIN — SODIUM CHLORIDE, PRESERVATIVE FREE 10 ML: 5 INJECTION INTRAVENOUS at 19:50

## 2022-02-26 RX ADMIN — KETOROLAC TROMETHAMINE 15 MG: 30 INJECTION, SOLUTION INTRAMUSCULAR; INTRAVENOUS at 14:18

## 2022-02-26 RX ADMIN — IBUPROFEN 800 MG: 800 TABLET, FILM COATED ORAL at 10:32

## 2022-02-26 RX ADMIN — ONDANSETRON 4 MG: 2 INJECTION INTRAMUSCULAR; INTRAVENOUS at 14:12

## 2022-02-26 RX ADMIN — IBUPROFEN 800 MG: 800 TABLET, FILM COATED ORAL at 18:22

## 2022-02-26 RX ADMIN — ONDANSETRON 4 MG: 2 INJECTION INTRAMUSCULAR; INTRAVENOUS at 19:51

## 2022-02-26 ASSESSMENT — PAIN SCALES - GENERAL
PAINLEVEL_OUTOF10: 7
PAINLEVEL_OUTOF10: 5
PAINLEVEL_OUTOF10: 7
PAINLEVEL_OUTOF10: 7
PAINLEVEL_OUTOF10: 5
PAINLEVEL_OUTOF10: 7
PAINLEVEL_OUTOF10: 0
PAINLEVEL_OUTOF10: 8
PAINLEVEL_OUTOF10: 7
PAINLEVEL_OUTOF10: 7
PAINLEVEL_OUTOF10: 9
PAINLEVEL_OUTOF10: 7

## 2022-02-26 ASSESSMENT — PAIN DESCRIPTION - PAIN TYPE: TYPE: ACUTE PAIN

## 2022-02-26 ASSESSMENT — PAIN DESCRIPTION - LOCATION: LOCATION: ABDOMEN;PELVIS

## 2022-02-26 NOTE — H&P
[address]  CDU / OBSERVATION ENCOUNTER  ATTENDING NOTE     Pt Name: Hardy Schilder  MRN: [id number]  [place] 1985  Date of evaluation: 2/25/22  Patient's PCP is :  Raymond Dawkins MD    CHIEF COMPLAINT       Chief Complaint   Patient presents with    Abdominal Pain         HISTORY OF PRESENT ILLNESS    Hardy Schilder is a 29 y.o. female who presents status post Campbell removal after cystoscopy. Patient had lysis of adhesions IUD insertion and D&C done on 2/14/2022. Patient has been having significant abdominal pain and lower pelvic pain since removal of Campbell yesterday. Patient admitted for supportive symptomatic care. Patient has been evaluated by OB/GYN. Not felt to require further surgical intervention but requiring IV analgesics and IV fluids. Patient with significant dehydration noted on urinalysis. Location/Symptom: Lower pelvic pain  Timing/Onset: Since yesterday with Campbell removal  Provocation: Patient with recent surgery  Quality: Aching pain. Sharp discomfort  Radiation: None  Severity: Moderate to severe  Timing/Duration: Days  Modifying Factors: Uncertain    REVIEW OF SYSTEMS        General ROS - No fevers, No malaise   Ophthalmic ROS - No discharge, No changes in vision  ENT ROS -  No sore throat, No rhinorrhea,   Respiratory ROS - no shortness of breath, no cough, no  wheezing  Cardiovascular ROS - No chest pain, no dyspnea on exertion  Gastrointestinal ROS -  abdominal pain, nausea and vomiting, no change in bowel habits, no black or bloody stools  Genito-Urinary ROS -  dysuria, trouble voiding, or hematuria  Musculoskeletal ROS - No myalgias, No arthalgias  Neurological ROS - No headache, no dizziness/lightheadedness, No focal weakness, no loss of sensation  Dermatological ROS - No lesions, No rash     (PQRS) Advance directives on face sheet per hospital policy.  No change unless specifically mentioned in chart    PAST MEDICAL HISTORY    has a past medical history of Acne, Benign essential hypertension antepartum, Closed nondisplaced fracture of distal pole of navicular bone of left wrist, COVID-19, Family history of uterine cancer, Iron deficiency anemia, Mirena IUD inserted 22, Prothrombin gene mutation [facility]), S/p Diagnostic laparoscopy, hysis of adhesions, hysteroscopy, dilatation and curettage, Mirena IUD insertion, Snores, Under care of team, and Under care of team.    I have reviewed the past medical history with the patient and it is  pertinent to this complaint. SURGICAL HISTORY      has a past surgical history that includes  section (1/10/2007, 2007, 2012); Cervix surgery; laparoscopy (2022); laparoscopy (N/A, 2022); Dilation and curettage of uterus (N/A, 2022); and Bladder surgery (N/A, 2022). I have reviewed and agree with Surgical History entered and it is  pertinent to this complaint. CURRENT MEDICATIONS     docusate sodium (COLACE) capsule 100 mg, BID PRN  ibuprofen (ADVIL;MOTRIN) tablet 800 mg, Q8H PRN  oxybutynin (DITROPAN) tablet 5 mg, BID PRN  simethicone (MYLICON) chewable tablet 80 mg, 4x Daily PRN  sodium chloride flush 0.9 % injection 5-40 mL, 2 times per day  sodium chloride flush 0.9 % injection 5-40 mL, PRN  0.9 % sodium chloride infusion, PRN  enoxaparin (LOVENOX) injection 40 mg, Daily  acetaminophen (TYLENOL) tablet 650 mg, Q4H PRN  ondansetron (ZOFRAN-ODT) disintegrating tablet 4 mg, Q8H PRN   Or  ondansetron (ZOFRAN) injection 4 mg, Q6H PRN  HYDROcodone-acetaminophen (NORCO) 5-325 MG per tablet 2 tablet, Q6H PRN  fentaNYL (SUBLIMAZE) injection 25 mcg, Q2H PRN  ketorolac (TORADOL) injection 15 mg, Q6H PRN  lactated ringers infusion, Continuous  famotidine (PEPCID) tablet 20 mg, BID        All medication charted and reviewed. ALLERGIES     is allergic to seasonal.      FAMILY HISTORY     She indicated that her mother is alive. She indicated that her father is alive.  She indicated that her sister is alive. She indicated that her brother is alive. She indicated that the status of her maternal grandmother is unknown. She indicated that her paternal grandmother is . She indicated that the status of her neg hx is unknown.     family history includes Diabetes in her paternal grandmother; Endometrial Cancer in her paternal grandmother; Hypertension in her maternal grandmother; No Known Problems in her father and mother. The patient denies any pertinent family history. I have reviewed and agree with the family history entered. I have reviewed the Family History and it is not significant to the case    SOCIAL HISTORY      reports that she has never smoked. She has never used smokeless tobacco. She reports current alcohol use of about 3.0 standard drinks of alcohol per week. She reports current drug use. Frequency: 2.00 times per week. Drug: Marijuana Alysa Bachelor). I have reviewed and agree with all Social.  There are no   concerns for substance abuse/use. PHYSICAL EXAM     INITIAL VITALS:  height is 5' 1\" (1.549 m) and weight is 152 lb (68.9 kg). Her oral temperature is 98.5 °F (36.9 °C). Her blood pressure is 109/66 and her pulse is 100. Her respiration is 20 and oxygen saturation is 100%. CONSTITUTIONAL:  Mild discomfort apparent   HEAD: normocephalic, atraumatic   EYES: PERRLA, EOMI    ENT: moist mucous membranes, uvula midline   NECK: supple, symmetric   BACK: symmetric   LUNGS: clear to auscultation bilaterally   CARDIOVASCULAR: regular rate and rhythm, no murmurs, rubs or gallops   Abdomen  tender lower abdomen. None surgical abdomen on exam.  No peritonitis. NEUROLOGIC:  MAEx4, no focal sensory or motor deficits   MUSCULOSKELETAL: no clubbing, cyanosis or edema   SKIN: no rash or wounds       DIFFERENTIAL DIAGNOSIS/MDM:     Patient with pelvic pain after surgery on the . Patient had cystoscopy at that time with repair of bladder.   Patient had Campbell catheter removed yesterday when patient had reexacerbation of pain. Patient presents with need for further symptomatic treatment. Patient was seen by GYN in emergency department. Patient was not felt to have surgical abdomen but did require ongoing symptom relief. Patient for IV hydration analgesia and reevaluation. DIAGNOSTIC RESULTS     RADIOLOGY:   I directly visualized the following  images and reviewed the radiologist interpretations:    CT ABDOMEN PELVIS W IV CONTRAST Additional Contrast? None    Result Date: 2/25/2022  EXAMINATION: CT OF THE ABDOMEN AND PELVIS WITH CONTRAST 2/25/2022 10:07 am TECHNIQUE: CT of the abdomen and pelvis was performed with the administration of intravenous contrast. Multiplanar reformatted images are provided for review. Dose modulation, iterative reconstruction, and/or weight based adjustment of the mA/kV was utilized to reduce the radiation dose to as low as reasonably achievable. COMPARISON: CT abdomen and pelvis dated 08/17/2021. HISTORY: ORDERING SYSTEM PROVIDED HISTORY: Abd pain s/p diagnostic lap, hysteroscopy, lysis of adhesions, d&C, cystotomy repair TECHNOLOGIST PROVIDED HISTORY: Abd pain s/p diagnostic lap, hysteroscopy, lysis of adhesions, d&C, cystotomy repair Decision Support Exception - unselect if not a suspected or confirmed emergency medical condition->Emergency Medical Condition (MA) Reason for Exam: Abd pain s/p diagnostic lap, hysteroscopy, lysis of adhesions, d&C, cystotomy repair Pelvic surgery 02/14/2022. FINDINGS: Lower Chest: Visualized portion of the lower chest demonstrates no acute abnormality. Organs: The liver, spleen, pancreas, and adrenal glands are unremarkable. Gallbladder is unremarkable. There is symmetric enhancement of the kidneys. No hydronephrosis or perinephric inflammation. GI/Bowel: There is no abnormal bowel distention or pericolonic inflammation. No free intraperitoneal air or fluid. Appendix is normal. Pelvis: The urinary bladder is within normal limits. There is no perivesicular fluid collection. No free air in the pelvis. There is mild stranding around the uterus and bladder, likely postoperative change. Tiny amount of free fluid in the pelvic cul-de-sac. The uterus is somewhat bulky and heterogeneous, which is unchanged. Small nonspecific follicles are noted in the ovaries. No pelvic lymphadenopathy. Peritoneum/Retroperitoneum: Abdominal aorta is normal in caliber. No retroperitoneal lymphadenopathy. Bones/Soft Tissues: There are a few foci of gas in the abdominal wall, likely postoperative. No fluid collection in the abdominal wall. No acute or suspicious osseous abnormality. 1.  Mild stranding around the urinary bladder and uterus as well as tiny amount of free fluid in the pelvic cul-de-sac, likely expected postoperative change. No pelvic fluid collection or gas. 2.  No acute findings in the upper abdomen. LABS:  I have reviewed and interpreted all available lab results.   Labs Reviewed   CBC WITH AUTO DIFFERENTIAL - Abnormal; Notable for the following components:       Result Value    Hemoglobin 9.6 (*)     Hematocrit 30.8 (*)     MCV 76.0 (*)     MCH 23.7 (*)     RDW 17.7 (*)     Seg Neutrophils 74 (*)     Lymphocytes 17 (*)     Absolute Lymph # 0.81 (*)     All other components within normal limits   COMPREHENSIVE METABOLIC PANEL - Abnormal; Notable for the following components:    Sodium 132 (*)     All other components within normal limits   URINALYSIS WITH MICROSCOPIC - Abnormal; Notable for the following components:    Color, UA Orange (*)     Ketones, Urine MODERATE (*)     Specific Gravity, UA 1.061 (*)     Urine Hgb LARGE (*)     Bacteria, UA FEW (*)     Yeast, UA FEW (*)     All other components within normal limits   COVID-19, RAPID   CULTURE, URINE   HCG, SERUM, QUALITATIVE   LIPASE   IMMATURE PLATELET FRACTION         CDU IMPRESSION / Tank Razo is a 29 y.o. female who presents with       · Patient is postop dilatation and curettage history asked to be and IUD placement with incidental cystostomy with robotic repair by urology on 2/14/2022. Patient had Campbell catheter removed yesterday and was seen in the OB office on the 21st as well. · Evaluated by GYN. Masterson to be surgical.  · Patient with CT showing no acute findings of upper abdomen. Patient was symptomatically treated. · Patient felt stable for discharge by OB/GYN but symptomatic treatment was required due to intractable pain on the part of the patient. · IV fluids    Analgesics  · Continue home medications and pain control  · Monitor vitals, labs, and imaging  · DISPO: pending consults and clinical improvement    CONSULTS:    IP CONSULT TO OB GYN    PROCEDURES:  Not indicated        PATIENT REFERRED TO:    No follow-up provider specified. --  Magdalene More MD   Emergency Medicine Attending    This dictation was generated by voice recognition computer software. Although all attempts are made to edit the dictation for accuracy, there may be errors in the transcription that are not intended.

## 2022-02-26 NOTE — PROGRESS NOTES
OBS/CDU   RESIDENT NOTE      Patients PCP is:  Deborah Spicer MD        SUBJECTIVE      No acute events overnight. Patient states that she has been mildly nauseous but tolerating p.o. She has had a few episodes of emesis. She states that her pain is unchanged. Patient states that she has not urinated since yesterday. She has been urinating since her Campbell was removed with no issues. PHYSICAL EXAM      General: NAD, AO X 3  Heent: EMOI, PERRL  Neck: SUPPLE, NO JVD  Cardiovascular: RRR, S1S2  Pulmonary: CTAB, NO SOB  Abdomen: SOFT, diffuse tenderness to her abdomen. Extremities: +2/4 PULSES DISTAL, NO SWELLING  Neuro / Psych: NO NUMBNESS OR TINGLING, MENTATION AT BASELINE    PERTINENT TEST /EXAMS      I have reviewed all available laboratory results. MEDICATIONS CURRENT   docusate sodium (COLACE) capsule 100 mg, BID PRN  ibuprofen (ADVIL;MOTRIN) tablet 800 mg, Q8H PRN  oxybutynin (DITROPAN) tablet 5 mg, BID PRN  simethicone (MYLICON) chewable tablet 80 mg, 4x Daily PRN  sodium chloride flush 0.9 % injection 5-40 mL, 2 times per day  sodium chloride flush 0.9 % injection 5-40 mL, PRN  0.9 % sodium chloride infusion, PRN  enoxaparin (LOVENOX) injection 40 mg, Daily  acetaminophen (TYLENOL) tablet 650 mg, Q4H PRN  ondansetron (ZOFRAN-ODT) disintegrating tablet 4 mg, Q8H PRN   Or  ondansetron (ZOFRAN) injection 4 mg, Q6H PRN  HYDROcodone-acetaminophen (NORCO) 5-325 MG per tablet 2 tablet, Q6H PRN  fentaNYL (SUBLIMAZE) injection 25 mcg, Q2H PRN  ketorolac (TORADOL) injection 15 mg, Q6H PRN  lactated ringers infusion, Continuous  famotidine (PEPCID) tablet 20 mg, BID        All medication charted and reviewed. CONSULTS      IP CONSULT TO OB GYN    ASSESSMENT/PLAN       Major Yony is a 29 y.o. female who presents with abdominal pain. She is status post D&C with an IUD placement and incidental cystostomy with a robotic repair by urology on 2/14. Patient had a Campbell catheter removed yesterday.   She was seen in the emergency department by [facility]. PT showed no acute findings of upper abdomen and patient was symptomatically treated. Patient was stable for discharge per OB/GYN patient was placed in the observation unit for symptomatic management with IV fluids and analgesia. · Pain control  · Advance diet as tolerated  · Continue home medications and pain control  · Monitor vitals, labs, and imaging  · DISPO: pending consults and clinical improvement    --  Jv Quiñonez MD  Emergency Medicine Resident Physician     This dictation was generated by voice recognition computer software. Although all attempts are made to edit the dictation for accuracy, there may be errors in the transcription that are not intended.

## 2022-02-27 ENCOUNTER — APPOINTMENT (OUTPATIENT)
Dept: CT IMAGING | Age: 37
DRG: 441 | End: 2022-02-27
Payer: COMMERCIAL

## 2022-02-27 PROBLEM — Z30.432 ENCOUNTER FOR IUD REMOVAL: Status: ACTIVE | Noted: 2022-02-27

## 2022-02-27 LAB
-: NORMAL
ABSOLUTE EOS #: 0.03 K/UL (ref 0–0.44)
ABSOLUTE IMMATURE GRANULOCYTE: <0.03 K/UL (ref 0–0.3)
ABSOLUTE LYMPH #: 0.77 K/UL (ref 1.1–3.7)
ABSOLUTE MONO #: 0.55 K/UL (ref 0.1–1.2)
ANION GAP SERPL CALCULATED.3IONS-SCNC: 12 MMOL/L (ref 9–17)
ANION GAP SERPL CALCULATED.3IONS-SCNC: 12 MMOL/L (ref 9–17)
BASOPHILS # BLD: 0 % (ref 0–2)
BASOPHILS ABSOLUTE: <0.03 K/UL (ref 0–0.2)
BILIRUBIN URINE: ABNORMAL
BUN BLDV-MCNC: 20 MG/DL (ref 6–20)
BUN BLDV-MCNC: 22 MG/DL (ref 6–20)
CALCIUM SERPL-MCNC: 9 MG/DL (ref 8.6–10.4)
CALCIUM SERPL-MCNC: 9 MG/DL (ref 8.6–10.4)
CASTS UA: NORMAL /LPF (ref 0–8)
CHLORIDE BLD-SCNC: 102 MMOL/L (ref 98–107)
CHLORIDE BLD-SCNC: 102 MMOL/L (ref 98–107)
CO2: 19 MMOL/L (ref 20–31)
CO2: 19 MMOL/L (ref 20–31)
COLOR: ABNORMAL
CREAT SERPL-MCNC: 4.54 MG/DL (ref 0.5–0.9)
CREAT SERPL-MCNC: 5.25 MG/DL (ref 0.5–0.9)
CREATININE URINE: 185.1 MG/DL (ref 28–217)
EOSINOPHILS RELATIVE PERCENT: 0 % (ref 1–4)
EPITHELIAL CELLS UA: NORMAL /HPF (ref 0–5)
GFR AFRICAN AMERICAN: 11 ML/MIN
GFR AFRICAN AMERICAN: 13 ML/MIN
GFR NON-AFRICAN AMERICAN: 11 ML/MIN
GFR NON-AFRICAN AMERICAN: 9 ML/MIN
GFR SERPL CREATININE-BSD FRML MDRD: ABNORMAL ML/MIN/{1.73_M2}
GFR SERPL CREATININE-BSD FRML MDRD: ABNORMAL ML/MIN/{1.73_M2}
GLUCOSE BLD-MCNC: 84 MG/DL (ref 70–99)
GLUCOSE BLD-MCNC: 87 MG/DL (ref 70–99)
GLUCOSE URINE: NEGATIVE
HCT VFR BLD CALC: 29.7 % (ref 36.3–47.1)
HEMOGLOBIN: 9.3 G/DL (ref 11.9–15.1)
IMMATURE GRANULOCYTES: 0 %
KETONES, URINE: ABNORMAL
LEUKOCYTE ESTERASE, URINE: NEGATIVE
LYMPHOCYTES # BLD: 11 % (ref 24–43)
MCH RBC QN AUTO: 23.7 PG (ref 25.2–33.5)
MCHC RBC AUTO-ENTMCNC: 31.3 G/DL (ref 28.4–34.8)
MCV RBC AUTO: 75.6 FL (ref 82.6–102.9)
MONOCYTES # BLD: 8 % (ref 3–12)
NITRITE, URINE: POSITIVE
NRBC AUTOMATED: 0 PER 100 WBC
PDW BLD-RTO: 18.3 % (ref 11.8–14.4)
PH UA: 5 (ref 5–8)
PLATELET # BLD: 356 K/UL (ref 138–453)
PMV BLD AUTO: 10.1 FL (ref 8.1–13.5)
POTASSIUM SERPL-SCNC: 4.3 MMOL/L (ref 3.7–5.3)
POTASSIUM SERPL-SCNC: 4.3 MMOL/L (ref 3.7–5.3)
PROTEIN UA: NEGATIVE
RBC # BLD: 3.93 M/UL (ref 3.95–5.11)
RBC # BLD: ABNORMAL 10*6/UL
RBC UA: NORMAL /HPF (ref 0–4)
SEG NEUTROPHILS: 81 % (ref 36–65)
SEGMENTED NEUTROPHILS ABSOLUTE COUNT: 5.94 K/UL (ref 1.5–8.1)
SODIUM BLD-SCNC: 133 MMOL/L (ref 135–144)
SODIUM BLD-SCNC: 133 MMOL/L (ref 135–144)
SODIUM,UR: 140 MMOL/L
SPECIFIC GRAVITY UA: 1.05 (ref 1–1.03)
TURBIDITY: CLEAR
URINE HGB: NEGATIVE
UROBILINOGEN, URINE: NORMAL
WBC # BLD: 7.3 K/UL (ref 3.5–11.3)
WBC UA: NORMAL /HPF (ref 0–5)

## 2022-02-27 PROCEDURE — 36415 COLL VENOUS BLD VENIPUNCTURE: CPT

## 2022-02-27 PROCEDURE — 51702 INSERT TEMP BLADDER CATH: CPT

## 2022-02-27 PROCEDURE — 6370000000 HC RX 637 (ALT 250 FOR IP): Performed by: EMERGENCY MEDICINE

## 2022-02-27 PROCEDURE — 84300 ASSAY OF URINE SODIUM: CPT

## 2022-02-27 PROCEDURE — 6360000004 HC RX CONTRAST MEDICATION: Performed by: EMERGENCY MEDICINE

## 2022-02-27 PROCEDURE — 6360000002 HC RX W HCPCS: Performed by: STUDENT IN AN ORGANIZED HEALTH CARE EDUCATION/TRAINING PROGRAM

## 2022-02-27 PROCEDURE — 99222 1ST HOSP IP/OBS MODERATE 55: CPT | Performed by: STUDENT IN AN ORGANIZED HEALTH CARE EDUCATION/TRAINING PROGRAM

## 2022-02-27 PROCEDURE — 2500000003 HC RX 250 WO HCPCS: Performed by: STUDENT IN AN ORGANIZED HEALTH CARE EDUCATION/TRAINING PROGRAM

## 2022-02-27 PROCEDURE — 82570 ASSAY OF URINE CREATININE: CPT

## 2022-02-27 PROCEDURE — 2580000003 HC RX 258: Performed by: STUDENT IN AN ORGANIZED HEALTH CARE EDUCATION/TRAINING PROGRAM

## 2022-02-27 PROCEDURE — 2580000003 HC RX 258: Performed by: EMERGENCY MEDICINE

## 2022-02-27 PROCEDURE — 1200000000 HC SEMI PRIVATE

## 2022-02-27 PROCEDURE — 74177 CT ABD & PELVIS W/CONTRAST: CPT

## 2022-02-27 PROCEDURE — 80048 BASIC METABOLIC PNL TOTAL CA: CPT

## 2022-02-27 PROCEDURE — 51798 US URINE CAPACITY MEASURE: CPT

## 2022-02-27 PROCEDURE — 6360000004 HC RX CONTRAST MEDICATION: Performed by: STUDENT IN AN ORGANIZED HEALTH CARE EDUCATION/TRAINING PROGRAM

## 2022-02-27 PROCEDURE — 85025 COMPLETE CBC W/AUTO DIFF WBC: CPT

## 2022-02-27 PROCEDURE — 6370000000 HC RX 637 (ALT 250 FOR IP): Performed by: STUDENT IN AN ORGANIZED HEALTH CARE EDUCATION/TRAINING PROGRAM

## 2022-02-27 PROCEDURE — 6360000002 HC RX W HCPCS: Performed by: EMERGENCY MEDICINE

## 2022-02-27 PROCEDURE — 81001 URINALYSIS AUTO W/SCOPE: CPT

## 2022-02-27 PROCEDURE — 72193 CT PELVIS W/DYE: CPT

## 2022-02-27 RX ORDER — HEPARIN SODIUM 5000 [USP'U]/ML
5000 INJECTION, SOLUTION INTRAVENOUS; SUBCUTANEOUS EVERY 8 HOURS SCHEDULED
Status: DISCONTINUED | OUTPATIENT
Start: 2022-02-27 | End: 2022-03-02

## 2022-02-27 RX ADMIN — SODIUM BICARBONATE: 84 INJECTION, SOLUTION INTRAVENOUS at 20:33

## 2022-02-27 RX ADMIN — IBUPROFEN 800 MG: 800 TABLET, FILM COATED ORAL at 02:05

## 2022-02-27 RX ADMIN — SODIUM CHLORIDE, PRESERVATIVE FREE 10 ML: 5 INJECTION INTRAVENOUS at 17:12

## 2022-02-27 RX ADMIN — HYDROCODONE BITARTRATE AND ACETAMINOPHEN 2 TABLET: 5; 325 TABLET ORAL at 02:05

## 2022-02-27 RX ADMIN — ONDANSETRON 4 MG: 2 INJECTION INTRAMUSCULAR; INTRAVENOUS at 09:18

## 2022-02-27 RX ADMIN — HEPARIN SODIUM 5000 UNITS: 5000 INJECTION INTRAVENOUS; SUBCUTANEOUS at 21:35

## 2022-02-27 RX ADMIN — SODIUM CHLORIDE, POTASSIUM CHLORIDE, SODIUM LACTATE AND CALCIUM CHLORIDE: 600; 310; 30; 20 INJECTION, SOLUTION INTRAVENOUS at 05:31

## 2022-02-27 RX ADMIN — IOTHALAMATE MEGLUMINE 250 ML: 172 INJECTION URETERAL at 18:58

## 2022-02-27 RX ADMIN — ONDANSETRON 4 MG: 2 INJECTION INTRAMUSCULAR; INTRAVENOUS at 02:05

## 2022-02-27 RX ADMIN — IOPAMIDOL 75 ML: 755 INJECTION, SOLUTION INTRAVENOUS at 15:26

## 2022-02-27 RX ADMIN — KETOROLAC TROMETHAMINE 15 MG: 30 INJECTION, SOLUTION INTRAMUSCULAR; INTRAVENOUS at 05:28

## 2022-02-27 RX ADMIN — HYDROCODONE BITARTRATE AND ACETAMINOPHEN 2 TABLET: 5; 325 TABLET ORAL at 09:50

## 2022-02-27 RX ADMIN — KETOROLAC TROMETHAMINE 15 MG: 30 INJECTION, SOLUTION INTRAMUSCULAR; INTRAVENOUS at 11:55

## 2022-02-27 RX ADMIN — IOHEXOL 50 ML: 240 INJECTION, SOLUTION INTRATHECAL; INTRAVASCULAR; INTRAVENOUS; ORAL at 13:44

## 2022-02-27 RX ADMIN — FAMOTIDINE 20 MG: 20 TABLET, FILM COATED ORAL at 09:50

## 2022-02-27 ASSESSMENT — PAIN SCALES - GENERAL
PAINLEVEL_OUTOF10: 8
PAINLEVEL_OUTOF10: 6
PAINLEVEL_OUTOF10: 7
PAINLEVEL_OUTOF10: 0
PAINLEVEL_OUTOF10: 4
PAINLEVEL_OUTOF10: 8

## 2022-02-27 NOTE — PROGRESS NOTES
[address]  CDU / OBSERVATION ENCOUNTER  ATTENDING NOTE       I performed a history and physical examination of the patient and discussed management with the resident or midlevel provider. I reviewed the resident or midlevel provider's note and agree with the documented findings and plan of care. Any areas of disagreement are noted on the chart. I was personally present for the key portions of any procedures. I have documented in the chart those procedures where I was not present during the key portions. I have reviewed the nurses notes. I agree with the chief complaint, past medical history, past surgical history, allergies, medications, social and family history as documented unless otherwise noted below. The Family history, social history, and ROS are effectively unchanged since admission unless noted elsewhere in the chart. Persistent pelvic pain. Patient with persistent nausea vomiting and ongoing discomfort. Patient discussed with OB. Patient felt to be appropriate for ongoing symptomatic treatment but not requiring surgical intervention. Patient will reevaluation tomorrow. Advancing diet slowly.     Morgan Timmons MD  Attending Emergency  Physician

## 2022-02-27 NOTE — PROGRESS NOTES
OBS/CDU   RESIDENT NOTE      Patients PCP is:  Vimal Sen MD        SUBJECTIVE      No acute events overnight. Patient has had a few episodes of emesis overnight. She states that her pain is still the same. Patient is visibly uncomfortable. She states that the pain medication is helping but only last for a few hours. She states that the nausea comes on suddenly and so she has not taken the medication regularly. PHYSICAL EXAM      General: NAD, AO X 3  Heent: EMOI, PERRL  Neck: SUPPLE, NO JVD  Cardiovascular: RRR, S1S2  Pulmonary: CTAB, NO SOB  Abdomen: SOFT, tenderness to the lower abdomen and epigastric area  Extremities: +2/4 PULSES DISTAL, NO SWELLING  Neuro / Psych: NO NUMBNESS OR TINGLING, MENTATION AT BASELINE    PERTINENT TEST /EXAMS      I have reviewed all available laboratory results. MEDICATIONS CURRENT   HYDROmorphone (DILAUDID) injection 0.5 mg, Q4H PRN  docusate sodium (COLACE) capsule 100 mg, BID PRN  ibuprofen (ADVIL;MOTRIN) tablet 800 mg, Q8H PRN  oxybutynin (DITROPAN) tablet 5 mg, BID PRN  simethicone (MYLICON) chewable tablet 80 mg, 4x Daily PRN  sodium chloride flush 0.9 % injection 5-40 mL, 2 times per day  sodium chloride flush 0.9 % injection 5-40 mL, PRN  0.9 % sodium chloride infusion, PRN  enoxaparin (LOVENOX) injection 40 mg, Daily  acetaminophen (TYLENOL) tablet 650 mg, Q4H PRN  ondansetron (ZOFRAN-ODT) disintegrating tablet 4 mg, Q8H PRN   Or  ondansetron (ZOFRAN) injection 4 mg, Q6H PRN  HYDROcodone-acetaminophen (NORCO) 5-325 MG per tablet 2 tablet, Q6H PRN  ketorolac (TORADOL) injection 15 mg, Q6H PRN  lactated ringers infusion, Continuous  famotidine (PEPCID) tablet 20 mg, BID        All medication charted and reviewed. CONSULTS      IP CONSULT TO OB GYN    ASSESSMENT/PLAN       Christel Obrien is a 29 y.o. female who presents with abdominal pain. Patient is post [facility]  with an IUD placement and incidental cystostomy with a robotic repair by urology on 2/14.

## 2022-02-27 NOTE — PROGRESS NOTES
OB/GYN Interval Note  [facility]    Patient Name: Lydia Goddard     Patient : 1985  Room/Bed: 5540/7106-67  Admission Date/Time: 2022  9:04 AM  Primary Care Physician: Ethel Weston MD    Consulting Provider: Dr. Jeanna Farmer  Reason for Consult: Bladder injury    CC:   Chief Complaint   Patient presents with    Abdominal Pain                HPI: Lydia Goddard is a 29 y.o. female [id number] who is currently admitted for intractable abdominal pain. Patient presented to the ED on 2022 for abdominal pain following Campbell catheter removal on 2022. Of note, she is status post diagnostic laparoscopy, lysis of adhesions, dilation and curettage, hysteroscopy with IUD insertion and incidental cystotomy with robotic repair by urology on 2022 and IUD removal on 22. On 22 while in the ED the patient had a negative CT and negative labs with a normal creatinine. Abdomen was nonacute but mildly tender diffusely. The patient was urinating appropriately and having regular bowel movements. The patient did not feel comfortable going home at that time and was admitted for pain control. While admitted the patient's abdominal pain persisted. The patient has continued to urinate but volume has decreased, per verbal report. No I&Os were collected. Urology was consulted due to persistent pain and did not recommend any surgical intervention. Primary team obtained a repeat CT that showed \"Urinary bladder is filled with contrast. Track of contrast extends from the anterior dome of the urinary bladder into the peritoneal cavity consistent with active extravasation from the urinary bladder indicative of bladder injury/tear. Interval development of large volume free intraperitoneal fluid of density higher than simple fluid.  This is attributed to extravasation of  contrast/urine from the urinary bladder. \"  CBC and BMP were ordered. Urology was re-consulted.     Patient was reevaluated by the OB/GYN team.  Patient's pain has greatly improved with replacement of Campbell catheter. She is currently NPO. Patient denies vaginal bleeding, irritation, itching, hematuria, dysuria, chest pain, SOB, F/C, N/V/D, HA, RUQ pain, visual changes. Per primary team, patient may require surgery, will await Urology recommendations.      REVIEW OF SYSTEMS:    Constitutional: negative fever, negative chills  HEENT: negative visual disturbances, negative headaches  Respiratory: negative dyspnea, negative cough  Cardiovascular: negative chest pain,  negative palpitations  Gastrointestinal: positive abdominal pain, negative RUQ pain, negative N/V, negative diarrhea, negative constipation  Genitourinary: negative dysuria, negative vaginal discharge, negative vaginal bleeding  Dermatological: negative rash, negative wounds  Hematologic: negative bleeding/clotting disorder  Immunologic: negative recent illness, negative recent sick contact, negative allergic reactions  Lymphatic: negative lymph nodes  Musculoskeletal: negative back pain, negative myalgias, negative arthralgias  Neurological:  negative dizziness, negative weakness  Behavior/Psych: negative depression, negative anxiety      OBSTETRICAL HISTORY:   OB History    Para Term  AB Living   3 3 1 2 0 1   SAB IAB Ectopic Molar Multiple Live Births   0 0 0 0 0 2      # Outcome Date GA Lbr Anastacio/2nd Weight Sex Delivery Anes PTL Lv   3  12 23w4d   M CS-Classical  Y       Birth Comments: breech,       Apgar1: 1  Apgar5: 6   2   25w0d   M CS-LTranv Gen  DEC   1 Term  40w0d   F CS-LTranv EPI  EDY       PAST MEDICAL HISTORY:   has a past medical history of Acne, Benign essential hypertension antepartum, Closed nondisplaced fracture of distal pole of navicular bone of left wrist, COVID-19, Family history of uterine cancer, Iron deficiency anemia, Mirena IUD inserted 22, Prothrombin gene mutation [facility]), S/p Diagnostic laparoscopy, hysis of adhesions, hysteroscopy, dilatation and curettage, Mirena IUD insertion, Snores, Under care of team, and Under care of team.    PAST SURGICAL HISTORY:   has a past surgical history that includes  section (1/10/2007, 2007, 2012); Cervix surgery; laparoscopy (2022); laparoscopy (N/A, 2022); Dilation and curettage of uterus (N/A, 2022); and Bladder surgery (N/A, 2022).     ALLERGIES:  Allergies as of 2022 - Fully Reviewed 2022   Allergen Reaction Noted    Seasonal Other (See Comments) 2022       MEDICATIONS:  Current Facility-Administered Medications   Medication Dose Route Frequency Provider Last Rate Last Admin    HYDROmorphone (DILAUDID) injection 0.5 mg  0.5 mg IntraVENous Q3H PRN Carl Concepcion MD        docusate sodium (COLACE) capsule 100 mg  100 mg Oral BID PRN Jens Vidales MD        ibuprofen (ADVIL;MOTRIN) tablet 800 mg  800 mg Oral Q8H PRN Jens Vidales MD   800 mg at 22 0205    oxybutynin (DITROPAN) tablet 5 mg  5 mg Oral BID PRN Jens Vidales MD   5 mg at 22 1033    simethicone (MYLICON) chewable tablet 80 mg  80 mg Oral 4x Daily PRN Jens Vidales MD        sodium chloride flush 0.9 % injection 5-40 mL  5-40 mL IntraVENous 2 times per day Jens Vidales MD   10 mL at 22 1950    sodium chloride flush 0.9 % injection 5-40 mL  5-40 mL IntraVENous PRN Jens Vidales MD        0.9 % sodium chloride infusion  25 mL IntraVENous PRN Jens Vidales MD        enoxaparin (LOVENOX) injection 40 mg  40 mg SubCUTAneous Daily Jens Vidales MD   40 mg at 22 1032    acetaminophen (TYLENOL) tablet 650 mg  650 mg Oral Q4H PRN Jens Vidales MD        ondansetron (ZOFRAN-ODT) disintegrating tablet 4 mg  4 mg Oral Q8H PRN Jens Vidales MD        Or    ondansetron [facility]) injection 4 mg  4 mg IntraVENous Q6H PRN Jens Vidales MD   4 mg at 22 7691    HYDROcodone-acetaminophen (NORCO) 5-325 MG per tablet 2 tablet  2 tablet Oral Q6H PRN Kolby Franks MD   2 tablet at 02/27/22 0950    ketorolac (TORADOL) injection 15 mg  15 mg IntraVENous Q6H PRN Kolby Franks MD   15 mg at 02/27/22 1155    lactated ringers infusion   IntraVENous Continuous Kolby Franks  mL/hr at 02/27/22 0700 Rate Verify at 02/27/22 0700    famotidine (PEPCID) tablet 20 mg  20 mg Oral BID Kolby Franks MD   20 mg at 02/27/22 5952       FAMILY HISTORY:  Family History of Breast, Ovarian, Colon or Uterine Cancer: No   family history includes Diabetes in her paternal grandmother; Endometrial Cancer in her paternal grandmother; Hypertension in her maternal grandmother; No Known Problems in her father and mother. SOCIAL HISTORY:   reports that she has never smoked. She has never used smokeless tobacco. She reports current alcohol use of about 3.0 standard drinks of alcohol per week. She reports current drug use. Frequency: 2.00 times per week. Drug: Marijuana Eudelia Oumar). ________________________________________________________________________                                    Angelita Beat:  Vitals:    02/25/22 2026 02/26/22 0753 02/26/22 1948 02/27/22 0756   BP: 109/66 133/85 (!) 138/91 (!) 124/90   Pulse: 100 90 91 85   Resp: 20 18 17 18   Temp: 98.5 °F (36.9 °C) 98.1 °F (36.7 °C) 97.7 °F (36.5 °C) 97.9 °F (36.6 °C)   TempSrc: Oral Oral Oral Oral   SpO2: 100% 97% 100% 98%   Weight:       Height:                                                    INPUT/OUTPUT:  I/O this shift:  In: -   Out: 250 [Urine:250]  In: 3068.1 [I.V.:3068.1]  Out: 295 [Urine:295]                                                                                                                                 PHYSICAL EXAM:   General Appearance: Appears healthy. Alert; in no acute distress. Pleasant. Skin: Skin color, texture, turgor normal. No rashes or lesions.   Lymphatic: No abnormally enlarged lymph 9 6 - 20 mg/dL    CREATININE 0.63 0.50 - 0.90 mg/dL    Calcium 9.3 8.6 - 10.4 mg/dL    Sodium 132 (L) 135 - 144 mmol/L    Potassium 3.8 3.7 - 5.3 mmol/L    Chloride 99 98 - 107 mmol/L    CO2 23 20 - 31 mmol/L    Anion Gap 10 9 - 17 mmol/L    Alkaline Phosphatase 46 35 - 104 U/L    ALT 11 5 - 33 U/L    AST 17 <32 U/L    Total Bilirubin 0.34 0.3 - 1.2 mg/dL    Total Protein 7.2 6.4 - 8.3 g/dL    Albumin 4.1 3.5 - 5.2 g/dL    Albumin/Globulin Ratio 1.3 1.0 - 2.5    GFR Non-African American >60 >60 mL/min    GFR African American >60 >60 mL/min    GFR Comment         HCG Qualitative, Serum   Result Value Ref Range    hCG Qual NEGATIVE NEGATIVE   Urinalysis with Microscopic   Result Value Ref Range    Color, UA Orange (A) Yellow    Turbidity UA Clear Clear    Glucose, Ur NEGATIVE NEGATIVE    Bilirubin Urine NEGATIVE NEGATIVE    Ketones, Urine MODERATE (A) NEGATIVE    Specific Gravity, UA 1.061 (H) 1.005 - 1.030    Urine Hgb LARGE (A) NEGATIVE    pH, UA 5.5 5.0 - 8.0    Protein, UA NEGATIVE NEGATIVE    Urobilinogen, Urine Normal Normal    Nitrite, Urine NEGATIVE NEGATIVE    Leukocyte Esterase, Urine NEGATIVE NEGATIVE    -          WBC, UA 2 TO 5 0 - 5 /HPF    RBC, UA 0 TO 2 0 - 2 /HPF    Casts UA 0 TO 2 0 - 2 /LPF    Casts UA HYALINE 0 - 2 /LPF    Epithelial Cells UA 5 TO 10 0 - 5 /HPF    Bacteria, UA FEW (A) None    Yeast, UA FEW (A) None   Lipase   Result Value Ref Range    Lipase 25 13 - 60 U/L   Immature Platelet Fraction   Result Value Ref Range    Platelet, Fluorescence Platelet clumps present, count appears adequate.  138 - 453 k/uL   CBC with Auto Differential   Result Value Ref Range    WBC 7.3 3.5 - 11.3 k/uL    RBC 3.93 (L) 3.95 - 5.11 m/uL    Hemoglobin 9.3 (L) 11.9 - 15.1 g/dL    Hematocrit 29.7 (L) 36.3 - 47.1 %    MCV 75.6 (L) 82.6 - 102.9 fL    MCH 23.7 (L) 25.2 - 33.5 pg    MCHC 31.3 28.4 - 34.8 g/dL    RDW 18.3 (H) 11.8 - 14.4 %    Platelets 718 539 - 238 k/uL    MPV 10.1 8.1 - 13.5 fL    NRBC Automated 0.0 0.0 per 100 WBC    RBC Morphology ANISOCYTOSIS PRESENT     Seg Neutrophils 81 (H) 36 - 65 %    Lymphocytes 11 (L) 24 - 43 %    Monocytes 8 3 - 12 %    Eosinophils % 0 (L) 1 - 4 %    Basophils 0 0 - 2 %    Immature Granulocytes 0 0 %    Segs Absolute 5.94 1.50 - 8.10 k/uL    Absolute Lymph # 0.77 (L) 1.10 - 3.70 k/uL    Absolute Mono # 0.55 0.10 - 1.20 k/uL    Absolute Eos # 0.03 0.00 - 0.44 k/uL    Basophils Absolute <0.03 0.00 - 0.20 k/uL    Absolute Immature Granulocyte <0.03 0.00 - 0.30 k/uL   Basic Metabolic Panel   Result Value Ref Range    Glucose 84 70 - 99 mg/dL    BUN 22 (H) 6 - 20 mg/dL    CREATININE 5.25 (HH) 0.50 - 0.90 mg/dL    Calcium 9.0 8.6 - 10.4 mg/dL    Sodium 133 (L) 135 - 144 mmol/L    Potassium 4.3 3.7 - 5.3 mmol/L    Chloride 102 98 - 107 mmol/L    CO2 19 (L) 20 - 31 mmol/L    Anion Gap 12 9 - 17 mmol/L    GFR Non-African American 9 (L) >60 mL/min    GFR  11 (L) >60 mL/min    GFR Comment             DIAGNOSTICS:    CT ABDOMEN PELVIS W IV CONTRAST    Result Date: 2/27/2022  EXAMINATION: CT OF THE ABDOMEN AND PELVIS WITH CONTRAST 2/27/2022 3:24 pm TECHNIQUE: CT of the abdomen and pelvis was performed with the administration of intravenous contrast. Multiplanar reformatted images are provided for review. Dose modulation, iterative reconstruction, and/or weight based adjustment of the mA/kV was utilized to reduce the radiation dose to as low as reasonably achievable. COMPARISON: 02/25/2022 HISTORY: ORDERING SYSTEM PROVIDED HISTORY: PERSISTENT PELVIC PAIN TECHNOLOGIST PROVIDED HISTORY: Oral and IV Contrast PERSISTENT PELVIC PAIN Reason for Exam: abdominal pain FINDINGS: Lower Chest: Small sliding hiatal hernia. The visualized heart and lungs show no acute abnormalities. Organs: Vicarious excretion of contrast in the gallbladder. Liver, spleen, pancreas, adrenal glands and kidneys show no significant abnormalities.  GI/Bowel: Small sliding hiatal hernia otherwise stomach reconstruction, and/or weight based adjustment of the mA/kV was utilized to reduce the radiation dose to as low as reasonably achievable. COMPARISON: CT abdomen and pelvis dated 08/17/2021. HISTORY: ORDERING SYSTEM PROVIDED HISTORY: Abd pain s/p diagnostic lap, hysteroscopy, lysis of adhesions, d&C, cystotomy repair TECHNOLOGIST PROVIDED HISTORY: Abd pain s/p diagnostic lap, hysteroscopy, lysis of adhesions, d&C, cystotomy repair Decision Support Exception - unselect if not a suspected or confirmed emergency medical condition->Emergency Medical Condition (MA) Reason for Exam: Abd pain s/p diagnostic lap, hysteroscopy, lysis of adhesions, d&C, cystotomy repair Pelvic surgery 02/14/2022. FINDINGS: Lower Chest: Visualized portion of the lower chest demonstrates no acute abnormality. Organs: The liver, spleen, pancreas, and adrenal glands are unremarkable. Gallbladder is unremarkable. There is symmetric enhancement of the kidneys. No hydronephrosis or perinephric inflammation. GI/Bowel: There is no abnormal bowel distention or pericolonic inflammation. No free intraperitoneal air or fluid. Appendix is normal. Pelvis: The urinary bladder is within normal limits. There is no perivesicular fluid collection. No free air in the pelvis. There is mild stranding around the uterus and bladder, likely postoperative change. Tiny amount of free fluid in the pelvic cul-de-sac. The uterus is somewhat bulky and heterogeneous, which is unchanged. Small nonspecific follicles are noted in the ovaries. No pelvic lymphadenopathy. Peritoneum/Retroperitoneum: Abdominal aorta is normal in caliber. No retroperitoneal lymphadenopathy. Bones/Soft Tissues: There are a few foci of gas in the abdominal wall, likely postoperative. No fluid collection in the abdominal wall. No acute or suspicious osseous abnormality.      1.  Mild stranding around the urinary bladder and uterus as well as tiny amount of free fluid in the pelvic cul-de-sac, likely expected postoperative change. No pelvic fluid collection or gas. 2.  No acute findings in the upper abdomen. ASSESSMENT & PLAN:    Anjelica Martinez is a 29 y.o. female [id number]    - VSS, afebrile   - She is status post diagnostic laparoscopy, lysis of adhesions, dilation and curettage, hysteroscopy with IUD insertion and incidental cystotomy with robotic repair by urology on 2/14/2022 and IUD removal on 2/21/22   - She was seen in the ED on 2/25/22 due to abdominal pain following rosado catheter removal on 2/24/22. At that time she had a negative CT and normal labs. Patient was urinating adequately. - Creatinine 0.63     - WBC 7.3    - Hbg 9.3 stable from 10 on 2/16/22    - Ucx negative     - Repeat BMP and CBC now    -  Abdominal exam in the ED revealed a soft nondistended diffusely tender abdomen with voluntary guarding. There was no concern for acute peritoneal signs   - Urology evaluated the patient today prior to repeat CT and did not recommend surgical intervention. PVRs were normal and patient was urinating, althought output had decreased per patient. No I&Os recorded    - Repeat CT A/P today revealed bladder is filled with contrast. Track of contrast extends from the anterior dome of the urinary bladder into the peritoneal cavity consistent with active extravasation from the urinary bladder indicative of   bladder injury/tear. Interval development of large volume free intraperitoneal fluid of density higher than simple fluid.  This is attributed to extravasation of  contrast/urine from the urinary bladder. Small sliding hiatal hernia. - Rosado catheter was replaced and patient was made NPO   - Per primary team depending on how patient does with Rosado catheter she may require surgical intervention              - Patient has follow-up appointment on 3/1/2022 with her OB     - Will await Urology recommendations    -The OB/GYN team will continue to follow along.   Please refer to reach out with any questions or concerns. Patient Active Problem List    Diagnosis Date Noted    History of  section, classical 2013     Priority: Medium           History of  labor 2013     Priority: Medium    IUD removed 2022     Patient presented with rash. There was concern for possible allergy. IUD was removed at that time      Intractable abdominal pain 2022    Lower abdominal pain 2022    S/p Diagnostic laparoscopy, lysis of adhesions, hysteroscopy, dilatation and curettage, Mirena IUD insertion 22      Complicated by incidental cystotomy repaired by Dr. Olvin Paulson MD Urology via [employer]        Pelvic adhesive disease 2022    Intramural leiomyoma of uterus     Abnormal uterine bleeding (AUB) 2021    Bilateral nipple discharge     Folliculitis     Closed nondisplaced fracture of distal pole of navicular bone of left wrist 10/30/2018    Iron deficiency anemia 2016    Female pelvic pain 2016     Pelvic US ordered, appt on 16: unremarkable       Family history of uterine cancer      PGM      Family planning education, guidance, and counseling 10/15/2014    [id number]       Plan discussed with Dr. Jassi Campo, who is agreeable.      Caprice Torres DO  Ob/Gyn Resident  Pager: [phone]  2022, 5:12 PM

## 2022-02-27 NOTE — H&P
[facility]  Office: [address], DO, Love Favre, DO, Grayland Runner, DO, Peg Daniel Blood, DO, Quentin Hunt MD, Mara Frias MD, Michoacano Barber MD, Selam Watson MD, Mayela Berger MD, Duncan Luong MD, Nelson Lombardo MD, Brunilda Osborn, DO, Kendell Sorenson DO, Murlean Gowers, MD,  Tenisha Marie DO, Dioni Escalona MD, Vinnie Mcpherson MD, Sandhya Abebe MD, Asa Prabhakar MD, Darrell Petersen MD, Colpage Trinh, [facility], [facility] Lexa, CNP, Alf Caldera, CNP, Cary Genao, CNS, Jennifer Live, CNP, Shanae Oseguera, CNP, Jacey Pardo, CNP, Munira Busch, CNP, Elina Watts, CNP, Radha Porter PA-C, Kiara Santana, DNP, Jolly Galarza, [facility], Trudi Reardon, CNP, Cipriano Molina, CNP, Fabio Jane, CNP, jAay Ling, CNP, Silvino Cornejo, CNP, Tarik Hollingsworth, [address]    HISTORY AND PHYSICAL EXAMINATION            Date:   2022  Patient name:  Poonam Seals  Date of admission:  2022  9:04 AM  MRN:   [id number]  Account:  [de-identified]  YOB: 1985  PCP:    Sandi Martino MD  Room:   0096/5901-71  Code Status:    Full Code    Chief Complaint:     Chief Complaint   Patient presents with    Abdominal Pain     History Obtained From:     patient, electronic medical record    History of Present Illness:     Poonam Seals is a 29 y.o. Non- / non  female who presents with Abdominal Pain   and is admitted to the hospital for the management of Lower abdominal pain. 81-year-old female  presented to the hospital for abdominal pain. Patient had recent diagnostic laparoscopy for chronic pelvic pain with lysis of admission, dilation and curettage, hysteroscopy with IUD insertion and incidental cystotomy with robotic repair by urology on . IUD was removed on . Patient had a Campbell catheter in which was removed on . Patient presented on  with severe abdominal pain.   Patient had a negative CT scan abdomen and had a normal creatinine. Patient was admitted in the observation unit. Patient pain persisted and OB/GYN was reconsulted. Urology was also consulted but did not recommend any surgical intervention. A repeat CT scan of the abdomen was obtained which showed urinary bladder filled with contrast and contrast extending from anterior dome of urinary bladder into the peritoneal cavity consistent with active extravasation from the urinary bladder indicative of bladder tear or injury. Interval development of large volume of free intraperitoneal fluid. Patient was moved to [facility] team.  Patient had replacement of Campbell catheter which improved her pain. Patient currently reports mild generalized abdominal pain. Past Medical History:     Past Medical History:   Diagnosis Date    Acne     sees dermatology, last seen 1/31/22    Benign essential hypertension antepartum 05/06/2013    Closed nondisplaced fracture of distal pole of navicular bone of left wrist 10/30/2018    COVID-19 12/30/2021    surgical screen, asymptomatic    Family history of uterine cancer     PGM    Iron deficiency anemia 07/24/2016    Mirena IUD inserted 2/14/22 2/14/2022    Lot #: [id number] Exp: 03/2024    Prothrombin gene mutation ([address]     managed per PCP, diagnosed after miscarriage, denies easy bruising, admits heavy peiods    S/p Diagnostic laparoscopy, hysis of adhesions, hysteroscopy, dilatation and curettage, Mirena IUD insertion 3/24/3207    Complicated by incidental cystotomy repaired by Dr. Marie James MD Urology via Robot     Snores     denies apnea    Under care of team 12/28/2021    PCP - [address] - LAST VISIT 11/2021    Under care of team 12/28/2021    [facility]        Past Surgical History:     Past Surgical History:   Procedure Laterality Date    BLADDER SURGERY N/A 2/14/2022    XI ROBOTIC ASSISTED LAPAROSCOPIC INCIDENTAL CYSTOTOMY REPAIR performed by Emma Liu Melecio Andrews MD at [address]  1/10/2007, 9/2007, 6/2012    LTCS and classical CS on 6/7/12 @ 23 weeks    DILATION AND CURETTAGE OF UTERUS N/A 2/14/2022    DILATATION AND CURETTAGE HYSTEROSCOPY, POSSIBLE MIRENA IUD performed by Danette Mckinnon MD at [address]  02/14/2022    DIAGNOSTIC LAPAROSCROPY    LAPAROSCOPY N/A 2/14/2022    DIAGNOSTIC LAPAROSCROPY performed by Danette Mckinnon MD at [facility]        Medications Prior to Admission:     Prior to Admission medications    Medication Sig Start Date End Date Taking? Authorizing Provider   phenazopyridine (PYRIDIUM) 200 MG tablet Take 1 tablet by mouth 3 times daily as needed for Pain 2/25/22 2/28/22 Yes Charlie Foy DO   oxyCODONE (ROXICODONE) 5 MG immediate release tablet Take 1 tablet by mouth every 6 hours as needed for Pain for up to 5 days. Intended supply: 5 days. Take lowest dose possible to manage pain 2/25/22 3/2/22 Yes Charlie Foy DO   gabapentin (NEURONTIN) 300 MG capsule Take 1 capsule by mouth 3 times daily for 7 days.  2/25/22 3/4/22 Yes Charlie Foy DO   oxybutynin (DITROPAN) 5 MG tablet Take 1 tablet by mouth 2 times daily as needed (bladder pain/spasms from stent) 2/14/22 2/24/22  Nemesio Gaines MD   acetaminophen (TYLENOL) 500 MG tablet Take 2 tablets by mouth every 6 hours as needed for Pain 2/14/22 3/16/22  Evelyn Lockett DO   docusate sodium (COLACE) 100 MG capsule Take 1 capsule by mouth 2 times daily as needed for Constipation 2/14/22   Evelyn Lockett DO   ibuprofen (ADVIL;MOTRIN) 600 MG tablet Take 1 tablet by mouth every 6 hours as needed for Pain 2/14/22 3/16/22  Evelyn Lockett DO   ondansetron (ZOFRAN) 4 MG tablet Take 1 tablet by mouth every 8 hours as needed for Nausea or Vomiting 2/14/22   Evelyn Lockett DO   simethicone (MYLICON) 80 MG chewable tablet Take 1 tablet by mouth 4 times daily as needed for Flatulence 2/14/22   Evelyn Lockett, DO   acetaminophen (TYLENOL) 325 MG tablet Take 650 mg by mouth every 6 hours as needed for Pain    Historical Provider, MD   ferrous sulfate (IRON 325) 325 (65 Fe) MG tablet Take 1 tablet by mouth daily (with breakfast) 22   Ever Naylor MD   ibuprofen (ADVIL;MOTRIN) 800 MG tablet Take 1 tablet by mouth every 8 hours as needed for Pain 10/30/21   Elvira Machuca,         Allergies:     Seasonal    Social History:     Tobacco:    reports that she has never smoked. She has never used smokeless tobacco.  Alcohol:      reports current alcohol use of about 3.0 standard drinks of alcohol per week. Drug Use:  reports current drug use. Frequency: 2.00 times per week. Drug: Marijuana Dwight Berry). Family History:     Family History   Problem Relation Age of Onset    Diabetes Paternal Grandmother     Endometrial Cancer Paternal Grandmother     No Known Problems Mother     No Known Problems Father     Hypertension Maternal Grandmother     Breast Cancer Neg Hx     Cancer Neg Hx     Colon Cancer Neg Hx     Eclampsia Neg Hx     Ovarian Cancer Neg Hx      Labor Neg Hx     Spont Abortions Neg Hx     Stroke Neg Hx        Review of Systems:     Positive and Negative as described in HPI.     CONSTITUTIONAL:  negative for fevers, chills, sweats, fatigue, weight loss  HEENT:  negative for vision, hearing changes, runny nose, throat pain  RESPIRATORY:  negative for shortness of breath, cough, congestion, wheezing  CARDIOVASCULAR:  negative for chest pain, palpitations  GASTROINTESTINAL:  negative for nausea, vomiting, diarrhea, constipation, change in bowel habits, positive for abdominal pain   GENITOURINARY:  negative for difficulty of urination, burning with urination, frequency   INTEGUMENT:  negative for rash, skin lesions, easy bruising   HEMATOLOGIC/LYMPHATIC:  negative for swelling/edema   ALLERGIC/IMMUNOLOGIC:  negative for urticaria , itching  ENDOCRINE:  negative increase in drinking, increase in urination, hot or cold intolerance  MUSCULOSKELETAL:  negative joint pains, muscle aches, swelling of joints  NEUROLOGICAL:  negative for headaches, dizziness, lightheadedness, numbness, pain, tingling extremities  BEHAVIOR/PSYCH:  negative for depression, anxiety    Physical Exam:   BP (!) 124/90   Pulse 85   Temp 97.9 °F (36.6 °C) (Oral)   Resp 18   Ht 5' 1\" (1.549 m)   Wt 152 lb (68.9 kg)   SpO2 98%   BMI 28.72 kg/m²   Temp (24hrs), Av.8 °F (36.6 °C), Min:97.7 °F (36.5 °C), Max:97.9 °F (36.6 °C)    No results for input(s): POCGLU in the last 72 hours.     Intake/Output Summary (Last 24 hours) at 2022 1736  Last data filed at 2022 1645  Gross per 24 hour   Intake 3068.14 ml   Output 250 ml   Net 2818.14 ml       General Appearance: alert, well appearing, and in no acute distress  Mental status: oriented to person, place, and time  Head: normocephalic, atraumatic  Eye: no icterus, redness, pupils equal and reactive, extraocular eye movements intact, conjunctiva clear  Ear: normal external ear, no discharge, hearing intact  Nose: no drainage noted  Mouth: mucous membranes moist  Neck: supple, no carotid bruits, thyroid not palpable  Lungs: Bilateral equal air entry, clear to ausculation, no wheezing, rales or rhonchi, normal effort  Cardiovascular: normal rate, regular rhythm, no murmur, gallop, rub  Abdomen: Soft, nontender, nondistended, normal bowel sounds, no hepatomegaly or splenomegaly, rosado catheter in place  Neurologic: There are no new focal motor or sensory deficits, normal muscle tone and bulk, no abnormal sensation, normal speech, cranial nerves II through XII grossly intact  Skin: No gross lesions, rashes, bruising or bleeding on exposed skin area  Extremities: peripheral pulses palpable, no pedal edema or calf pain with palpation  Psych: normal affect    Investigations:      Laboratory Testing:  Recent Results (from the past 24 hour(s))   CBC with Auto Differential    Collection Time: 02/27/22  4:15 PM   Result Value Ref Range    WBC 7.3 3.5 - 11.3 k/uL    RBC 3.93 (L) 3.95 - 5.11 m/uL    Hemoglobin 9.3 (L) 11.9 - 15.1 g/dL    Hematocrit 29.7 (L) 36.3 - 47.1 %    MCV 75.6 (L) 82.6 - 102.9 fL    MCH 23.7 (L) 25.2 - 33.5 pg    MCHC 31.3 28.4 - 34.8 g/dL    RDW 18.3 (H) 11.8 - 14.4 %    Platelets 053 228 - 394 k/uL    MPV 10.1 8.1 - 13.5 fL    NRBC Automated 0.0 0.0 per 100 WBC    RBC Morphology ANISOCYTOSIS PRESENT     Seg Neutrophils 81 (H) 36 - 65 %    Lymphocytes 11 (L) 24 - 43 %    Monocytes 8 3 - 12 %    Eosinophils % 0 (L) 1 - 4 %    Basophils 0 0 - 2 %    Immature Granulocytes 0 0 %    Segs Absolute 5.94 1.50 - 8.10 k/uL    Absolute Lymph # 0.77 (L) 1.10 - 3.70 k/uL    Absolute Mono # 0.55 0.10 - 1.20 k/uL    Absolute Eos # 0.03 0.00 - 0.44 k/uL    Basophils Absolute <0.03 0.00 - 0.20 k/uL    Absolute Immature Granulocyte <0.03 0.00 - 0.30 k/uL   Basic Metabolic Panel    Collection Time: 02/27/22  4:15 PM   Result Value Ref Range    Glucose 84 70 - 99 mg/dL    BUN 22 (H) 6 - 20 mg/dL    CREATININE 5.25 (HH) 0.50 - 0.90 mg/dL    Calcium 9.0 8.6 - 10.4 mg/dL    Sodium 133 (L) 135 - 144 mmol/L    Potassium 4.3 3.7 - 5.3 mmol/L    Chloride 102 98 - 107 mmol/L    CO2 19 (L) 20 - 31 mmol/L    Anion Gap 12 9 - 17 mmol/L    GFR Non-African American 9 (L) >60 mL/min    GFR  11 (L) >60 mL/min    GFR Comment             Imaging/Diagnostics:  XR CHEST (2 VW)    Result Date: 2/26/2022  No acute process. There is no change from prior examination. CT ABDOMEN PELVIS W IV CONTRAST    Result Date: 2/27/2022  1. Urinary bladder is filled with contrast. Track of contrast extends from the anterior dome of the urinary bladder into the peritoneal cavity consistent with active extravasation from the urinary bladder indicative of bladder injury/tear. 2. Interval development of large volume free intraperitoneal fluid of density higher than simple fluid.   This is attributed to extravasation of contrast/urine from the urinary bladder. 3. Small sliding hiatal hernia. Findings were discussed with SHI Mckinnon at 4:02 pm on 2022. CT ABDOMEN PELVIS W IV CONTRAST Additional Contrast? None    Result Date: 2022  1. Mild stranding around the urinary bladder and uterus as well as tiny amount of free fluid in the pelvic cul-de-sac, likely expected postoperative change. No pelvic fluid collection or gas. 2.  No acute findings in the upper abdomen. Assessment :      Hospital Problems           Last Modified POA    * (Principal) Lower abdominal pain 2022 Yes    History of  section, classical 2022 Yes    Overview Signed 2013 10:48 AM by Major Coburn MD     2012         Heterozygous for prothrombin [id number] [facility] 2022 Yes    Female pelvic pain 2022 Yes    Overview Addendum 2020  8:15 AM by Caprice Torres DO     Pelvic US ordered, appt on 16: unremarkable          Abnormal uterine bleeding (AUB) 2022 Yes    S/p Diagnostic laparoscopy, lysis of adhesions, hysteroscopy, dilatation and curettage, Mirena IUD insertion 22 Yes    Overview Signed 2022  1:27 PM by Kimberly Graff DO     Complicated by incidental cystotomy repaired by Dr. Olvin Paulson MD Urology via Robot           Intractable abdominal pain 2022 Yes    IUD removed 22 Yes    Overview Signed 2022  8:37 AM by Caprice Torres DO     Patient presented with rash. There was concern for possible allergy. IUD was removed at that time               Plan:     Patient status inpatient in the Med/Surge    Postop urinary bladder injury-continue Campbell catheter, CT abdomen concerning for bladder leak, urology following, CT cystogram pending.   Might need urological intervention  Plan for hysterectomy on 3/1/2022  Start IV hydration with LR at 150 mils an hour  Repeat stat BMP again  Obtain urinalysis with culture  Obtain urine sodium and urine creatinine  If creatinine continues to be elevated, will consult nephrology  Microcytic anemia-continue to monitor  D/c NSAID    Consultations:   IP CONSULT TO OB GYN  IP CONSULT TO UROLOGY  IP CONSULT TO UROLOGY     Patient is admitted as inpatient status because of co-morbidities listed above, severity of signs and symptoms as outlined, requirement for current medical therapies and most importantly because of direct risk to patient if care not provided in a hospital setting. Expected length of stay > 48 hours.     Mali Mckinney MD  2/27/2022  5:36 PM    Copy sent to Dr. Rosalino Last MD       Addendum  Repeat bmp noted  Start bicarb drip  Bmp q12

## 2022-02-27 NOTE — PROGRESS NOTES
[facility]  CDU / OBSERVATION ENCOUNTER  ATTENDING NOTE       I performed a history and physical examination of the patient and discussed management with the resident or midlevel provider. I reviewed the resident or midlevel provider's note and agree with the documented findings and plan of care. Any areas of disagreement are noted on the chart. I was personally present for the key portions of any procedures. I have documented in the chart those procedures where I was not present during the key portions. I have reviewed the nurses notes. I agree with the chief complaint, past medical history, past surgical history, allergies, medications, social and family history as documented unless otherwise noted below. The Family history, social history, and ROS are effectively unchanged since admission unless noted elsewhere in the chart. Patient still with episodes of emesis last night. For reevaluation again this morning. Patient has had some resolution of symptoms but still has persistent colicky discomfort. Patient states urinary output is decreased. Bladder scan shows reasonably empty bladder. Patient with good renal function. We will recheck labs. Will repeat CT scan. Discussed with urology regarding reevaluation. Urology will reevaluate patient at bedside. Patient for reassessment after CT scanning. Patient still uncomfortable.   Uncertain etiology    Luisa Forrest MD  Attending Emergency  Physician

## 2022-02-27 NOTE — CONSULTS
Jeffrey Boothe, [address]  Urology Consult      Patient:  Del Fenton  MRN: [id number]  YOB: 1985    CHIEF COMPLAINT: Abdominal pain    HISTORY OF PRESENT ILLNESS:   The patient is a 29 y.o. female with past medical history hypertension who presented to the ED with abdominal pain and intractable nausea and vomiting. Patient is status post robotic diagnostic laparoscopy, lysis of adhesions, hysteroscopy, dilatation and curettage, and IUD insertion with obstetrics and gynecology on 2/14/2022. Intraoperatively, there was a 2cm injury to the bladder requiring intraoperative urology consultation for bladder closure. Campbell was placed at this time, and was removed 2/24/22. Urology was consulted for persistent abdominal pain requiring IV pain medications. Per charting, patient has returned to the emergency department at least three times for headaches, then sore throat, and recently for abdominal pain. On presentation, patient is afebrile, with stable vital signs. She has no leukocytosis, and Cr is 0.63 as of 2/25. CT abd/pel with contrast was obtained 2/25/22 which revealed no hydronephrosis, renal masses, or bladder abnormality. There was no perivesicular fluid collection seen. PVR was obtained and found to be 43cc. Patient states she has not urinated since yesterday. UA neg nitrites, neg LE, 2-5 WBCs, no microscopic hematuria, few bacteria but also few epithelial cells. Primary team plans to order repeat CT today as patient's abdominal pain has not resolved since last imaging and patient continues to have nausea and emesis. Patient's old records, notes and chart reviewed and summarized above.     Past Medical History:    Past Medical History:   Diagnosis Date    Acne     sees dermatology, last seen 1/31/22    Benign essential hypertension antepartum 05/06/2013    Closed nondisplaced fracture of distal pole of navicular bone of left wrist 10/30/2018    COVID-19 12/30/2021 surgical screen, asymptomatic    Family history of uterine cancer     PGM    Iron deficiency anemia 07/24/2016    Mirena IUD inserted 2/14/22 2/14/2022    Lot #: [id number] Exp: 03/2024    Prothrombin gene mutation ([facility] [address]     managed per PCP, diagnosed after miscarriage, denies easy bruising, admits heavy peiods    S/p Diagnostic laparoscopy, hysis of adhesions, hysteroscopy, dilatation and curettage, Mirena IUD insertion 2/56/0994    Complicated by incidental cystotomy repaired by Dr. Bo Lizarraga MD Urology via Robot     Snores     denies apnea    Under care of team 12/28/2021    PCP - [address] - LAST VISIT 11/2021    Under care of team 12/28/2021    OB/GYN -   [facility]- LAST VISIT 12/2021       Past Surgical History:    Past Surgical History:   Procedure Laterality Date    BLADDER SURGERY N/A 2/14/2022    XI ROBOTIC ASSISTED LAPAROSCOPIC INCIDENTAL CYSTOTOMY REPAIR performed by Bo Lizarraga MD at [address]  1/10/2007, 9/2007, 6/2012    LTCS and classical CS on 6/7/12 @ 23 weeks   [address] [place] [address] N/A 2/14/2022    DILATATION AND CURETTAGE HYSTEROSCOPY, POSSIBLE MIRENA IUD performed by Kelin Galdamez MD at [facility]  02/14/2022    DIAGNOSTIC LAPAROSCROPY    LAPAROSCOPY N/A 2/14/2022    DIAGNOSTIC LAPAROSCROPY performed by Kelin Galdamez MD at [facility]       Medications:      Current Facility-Administered Medications:     HYDROmorphone (DILAUDID) injection 0.5 mg, 0.5 mg, IntraVENous, Q3H PRN, Kim Concepcion MD    docusate sodium (COLACE) capsule 100 mg, 100 mg, Oral, BID PRN, Josefina Serna MD    ibuprofen (ADVIL;MOTRIN) tablet 800 mg, 800 mg, Oral, Q8H PRN, Josefina Serna MD, 800 mg at 02/27/22 0205    oxybutynin (DITROPAN) tablet 5 mg, 5 mg, Oral, BID PRN, Josefina Serna MD, 5 mg at 02/26/22 1033    simethicone (MYLICON) chewable tablet 80 mg, 80 mg, Oral, 4x Daily PRN, Josefina Serna MD    sodium chloride flush 0.9 % injection 5-40 mL, 5-40 mL, IntraVENous, 2 times per day, Debora Palma MD, 10 mL at 02/26/22 1950    sodium chloride flush 0.9 % injection 5-40 mL, 5-40 mL, IntraVENous, PRN, Debora Palma MD    0.9 % sodium chloride infusion, 25 mL, IntraVENous, PRN, Debora Palma MD    enoxaparin (LOVENOX) injection 40 mg, 40 mg, SubCUTAneous, Daily, Debora Palma MD, 40 mg at 02/26/22 1032    acetaminophen (TYLENOL) tablet 650 mg, 650 mg, Oral, Q4H PRN, Debora Palma MD    ondansetron (ZOFRAN-ODT) disintegrating tablet 4 mg, 4 mg, Oral, Q8H PRN **OR** ondansetron (ZOFRAN) injection 4 mg, 4 mg, IntraVENous, Q6H PRN, Debora Palma MD, 4 mg at 02/27/22 0918    HYDROcodone-acetaminophen (NORCO) 5-325 MG per tablet 2 tablet, 2 tablet, Oral, Q6H PRN, Gurinder Barriga MD, 2 tablet at 02/27/22 0950    ketorolac (TORADOL) injection 15 mg, 15 mg, IntraVENous, Q6H PRN, Gurinder Barriga MD, 15 mg at 02/27/22 1155    lactated ringers infusion, , IntraVENous, Continuous, Gurinder Barriga MD, Last Rate: 100 mL/hr at 02/27/22 0700, Rate Verify at 02/27/22 0700    famotidine (PEPCID) tablet 20 mg, 20 mg, Oral, BID, Gurinder Barriga MD, 20 mg at 02/27/22 3047    Allergies: Allergies   Allergen Reactions    Seasonal Other (See Comments)     Allergic rhinnitis       Social History:   Social History     Socioeconomic History    Marital status: Single     Spouse name: Not on file    Number of children: Not on file    Years of education: Not on file    Highest education level: Not on file   Occupational History    Not on file   Tobacco Use    Smoking status: Never Smoker    Smokeless tobacco: Never Used   Vaping Use    Vaping Use: Never used   Substance and Sexual Activity    Alcohol use:  Yes     Alcohol/week: 3.0 standard drinks     Types: 3 Glasses of wine per week     Comment: 3 times per month    Drug use: Yes     Frequency: 2.0 times per week     Types: Marijuana (Weed)     Comment: [facility]     Sexual activity: Yes     Partners: Male   Other Topics Concern    Not on file   Social History Narrative    Not on file     Social Determinants of Health     Financial Resource Strain: Low Risk     Difficulty of Paying Living Expenses: Not hard at all   Food Insecurity: No Food Insecurity    Worried About Running Out of Food in the Last Year: Never true    [address] in the Last Year: Never true   Transportation Needs:     Lack of Transportation (Medical): Not on file    Lack of Transportation (Non-Medical):  Not on file   Physical Activity:     Days of Exercise per Week: Not on file    Minutes of Exercise per Session: Not on file   Stress:     Feeling of Stress : Not on file   Social Connections:     Frequency of Communication with Friends and Family: Not on file    Frequency of Social Gatherings with Friends and Family: Not on file    Attends [employer] Services: Not on file    Active Member of [address] [place] [address] or Organizations: Not on file    Attends Club or Organization Meetings: Not on file    Marital Status: Not on file   Intimate Partner Violence:     Fear of Current or Ex-Partner: Not on file    Emotionally Abused: Not on file    Physically Abused: Not on file    Sexually Abused: Not on file   Housing Stability:     Unable to Pay for Housing in the Last Year: Not on file    Number of [place] in the Last Year: Not on file    Unstable Housing in the Last Year: Not on file       Family History:    Family History   Problem Relation Age of Onset    Diabetes Paternal Grandmother     Endometrial Cancer Paternal Grandmother     No Known Problems Mother     No Known Problems Father     Hypertension Maternal Grandmother     Breast Cancer Neg Hx     Cancer Neg Hx     Colon Cancer Neg Hx     Eclampsia Neg Hx     Ovarian Cancer Neg Hx      Labor Neg Hx     Spont Abortions Neg Hx     Stroke Neg Hx        REVIEW OF SYSTEMS:  A comprehensive 14 point review of systems was obtained. Constitutional: No fatigue  Eyes: No blurry vision  Ears, nose, mouth, throat, face: No ringing in the ears; no facial droop. Respiratory: No cough or cold. Cardiovascular: No palpitations  Gastrointestinal: No diarrhea or constipation. Genitourinary: No burning with urination  Integument/Skin: No rashes  Hematologic/Lymphatic: No easy bruising  Musculoskeletal: No muscle pains  Neurologic: No weakness in the extremities. Psychiatric: No depression or suicidal thoughts. Endocrine: No heat or cold intolerances. Allergic/Immunologic: No current seasonal allergies; no skin hives. Physical Exam:      This a 29 y.o. female   Vitals:    02/27/22 0756   BP: (!) 124/90   Pulse: 85   Resp: 18   Temp: 97.9 °F (36.6 °C)   SpO2: 98%     Constitutional: Patient in no acute distress. Neuro: alert and oriented to person place and time. Psych: Mood and affect normal.   Head: Atraumatic and normocephalic. Neck: Trachea midline   Skin: No rashes or bruising present. Lungs: Respiratory effort normal.  Cardiovascular:  Heart rate regular. Positive radial pulses bilaterally  Abdomen: soft, generally tender to palpation. Bladder non-tender and not distended.   Lymphatics: no palpable lymphadenopathy    Labs:  Recent Labs     02/25/22  0935   WBC 4.8   HGB 9.6*   HCT 30.8*   MCV 76.0*   PLT See Reflexed IPF Result     Recent Labs     02/25/22  0935   *   K 3.8   CL 99   CO2 23   BUN 9   CREATININE 0.63       Recent Labs     02/25/22  1358   COLORU [place]*   PHUR 5.5   WBCUA 2 TO 5   RBCUA 0 TO 2   YEAST FEW*   BACTERIA FEW*   SPECGRAV 1.061*   LEUKOCYTESUR NEGATIVE   UROBILINOGEN Normal   BILIRUBINUR NEGATIVE           -----------------------------------------------------------------  Imaging Results:  XR CHEST (2 VW)    Result Date: 2/26/2022  EXAMINATION: TWO XRAY VIEWS OF THE CHEST 2/26/2022 10:48 am COMPARISON: 10/30/2021 HISTORY: ORDERING SYSTEM PROVIDED HISTORY: chest pain TECHNOLOGIST PROVIDED HISTORY: chest pain Reason for Exam: cp FINDINGS: The lungs are without acute focal process. There is no effusion or pneumothorax. The cardiomediastinal silhouette is without acute process. The osseous structures are without acute process. No acute process. There is no change from prior examination. CT ABDOMEN PELVIS W IV CONTRAST Additional Contrast? None    Result Date: 2/25/2022  EXAMINATION: CT OF THE ABDOMEN AND PELVIS WITH CONTRAST 2/25/2022 10:07 am TECHNIQUE: CT of the abdomen and pelvis was performed with the administration of intravenous contrast. Multiplanar reformatted images are provided for review. Dose modulation, iterative reconstruction, and/or weight based adjustment of the mA/kV was utilized to reduce the radiation dose to as low as reasonably achievable. COMPARISON: CT abdomen and pelvis dated 08/17/2021. HISTORY: ORDERING SYSTEM PROVIDED HISTORY: Abd pain s/p diagnostic lap, hysteroscopy, lysis of adhesions, d&C, cystotomy repair TECHNOLOGIST PROVIDED HISTORY: Abd pain s/p diagnostic lap, hysteroscopy, lysis of adhesions, d&C, cystotomy repair Decision Support Exception - unselect if not a suspected or confirmed emergency medical condition->Emergency Medical Condition (MA) Reason for Exam: Abd pain s/p diagnostic lap, hysteroscopy, lysis of adhesions, d&C, cystotomy repair Pelvic surgery 02/14/2022. FINDINGS: Lower Chest: Visualized portion of the lower chest demonstrates no acute abnormality. Organs: The liver, spleen, pancreas, and adrenal glands are unremarkable. Gallbladder is unremarkable. There is symmetric enhancement of the kidneys. No hydronephrosis or perinephric inflammation. GI/Bowel: There is no abnormal bowel distention or pericolonic inflammation. No free intraperitoneal air or fluid. Appendix is normal. Pelvis: The urinary bladder is within normal limits. There is no perivesicular fluid collection. No free air in the pelvis. There is mild stranding around the uterus and bladder, likely postoperative change. Tiny amount of free fluid in the pelvic cul-de-sac. The uterus is somewhat bulky and heterogeneous, which is unchanged. Small nonspecific follicles are noted in the ovaries. No pelvic lymphadenopathy. Peritoneum/Retroperitoneum: Abdominal aorta is normal in caliber. No retroperitoneal lymphadenopathy. Bones/Soft Tissues: There are a few foci of gas in the abdominal wall, likely postoperative. No fluid collection in the abdominal wall. No acute or suspicious osseous abnormality. 1.  Mild stranding around the urinary bladder and uterus as well as tiny amount of free fluid in the pelvic cul-de-sac, likely expected postoperative change. No pelvic fluid collection or gas. 2.  No acute findings in the upper abdomen.        Assessment and Plan   Impression:    36F admitted with abdominal pain with nausea and vomiting s/p diagnostic laparoscopy, lysis of adhesions, dilation and curettage, hysteroscopy with IUD insertion and cystotomy with intraop repair 2/14/22 with subsequent IUD removal 2/21/22     issues  Recurrent bladder wall defect at anterior dome, 2cm or less, intraperitoneal defect, seen on CT cystogram 2/27/22 and CT abd/pel w contrast 2/27/22  Campbell in place for maximal drainage and diversion of urine  S/p intraoperative cystotomy 2cm with repair at anterior dome 2/14/22  Campbell removed after 10 days on 2/24/22  Normal renal function, elevated Cr on repeat labs reflective of urine absorption  CT abd/pel 2/25/22 negative for hydro, renal masses, bladder abnormalities, or perivesicular fluid collection  UA mild pyuria with few bacteria, although epithelial cells suggest possible contamination      Plan:   Maintain Campbell, not to be removed until repeat cystogram confirms no defect  CT cystogram confirms small anterior bladder dome defect - as patient is currently hemodynamically stable, and non-peritonitic, we will continue with conservative management with Campbell at this time.  Should patient's condition worsen, or creatinine rise, surgical intervention will be considered at that time  Will keep NPOMN and continue to monitor  Cr is 0.63 at baseline as of 2/25, repeat Cr 5.25 on 2/27, then 4.54 on additional repeat, secondary to absorption of urine from abdomen  Prior CT 2/25 negative for hydro or bladder abnormality  Patient has follow up with obgyn to consider hysterectomy on 3/1/22    Plan discussed with Dr. Delsie Leyden, DO  Urology Resident, PGY2  12:19 PM 2/27/2022

## 2022-02-28 PROBLEM — E87.6 HYPOKALEMIA: Status: ACTIVE | Noted: 2022-02-28

## 2022-02-28 PROBLEM — D68.59 HYPERCOAGULABLE STATE (HCC): Status: ACTIVE | Noted: 2022-02-28

## 2022-02-28 PROBLEM — R18.8: Status: ACTIVE | Noted: 2022-02-28

## 2022-02-28 PROBLEM — E83.51 HYPOCALCEMIA: Status: ACTIVE | Noted: 2022-02-28

## 2022-02-28 PROBLEM — F12.90 MARIJUANA USE: Status: ACTIVE | Noted: 2022-02-28

## 2022-02-28 PROBLEM — Z98.891 HISTORY OF C-SECTION: Status: ACTIVE | Noted: 2022-02-28

## 2022-02-28 PROBLEM — N99.72 BLADDER PERFORATION, INTRAOPERATIVE: Status: ACTIVE | Noted: 2022-02-28

## 2022-02-28 LAB
ANION GAP SERPL CALCULATED.3IONS-SCNC: 9 MMOL/L (ref 9–17)
BUN BLDV-MCNC: 11 MG/DL (ref 6–20)
CALCIUM SERPL-MCNC: 8.3 MG/DL (ref 8.6–10.4)
CHLORIDE BLD-SCNC: 98 MMOL/L (ref 98–107)
CO2: 23 MMOL/L (ref 20–31)
CREAT SERPL-MCNC: 1 MG/DL (ref 0.5–0.9)
GFR AFRICAN AMERICAN: >60 ML/MIN
GFR NON-AFRICAN AMERICAN: >60 ML/MIN
GFR SERPL CREATININE-BSD FRML MDRD: ABNORMAL ML/MIN/{1.73_M2}
GLUCOSE BLD-MCNC: 81 MG/DL (ref 70–99)
POTASSIUM SERPL-SCNC: 3.6 MMOL/L (ref 3.7–5.3)
SODIUM BLD-SCNC: 130 MMOL/L (ref 135–144)

## 2022-02-28 PROCEDURE — 99232 SBSQ HOSP IP/OBS MODERATE 35: CPT | Performed by: INTERNAL MEDICINE

## 2022-02-28 PROCEDURE — 6360000002 HC RX W HCPCS: Performed by: STUDENT IN AN ORGANIZED HEALTH CARE EDUCATION/TRAINING PROGRAM

## 2022-02-28 PROCEDURE — 1200000000 HC SEMI PRIVATE

## 2022-02-28 PROCEDURE — 6370000000 HC RX 637 (ALT 250 FOR IP): Performed by: EMERGENCY MEDICINE

## 2022-02-28 PROCEDURE — 2500000003 HC RX 250 WO HCPCS: Performed by: STUDENT IN AN ORGANIZED HEALTH CARE EDUCATION/TRAINING PROGRAM

## 2022-02-28 PROCEDURE — 36415 COLL VENOUS BLD VENIPUNCTURE: CPT

## 2022-02-28 PROCEDURE — 80048 BASIC METABOLIC PNL TOTAL CA: CPT

## 2022-02-28 PROCEDURE — 2580000003 HC RX 258: Performed by: STUDENT IN AN ORGANIZED HEALTH CARE EDUCATION/TRAINING PROGRAM

## 2022-02-28 RX ADMIN — HYDROCODONE BITARTRATE AND ACETAMINOPHEN 2 TABLET: 5; 325 TABLET ORAL at 08:38

## 2022-02-28 RX ADMIN — SODIUM BICARBONATE: 84 INJECTION, SOLUTION INTRAVENOUS at 03:39

## 2022-02-28 RX ADMIN — HEPARIN SODIUM 5000 UNITS: 5000 INJECTION INTRAVENOUS; SUBCUTANEOUS at 20:12

## 2022-02-28 RX ADMIN — HEPARIN SODIUM 5000 UNITS: 5000 INJECTION INTRAVENOUS; SUBCUTANEOUS at 05:13

## 2022-02-28 RX ADMIN — ONDANSETRON 4 MG: 2 INJECTION INTRAMUSCULAR; INTRAVENOUS at 22:24

## 2022-02-28 RX ADMIN — FAMOTIDINE 20 MG: 20 TABLET, FILM COATED ORAL at 20:12

## 2022-02-28 RX ADMIN — FAMOTIDINE 20 MG: 20 TABLET, FILM COATED ORAL at 08:38

## 2022-02-28 RX ADMIN — HEPARIN SODIUM 5000 UNITS: 5000 INJECTION INTRAVENOUS; SUBCUTANEOUS at 15:28

## 2022-02-28 ASSESSMENT — ENCOUNTER SYMPTOMS
COUGH: 0
VOMITING: 0
NAUSEA: 0
ABDOMINAL PAIN: 0
SHORTNESS OF BREATH: 0

## 2022-02-28 ASSESSMENT — PAIN DESCRIPTION - LOCATION: LOCATION: ABDOMEN

## 2022-02-28 ASSESSMENT — PAIN SCALES - GENERAL
PAINLEVEL_OUTOF10: 0
PAINLEVEL_OUTOF10: 7

## 2022-02-28 ASSESSMENT — PAIN DESCRIPTION - PAIN TYPE: TYPE: ACUTE PAIN

## 2022-02-28 NOTE — PROGRESS NOTES
Patient feeling better, reports some left sided lower abdominal cramping. Positive appetite, positive flatus. Voiding with rosado in place. Vitals:    02/28/22 0855   BP: (!) 144/97   Pulse: 89   Resp: 20   Temp: 97.7 °F (36.5 °C)   SpO2: 100%     Abdomen soft , minimally tender. No CVA tenderness  Rosado draining yellow urine. Labs reviewed: serum creatinine much improved. Impression/ Plan:   Bladder injury / repair 2/14/22  with leaking S/P removal of rosado catheter 2/14/22. Patient stable with rosado catheter in place. The patient has underlying Gyn pathology / large symptomatic fibroids  for which hysterectomy was planned . I discussed with urology / Dr Barragan Breath option of expectant management of bladder injury followed by hysterectomy vs proceeding with hysterectomy and repair of bladder injury at this time. It is the patient's preference to undergo procedures this week so as to decrease need for long term need for rosado. We will coordinate surgery with urology service. Internal medicine's input regarding optimization of her medical problems prior to surgery will be appreciated.

## 2022-02-28 NOTE — PROGRESS NOTES
Marc Menard, Valdemar Hunt, Lionel Lacy  Urology Progress Note    Subjective:  Overnight, patient afebrile vital signs stable  Campbell catheter placed and draining clear yellow urine  Denies any fevers, chills, chest pain or shortness of breath  Notes interval improvement in nausea and emesis  Denies any suprapubic tenderness or flank pain  Notes overall improvement in abdominal pain    Urinary output- 1400cc      Patient Vitals for the past 24 hrs:   BP Temp Temp src Pulse Resp SpO2   02/27/22 2007 129/88 98.2 °F (36.8 °C) Oral 81 18 100 %   02/27/22 0756 (!) 124/90 97.9 °F (36.6 °C) Oral 85 18 98 %       Intake/Output Summary (Last 24 hours) at 2/28/2022 0749  Last data filed at 2/28/2022 0517  Gross per 24 hour   Intake --   Output 1400 ml   Net -1400 ml       Recent Labs     02/25/22  0935 02/27/22  1615   WBC 4.8 7.3   HGB 9.6* 9.3*   HCT 30.8* 29.7*   MCV 76.0* 75.6*   PLT See Reflexed IPF Result 356     Recent Labs     02/25/22  0935 02/27/22  1615 02/27/22  1757   * 133* 133*   K 3.8 4.3 4.3   CL 99 102 102   CO2 23 19* 19*   BUN 9 22* 20   CREATININE 0.63 5.25* 4.54*       Recent Labs     02/25/22  1358 02/25/22  1358 02/27/22  1844   COLORU Orange*   < > Dark Yellow*   PHUR 5.5   < > 5.0   WBCUA 2 TO 5   < > 2 TO 5   RBCUA 0 TO 2   < > 0 TO 2   YEAST FEW*  --   --    BACTERIA FEW*  --   --    SPECGRAV 1.061*   < > 1.048*   LEUKOCYTESUR NEGATIVE   < > NEGATIVE   UROBILINOGEN Normal   < > Normal   BILIRUBINUR NEGATIVE   < > NEGATIVE  Verified by ictotest.*    < > = values in this interval not displayed.        Additional Lab/culture results: None    Physical Exam:   AAOx3  NAD  Unlabored breathing  Normal rate  Abdomen soft, appropriately tender to palpation, nondistended  : Campbell in place draining yellow urine  No calf tenderness to palpation     Interval Imaging Findings: None    Impression:   29 y.o. 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 We will continue to monitor patient clinically.   Will need a cystogram prior to Campbell removal  -If patient is not improving clinically, febrile, tachycardic will discuss surgical repair of bladder wall defect  -Trend creatinine, replete electrolytes as needed        Arcenio Castañeda MD  PGY-3,Urology  7:49 AM 2/28/2022

## 2022-02-28 NOTE — PROGRESS NOTES
[facility]  Office: [address], DO, Ambrosio Streeterress, DO, Po Haile, DO, Mehran Late Blood, DO, Mine Landeros MD, Oneil Hogan MD, Christa Sanabria MD, Aime Concepcion MD, Eulalia Berrios MD, Blessing Hollis MD, Jim Cantu MD, Eladio Valenzuela, DO, Roselia Abreu, DO, Deisy Levine MD,  eKlly Pedro, DO, Juan Norton MD, Terri Randolph MD, Emperatriz Davila MD, Luz Maria Mcneill MD, Shira Guillen MD, Kennedy Higginbotham, CNP, Mary Ellen Salazar, CNP, Pascale Lozano, CNP, Ed Lopez, CNS, Alfredo Osgood, CNP, Shanell Alert, CNP, Yesi Platt, CNP, Williams Chapman, CNP, Vinny Smith, CNP, Shubham Harrell PAShannonC, Vanita Diop, [facility], Mat Freeman, [facility], Avelino Rm, CNP, Bridget Gayle, CNP, Kervin Baldwin, CNP, Saira Bermudez, CNP, Coco Ross, CNP, [address]    Progress Note    2022    3:46 PM    Name:   Major Bhakta  MRN:     [id number]     Acct:      [de-identified]   Room:   [address] [place] [address] Day:  2  Admit Date:  2022  9:04 AM    PCP:   Deborah Spicer MD  Code Status:  Full Code    Subjective:     C/C:   Chief Complaint   Patient presents with    Abdominal Pain     Interval History Status:   Improved  Abdominal pain controlled  She is hungry    Data Base Updates:  Bjlbpfe82ch/dL     HZC27rf/dL   CREATININE1.00 High mg/dL     Calcium8. 3 Low mg/dL   Eusckh304 Low mmol/L   Potassium3. 6 Low      UA:  Nitrite, UrinePOSITIVE Abnormal    Leukocyte Esterase, UrineNEGATIVE     WBC, UA2 TO 5/HPF RBC, UA0 TO 2       Brief History:     As documented in the medical record:  \"29year-old female  presented to the hospital for abdominal pain. Patient had recent diagnostic laparoscopy for chronic pelvic pain with lysis of admission, dilation and curettage, hysteroscopy with IUD insertion and incidental cystotomy with robotic repair by urology on . IUD was removed on .   Patient had a Campbell catheter in which was removed on .  Patient presented on  with severe abdominal pain. Patient had a negative CT scan abdomen and had a normal creatinine. Patient was admitted in the observation unit. Patient pain persisted and OB/GYN was reconsulted. Urology was also consulted but did not recommend any surgical intervention. A repeat CT scan of the abdomen was obtained which showed urinary bladder filled with contrast and contrast extending from anterior dome of urinary bladder into the peritoneal cavity consistent with active extravasation from the urinary bladder indicative of bladder tear or injury. Interval development of large volume of free intraperitoneal fluid. Patient was moved to Intermed team.  Patient had replacement of Campbell catheter which improved her pain. Patient currently reports mild generalized abdominal pain. \"      Procedure: Diagnostic laparoscopy, lysis of adhesions, dilatation and curettage, hysteroscopy, Mirena IUD insertion  Robotic incidental cystotomy repair by Urology      The assessment and plan included:  \"  Hospital Problems            Last Modified POA     * (Principal) Lower abdominal pain 2022 Yes     History of  section, classical 2022 Yes     Overview Signed 2013 10:48 AM by Maico Wilburn MD                  Heter[facility]bin [id number] [facility] 2022 Yes     Female pelvic pain 2022 Yes     Overview Addendum 2020  8:15 AM by Ann Marie Delatorre DO       Pelvic US ordered, appt on 16: unremarkable            Abnormal uterine bleeding (AUB) 2022 Yes     S/p Diagnostic laparoscopy, lysis of adhesions, hysteroscopy, dilatation and curettage, Mirena IUD insertion 22 Yes     Overview Signed 2022  1:27 PM by Orlando Sullivan DO       Complicated by incidental cystotomy repaired by Dr. Tiffanie Leger MD Urology via Robot              Intractable abdominal pain 2022 Yes     IUD removed 22 Yes Overview Signed 2/27/2022  8:37 AM by Surjit Lubin DO       Patient presented with rash. There was concern for possible allergy. IUD was removed at that time                Plan:  Patient status inpatient in the Med/Surge   Postop urinary bladder injury-continue Campbell catheter, CT abdomen concerning for bladder leak, urology following, CT cystogram pending. Might need urological intervention  Plan for hysterectomy on 3/1/2022  Start IV hydration with LR at 150 mils an hour  Repeat stat BMP again  Obtain urinalysis with culture  Obtain urine sodium and urine creatinine  If creatinine continues to be elevated, will consult nephrology  Microcytic anemia-continue to monitor  D/c NSAID\"    Cystogram 2/27:  Impression:  1. Urinary bladder is filled with contrast. Track of contrast extends from   the anterior dome of the urinary bladder into the peritoneal cavity   consistent with active extravasation from the urinary bladder indicative of   bladder injury/tear. 2. Interval development of large volume free intraperitoneal fluid of density   higher than simple fluid. This is attributed to extravasation of   contrast/urine from the urinary bladder. 3. Small sliding hiatal hernia. Findings were discussed with SHI Hayes at 4:02 pm on 2/27/2022. Past Medical History:   has a past medical history of Acne, Benign essential hypertension antepartum, Closed nondisplaced fracture of distal pole of navicular bone of left wrist, COVID-19, Family history of uterine cancer, Iron deficiency anemia, Mirena IUD inserted 2/14/22, Prothrombin gene mutation [facility]), S/p Diagnostic laparoscopy, hysis of adhesions, hysteroscopy, dilatation and curettage, Mirena IUD insertion, Snores, Under care of team, and Under care of team.    Social History:   reports that she has never smoked. She has never used smokeless tobacco. She reports current alcohol use of about 3.0 standard drinks of alcohol per week.  She reports current drug use. Frequency: 2.00 times per week. Drug: Marijuana Lanestevan Cavanaugh). Family History:   Family History   Problem Relation Age of Onset    Diabetes Paternal Grandmother     Endometrial Cancer Paternal Grandmother     No Known Problems Mother     No Known Problems Father     Hypertension Maternal Grandmother     Breast Cancer Neg Hx     Cancer Neg Hx     Colon Cancer Neg Hx     Eclampsia Neg Hx     Ovarian Cancer Neg Hx      Labor Neg Hx     Spont Abortions Neg Hx     Stroke Neg Hx        Review of Systems:     Review of Systems   Constitutional: Negative for activity change, appetite change and fever. Respiratory: Negative for cough and shortness of breath. Cardiovascular: Negative for chest pain and palpitations. Gastrointestinal: Negative for abdominal pain, nausea and vomiting. Genitourinary: Positive for difficulty urinating (Campbell catheter remains in place). Negative for flank pain and hematuria. Physical Examination:        Vitals  BP (!) 144/97   Pulse 89   Temp 97.7 °F (36.5 °C) (Oral)   Resp 20   Ht 5' 1\" (1.549 m)   Wt 152 lb (68.9 kg)   SpO2 100%   BMI 28.72 kg/m²   Temp (24hrs), Av °F (36.7 °C), Min:97.7 °F (36.5 °C), Max:98.2 °F (36.8 °C)    No results for input(s): POCGLU in the last 72 hours. Physical Exam  Vitals reviewed. Constitutional:       General: She is not in acute distress. Appearance: She is not diaphoretic. HENT:      Head: Normocephalic. Nose: Nose normal.   Eyes:      General: No scleral icterus. Conjunctiva/sclera: Conjunctivae normal.   Neck:      Trachea: No tracheal deviation. Cardiovascular:      Rate and Rhythm: Normal rate and regular rhythm. Pulmonary:      Effort: Pulmonary effort is normal. No respiratory distress. Breath sounds: Normal breath sounds. No wheezing or rales. Chest:      Chest wall: No tenderness. Abdominal:      General: There is no distension. Palpations: Abdomen is soft. Tenderness: There is no abdominal tenderness. There is no guarding or rebound. Musculoskeletal:         General: No tenderness. Cervical back: Neck supple. Skin:     General: Skin is warm and dry. Neurological:      Mental Status: She is alert and oriented to person, place, and time. Medications: Allergies: Allergies   Allergen Reactions    Seasonal Other (See Comments)     Allergic rhinnitis       Current Meds:   Scheduled Meds:    heparin (porcine)  5,000 Units SubCUTAneous 3 times per day    sodium chloride flush  5-40 mL IntraVENous 2 times per day    famotidine  20 mg Oral BID     Continuous Infusions:    IV infusion builder 150 mL/hr at 02/28/22 0339    sodium chloride       PRN Meds: HYDROmorphone, docusate sodium, oxybutynin, simethicone, sodium chloride flush, sodium chloride, acetaminophen, ondansetron **OR** ondansetron, HYDROcodone 5 mg - acetaminophen    Data:     I/O (24Hr): Intake/Output Summary (Last 24 hours) at 2/28/2022 1546  Last data filed at 2/28/2022 1345  Gross per 24 hour   Intake --   Output 2800 ml   Net -2800 ml       Labs:  Hematology:  Recent Labs     02/27/22  1615   WBC 7.3   RBC 3.93*   HGB 9.3*   HCT 29.7*   MCV 75.6*   MCH 23.7*   MCHC 31.3   RDW 18.3*      MPV 10.1     Chemistry:  Recent Labs     02/27/22  1615 02/27/22  1757 02/28/22  0707   * 133* 130*   K 4.3 4.3 3.6*    102 98   CO2 19* 19* 23   GLUCOSE 84 87 81   BUN 22* 20 11   CREATININE 5.25* 4.54* 1.00*   ANIONGAP 12 12 9   LABGLOM 9* 11* >60   GFRAA 11* 13* >60   CALCIUM 9.0 9.0 8.3*   No results for input(s): PROT, LABALBU, LABA1C, Z4NJRST, F7CKQAW, FT4, TSH, AST, ALT, LDH, GGT, ALKPHOS, LABGGT, BILITOT, BILIDIR, AMMONIA, AMYLASE, LIPASE, LACTATE, CHOL, HDL, LDLCHOLESTEROL, CHOLHDLRATIO, TRIG, VLDL, PLL76VP, PHENYTOIN, PHENYF, URICACID, POCGLU in the last 72 hours.   ABG:No results found for: POCPH, PHART, PH, POCPCO2, GVZ1VHA, PCO2, POCPO2, PO2ART, PO2, POCHCO3, YGQ2LHS, HCO3, NBEA, PBEA, BEART, BE, THGBART, THB, FUP6UAX, ZYJW5FZI, I2TVRBCL, O2SAT, FIO2  Lab Results   Component Value Date/Time    SPECIAL NOT REPORTED 2022 12:54 PM     Lab Results   Component Value Date/Time    CULTURE NO GROWTH 2022 02:15 PM       Radiology:  XR CHEST (2 VW)    Result Date: 2022  No acute process. There is no change from prior examination. CT ABDOMEN PELVIS W IV CONTRAST    Result Date: 2022  1. Urinary bladder is filled with contrast. Track of contrast extends from the anterior dome of the urinary bladder into the peritoneal cavity consistent with active extravasation from the urinary bladder indicative of bladder injury/tear. 2. Interval development of large volume free intraperitoneal fluid of density higher than simple fluid. This is attributed to extravasation of contrast/urine from the urinary bladder. 3. Small sliding hiatal hernia. Findings were discussed with SHI Teran at 4:02 pm on 2022. CT ABDOMEN PELVIS W IV CONTRAST Additional Contrast? None    Result Date: 2022  1. Mild stranding around the urinary bladder and uterus as well as tiny amount of free fluid in the pelvic cul-de-sac, likely expected postoperative change. No pelvic fluid collection or gas. 2.  No acute findings in the upper abdomen. CT CYSTOGRAM W CONTRAST    Result Date: 2022  Defect in the bladder dome is confirmed with intraperitoneal contrast extravasation and large volume abdominopelvic fluid.        Assessment:        Primary Problem  Bladder perforation, intraoperative    Active Hospital Problems    Diagnosis Date Noted    History of  section, classical [Z98.891] 2013     Priority: Medium    Hypercoagulable state ([facility] [D68.59] 2022    Hypokalemia [E87.6] 2022    Hypocalcemia [E83.51] 2022    Bladder perforation, intraoperative [N99.72] 2022    IUD removed 22 [Z30.432] 2022    Intractable abdominal pain [R10.9] 02/26/2022    Lower abdominal pain [R10.30] 02/25/2022    S/p Diagnostic laparoscopy, lysis of adhesions, hysteroscopy, dilatation and curettage, Mirena IUD insertion 2/14/22 [Z98.890] 02/14/2022    Pelvic adhesive disease [N73.6] 02/14/2022    Intramural leiomyoma of uterus [D25.1]     Abnormal uterine bleeding (AUB) [N93.9] 05/19/2021    Female pelvic pain [R10.2] 07/18/2016    Heterozygous for prothrombin [id number] mutation [facility] [D68.52] 04/29/2014         Plan:        Check bun and creatinine  Avoid nephrotoxins  Nonsteroidal anti-inflammatories on hold  Gynecology evaluation in progress  Hysterectomy under consideration  Urological evaluation in progress  Maintain Campbell catheter  Antibiotics per C&S Results - urine culture remains negative  Pain management  Resume diet if okay with other services  DVT prophylaxis  Heparin 5000 Units subQ every tid    IP CONSULT TO OB GYN  IP CONSULT TO UROLOGY  IP CONSULT TO [address], DO  2/28/2022  3:46 PM

## 2022-02-28 NOTE — FLOWSHEET NOTE
SPIRITUAL CARE DEPARTMENT - [address]  PROGRESS NOTE    Shift date: 2/28/22  Shift day: Monday   Shift # 2    Room # 6275/3046-53   Name: Tawnya Canchola            Age: 29 y.o. Gender: female          Mandaeism:    Place of Roman Catholic:     Referral: Routine Visit    Admit Date & Time: 2/25/2022  9:04 AM    PATIENT/EVENT DESCRIPTION:  Tawnya Canchola is a 29 y.o. female   (Description of condition/event). SPIRITUAL ASSESSMENT/INTERVENTION:  Pt was sitting up watching TV.  opened discussion for patient to speak about illness and frustration. Pt wished that her family and friends would be more supportive. Pt provided  with life review.  prayed. SPIRITUAL CARE FOLLOW-UP PLAN:  Chaplains will remain available to offer spiritual and emotional support as needed. Electronically signed by Dl Mahmood, on 2/28/2022 at 5:36 PM.  [facility]  [phone]       02/28/22 1530   Encounter Summary   Services provided to: Patient   Referral/Consult From: [facility] Family members   Continue Visiting   (2/28/22)   Complexity of Encounter Low   Length of Encounter 15 minutes   Spiritual Assessment Completed Yes   Routine   Type Initial   Assessment Calm; Approachable   Intervention Active listening;Explored feelings, thoughts, concerns;Nurtured hope;Sustaining presence/ Ministry of presence;Empowerment; Confronted/challenged   Outcome Acceptance;Comfort;Engaged in conversation;Coping;Less anxious, less agitated

## 2022-02-28 NOTE — PROGRESS NOTES
Physician Progress Note      Kobe Simmons  CSN #:                  [id number]  :                       1985  ADMIT DATE:       2022 9:04 AM  DISCH DATE:  RESPONDING  PROVIDER #:        Spencer GRANGER DO          QUERY TEXT:    Pt admitted with intractable abdominal pain. Pt noted to have contrasted   imaging with significant rise in creatine. If possible, please document if you   are evaluating and/or treating any of the following: The medical record reflects the following:  Risk Factors: Abd pain s/p  diagnostic laparoscopy, hysteroscopy, D&C, lysis   of adhesions, incidental cystotomy repair, and Mirena IUD insertion  with   IUD removal . CT abd pelvis with contrast on  and CT cystogram with   contrast on 22. Clinical Indicators: BUN/Creatine 9/0.63 increasing to 22/5.25 and 20/4.54   Urology consult note stating: S/p intraoperative cystotomy 2cm with repair at   anterior dome 22 Campbell removed after 10 days on 22 Per ED physician   notes: Patient has been able to urinate on her own since the Campbell catheter   removal  Treatment: [email or web address], Campbell placed 22, Urology consult, labs/monitoring    Defined by Kidney Disease Improving Global Outcomes (KDIGO) clinical practice   guideline for acute kidney injury:  -Increase in SCr by greater than or equal to 0.3 mg/dl within 48 hours; or  -Increase or decrease in SCr to greater than or equal to 1.5 times baseline,   which is known or presumed to have occurred within the prior 7 days; or  -Urine volume < 0.5ml/kg/h for 6 hours    Thank-you,  Jass Banuelos RN, CDS  [email or web address]  Options provided:  -- Acute kidney injury  -- Acute kidney failure with acute tubular necrosis  -- Other - I will add my own diagnosis  -- Disagree - Not applicable / Not valid  -- Disagree - Clinically unable to determine / Unknown  -- Refer to Clinical Documentation Reviewer    PROVIDER RESPONSE TEXT:    This patient has an Acute kidney injury.     Query created by: Aleksandra Real on 2/28/2022 6:32 AM      Electronically signed by:  Dewey Hammond DO 2/28/2022 3:55 PM

## 2022-02-28 NOTE — CARE COORDINATION
[facility] [facility] Flow/Interdisciplinary Rounds Progress Note    Quality Flow Rounds held on February 28, 2022 at [address] Attending:  Bedside Nurse,  and Nursing Unit Leadership    Barriers to Discharge:     Anticipated Discharge Date:       Anticipated Discharge Disposition:    Readmission Risk              Risk of Unplanned Readmission:  13           Discussed patient goal for the day, patient clinical progression, and barriers to discharge.   The following Goal(s) of the Day/Commitment(s) have been identified:  Activity Progression  OBGYN, urology following, possible OR, monitor for home care needs      Thea Lawrence RN  February 28, 2022

## 2022-02-28 NOTE — PROGRESS NOTES
CDU Transfer Summary        Patient:  Yassine Lehman  YOB: 1985    MRN: [id number]   Acct: [de-identified]    Primary Care Physician: Ever Naylor MD    Admit date:  2/25/2022  9:04 AM  Transfer date: 2/27/2022    Transfer Diagnoses:     1.) Status post diagnostic laparoscopy lysis of adhesions hysteroscopy dilatation and curettage Mirena IUD insertion complicated by an incidental cystostomy. 2.  Status post repair cystostomy with ongoing bladder leak after Campbell catheter removal.    3.  Decreased urinary output secondary to above. Medication List      START taking these medications    gabapentin 300 MG capsule  Commonly known as: NEURONTIN  Take 1 capsule by mouth 3 times daily for 7 days. oxyCODONE 5 MG immediate release tablet  Commonly known as: Roxicodone  Take 1 tablet by mouth every 6 hours as needed for Pain for up to 5 days. Intended supply: 5 days.  Take lowest dose possible to manage pain     phenazopyridine 200 MG tablet  Commonly known as: Pyridium  Take 1 tablet by mouth 3 times daily as needed for Pain        CONTINUE taking these medications    * acetaminophen 325 MG tablet  Commonly known as: TYLENOL     * acetaminophen 500 MG tablet  Commonly known as: TYLENOL  Take 2 tablets by mouth every 6 hours as needed for Pain     docusate sodium 100 MG capsule  Commonly known as: Colace  Take 1 capsule by mouth 2 times daily as needed for Constipation     ferrous sulfate 325 (65 Fe) MG tablet  Commonly known as: IRON 325  Take 1 tablet by mouth daily (with breakfast)     * ibuprofen 800 MG tablet  Commonly known as: ADVIL;MOTRIN  Take 1 tablet by mouth every 8 hours as needed for Pain     * ibuprofen 600 MG tablet  Commonly known as: ADVIL;MOTRIN  Take 1 tablet by mouth every 6 hours as needed for Pain     ondansetron 4 MG tablet  Commonly known as: Zofran  Take 1 tablet by mouth every 8 hours as needed for Nausea or Vomiting     oxybutynin 5 MG tablet  Commonly known as: DITROPAN  Take 1 tablet by mouth 2 times daily as needed (bladder pain/spasms from stent)     simethicone 80 MG chewable tablet  Commonly known as: MYLICON  Take 1 tablet by mouth 4 times daily as needed for Flatulence         * This list has 4 medication(s) that are the same as other medications prescribed for you. Read the directions carefully, and ask your doctor or other care provider to review them with you. Where to Get Your Medications      These medications were sent to [facility] [address] [address]    Phone: [phone]   · gabapentin 300 MG capsule  · oxyCODONE 5 MG immediate release tablet  · phenazopyridine 200 MG tablet         Diet:  Diet NPO, advance as tolerated     Activity:  As tolerated    Consultants: IP CONSULT TO OB GYN  IP CONSULT TO UROLOGY  IP CONSULT TO UROLOGY    Diagnostic Test:   Results for orders placed or performed during the hospital encounter of 02/25/22   Culture, Urine    Specimen: Urine, clean catch   Result Value Ref Range    Specimen Description . CLEAN CATCH URINE     Culture NO GROWTH    COVID-19, Rapid    Specimen: Nasopharyngeal Swab   Result Value Ref Range    Specimen Description . NASOPHARYNGEAL SWAB     SARS-CoV-2, Rapid Not Detected Not Detected   CBC with Auto Differential   Result Value Ref Range    WBC 4.8 3.5 - 11.3 k/uL    RBC 4.05 3.95 - 5.11 m/uL    Hemoglobin 9.6 (L) 11.9 - 15.1 g/dL    Hematocrit 30.8 (L) 36.3 - 47.1 %    MCV 76.0 (L) 82.6 - 102.9 fL    MCH 23.7 (L) 25.2 - 33.5 pg    MCHC 31.2 28.4 - 34.8 g/dL    RDW 17.7 (H) 11.8 - 14.4 %    Platelets See Reflexed IPF Result 138 - 453 k/uL    NRBC Automated 0.0 0.0 per 100 WBC    RBC Morphology ANISOCYTOSIS PRESENT     Seg Neutrophils 74 (H) 36 - 65 %    Lymphocytes 17 (L) 24 - 43 %    Monocytes 7 3 - 12 %    Eosinophils % 2 1 - 4 %    Basophils 0 0 - 2 %    Immature Granulocytes 0 0 %    Segs Absolute 3.55 1.50 - 8.10 k/uL    Absolute Lymph # 0.81 (L) 1.10 - 3.70 k/uL    Absolute Mono # 0.33 0.10 - 1.20 k/uL    Absolute Eos # 0.10 0.00 - 0.44 k/uL    Basophils Absolute <0.03 0.00 - 0.20 k/uL    Absolute Immature Granulocyte <0.03 0.00 - 0.30 k/uL   Comprehensive Metabolic Panel   Result Value Ref Range    Glucose 84 70 - 99 mg/dL    BUN 9 6 - 20 mg/dL    CREATININE 0.63 0.50 - 0.90 mg/dL    Calcium 9.3 8.6 - 10.4 mg/dL    Sodium 132 (L) 135 - 144 mmol/L    Potassium 3.8 3.7 - 5.3 mmol/L    Chloride 99 98 - 107 mmol/L    CO2 23 20 - 31 mmol/L    Anion Gap 10 9 - 17 mmol/L    Alkaline Phosphatase 46 35 - 104 U/L    ALT 11 5 - 33 U/L    AST 17 <32 U/L    Total Bilirubin 0.34 0.3 - 1.2 mg/dL    Total Protein 7.2 6.4 - 8.3 g/dL    Albumin 4.1 3.5 - 5.2 g/dL    Albumin/Globulin Ratio 1.3 1.0 - 2.5    GFR Non-African American >60 >60 mL/min    GFR African American >60 >60 mL/min    GFR Comment         HCG Qualitative, Serum   Result Value Ref Range    hCG Qual NEGATIVE NEGATIVE   Urinalysis with Microscopic   Result Value Ref Range    Color, UA Orange (A) Yellow    Turbidity UA Clear Clear    Glucose, Ur NEGATIVE NEGATIVE    Bilirubin Urine NEGATIVE NEGATIVE    Ketones, Urine MODERATE (A) NEGATIVE    Specific Gravity, UA 1.061 (H) 1.005 - 1.030    Urine Hgb LARGE (A) NEGATIVE    pH, UA 5.5 5.0 - 8.0    Protein, UA NEGATIVE NEGATIVE    Urobilinogen, Urine Normal Normal    Nitrite, Urine NEGATIVE NEGATIVE    Leukocyte Esterase, Urine NEGATIVE NEGATIVE    -          WBC, UA 2 TO 5 0 - 5 /HPF    RBC, UA 0 TO 2 0 - 2 /HPF    Casts UA 0 TO 2 0 - 2 /LPF    Casts UA HYALINE 0 - 2 /LPF    Epithelial Cells UA 5 TO 10 0 - 5 /HPF    Bacteria, UA FEW (A) None    Yeast, UA FEW (A) None   Lipase   Result Value Ref Range    Lipase 25 13 - 60 U/L   Immature Platelet Fraction   Result Value Ref Range    Platelet, Fluorescence Platelet clumps present, count appears adequate.  138 - 453 k/uL   CBC with Auto Differential   Result Value Ref Range    WBC 7.3 3.5 - 11.3 k/uL    RBC 3.93 (L) 3.95 - 5.11 m/uL    Hemoglobin 9.3 (L) 11.9 - 15.1 g/dL    Hematocrit 29.7 (L) 36.3 - 47.1 %    MCV 75.6 (L) 82.6 - 102.9 fL    MCH 23.7 (L) 25.2 - 33.5 pg    MCHC 31.3 28.4 - 34.8 g/dL    RDW 18.3 (H) 11.8 - 14.4 %    Platelets 349 818 - 166 k/uL    MPV 10.1 8.1 - 13.5 fL    NRBC Automated 0.0 0.0 per 100 WBC    RBC Morphology ANISOCYTOSIS PRESENT     Seg Neutrophils 81 (H) 36 - 65 %    Lymphocytes 11 (L) 24 - 43 %    Monocytes 8 3 - 12 %    Eosinophils % 0 (L) 1 - 4 %    Basophils 0 0 - 2 %    Immature Granulocytes 0 0 %    Segs Absolute 5.94 1.50 - 8.10 k/uL    Absolute Lymph # 0.77 (L) 1.10 - 3.70 k/uL    Absolute Mono # 0.55 0.10 - 1.20 k/uL    Absolute Eos # 0.03 0.00 - 0.44 k/uL    Basophils Absolute <0.03 0.00 - 0.20 k/uL    Absolute Immature Granulocyte <0.03 0.00 - 0.30 k/uL   Basic Metabolic Panel   Result Value Ref Range    Glucose 84 70 - 99 mg/dL    BUN 22 (H) 6 - 20 mg/dL    CREATININE 5.25 (HH) 0.50 - 0.90 mg/dL    Calcium 9.0 8.6 - 10.4 mg/dL    Sodium 133 (L) 135 - 144 mmol/L    Potassium 4.3 3.7 - 5.3 mmol/L    Chloride 102 98 - 107 mmol/L    CO2 19 (L) 20 - 31 mmol/L    Anion Gap 12 9 - 17 mmol/L    GFR Non-African American 9 (L) >60 mL/min    GFR  11 (L) >60 mL/min    GFR Comment         Basic Metabolic Panel w/ Reflex to MG   Result Value Ref Range    Glucose 87 70 - 99 mg/dL    BUN 20 6 - 20 mg/dL    CREATININE 4.54 (H) 0.50 - 0.90 mg/dL    Calcium 9.0 8.6 - 10.4 mg/dL    Sodium 133 (L) 135 - 144 mmol/L    Potassium 4.3 3.7 - 5.3 mmol/L    Chloride 102 98 - 107 mmol/L    CO2 19 (L) 20 - 31 mmol/L    Anion Gap 12 9 - 17 mmol/L    GFR Non-African American 11 (L) >60 mL/min    GFR  13 (L) >60 mL/min    GFR Comment         Urinalysis with Reflex to Culture    Specimen: Urine, clean catch   Result Value Ref Range    Color, UA Dark Yellow (A) Yellow    Turbidity UA Clear Clear Glucose, Ur NEGATIVE NEGATIVE    Bilirubin Urine NEGATIVE  Verified by ictotest. (A) NEGATIVE    Ketones, Urine TRACE (A) NEGATIVE    Specific Gravity, UA 1.048 (H) 1.005 - 1.030    Urine Hgb NEGATIVE NEGATIVE    pH, UA 5.0 5.0 - 8.0    Protein, UA NEGATIVE NEGATIVE    Urobilinogen, Urine Normal Normal    Nitrite, Urine POSITIVE (A) NEGATIVE    Leukocyte Esterase, Urine NEGATIVE NEGATIVE   SODIUM, URINE, RANDOM   Result Value Ref Range    Sodium,Ur 140 mmol/L   CREATININE, RANDOM URINE   Result Value Ref Range    Creatinine, Ur 185.1 28.0 - 217.0 mg/dL   Microscopic Urinalysis   Result Value Ref Range    -          WBC, UA 2 TO 5 0 - 5 /HPF    RBC, UA 0 TO 2 0 - 4 /HPF    Casts UA  0 - 8 /LPF     0 TO 2 HYALINE Reference range defined for non-centrifuged specimen. Epithelial Cells UA 2 TO 5 0 - 5 /HPF     XR CHEST (2 VW)    Result Date: 2/26/2022  EXAMINATION: TWO XRAY VIEWS OF THE CHEST 2/26/2022 10:48 am COMPARISON: 10/30/2021 HISTORY: ORDERING SYSTEM PROVIDED HISTORY: chest pain TECHNOLOGIST PROVIDED HISTORY: chest pain Reason for Exam: cp FINDINGS: The lungs are without acute focal process. There is no effusion or pneumothorax. The cardiomediastinal silhouette is without acute process. The osseous structures are without acute process. No acute process. There is no change from prior examination. CT ABDOMEN PELVIS W IV CONTRAST Additional Contrast? None    Result Date: 2/25/2022  EXAMINATION: CT OF THE ABDOMEN AND PELVIS WITH CONTRAST 2/25/2022 10:07 am TECHNIQUE: CT of the abdomen and pelvis was performed with the administration of intravenous contrast. Multiplanar reformatted images are provided for review. Dose modulation, iterative reconstruction, and/or weight based adjustment of the mA/kV was utilized to reduce the radiation dose to as low as reasonably achievable. COMPARISON: CT abdomen and pelvis dated 08/17/2021.  HISTORY: ORDERING SYSTEM PROVIDED HISTORY: Abd pain s/p diagnostic lap, hysteroscopy, lysis of adhesions, d&C, cystotomy repair TECHNOLOGIST PROVIDED HISTORY: Abd pain s/p diagnostic lap, hysteroscopy, lysis of adhesions, d&C, cystotomy repair Decision Support Exception - unselect if not a suspected or confirmed emergency medical condition->Emergency Medical Condition (MA) Reason for Exam: Abd pain s/p diagnostic lap, hysteroscopy, lysis of adhesions, d&C, cystotomy repair Pelvic surgery 02/14/2022. FINDINGS: Lower Chest: Visualized portion of the lower chest demonstrates no acute abnormality. Organs: The liver, spleen, pancreas, and adrenal glands are unremarkable. Gallbladder is unremarkable. There is symmetric enhancement of the kidneys. No hydronephrosis or perinephric inflammation. GI/Bowel: There is no abnormal bowel distention or pericolonic inflammation. No free intraperitoneal air or fluid. Appendix is normal. Pelvis: The urinary bladder is within normal limits. There is no perivesicular fluid collection. No free air in the pelvis. There is mild stranding around the uterus and bladder, likely postoperative change. Tiny amount of free fluid in the pelvic cul-de-sac. The uterus is somewhat bulky and heterogeneous, which is unchanged. Small nonspecific follicles are noted in the ovaries. No pelvic lymphadenopathy. Peritoneum/Retroperitoneum: Abdominal aorta is normal in caliber. No retroperitoneal lymphadenopathy. Bones/Soft Tissues: There are a few foci of gas in the abdominal wall, likely postoperative. No fluid collection in the abdominal wall. No acute or suspicious osseous abnormality. 1.  Mild stranding around the urinary bladder and uterus as well as tiny amount of free fluid in the pelvic cul-de-sac, likely expected postoperative change. No pelvic fluid collection or gas. 2.  No acute findings in the upper abdomen. Hospital Course:  Clinical course has improved, labs and imaging reviewed.      Mylesfalguni Abdi originally presented to the hospital on 2/25/2022  9:04 AM with intractable pelvic pain after \"Campbell cath removal status post surgery. .  At that time it was determined that She required further observation and supportive care. Patient has been evaluated with development of intra-abdominal fluid collection discovered on repeat CT scan after decreased urine output. Admitted to medicine service for ongoing renal management along with potential surgical intervention per urology    Pt discussed directly with the admitting team    Disposition: Transfer  Condition: Good    Time Spent: 1 day      --  Iraj Mattson MD  Emergency Medicine Attending Physician    This dictation was generated by voice recognition computer software. Although all attempts are made to edit the dictation for accuracy, there may be errors in the transcription that are not intended.

## 2022-03-01 LAB
ABO/RH: NORMAL
ANION GAP SERPL CALCULATED.3IONS-SCNC: 12 MMOL/L (ref 9–17)
ANTIBODY SCREEN: NEGATIVE
ARM BAND NUMBER: NORMAL
BUN BLDV-MCNC: 4 MG/DL (ref 6–20)
CALCIUM IONIZED: 1.1 MMOL/L (ref 1.13–1.33)
CALCIUM SERPL-MCNC: 8.9 MG/DL (ref 8.6–10.4)
CHLORIDE BLD-SCNC: 102 MMOL/L (ref 98–107)
CO2: 27 MMOL/L (ref 20–31)
CREAT SERPL-MCNC: 0.53 MG/DL (ref 0.5–0.9)
EXPIRATION DATE: NORMAL
FERRITIN: 9 UG/L (ref 13–150)
GFR AFRICAN AMERICAN: >60 ML/MIN
GFR NON-AFRICAN AMERICAN: >60 ML/MIN
GFR SERPL CREATININE-BSD FRML MDRD: ABNORMAL ML/MIN/{1.73_M2}
GLUCOSE BLD-MCNC: 83 MG/DL (ref 70–99)
HCT VFR BLD CALC: 29 % (ref 36.3–47.1)
HEMOGLOBIN: 8.9 G/DL (ref 11.9–15.1)
IRON SATURATION: 4 % (ref 20–55)
IRON: 13 UG/DL (ref 37–145)
MCH RBC QN AUTO: 23.4 PG (ref 25.2–33.5)
MCHC RBC AUTO-ENTMCNC: 30.7 G/DL (ref 28.4–34.8)
MCV RBC AUTO: 76.1 FL (ref 82.6–102.9)
NRBC AUTOMATED: 0 PER 100 WBC
PDW BLD-RTO: 18 % (ref 11.8–14.4)
PLATELET # BLD: 353 K/UL (ref 138–453)
PMV BLD AUTO: 9.9 FL (ref 8.1–13.5)
POTASSIUM SERPL-SCNC: 4.2 MMOL/L (ref 3.7–5.3)
RBC # BLD: 3.81 M/UL (ref 3.95–5.11)
SODIUM BLD-SCNC: 141 MMOL/L (ref 135–144)
TOTAL IRON BINDING CAPACITY: 307 UG/DL (ref 250–450)
UNSATURATED IRON BINDING CAPACITY: 294 UG/DL (ref 112–347)
WBC # BLD: 3.6 K/UL (ref 3.5–11.3)

## 2022-03-01 PROCEDURE — 6360000002 HC RX W HCPCS: Performed by: STUDENT IN AN ORGANIZED HEALTH CARE EDUCATION/TRAINING PROGRAM

## 2022-03-01 PROCEDURE — 2580000003 HC RX 258: Performed by: INTERNAL MEDICINE

## 2022-03-01 PROCEDURE — 2580000003 HC RX 258: Performed by: STUDENT IN AN ORGANIZED HEALTH CARE EDUCATION/TRAINING PROGRAM

## 2022-03-01 PROCEDURE — 85027 COMPLETE CBC AUTOMATED: CPT

## 2022-03-01 PROCEDURE — 99232 SBSQ HOSP IP/OBS MODERATE 35: CPT | Performed by: INTERNAL MEDICINE

## 2022-03-01 PROCEDURE — 1200000000 HC SEMI PRIVATE

## 2022-03-01 PROCEDURE — 83550 IRON BINDING TEST: CPT

## 2022-03-01 PROCEDURE — 6370000000 HC RX 637 (ALT 250 FOR IP): Performed by: EMERGENCY MEDICINE

## 2022-03-01 PROCEDURE — 80048 BASIC METABOLIC PNL TOTAL CA: CPT

## 2022-03-01 PROCEDURE — 82728 ASSAY OF FERRITIN: CPT

## 2022-03-01 PROCEDURE — 86850 RBC ANTIBODY SCREEN: CPT

## 2022-03-01 PROCEDURE — 2500000003 HC RX 250 WO HCPCS: Performed by: STUDENT IN AN ORGANIZED HEALTH CARE EDUCATION/TRAINING PROGRAM

## 2022-03-01 PROCEDURE — 86900 BLOOD TYPING SEROLOGIC ABO: CPT

## 2022-03-01 PROCEDURE — 86901 BLOOD TYPING SEROLOGIC RH(D): CPT

## 2022-03-01 PROCEDURE — 6370000000 HC RX 637 (ALT 250 FOR IP): Performed by: STUDENT IN AN ORGANIZED HEALTH CARE EDUCATION/TRAINING PROGRAM

## 2022-03-01 PROCEDURE — 36415 COLL VENOUS BLD VENIPUNCTURE: CPT

## 2022-03-01 PROCEDURE — 82330 ASSAY OF CALCIUM: CPT

## 2022-03-01 PROCEDURE — 83540 ASSAY OF IRON: CPT

## 2022-03-01 RX ORDER — SODIUM CHLORIDE 9 MG/ML
INJECTION, SOLUTION INTRAVENOUS CONTINUOUS
Status: DISCONTINUED | OUTPATIENT
Start: 2022-03-01 | End: 2022-03-04

## 2022-03-01 RX ORDER — DOCUSATE SODIUM 100 MG/1
100 CAPSULE, LIQUID FILLED ORAL DAILY
Status: DISCONTINUED | OUTPATIENT
Start: 2022-03-01 | End: 2022-03-05 | Stop reason: HOSPADM

## 2022-03-01 RX ADMIN — SODIUM BICARBONATE: 84 INJECTION, SOLUTION INTRAVENOUS at 01:14

## 2022-03-01 RX ADMIN — IRON SUCROSE 200 MG: 20 INJECTION, SOLUTION INTRAVENOUS at 09:26

## 2022-03-01 RX ADMIN — SODIUM BICARBONATE: 84 INJECTION, SOLUTION INTRAVENOUS at 12:29

## 2022-03-01 RX ADMIN — FAMOTIDINE 20 MG: 20 TABLET, FILM COATED ORAL at 08:39

## 2022-03-01 RX ADMIN — DOCUSATE SODIUM 100 MG: 100 CAPSULE ORAL at 08:39

## 2022-03-01 RX ADMIN — FAMOTIDINE 20 MG: 20 TABLET, FILM COATED ORAL at 20:13

## 2022-03-01 RX ADMIN — HEPARIN SODIUM 5000 UNITS: 5000 INJECTION INTRAVENOUS; SUBCUTANEOUS at 13:30

## 2022-03-01 RX ADMIN — SODIUM CHLORIDE: 9 INJECTION, SOLUTION INTRAVENOUS at 20:08

## 2022-03-01 RX ADMIN — SODIUM CHLORIDE, PRESERVATIVE FREE 10 ML: 5 INJECTION INTRAVENOUS at 20:06

## 2022-03-01 RX ADMIN — HEPARIN SODIUM 5000 UNITS: 5000 INJECTION INTRAVENOUS; SUBCUTANEOUS at 05:47

## 2022-03-01 ASSESSMENT — PAIN SCALES - GENERAL
PAINLEVEL_OUTOF10: 0

## 2022-03-01 NOTE — PLAN OF CARE
Problem: Pain:  Goal: Pain level will decrease  Description: Pain level will decrease  Outcome: Ongoing  Goal: Control of acute pain  Description: Control of acute pain  Outcome: Ongoing  Goal: Control of chronic pain  Description: Control of chronic pain  Outcome: Ongoing     Problem: Pain:  Goal: Pain level will decrease  Description: Pain level will decrease  Outcome: Ongoing     Problem: Pain:  Goal: Control of acute pain  Description: Control of acute pain  Outcome: Ongoing     Problem: Pain:  Goal: Control of chronic pain  Description: Control of chronic pain  Outcome: Ongoing

## 2022-03-01 NOTE — PROGRESS NOTES
Report called to [address] on [address], all questions answered at this time. Will transport patient after Venofer infusion is complete.

## 2022-03-01 NOTE — PROGRESS NOTES
Angella Sainz Kyle Sane, Estanislao Balloon  Urology Progress Note    Subjective:  No acute events overnight  No fevers or chills  Some nausea no vomiting  Tolerating Campbell  Abdominal pain significantly improved  Somewhat tearful this a.m. Campbell 4.6 L / 24 hours clear yellow urine      Patient Vitals for the past 24 hrs:   BP Temp Temp src Pulse Resp SpO2   02/28/22 2011 136/87 99.1 °F (37.3 °C) Oral 94 17 100 %   02/28/22 0855 (!) 144/97 97.7 °F (36.5 °C) Oral 89 20 100 %       Intake/Output Summary (Last 24 hours) at 3/1/2022 0655  Last data filed at 3/1/2022 0551  Gross per 24 hour   Intake 480 ml   Output 4600 ml   Net -4120 ml       Recent Labs     02/27/22  1615 03/01/22  0607   WBC 7.3 3.6   HGB 9.3* 8.9*   HCT 29.7* 29.0*   MCV 75.6* 76.1*    353     Recent Labs     02/27/22  1615 02/27/22  1757 02/28/22  0707   * 133* 130*   K 4.3 4.3 3.6*    102 98   CO2 19* 19* 23   BUN 22* 20 11   CREATININE 5.25* 4.54* 1.00*       Recent Labs     02/27/22  1844   COLORU Dark Yellow*   PHUR 5.0   WBCUA 2 TO 5   RBCUA 0 TO 2   SPECGRAV 1.048*   LEUKOCYTESUR NEGATIVE   UROBILINOGEN Normal   BILIRUBINUR NEGATIVE  Verified by ictotest.*       Additional Lab/culture results: None    Physical Exam:   AAOx3  NAD  Unlabored breathing  Normal rate  Abdomen soft, mild epigastric tenderness, nondistended  : Campbell in place draining yellow urine  No calf tenderness to palpation     Interval Imaging Findings: None    Impression:   29 y.o. 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 We will continue to monitor patient clinically.   Will need a cystogram prior to Campbell removal  -Patient is going to the OR with OB/GYN tomorrow for hysterectomy, urology will join to repair her bladder injury at that time  -Creatinine 0.53, back to baseline  -Follow-up with urology outpatient after discharge     [facility], DO  PGY-2,Urology  6:55 AM 3/1/2022

## 2022-03-01 NOTE — CARE COORDINATION
[facility] [facility] Flow/Interdisciplinary Rounds Progress Note    Quality Flow Rounds held on March 1, 2022 at [address] Attending:  Bedside Nurse,  and Nursing Unit Leadership    Barriers to Discharge:     Anticipated Discharge Date:       Anticipated Discharge Disposition:    Readmission Risk              Risk of Unplanned Readmission:  18           Discussed patient goal for the day, patient clinical progression, and barriers to discharge.   The following Goal(s) of the Day/Commitment(s) have been identified:  Activity Progression  obgyn and urology following, possible bladder repair on 3/2, daily labs, goal is home, monitor for home care needs      Sunil Moore RN  March 1, 2022

## 2022-03-01 NOTE — CONSULTS
Gynecology Progress Note    Date: 3/1/2022  Time: 7:02 AM    Tyrone Lang [address] y.o. female [id number] who presents with abdominal pain 2/2 Urine ascites due to recurrent bladder dome defect s/p repair of intraoperative cystotomy during diagnostic laparoscopy, MICK, D&C, and Mirena IUD insertion 2/14/22     Patient seen and examined. She complained of low back pain. Pain is controlled. Patient is  tolerating oral intake. She is urinating well. She denies any vaginal bleeding. She is  ambulating without difficulty. She is  passing flatus. She denies Fever/Chills, Chest Pain, SOB, N/V, Calf Pain. Will coordinate care with Urology about scheduling Total abdominal hysterectomy, possible supracervical hysterectomy, with bilateral salpingectomy, possible unilateral or bilateral oophorectomy, all other indicated procedures, possible vertical midline skin incision, co-procedure with Urology for possible bladder repair on 3/2/22. Discussed risks, benefits, alternatives of hysterectomy such as pain, bleeding, scarring, infection, damage to surrounding structures like the bladder, bowel, arteries, veins, nerves, need for additional surgery, need for blood products, incomplete resolution of symptoms, maternal death patient verbalized and expressed understanding-surgical consent was signed and placed in chart. All questions were answered. Vitals:  Vitals:    02/27/22 0756 02/27/22 2007 02/28/22 0855 02/28/22 2011   BP: (!) 124/90 129/88 (!) 144/97 136/87   Pulse: 85 81 89 94   Resp: 18 18 20 17   Temp: 97.9 °F (36.6 °C) 98.2 °F (36.8 °C) 97.7 °F (36.5 °C) 99.1 °F (37.3 °C)   TempSrc: Oral Oral Oral Oral   SpO2: 98% 100% 100% 100%   Weight:       Height:             Intake/Output:   Last Shift: I/O last 3 completed shifts: In: 480 [P.O.:480]  Out: 5750 [Urine:5750]  Current Shift: No intake/output data recorded.   3200 mL out in last 12 hrs ~ 266mL/hr      Physical Exam:  General:  no apparent distress, alert and cooperative  Neurologic:  alert, oriented, normal speech, no focal findings or movement disorder noted  Lungs:  No increased work of breathing, good air exchange, clear to auscultation bilaterally, no crackles or wheezing  Heart:  normal S1 and S2, regular rate and rhythm and no murmur    Abdomen: soft, non-distended, appropriate tenderness, port sites healing well without erythema/drainage, negative CVA tenderness  Extremities:  no calf tenderness, non edematous, SCDs in place and functioning    Lab:  Lab Results   Component Value Date    WBC 3.6 03/01/2022    HGB 8.9 (L) 03/01/2022    HCT 29.0 (L) 03/01/2022    MCV 76.1 (L) 03/01/2022     03/01/2022     Lab Results   Component Value Date     03/01/2022    K 4.2 03/01/2022     03/01/2022    CO2 27 03/01/2022    BUN 4 (L) 03/01/2022    CREATININE 0.53 03/01/2022    GLUCOSE 83 03/01/2022    CALCIUM 8.9 03/01/2022    PROT 7.2 02/25/2022    LABALBU 4.1 02/25/2022    BILITOT 0.34 02/25/2022    ALKPHOS 46 02/25/2022    AST 17 02/25/2022    ALT 11 02/25/2022    LABGLOM >60 03/01/2022    GFRAA >60 03/01/2022       Assessment/Plan:  Elif MENDEZ [place] 29 y.o. female [id number] who presented with abdominal pain 2/2 Urine ascites due to recurrent bladder dome defect s/p repair of intraoperative cystotomy during diagnostic laparoscopy, MICK, D&C, and Mirena IUD insertion 2/14/22     - Urology consulted   - Internal medicine primary team   - Ob/Gyn consulted   - Doing well, vitals stable   - Continue rosado catheter, UOP adequate   - Continue IVF 1/2 NS + Sodium Bicarb 75mEQ 150ml/hr   - Encourage ambulation and use of incentive spirometer   - Pain control: Dilaudid 0.5mg IV q3hr PRN, Norco 2 tab [id number] PRN, Tylenol 650mg q4h PRN, avoiding nephrotoxic medications (NSAIDs)   - Labs: CBC qd, BMP BID, Ionized Ca ordered   - DVT Proph: Heparin 5000U [id number]/SCDs   - Abx: none   - Diet: Low Na/K diet    - Cr 0.63>5.25>4.54>1.00>0.53   - Continue admission for pain control and close monitoring for infection/sepsis with urine ascites present   - Will coordinate care with Urology about scheduling Total abdominal hysterectomy, possible supracervical hysterectomy, with bilateral salpingectomy, possible unilateral or bilateral oophorectomy, all other indicated procedures, possible vertical midline skin incision, co-procedure with Urology for possible bladder repair on 3/2/22   - Discussed risks, benefits, alternatives of hysterectomy such as pain, bleeding, scarring, infection, damage to surrounding structures like the bladder, bowel, arteries, veins, nerves, need for additional surgery, need for blood products, incomplete resolution of symptoms, maternal death patient verbalized and expressed understanding-surgical consent was signed and placed in chart   - Appreciate Internal medicine recommendations for medical optimization and evaluation for surgical risk    - Appreciate Urology following    Pelvic pain/Fibroid Uterus   - CT A/P 2/27: Urinary bladder is filled with contrast. Track of contrast extends from the anterior dome of the urinary bladder into the peritoneal cavity consistent with active extravasation from the urinary bladder indicative of bladder injury/tear. Interval development of large volume free intraperitoneal fluid of density higher than simple fluid. This is attributed to extravasation of  contrast/urine from the urinary bladder. Small sliding hiatal hernia.     - CT A/P 2/25/22: Bulky heterogeneous uterus    - Pelvic sono 5/21/21: Uterus 10 x 5 x 6cm, Stripe 8.6mm   - Endometrial biopsy obtained on 2/14/22 negative for atypia or malignancy    - Last pap smear 1/11/7 negative with HPV negative    Nausea/ Vomiting   - Pt reporting resolved    Hyponatremia (Na 132>130)   - Internal medicine as primary team and defer to them for management   - BMP BID ordered    - 1/2 NS + Sodium bicarb 75mEQ 150ml/hr IVF ordered    Anemia (10>9.6)   - Pt denies any s/s anemia    - Will consider IV Venofer will inpatient     S/p Dx laparoscopy, lysis of adhesions, hysteroscopy, dilatation and curettage, Mirena IUD insertion complicated by intra-operative cystotomy 2/14/22    - Urology was consulted intra-operatively on 2/14/22 and converted to robotic assisted repair and pt was discharged home with rosado catheter which was removed on 2/24/22    S/p IUD removal 2/21/22 due to rash and concern for allergy to progesterone    Hx of C/S x3    HTN   - Stable on no medications   - Pt denies any CP, SOB   - Last EKG upon chart review on 10/30/21: NSR    - Consider repeat EKG for surgical risk assessment    Heterozygous Prothrombin mutation   - Pt denies any personal or family hx of VTE   - Factor V Leiden mutation negative on 5/9/13    FHx Uterine cancer (PGM)    THC use   - Encouraged cessation    BMI 29    Will update Dr. Seema Clark. Please contact Ob/Gyn Resident Physician On Call via [email or web address] if you have any questions or concerns. Dipak Mejia, DO  Ob/Gyn Resident  3/1/2022, 7:02 AM          Attending Physician Statement  I have discussed the care of [facility], including pertinent history and exam findings,  with the resident. I have seen and examined the patient and the key elements of all parts of the encounter have been performed by me. I agree with the assessment, plan and orders as documented by the resident. I discussed planned abdominal hysterectomy and bladder defect repair by urology tomorrow. Indications, risks and alternative were reviewed at length. All the patients questions were answered to the best of my ability. Pt is aware hysterectomy is a sterilization procedure. The pt was provided with Acknowledgement of Hysterectomy form, it was reviewed with her and signed. This will be scanned into the Media section of the record.    (GC Modifier)

## 2022-03-01 NOTE — PROGRESS NOTES
[facility]  Office: [address], DO, Stephany Welsheller, DO, Briana Tapia, DO, Anthonyrandy Lees Blood, DO, Beverly Harris MD, Javi Garcia MD, Mignon Daley MD, Freddie Tompkins MD, Exie Ahumada, MD, Flavio Beavers MD, Kerry Rose MD, Jass Blank, DO, Adarsh Vigil, DO, Shayla Arzola MD,  Cam Cook, DO, Marco A Denson MD, Jessica Stanford MD, Luisa Packer MD, Michael Gore MD, Marielos Abdullahi MD, Natalie Damon, Ashley Asp, CNP, Luisito Galdamez, [facility], Helen Rondon, [facility], [facility], [facility], [facility], CNP, Christophe Licea, CNP, Joanna Landon, CNP, Jemal Bernal, CNP, Harsh Harmon, PA-C, Casper Boyce, [facility], [facility], [facility], Brian Gregg, CNP, Neville Camp, CNP, Yessi Johnson, CNP, Jory Malhotra, CNP, Esther Butcher, [facility], Sourav Bustamante, [address] [place] [address]    Progress Note    Name:   Gemma Izquierdo  MRN:     [id number]     Acct:      [de-identified]   Room:   [address] [place] [address] Day:  3  Admit Date:  2/25/2022  9:04 AM    PCP:   Lulu Hi MD  Code Status:  Full Code    Subjective:     C/C:   Chief Complaint   Patient presents with    Abdominal Pain     Interval History Status: not changed. Patient in bed talking on phone not in any distress. Is hemodynamically stable. On room air. Denies any specific complaints this morning. CBC BMP reviewed with patient hemoglobin 8.9, creatinine improved to 0.53, sodium 141. Urine culture no growth to date  Brief History:   80-year-old presented to ED with complaints of abdominal pain, after a recent robotic diagnostic laparoscopy with lysis of additions and had cystoscopy D&C and IUD insertion on 2/14/2022 intraoperatively there was a 2 cm injury to the bladder requiring repair. She is ultimately found to have 2 cm anterior dome defect with urinary ascites, BETHANY which improved after Campbell placement and diversion of urine.   Review of Systems:     Constitutional: negative for chills, fevers, sweats  Respiratory:  negative for cough, dyspnea on exertion, shortness of breath, wheezing  Cardiovascular:  negative for chest pain, chest pressure/discomfort, lower extremity edema, palpitations  Gastrointestinal:  negative for abdominal pain, constipation, diarrhea, nausea, vomiting  Neurological:  negative for dizziness, headache    Medications: Allergies: Allergies   Allergen Reactions    Seasonal Other (See Comments)     Allergic rhinnitis       Current Meds:   Scheduled Meds:    docusate sodium  100 mg Oral Daily    heparin (porcine)  5,000 Units SubCUTAneous 3 times per day    sodium chloride flush  5-40 mL IntraVENous 2 times per day    famotidine  20 mg Oral BID     Continuous Infusions:    IV infusion builder 150 mL/hr at 03/01/22 1229    sodium chloride       PRN Meds: magnesium hydroxide, HYDROmorphone, docusate sodium, oxybutynin, simethicone, sodium chloride flush, sodium chloride, acetaminophen, ondansetron **OR** ondansetron, HYDROcodone 5 mg - acetaminophen    Data:     Past Medical History:   has a past medical history of Acne, Benign essential hypertension antepartum, Closed nondisplaced fracture of distal pole of navicular bone of left wrist, COVID-19, Family history of uterine cancer, Iron deficiency anemia, Mirena IUD inserted 2/14/22, Prothrombin gene mutation [facility]), S/p Diagnostic laparoscopy, hysis of adhesions, hysteroscopy, dilatation and curettage, Mirena IUD insertion, Snores, Under care of team, and Under care of team.    Social History:   reports that she has never smoked. She has never used smokeless tobacco. She reports current alcohol use of about 3.0 standard drinks of alcohol per week. She reports current drug use. Frequency: 2.00 times per week. Drug: Marijuana Gokul Piper).      Family History:   Family History   Problem Relation Age of Onset    Diabetes Paternal Grandmother     Endometrial Cancer Paternal Grandmother     No Known Problems Mother     No Known Problems Father     Hypertension Maternal Grandmother     Breast Cancer Neg Hx     Cancer Neg Hx     Colon Cancer Neg Hx     Eclampsia Neg Hx     Ovarian Cancer Neg Hx      Labor Neg Hx     Spont Abortions Neg Hx     Stroke Neg Hx        Vitals:  BP (!) 150/108   Pulse 89   Temp 98.3 °F (36.8 °C) (Oral)   Resp 22   Ht 5' 1\" (1.549 m)   Wt 152 lb (68.9 kg)   SpO2 96%   BMI 28.72 kg/m²   Temp (24hrs), Av.2 °F (36.8 °C), Min:97 °F (36.1 °C), Max:99.1 °F (37.3 °C)    No results for input(s): POCGLU in the last 72 hours. I/O (24Hr): Intake/Output Summary (Last 24 hours) at 3/1/2022 181  Last data filed at 3/1/2022 1646  Gross per 24 hour   Intake 480 ml   Output 6000 ml   Net -5520 ml       Labs:  Hematology:  Recent Labs     22  1615 22  0607   WBC 7.3 3.6   RBC 3.93* 3.81*   HGB 9.3* 8.9*   HCT 29.7* 29.0*   MCV 75.6* 76.1*   MCH 23.7* 23.4*   MCHC 31.3 30.7   RDW 18.3* 18.0*    353   MPV 10.1 9.9     Chemistry:  Recent Labs     22  1757 22  0707 22  0607   * 130* 141   K 4.3 3.6* 4.2    98 102   CO2 19* 23 27   GLUCOSE 87 81 83   BUN 20 11 4*   CREATININE 4.54* 1.00* 0.53   ANIONGAP 12 9 12   LABGLOM 11* >60 >60   GFRAA 13* >60 >60   CALCIUM 9.0 8.3* 8.9   CAION  --   --  1.10*   No results for input(s): PROT, LABALBU, LABA1C, C1YSCOV, T6PEVZO, FT4, TSH, AST, ALT, LDH, GGT, ALKPHOS, LABGGT, BILITOT, BILIDIR, AMMONIA, AMYLASE, LIPASE, LACTATE, CHOL, HDL, LDLCHOLESTEROL, CHOLHDLRATIO, TRIG, VLDL, SPQ49UG, PHENYTOIN, PHENYF, URICACID, POCGLU in the last 72 hours.   ABG:No results found for: POCPH, PHART, PH, POCPCO2, FPX1MXQ, PCO2, POCPO2, PO2ART, PO2, POCHCO3, QGC9WZJ, HCO3, NBEA, PBEA, BEART, BE, THGBART, THB, ZQC3NLV, PAXX8MNX, K3OXFFVH, O2SAT, FIO2  Lab Results   Component Value Date/Time    SPECIAL NOT REPORTED 2022 12:54 PM     Lab Results   Component Value Date/Time    CULTURE NO GROWTH 2022 02:15 PM       Radiology:  XR CHEST (2 VW)    Result Date: 2022  No acute process. There is no change from prior examination. CT ABDOMEN PELVIS W IV CONTRAST    Result Date: 2022  1. Urinary bladder is filled with contrast. Track of contrast extends from the anterior dome of the urinary bladder into the peritoneal cavity consistent with active extravasation from the urinary bladder indicative of bladder injury/tear. 2. Interval development of large volume free intraperitoneal fluid of density higher than simple fluid. This is attributed to extravasation of contrast/urine from the urinary bladder. 3. Small sliding hiatal hernia. Findings were discussed with SHI Vicente at 4:02 pm on 2022. CT ABDOMEN PELVIS W IV CONTRAST Additional Contrast? None    Result Date: 2022  1. Mild stranding around the urinary bladder and uterus as well as tiny amount of free fluid in the pelvic cul-de-sac, likely expected postoperative change. No pelvic fluid collection or gas. 2.  No acute findings in the upper abdomen. CT CYSTOGRAM W CONTRAST    Result Date: 2022  Defect in the bladder dome is confirmed with intraperitoneal contrast extravasation and large volume abdominopelvic fluid.        Physical Examination:        General appearance:  alert, cooperative and no distress  Mental Status:  oriented to person, place and time and normal affect  Lungs:  clear to auscultation bilaterally, normal effort  Heart:  regular rate and rhythm, no murmur  Abdomen:  soft, nontender, nondistended, normal bowel sounds, no masses, hepatomegaly, splenomegaly  Extremities:  no edema, redness, tenderness in the calves  Skin:  no gross lesions, rashes, induration    Assessment:        Hospital Problems           Last Modified POA    * (Principal) Urine ascites 2022 Yes    History of  section, classical 2022 Yes    Overview Signed 2013 10:48 AM by Maico Wilburn MD      Heterozygous for prothrombin 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         Female pelvic pain 2/25/2022 Yes    Overview Addendum 2/18/2020  8:15 AM by Mili Wheatley DO     Pelvic US ordered, appt on 7/21/16: unremarkable          Abnormal uterine bleeding (AUB) 2/25/2022 Yes    S/p Diagnostic laparoscopy, lysis of adhesions, hysteroscopy, dilatation and curettage, Mirena IUD insertion 2/14/22 2/25/2022 Yes    Overview Signed 2/14/2022  1:27 PM by Jose Quiñonez DO     Complicated by incidental cystotomy repaired by Dr. Marie James MD Urology via Robot           Pelvic adhesive disease 2/28/2022 Yes    Intramural leiomyoma of uterus 2/28/2022 Yes    Lower abdominal pain 2/28/2022 Yes    Intractable abdominal pain 2/26/2022 Yes    IUD removed 2/21/22 2/27/2022 Yes    Overview Signed 2/27/2022  8:37 AM by Mili Wheatley DO     Patient presented with rash. There was concern for possible allergy. IUD was removed at that time         Hypercoagulable state ([facility] [address] 2/28/2022 Yes    Hypokalemia 2/28/2022 Yes    Hypocalcemia 2/28/2022 Yes    Bladder perforation, intraoperative 2/28/2022 Yes    Hx CS x3 2/28/2022 Yes    Marijuana use 2/28/2022 Yes                Plan:      -Recurrent bladder wall defect with urinary ascites after a recent diagnostic lap:  Urology and GYN planning for a hysterectomy and bladder injury repair tomorrow. Is low CV risk for low risk surgery. -BETHANY is resolved, continue Campbell monitor urine output. DC bicarb infusion  -Chronic iron deficiency anemia :continue IV iron. -DVT/GI prophylaxis.     Eileen Stewart MD  3/1/2022

## 2022-03-02 ENCOUNTER — ANESTHESIA (OUTPATIENT)
Dept: OPERATING ROOM | Age: 37
DRG: 441 | End: 2022-03-02
Payer: COMMERCIAL

## 2022-03-02 ENCOUNTER — ANESTHESIA EVENT (OUTPATIENT)
Dept: OPERATING ROOM | Age: 37
DRG: 441 | End: 2022-03-02
Payer: COMMERCIAL

## 2022-03-02 VITALS
SYSTOLIC BLOOD PRESSURE: 134 MMHG | TEMPERATURE: 98.7 F | RESPIRATION RATE: 19 BRPM | OXYGEN SATURATION: 100 % | DIASTOLIC BLOOD PRESSURE: 81 MMHG

## 2022-03-02 PROBLEM — Z90.710 S/P TOTAL ABDOMINAL HYSTERECTOMY: Status: ACTIVE | Noted: 2022-03-02

## 2022-03-02 LAB
ABSOLUTE EOS #: 0.08 K/UL (ref 0–0.44)
ABSOLUTE IMMATURE GRANULOCYTE: <0.03 K/UL (ref 0–0.3)
ABSOLUTE LYMPH #: 0.81 K/UL (ref 1.1–3.7)
ABSOLUTE MONO #: 0.44 K/UL (ref 0.1–1.2)
ANION GAP SERPL CALCULATED.3IONS-SCNC: 12 MMOL/L (ref 9–17)
BASOPHILS # BLD: 1 % (ref 0–2)
BASOPHILS ABSOLUTE: <0.03 K/UL (ref 0–0.2)
BUN BLDV-MCNC: 4 MG/DL (ref 6–20)
CALCIUM SERPL-MCNC: 9 MG/DL (ref 8.6–10.4)
CHLORIDE BLD-SCNC: 101 MMOL/L (ref 98–107)
CO2: 23 MMOL/L (ref 20–31)
CREAT SERPL-MCNC: 0.53 MG/DL (ref 0.5–0.9)
EOSINOPHILS RELATIVE PERCENT: 2 % (ref 1–4)
GFR AFRICAN AMERICAN: >60 ML/MIN
GFR NON-AFRICAN AMERICAN: >60 ML/MIN
GFR SERPL CREATININE-BSD FRML MDRD: ABNORMAL ML/MIN/{1.73_M2}
GLUCOSE BLD-MCNC: 88 MG/DL (ref 70–99)
HCT VFR BLD CALC: 29.6 % (ref 36.3–47.1)
HEMOGLOBIN: 9 G/DL (ref 11.9–15.1)
IMMATURE GRANULOCYTES: 0 %
LYMPHOCYTES # BLD: 23 % (ref 24–43)
MCH RBC QN AUTO: 23.4 PG (ref 25.2–33.5)
MCHC RBC AUTO-ENTMCNC: 30.4 G/DL (ref 28.4–34.8)
MCV RBC AUTO: 76.9 FL (ref 82.6–102.9)
MONOCYTES # BLD: 13 % (ref 3–12)
NRBC AUTOMATED: 0 PER 100 WBC
PDW BLD-RTO: 17.7 % (ref 11.8–14.4)
PLATELET # BLD: 363 K/UL (ref 138–453)
PMV BLD AUTO: 10.2 FL (ref 8.1–13.5)
POTASSIUM SERPL-SCNC: 4 MMOL/L (ref 3.7–5.3)
RBC # BLD: 3.85 M/UL (ref 3.95–5.11)
RBC # BLD: ABNORMAL 10*6/UL
SEG NEUTROPHILS: 61 % (ref 36–65)
SEGMENTED NEUTROPHILS ABSOLUTE COUNT: 2.17 K/UL (ref 1.5–8.1)
SODIUM BLD-SCNC: 136 MMOL/L (ref 135–144)
WBC # BLD: 3.5 K/UL (ref 3.5–11.3)

## 2022-03-02 PROCEDURE — 0TQB0ZZ REPAIR BLADDER, OPEN APPROACH: ICD-10-PCS | Performed by: UROLOGY

## 2022-03-02 PROCEDURE — 58150 TOTAL HYSTERECTOMY: CPT | Performed by: OBSTETRICS & GYNECOLOGY

## 2022-03-02 PROCEDURE — 6360000002 HC RX W HCPCS

## 2022-03-02 PROCEDURE — 0UT90ZZ RESECTION OF UTERUS, OPEN APPROACH: ICD-10-PCS | Performed by: OBSTETRICS & GYNECOLOGY

## 2022-03-02 PROCEDURE — 0U910ZZ DRAINAGE OF LEFT OVARY, OPEN APPROACH: ICD-10-PCS | Performed by: OBSTETRICS & GYNECOLOGY

## 2022-03-02 PROCEDURE — BT101ZZ FLUOROSCOPY OF BLADDER USING LOW OSMOLAR CONTRAST: ICD-10-PCS | Performed by: UROLOGY

## 2022-03-02 PROCEDURE — 6360000002 HC RX W HCPCS: Performed by: STUDENT IN AN ORGANIZED HEALTH CARE EDUCATION/TRAINING PROGRAM

## 2022-03-02 PROCEDURE — 85025 COMPLETE CBC W/AUTO DIFF WBC: CPT

## 2022-03-02 PROCEDURE — 2709999900 HC NON-CHARGEABLE SUPPLY: Performed by: OBSTETRICS & GYNECOLOGY

## 2022-03-02 PROCEDURE — 7100000001 HC PACU RECOVERY - ADDTL 15 MIN: Performed by: OBSTETRICS & GYNECOLOGY

## 2022-03-02 PROCEDURE — 88307 TISSUE EXAM BY PATHOLOGIST: CPT

## 2022-03-02 PROCEDURE — 2500000003 HC RX 250 WO HCPCS: Performed by: NURSE ANESTHETIST, CERTIFIED REGISTERED

## 2022-03-02 PROCEDURE — 0TNB0ZZ RELEASE BLADDER, OPEN APPROACH: ICD-10-PCS | Performed by: OBSTETRICS & GYNECOLOGY

## 2022-03-02 PROCEDURE — 99232 SBSQ HOSP IP/OBS MODERATE 35: CPT | Performed by: INTERNAL MEDICINE

## 2022-03-02 PROCEDURE — 1200000000 HC SEMI PRIVATE

## 2022-03-02 PROCEDURE — 36415 COLL VENOUS BLD VENIPUNCTURE: CPT

## 2022-03-02 PROCEDURE — 3600000004 HC SURGERY LEVEL 4 BASE: Performed by: OBSTETRICS & GYNECOLOGY

## 2022-03-02 PROCEDURE — 2580000003 HC RX 258: Performed by: OBSTETRICS & GYNECOLOGY

## 2022-03-02 PROCEDURE — 2580000003 HC RX 258: Performed by: UROLOGY

## 2022-03-02 PROCEDURE — 6370000000 HC RX 637 (ALT 250 FOR IP): Performed by: EMERGENCY MEDICINE

## 2022-03-02 PROCEDURE — 2580000003 HC RX 258: Performed by: INTERNAL MEDICINE

## 2022-03-02 PROCEDURE — 51702 INSERT TEMP BLADDER CATH: CPT

## 2022-03-02 PROCEDURE — 3600000014 HC SURGERY LEVEL 4 ADDTL 15MIN: Performed by: OBSTETRICS & GYNECOLOGY

## 2022-03-02 PROCEDURE — 0UT70ZZ RESECTION OF BILATERAL FALLOPIAN TUBES, OPEN APPROACH: ICD-10-PCS | Performed by: OBSTETRICS & GYNECOLOGY

## 2022-03-02 PROCEDURE — 3700000001 HC ADD 15 MINUTES (ANESTHESIA): Performed by: OBSTETRICS & GYNECOLOGY

## 2022-03-02 PROCEDURE — 6370000000 HC RX 637 (ALT 250 FOR IP): Performed by: STUDENT IN AN ORGANIZED HEALTH CARE EDUCATION/TRAINING PROGRAM

## 2022-03-02 PROCEDURE — 6360000002 HC RX W HCPCS: Performed by: NURSE ANESTHETIST, CERTIFIED REGISTERED

## 2022-03-02 PROCEDURE — 6360000002 HC RX W HCPCS: Performed by: ANESTHESIOLOGY

## 2022-03-02 PROCEDURE — 80048 BASIC METABOLIC PNL TOTAL CA: CPT

## 2022-03-02 PROCEDURE — 3700000000 HC ANESTHESIA ATTENDED CARE: Performed by: OBSTETRICS & GYNECOLOGY

## 2022-03-02 PROCEDURE — 7100000000 HC PACU RECOVERY - FIRST 15 MIN: Performed by: OBSTETRICS & GYNECOLOGY

## 2022-03-02 PROCEDURE — 2580000003 HC RX 258: Performed by: NURSE ANESTHETIST, CERTIFIED REGISTERED

## 2022-03-02 PROCEDURE — 0T9B70Z DRAINAGE OF BLADDER WITH DRAINAGE DEVICE, VIA NATURAL OR ARTIFICIAL OPENING: ICD-10-PCS | Performed by: OBSTETRICS & GYNECOLOGY

## 2022-03-02 PROCEDURE — 2580000003 HC RX 258: Performed by: STUDENT IN AN ORGANIZED HEALTH CARE EDUCATION/TRAINING PROGRAM

## 2022-03-02 PROCEDURE — 2500000003 HC RX 250 WO HCPCS: Performed by: OBSTETRICS & GYNECOLOGY

## 2022-03-02 PROCEDURE — 2720000010 HC SURG SUPPLY STERILE: Performed by: OBSTETRICS & GYNECOLOGY

## 2022-03-02 RX ORDER — ONDANSETRON 2 MG/ML
4 INJECTION INTRAMUSCULAR; INTRAVENOUS EVERY 6 HOURS PRN
Status: DISCONTINUED | OUTPATIENT
Start: 2022-03-02 | End: 2022-03-05 | Stop reason: HOSPADM

## 2022-03-02 RX ORDER — ONDANSETRON 4 MG/1
4 TABLET, ORALLY DISINTEGRATING ORAL EVERY 8 HOURS PRN
Status: DISCONTINUED | OUTPATIENT
Start: 2022-03-02 | End: 2022-03-05 | Stop reason: HOSPADM

## 2022-03-02 RX ORDER — DIPHENHYDRAMINE HYDROCHLORIDE 50 MG/ML
25 INJECTION INTRAMUSCULAR; INTRAVENOUS EVERY 6 HOURS PRN
Status: DISCONTINUED | OUTPATIENT
Start: 2022-03-02 | End: 2022-03-05 | Stop reason: HOSPADM

## 2022-03-02 RX ORDER — SODIUM CHLORIDE 9 MG/ML
25 INJECTION, SOLUTION INTRAVENOUS PRN
Status: DISCONTINUED | OUTPATIENT
Start: 2022-03-02 | End: 2022-03-05 | Stop reason: HOSPADM

## 2022-03-02 RX ORDER — SODIUM CHLORIDE 9 MG/ML
25 INJECTION, SOLUTION INTRAVENOUS PRN
Status: DISCONTINUED | OUTPATIENT
Start: 2022-03-02 | End: 2022-03-02 | Stop reason: HOSPADM

## 2022-03-02 RX ORDER — MIDAZOLAM HYDROCHLORIDE 1 MG/ML
INJECTION INTRAMUSCULAR; INTRAVENOUS PRN
Status: DISCONTINUED | OUTPATIENT
Start: 2022-03-02 | End: 2022-03-02 | Stop reason: SDUPTHER

## 2022-03-02 RX ORDER — ACETAMINOPHEN 500 MG
1000 TABLET ORAL EVERY 6 HOURS
Status: DISCONTINUED | OUTPATIENT
Start: 2022-03-02 | End: 2022-03-04

## 2022-03-02 RX ORDER — SIMETHICONE 80 MG
80 TABLET,CHEWABLE ORAL EVERY 6 HOURS PRN
Status: DISCONTINUED | OUTPATIENT
Start: 2022-03-02 | End: 2022-03-02

## 2022-03-02 RX ORDER — ONDANSETRON 2 MG/ML
INJECTION INTRAMUSCULAR; INTRAVENOUS PRN
Status: DISCONTINUED | OUTPATIENT
Start: 2022-03-02 | End: 2022-03-02 | Stop reason: SDUPTHER

## 2022-03-02 RX ORDER — MEPERIDINE HYDROCHLORIDE 50 MG/ML
12.5 INJECTION INTRAMUSCULAR; INTRAVENOUS; SUBCUTANEOUS EVERY 5 MIN PRN
Status: DISCONTINUED | OUTPATIENT
Start: 2022-03-02 | End: 2022-03-02 | Stop reason: HOSPADM

## 2022-03-02 RX ORDER — SODIUM CHLORIDE, SODIUM LACTATE, POTASSIUM CHLORIDE, CALCIUM CHLORIDE 600; 310; 30; 20 MG/100ML; MG/100ML; MG/100ML; MG/100ML
INJECTION, SOLUTION INTRAVENOUS CONTINUOUS PRN
Status: DISCONTINUED | OUTPATIENT
Start: 2022-03-02 | End: 2022-03-02 | Stop reason: SDUPTHER

## 2022-03-02 RX ORDER — MAGNESIUM HYDROXIDE 1200 MG/15ML
LIQUID ORAL PRN
Status: DISCONTINUED | OUTPATIENT
Start: 2022-03-02 | End: 2022-03-02 | Stop reason: ALTCHOICE

## 2022-03-02 RX ORDER — KETOROLAC TROMETHAMINE 30 MG/ML
30 INJECTION, SOLUTION INTRAMUSCULAR; INTRAVENOUS EVERY 6 HOURS
Status: DISCONTINUED | OUTPATIENT
Start: 2022-03-02 | End: 2022-03-03

## 2022-03-02 RX ORDER — FENTANYL CITRATE 50 UG/ML
INJECTION, SOLUTION INTRAMUSCULAR; INTRAVENOUS PRN
Status: DISCONTINUED | OUTPATIENT
Start: 2022-03-02 | End: 2022-03-02 | Stop reason: SDUPTHER

## 2022-03-02 RX ORDER — SENNA AND DOCUSATE SODIUM 50; 8.6 MG/1; MG/1
2 TABLET, FILM COATED ORAL 2 TIMES DAILY
Status: DISCONTINUED | OUTPATIENT
Start: 2022-03-02 | End: 2022-03-03

## 2022-03-02 RX ORDER — ONDANSETRON 2 MG/ML
4 INJECTION INTRAMUSCULAR; INTRAVENOUS
Status: COMPLETED | OUTPATIENT
Start: 2022-03-02 | End: 2022-03-02

## 2022-03-02 RX ORDER — SODIUM CHLORIDE 0.9 % (FLUSH) 0.9 %
5-40 SYRINGE (ML) INJECTION EVERY 12 HOURS SCHEDULED
Status: DISCONTINUED | OUTPATIENT
Start: 2022-03-02 | End: 2022-03-02 | Stop reason: HOSPADM

## 2022-03-02 RX ORDER — CYCLOBENZAPRINE HCL 10 MG
10 TABLET ORAL EVERY 8 HOURS PRN
Status: DISCONTINUED | OUTPATIENT
Start: 2022-03-02 | End: 2022-03-03

## 2022-03-02 RX ORDER — FAMOTIDINE 20 MG/1
20 TABLET, FILM COATED ORAL 2 TIMES DAILY PRN
Status: DISCONTINUED | OUTPATIENT
Start: 2022-03-02 | End: 2022-03-03

## 2022-03-02 RX ORDER — DEXAMETHASONE SODIUM PHOSPHATE 10 MG/ML
INJECTION INTRAMUSCULAR; INTRAVENOUS PRN
Status: DISCONTINUED | OUTPATIENT
Start: 2022-03-02 | End: 2022-03-02 | Stop reason: SDUPTHER

## 2022-03-02 RX ORDER — MAGNESIUM HYDROXIDE 1200 MG/15ML
LIQUID ORAL CONTINUOUS PRN
Status: DISCONTINUED | OUTPATIENT
Start: 2022-03-02 | End: 2022-03-02 | Stop reason: ALTCHOICE

## 2022-03-02 RX ORDER — SODIUM CHLORIDE 9 MG/ML
INJECTION, SOLUTION INTRAVENOUS CONTINUOUS
Status: DISCONTINUED | OUTPATIENT
Start: 2022-03-02 | End: 2022-03-02

## 2022-03-02 RX ORDER — LIDOCAINE HYDROCHLORIDE 10 MG/ML
INJECTION, SOLUTION EPIDURAL; INFILTRATION; INTRACAUDAL; PERINEURAL PRN
Status: DISCONTINUED | OUTPATIENT
Start: 2022-03-02 | End: 2022-03-02 | Stop reason: SDUPTHER

## 2022-03-02 RX ORDER — ROCURONIUM BROMIDE 10 MG/ML
INJECTION, SOLUTION INTRAVENOUS PRN
Status: DISCONTINUED | OUTPATIENT
Start: 2022-03-02 | End: 2022-03-02 | Stop reason: SDUPTHER

## 2022-03-02 RX ORDER — HYDROCODONE BITARTRATE AND ACETAMINOPHEN 5; 325 MG/1; MG/1
1 TABLET ORAL EVERY 4 HOURS PRN
Status: DISCONTINUED | OUTPATIENT
Start: 2022-03-02 | End: 2022-03-02

## 2022-03-02 RX ORDER — OXYCODONE HYDROCHLORIDE 5 MG/1
5 TABLET ORAL EVERY 4 HOURS PRN
Status: DISCONTINUED | OUTPATIENT
Start: 2022-03-02 | End: 2022-03-04

## 2022-03-02 RX ORDER — OXYCODONE HYDROCHLORIDE 5 MG/1
10 TABLET ORAL EVERY 4 HOURS PRN
Status: DISCONTINUED | OUTPATIENT
Start: 2022-03-02 | End: 2022-03-04

## 2022-03-02 RX ORDER — KETOROLAC TROMETHAMINE 30 MG/ML
INJECTION, SOLUTION INTRAMUSCULAR; INTRAVENOUS PRN
Status: DISCONTINUED | OUTPATIENT
Start: 2022-03-02 | End: 2022-03-02 | Stop reason: SDUPTHER

## 2022-03-02 RX ORDER — SODIUM CHLORIDE 0.9 % (FLUSH) 0.9 %
10 SYRINGE (ML) INJECTION EVERY 12 HOURS SCHEDULED
Status: DISCONTINUED | OUTPATIENT
Start: 2022-03-02 | End: 2022-03-05 | Stop reason: HOSPADM

## 2022-03-02 RX ORDER — IBUPROFEN 600 MG/1
600 TABLET ORAL EVERY 6 HOURS
Status: DISCONTINUED | OUTPATIENT
Start: 2022-03-03 | End: 2022-03-03

## 2022-03-02 RX ORDER — GABAPENTIN 300 MG/1
300 CAPSULE ORAL EVERY 8 HOURS PRN
Status: DISCONTINUED | OUTPATIENT
Start: 2022-03-02 | End: 2022-03-03

## 2022-03-02 RX ORDER — BUPIVACAINE HYDROCHLORIDE 2.5 MG/ML
INJECTION, SOLUTION EPIDURAL; INFILTRATION; INTRACAUDAL PRN
Status: DISCONTINUED | OUTPATIENT
Start: 2022-03-02 | End: 2022-03-02 | Stop reason: ALTCHOICE

## 2022-03-02 RX ORDER — DIPHENHYDRAMINE HCL 25 MG
25 TABLET ORAL EVERY 6 HOURS PRN
Status: DISCONTINUED | OUTPATIENT
Start: 2022-03-02 | End: 2022-03-05 | Stop reason: HOSPADM

## 2022-03-02 RX ORDER — SODIUM CHLORIDE 0.9 % (FLUSH) 0.9 %
10 SYRINGE (ML) INJECTION PRN
Status: DISCONTINUED | OUTPATIENT
Start: 2022-03-02 | End: 2022-03-05 | Stop reason: HOSPADM

## 2022-03-02 RX ORDER — HYDROCODONE BITARTRATE AND ACETAMINOPHEN 5; 325 MG/1; MG/1
2 TABLET ORAL EVERY 4 HOURS PRN
Status: DISCONTINUED | OUTPATIENT
Start: 2022-03-02 | End: 2022-03-02

## 2022-03-02 RX ORDER — CALCIUM CARBONATE 200(500)MG
500 TABLET,CHEWABLE ORAL 3 TIMES DAILY PRN
Status: DISCONTINUED | OUTPATIENT
Start: 2022-03-02 | End: 2022-03-04

## 2022-03-02 RX ORDER — FENTANYL CITRATE 50 UG/ML
25 INJECTION, SOLUTION INTRAMUSCULAR; INTRAVENOUS EVERY 5 MIN PRN
Status: DISCONTINUED | OUTPATIENT
Start: 2022-03-02 | End: 2022-03-02 | Stop reason: HOSPADM

## 2022-03-02 RX ORDER — PROCHLORPERAZINE EDISYLATE 5 MG/ML
5 INJECTION INTRAMUSCULAR; INTRAVENOUS ONCE
Status: COMPLETED | OUTPATIENT
Start: 2022-03-02 | End: 2022-03-02

## 2022-03-02 RX ORDER — OXYCODONE HYDROCHLORIDE 5 MG/1
10 TABLET ORAL EVERY 4 HOURS PRN
Status: DISCONTINUED | OUTPATIENT
Start: 2022-03-02 | End: 2022-03-02

## 2022-03-02 RX ORDER — KETAMINE HCL IN NACL, ISO-OSM 100MG/10ML
SYRINGE (ML) INJECTION PRN
Status: DISCONTINUED | OUTPATIENT
Start: 2022-03-02 | End: 2022-03-02 | Stop reason: SDUPTHER

## 2022-03-02 RX ORDER — FENTANYL CITRATE 50 UG/ML
50 INJECTION, SOLUTION INTRAMUSCULAR; INTRAVENOUS EVERY 5 MIN PRN
Status: COMPLETED | OUTPATIENT
Start: 2022-03-02 | End: 2022-03-02

## 2022-03-02 RX ORDER — HYDRALAZINE HYDROCHLORIDE 20 MG/ML
10 INJECTION INTRAMUSCULAR; INTRAVENOUS EVERY 6 HOURS PRN
Status: DISCONTINUED | OUTPATIENT
Start: 2022-03-02 | End: 2022-03-05 | Stop reason: HOSPADM

## 2022-03-02 RX ORDER — PROPOFOL 10 MG/ML
INJECTION, EMULSION INTRAVENOUS PRN
Status: DISCONTINUED | OUTPATIENT
Start: 2022-03-02 | End: 2022-03-02 | Stop reason: SDUPTHER

## 2022-03-02 RX ORDER — SODIUM CHLORIDE 0.9 % (FLUSH) 0.9 %
5-40 SYRINGE (ML) INJECTION PRN
Status: DISCONTINUED | OUTPATIENT
Start: 2022-03-02 | End: 2022-03-02 | Stop reason: HOSPADM

## 2022-03-02 RX ORDER — OXYCODONE HYDROCHLORIDE 5 MG/1
5 TABLET ORAL EVERY 4 HOURS PRN
Status: DISCONTINUED | OUTPATIENT
Start: 2022-03-02 | End: 2022-03-02

## 2022-03-02 RX ADMIN — PROPOFOL 150 MG: 10 INJECTION, EMULSION INTRAVENOUS at 09:06

## 2022-03-02 RX ADMIN — HYDROMORPHONE HYDROCHLORIDE 0.5 MG: 1 INJECTION, SOLUTION INTRAMUSCULAR; INTRAVENOUS; SUBCUTANEOUS at 13:46

## 2022-03-02 RX ADMIN — HYDROMORPHONE HYDROCHLORIDE 1 MG: 1 INJECTION, SOLUTION INTRAMUSCULAR; INTRAVENOUS; SUBCUTANEOUS at 21:49

## 2022-03-02 RX ADMIN — SODIUM CHLORIDE, PRESERVATIVE FREE 10 ML: 5 INJECTION INTRAVENOUS at 23:40

## 2022-03-02 RX ADMIN — ROCURONIUM BROMIDE 20 MG: 10 INJECTION INTRAVENOUS at 09:46

## 2022-03-02 RX ADMIN — KETOROLAC TROMETHAMINE 30 MG: 30 INJECTION, SOLUTION INTRAMUSCULAR; INTRAVENOUS at 23:40

## 2022-03-02 RX ADMIN — HYDROMORPHONE HYDROCHLORIDE 0.25 MG: 1 INJECTION, SOLUTION INTRAMUSCULAR; INTRAVENOUS; SUBCUTANEOUS at 13:59

## 2022-03-02 RX ADMIN — ROCURONIUM BROMIDE 50 MG: 10 INJECTION INTRAVENOUS at 09:06

## 2022-03-02 RX ADMIN — FENTANYL CITRATE 50 MCG: 50 INJECTION, SOLUTION INTRAMUSCULAR; INTRAVENOUS at 13:27

## 2022-03-02 RX ADMIN — Medication 25 MG: at 08:50

## 2022-03-02 RX ADMIN — FENTANYL CITRATE 100 MCG: 50 INJECTION, SOLUTION INTRAMUSCULAR; INTRAVENOUS at 09:06

## 2022-03-02 RX ADMIN — PROPOFOL 50 MG: 10 INJECTION, EMULSION INTRAVENOUS at 09:10

## 2022-03-02 RX ADMIN — SODIUM CHLORIDE, POTASSIUM CHLORIDE, SODIUM LACTATE AND CALCIUM CHLORIDE: 600; 310; 30; 20 INJECTION, SOLUTION INTRAVENOUS at 11:09

## 2022-03-02 RX ADMIN — SODIUM CHLORIDE: 9 INJECTION, SOLUTION INTRAVENOUS at 17:01

## 2022-03-02 RX ADMIN — HYDROMORPHONE HYDROCHLORIDE 0.25 MG: 1 INJECTION, SOLUTION INTRAMUSCULAR; INTRAVENOUS; SUBCUTANEOUS at 13:55

## 2022-03-02 RX ADMIN — ROCURONIUM BROMIDE 20 MG: 10 INJECTION INTRAVENOUS at 10:37

## 2022-03-02 RX ADMIN — MIDAZOLAM HYDROCHLORIDE 2 MG: 1 INJECTION, SOLUTION INTRAMUSCULAR; INTRAVENOUS at 09:04

## 2022-03-02 RX ADMIN — KETOROLAC TROMETHAMINE 30 MG: 30 INJECTION, SOLUTION INTRAMUSCULAR; INTRAVENOUS at 12:30

## 2022-03-02 RX ADMIN — ENOXAPARIN SODIUM 40 MG: 100 INJECTION SUBCUTANEOUS at 21:44

## 2022-03-02 RX ADMIN — ONDANSETRON 4 MG: 2 INJECTION INTRAMUSCULAR; INTRAVENOUS at 13:27

## 2022-03-02 RX ADMIN — DOCUSATE SODIUM 50 MG AND SENNOSIDES 8.6 MG 2 TABLET: 8.6; 5 TABLET, FILM COATED ORAL at 21:44

## 2022-03-02 RX ADMIN — FENTANYL CITRATE 50 MCG: 50 INJECTION, SOLUTION INTRAMUSCULAR; INTRAVENOUS at 13:37

## 2022-03-02 RX ADMIN — HYDROMORPHONE HYDROCHLORIDE 1 MG: 1 INJECTION, SOLUTION INTRAMUSCULAR; INTRAVENOUS; SUBCUTANEOUS at 19:49

## 2022-03-02 RX ADMIN — DEXAMETHASONE SODIUM PHOSPHATE 10 MG: 10 INJECTION INTRAMUSCULAR; INTRAVENOUS at 09:25

## 2022-03-02 RX ADMIN — CEFAZOLIN SODIUM 2000 MG: 10 INJECTION, POWDER, FOR SOLUTION INTRAVENOUS at 09:13

## 2022-03-02 RX ADMIN — PROCHLORPERAZINE EDISYLATE 5 MG: 5 INJECTION INTRAMUSCULAR; INTRAVENOUS at 13:48

## 2022-03-02 RX ADMIN — FENTANYL CITRATE 50 MCG: 50 INJECTION, SOLUTION INTRAMUSCULAR; INTRAVENOUS at 09:36

## 2022-03-02 RX ADMIN — LIDOCAINE HYDROCHLORIDE 50 MG: 10 INJECTION, SOLUTION EPIDURAL; INFILTRATION; INTRACAUDAL at 09:06

## 2022-03-02 RX ADMIN — FENTANYL CITRATE 50 MCG: 50 INJECTION, SOLUTION INTRAMUSCULAR; INTRAVENOUS at 13:04

## 2022-03-02 RX ADMIN — OXYCODONE 10 MG: 5 TABLET ORAL at 23:40

## 2022-03-02 RX ADMIN — ONDANSETRON 4 MG: 2 INJECTION INTRAMUSCULAR; INTRAVENOUS at 19:46

## 2022-03-02 RX ADMIN — SODIUM CHLORIDE, POTASSIUM CHLORIDE, SODIUM LACTATE AND CALCIUM CHLORIDE: 600; 310; 30; 20 INJECTION, SOLUTION INTRAVENOUS at 08:50

## 2022-03-02 RX ADMIN — FENTANYL CITRATE 50 MCG: 50 INJECTION, SOLUTION INTRAMUSCULAR; INTRAVENOUS at 12:57

## 2022-03-02 RX ADMIN — HYDROMORPHONE HYDROCHLORIDE 1 MG: 1 INJECTION, SOLUTION INTRAMUSCULAR; INTRAVENOUS; SUBCUTANEOUS at 16:03

## 2022-03-02 RX ADMIN — FAMOTIDINE 20 MG: 20 TABLET, FILM COATED ORAL at 21:44

## 2022-03-02 RX ADMIN — ACETAMINOPHEN 1000 MG: 500 TABLET ORAL at 21:44

## 2022-03-02 RX ADMIN — HYDROCODONE BITARTRATE AND ACETAMINOPHEN 2 TABLET: 5; 325 TABLET ORAL at 17:08

## 2022-03-02 RX ADMIN — ROCURONIUM BROMIDE 10 MG: 10 INJECTION INTRAVENOUS at 11:33

## 2022-03-02 RX ADMIN — ONDANSETRON 4 MG: 2 INJECTION, SOLUTION INTRAMUSCULAR; INTRAVENOUS at 12:30

## 2022-03-02 RX ADMIN — HYDROMORPHONE HYDROCHLORIDE 0.5 MG: 1 INJECTION, SOLUTION INTRAMUSCULAR; INTRAVENOUS; SUBCUTANEOUS at 13:51

## 2022-03-02 RX ADMIN — SUGAMMADEX 200 MG: 100 INJECTION, SOLUTION INTRAVENOUS at 12:52

## 2022-03-02 RX ADMIN — ROCURONIUM BROMIDE 10 MG: 10 INJECTION INTRAVENOUS at 11:54

## 2022-03-02 ASSESSMENT — PULMONARY FUNCTION TESTS
PIF_VALUE: 16
PIF_VALUE: 17
PIF_VALUE: 17
PIF_VALUE: 16
PIF_VALUE: 17
PIF_VALUE: 16
PIF_VALUE: 15
PIF_VALUE: 17
PIF_VALUE: 14
PIF_VALUE: 17
PIF_VALUE: 16
PIF_VALUE: 15
PIF_VALUE: 16
PIF_VALUE: 15
PIF_VALUE: 16
PIF_VALUE: 17
PIF_VALUE: 17
PIF_VALUE: 16
PIF_VALUE: 19
PIF_VALUE: 15
PIF_VALUE: 16
PIF_VALUE: 17
PIF_VALUE: 16
PIF_VALUE: 2
PIF_VALUE: 15
PIF_VALUE: 16
PIF_VALUE: 17
PIF_VALUE: 16
PIF_VALUE: 16
PIF_VALUE: 4
PIF_VALUE: 2
PIF_VALUE: 16
PIF_VALUE: 17
PIF_VALUE: 17
PIF_VALUE: 16
PIF_VALUE: 17
PIF_VALUE: 17
PIF_VALUE: 15
PIF_VALUE: 16
PIF_VALUE: 17
PIF_VALUE: 17
PIF_VALUE: 16
PIF_VALUE: 15
PIF_VALUE: 17
PIF_VALUE: 15
PIF_VALUE: 16
PIF_VALUE: 17
PIF_VALUE: 15
PIF_VALUE: 15
PIF_VALUE: 16
PIF_VALUE: 8
PIF_VALUE: 15
PIF_VALUE: 17
PIF_VALUE: 16
PIF_VALUE: 7
PIF_VALUE: 16
PIF_VALUE: 17
PIF_VALUE: 3
PIF_VALUE: 16
PIF_VALUE: 17
PIF_VALUE: 16
PIF_VALUE: 17
PIF_VALUE: 16
PIF_VALUE: 15
PIF_VALUE: 17
PIF_VALUE: 2
PIF_VALUE: 16
PIF_VALUE: 17
PIF_VALUE: 16
PIF_VALUE: 14
PIF_VALUE: 17
PIF_VALUE: 9
PIF_VALUE: 16
PIF_VALUE: 17
PIF_VALUE: 15
PIF_VALUE: 16
PIF_VALUE: 20
PIF_VALUE: 17
PIF_VALUE: 16
PIF_VALUE: 16
PIF_VALUE: 15
PIF_VALUE: 17
PIF_VALUE: 16
PIF_VALUE: 17
PIF_VALUE: 2
PIF_VALUE: 17
PIF_VALUE: 17
PIF_VALUE: 16
PIF_VALUE: 17
PIF_VALUE: 16
PIF_VALUE: 17
PIF_VALUE: 16
PIF_VALUE: 2
PIF_VALUE: 16
PIF_VALUE: 17
PIF_VALUE: 16
PIF_VALUE: 17
PIF_VALUE: 16
PIF_VALUE: 17
PIF_VALUE: 16
PIF_VALUE: 17
PIF_VALUE: 16
PIF_VALUE: 17
PIF_VALUE: 16
PIF_VALUE: 17
PIF_VALUE: 2
PIF_VALUE: 16
PIF_VALUE: 18
PIF_VALUE: 16
PIF_VALUE: 17
PIF_VALUE: 16
PIF_VALUE: 15
PIF_VALUE: 17
PIF_VALUE: 16
PIF_VALUE: 17
PIF_VALUE: 27
PIF_VALUE: 16
PIF_VALUE: 14
PIF_VALUE: 15
PIF_VALUE: 14
PIF_VALUE: 16
PIF_VALUE: 17
PIF_VALUE: 16
PIF_VALUE: 16
PIF_VALUE: 17
PIF_VALUE: 15
PIF_VALUE: 16
PIF_VALUE: 17
PIF_VALUE: 16
PIF_VALUE: 17
PIF_VALUE: 16
PIF_VALUE: 19
PIF_VALUE: 16
PIF_VALUE: 16
PIF_VALUE: 17
PIF_VALUE: 17
PIF_VALUE: 15
PIF_VALUE: 17
PIF_VALUE: 16
PIF_VALUE: 17
PIF_VALUE: 15
PIF_VALUE: 4
PIF_VALUE: 17
PIF_VALUE: 15
PIF_VALUE: 17
PIF_VALUE: 17
PIF_VALUE: 16
PIF_VALUE: 16
PIF_VALUE: 17
PIF_VALUE: 16
PIF_VALUE: 3
PIF_VALUE: 17
PIF_VALUE: 15
PIF_VALUE: 16
PIF_VALUE: 16
PIF_VALUE: 22
PIF_VALUE: 17
PIF_VALUE: 17
PIF_VALUE: 15
PIF_VALUE: 16
PIF_VALUE: 16
PIF_VALUE: 17
PIF_VALUE: 17
PIF_VALUE: 18
PIF_VALUE: 16
PIF_VALUE: 17
PIF_VALUE: 16
PIF_VALUE: 16
PIF_VALUE: 17
PIF_VALUE: 7
PIF_VALUE: 7
PIF_VALUE: 16
PIF_VALUE: 17
PIF_VALUE: 17
PIF_VALUE: 16
PIF_VALUE: 17
PIF_VALUE: 16
PIF_VALUE: 15
PIF_VALUE: 17
PIF_VALUE: 15
PIF_VALUE: 16
PIF_VALUE: 19
PIF_VALUE: 16
PIF_VALUE: 17
PIF_VALUE: 15
PIF_VALUE: 16
PIF_VALUE: 16
PIF_VALUE: 17
PIF_VALUE: 15

## 2022-03-02 ASSESSMENT — PAIN SCALES - GENERAL
PAINLEVEL_OUTOF10: 0
PAINLEVEL_OUTOF10: 2
PAINLEVEL_OUTOF10: 8
PAINLEVEL_OUTOF10: 8
PAINLEVEL_OUTOF10: 7
PAINLEVEL_OUTOF10: 6
PAINLEVEL_OUTOF10: 10
PAINLEVEL_OUTOF10: 2
PAINLEVEL_OUTOF10: 6
PAINLEVEL_OUTOF10: 7
PAINLEVEL_OUTOF10: 8
PAINLEVEL_OUTOF10: 0
PAINLEVEL_OUTOF10: 0
PAINLEVEL_OUTOF10: 7
PAINLEVEL_OUTOF10: 8
PAINLEVEL_OUTOF10: 0
PAINLEVEL_OUTOF10: 9
PAINLEVEL_OUTOF10: 8

## 2022-03-02 ASSESSMENT — PAIN DESCRIPTION - PAIN TYPE
TYPE: SURGICAL PAIN

## 2022-03-02 ASSESSMENT — PAIN DESCRIPTION - LOCATION
LOCATION: ABDOMEN

## 2022-03-02 ASSESSMENT — PAIN DESCRIPTION - ORIENTATION
ORIENTATION: LOWER

## 2022-03-02 ASSESSMENT — PAIN DESCRIPTION - DESCRIPTORS
DESCRIPTORS: SORE;DISCOMFORT
DESCRIPTORS: SORE;DISCOMFORT

## 2022-03-02 NOTE — PROGRESS NOTES
Gynecology Progress Note    Date: 3/2/2022  Time: 6:22 AM    Gemma Izquierdo 29 y.o. female [id number] who presented with abdominal pain 2/2 Urine ascites due to recurrent bladder dome defect s/p repair of intraoperative cystotomy during diagnostic laparoscopy, MICK, D&C, and Mirena IUD insertion 2/14/22     Patient seen and examined. She complained of nothing this morning. Pain is controlled. Patient is  tolerating oral intake. She is urinating well via rosado catheter. She denies any vaginal bleeding. She is  ambulating without difficulty. She denies Fever/Chills, Chest Pain, SOB, N/V, Calf Pain    Vitals:  Vitals:    03/01/22 0730 03/01/22 1100 03/01/22 1141 03/01/22 2002   BP: 133/76 (!) 150/104 (!) 150/108 (!) 132/90   Pulse: 68 87 89 79   Resp: 18 20 22 18   Temp: 98.2 °F (36.8 °C) 97 °F (36.1 °C) 98.3 °F (36.8 °C) 98.3 °F (36.8 °C)   TempSrc: Oral Oral Oral Oral   SpO2: 100% 100% 96% 98%   Weight:       Height:             Intake/Output:   Last Shift: I/O last 3 completed shifts:   In: 480 [P.O.:480]  Out: 7400 [Urine:7400]  Current Shift: I/O this shift:  In: 164.5 [I.V.:164.5]  Out: 1950 [Urine:1950]  1200 mL out in last 12 hrs ~ 100mL/hr      Physical Exam:  General:  no apparent distress, alert, and cooperative  Neurologic:  alert, oriented, normal speech, no focal findings or movement disorder noted  Lungs:  No increased work of breathing, good air exchange, clear to auscultation bilaterally, no crackles or wheezing  Heart:  normal S1 and S2, regular rate and rhythm, and no murmur    Abdomen: soft, non-distended, appropriate tenderness, no CVA tenderness  Incision: Port sites healing well without erythema/drainage  Extremities:  no calf tenderness, non edematous, SCDs in place and functioning    Assessment/Plan:  Gemma Izquierdo 29 y.o. female [id number] who presented with abdominal pain 2/2 Urine ascites due to recurrent bladder dome defect s/p repair of intraoperative cystotomy during diagnostic laparoscopy, MICK, D&C, and Mirena IUD insertion 2/14/22     - Urology consulted   - Internal medicine primary team   - Ob/Gyn consulted   - Doing well, vitals stable   - Continue rosado catheter, UOP adequate   - Continue IVF NS 50ml/hr   - Encourage ambulation and use of incentive spirometer   - Pain control: Dilaudid 0.5mg IV q3hr PRN, Norco 2 tab g0rerkr PRN, Tylenol 650mg q4h PRN, avoiding nephrotoxic medications (NSAIDs)   - Labs: CBC, BMP qd   - DVT Proph: Heparin 5000U [id number] (held since 3/1/22 night)/SCDs   - Abx: none   - Diet: NPO since midnight   - Cr 0.63>5.25>4.54>1.00>0.53   - Continue admission for pain control and close monitoring for infection/sepsis with urine ascites present   -  Total abdominal hysterectomy, possible supracervical hysterectomy, with bilateral salpingectomy, possible unilateral or bilateral oophorectomy, all other indicated procedures, possible vertical midline skin incision, co-procedure with Urology for possible bladder repair scheduled this morning on 3/2/22 @0900 (consent signed and placed in chart)   - Appreciate Internal medicine recommendations for medical optimization and evaluation for surgical risk    - Appreciate Urology following    Pelvic pain/Fibroid Uterus   - CT A/P 2/27: Urinary bladder is filled with contrast. Track of contrast extends from the anterior dome of the urinary bladder into the peritoneal cavity consistent with active extravasation from the urinary bladder indicative of bladder injury/tear. Interval development of large volume free intraperitoneal fluid of density higher than simple fluid. This is attributed to extravasation of  contrast/urine from the urinary bladder. Small sliding hiatal hernia.     - CT A/P 2/25/22: Bulky heterogeneous uterus    - Pelvic sono 5/21/21: Uterus 10 x 5 x 6cm, Stripe 8.6mm   - Endometrial biopsy obtained on 2/14/22 negative for atypia or malignancy    - Last pap smear 1/11/7 negative with HPV negative    Nausea/ Vomiting (rslvd)    Hyponatremia (Na 132>130>141- rslved)   - Internal medicine as primary team and defer to them for management   - BMP BID ordered    - NS IVF 50ml/hr     Anemia (10>9.6>8.9)   - Pt denies any s/s anemia    - S/p Venofer 200mg IV x1   - VSS   - Iron studies confirm iron deficiency anemia    S/p Dx laparoscopy, lysis of adhesions, hysteroscopy, dilatation and curettage, Mirena IUD insertion complicated by intra-operative cystotomy 2/14/22    - Urology was consulted intra-operatively on 2/14/22 and converted to robotic assisted repair and pt was discharged home with rosado catheter which was removed on 2/24/22    S/p IUD removal 2/21/22 due to rash and concern for allergy to progesterone    Hx of C/S x3    HTN   - Stable on no medications   - Pt denies any CP, SOB   - Last EKG upon chart review on 10/30/21: NSR     Heterozygous Prothrombin mutation    - Factor V Leiden mutation negative on 5/9/13    FHx Uterine cancer (PGM)    THC use   - Encouraged cessation    BMI 29    Will update Dr. Miller Bachelor.     Zeferino Bingham DO  Ob/Gyn Resident   3/2/2022, 6:22 AM

## 2022-03-02 NOTE — OP NOTE
Operative Note  Department of Obstetrics and Gynecology  [facility]       Patient: Zaire Walden   : 1985  MRN: [id number]       Acct: [de-identified]   PCP: Lis Faulkner MD  Date of Procedure: 3/2/22    Pre-operative Diagnosis: 29 y.o. female     Abdominal Pain    Cystotomy    Fibroid Uterus    Chronic Pelvic Pain     Abnormal Uterine Bleeding    Anemia    History of 3  Sections     Post-operative Diagnosis: Same as above, left ovarian simple cyst     Procedure: Total abdominal hysterectomy, lysis of adhesions, bilateral salpingectomy, aspiration of left ovarian simple cyst, repair of cystostomies and cystoscopy by urology     Surgeon: Dr Cheri Gillette, Dr Saintclair Canal      Assistant: Ariana Tanner PGY2, Alden Davis PGY3    Anesthesia: General     Indications: The patient is a 29 y.o. female who is admitted due to abdominal pain secondary to urine ascites due to recurrent bladder dome defects after primary repair of intraoperative cystotomy during diagnostic laparoscopy with lysis on adhesions on 22. Prior procedure on 22 was for evaluation of chronic pelvic pain due to known bulky fibroid uterus. Due to long term need for rosado catheter, patient desired to have hysterectomy performed along with repair of bladder injury verse continuing with expectant   management. Case was coordinated with urology team.     Procedure Details: The patient was seen in the pre-op room. The risks, benefits, complications, treatment options, and expected outcomes were discussed with the patient. The patient concurred with the proposed plan, giving informed consent. She was given Ancef for surgical prophylaxis. The patient was taken to the Operating Room and identified as Zaire Walden and the procedure was verified. A Time Out was held and the above information confirmed.      After the administration of general anesthesia she was prepped and draped in the usual sterile fashion in the lithotomy position. The patient arrived to the OR with an indwelling rosado catheter already in place. A pfannenstiel skin incision was made with a scalpel and carried through to the underlying layer of fascia with the Bovie. The fascia was incised in the midline and the incision extended bilaterally using jolley scissors. The superior aspect of the fascial incision was grasped with the Kocher clamps, tented up, and the underlying rectus muscle dissected off bluntly and with Bovie assist. Attention was then turned to the inferior aspect of the fascial incision, which in a similar fashion was grasped with the Kochers, tented up, and the underlying rectus muscle dissected off bluntly. The rectus muscles were  in the midline and blunt dissection was used to enter the peritoneal cavity. The peritoneal opening was extended superiorly. While attempting to extend inferiorly, the suture from the previous bladder repair was immediately noted. The bladder was densely adhered to the anterior abdominal wall. At this point the uterine fundus was delivered up through the incision without difficulty. Bilateral ovaries were examined and the left ovary had a 2 cm simple cyst present. Using Bovie cautery, a small hole was made in the cyst and the fluid was aspirated. Cyst wall was hemostatic and edges were cauterized for further hemostasis. The bowel was packed with moist laps and Kaushik Sep- DAVIN'Percy self retractor was placed with care to ensure the underlying bowel was not impinged. It was immediately noted that the bladder was adhered to the entire anterior uterine serosa, distorting pelvic anatomy. The left fallopian tube was grasped and salpingectomy was carried out utilizing Ligasure device. Mesosalpinx was hemostatic. The left utero-ovarian ligament was also cauterized. The left round ligament was grasped & divided utilizing Ligasure device.  The posterior leaf of the broad ligament was exposed. The anterior leaf of the broad ligament was adhered to the bladder. Metzenbaum scissors were used to carefully dissect the bladder off of the broad ligament. A well defined plane was established an broad ligament was able to be fully exposed. Ureter was palpated and noted to be well below this surgical site. The same salpingectomy was performed on the right side using Ligasure. The right utero ovarian ligament was cauterized also utilizing Ligasure device. The right round ligament was grasped and divided with the Ligasure. There were no bladder adhesions noted to this side of the broad ligament so the anterior and posterior leaves were able to be opened without difficulty. Once the broad ligaments were dissected, the majority of the case was spent tediously dissecting the bladder off of the uterus. Dissection was carried out with Metzenbaum scissors, blunt dissection with RayTec and utilizing Bovie electrocautery. Once enough of the bladder was removed inferiorly, the uterine vessels were able to be skeletonized. These vessels were grasped, cauterized and cut using Ligasure bilaterally. Dissection of the bladder was continued until the cervix was able to be palpated. A straight Bruce clamp was placed on the left to develop the cardinal ligament pedicle. This was cut and suture ligated with 0 Vicryl Bruce stitch. Same procedure was repeated on the right. An additional clamp was placed on the left to incorporate the uterosacral ligaments. This was also cut with scalpel and suture ligated with 0 Vicryl Bruce stitch. It was at this point that urine was noted in the surgical field. Re evaluation of the last pedicle revealed that the bladder edge was incorporated into the pedicle. Suture was cut and removed. Further dissection of the bladder was carried out to free this area. A 2 cm cystotomy was revealed and tagged for urology evaluation.  The pedicle was regrasped and again suture ligated to ensure hemostasis. Careful dissection of the bladder was repeated on the right side. Same procedure was repeated on the right to create the uterosacral pedicles. Curved Bruce clamps were placed immediately inferior to the cervix. Colpotomy was created using Remy scissors to free the uterus from the vaginal cuff. Specimen was inspected and cervix appeared to be intact so it was then passed off the field for pathology. Bruce stitches were thrown on bilateral corners to incorporate uterosacral ligaments. Figure of eight stiches with 0 Vicryl were thrown left to right to close the vaginal cuff, making sure to incorporate the posterior peritoneum. All pedicles were inspected and noted to be hemostatic. All sutures were tagged to maintain pelvic anatomy for urology inspection. At this point urology scrubbed into case to repair posterior cystotomy along with previously known anterior bladder defect. Cystoscopy was then performed. Please see their op note for details about their portion of the case. Cambpell catheter was reinserted. Once both bladder defects were repaired, copious irrigation was carried out to ensure vaginal cuff was well intact. Bilateral uterosacral sutures were then tied together to provide extra pelvic support. At this portion of the case, all instruments were removed, gloves&gowns were changed and new sterile drapes were placed in order to have a clean closure. Self retractor was removed along with lap sponges. Bowel and skin were inspected where the retractor was placed and no overt injuries noted. A ÁNGEL drain was placed in the LLQ. Rectus muscle was inspected and no bleeding noted. Fascia was then closed with running stitch of 0 Vicryl starting at each apex and oversewing in the middle. Care was taken to not incorporate drain into fascial closure. 0.25% plain marcaine was injected along fascia for pain control.  The subcutaneous fat was then copiously irrigated and closed with interrupted 3-0 Vicryl stitches. The skin was closed with 4-0 Monocryl in running subcuticular fashion. Skin incision was further injected with 0.25% plain marcaine for additional analgesia. Steri strips were placed overtop incision. Nylon was used to suture the LLQ drain to skin and Kerlix gauze with Tegaderm was used to dress this incision. All laparotomy, cystoscopy, sponge, needle and instrument counts were correct at the conclusion of the case. The patient tolerated the procedure well. SCDs for DVT prophylaxis remained in place during the procedure and in the post operative state. She was given IV Toradol for additional pain control. She was taken to recovery in good and stable condition. Dr. Rush Garcia was present for the entire operation.     Findings: Normal appearing uterus with bladder completely adhered to the entire anterior uterine serosal surface, normal appearing bilateral fallopian tubes, normal appearing right ovary, enlarged left ovary with simple cyst (when aspirated the cyst contents were clear and blood tinged), on cystoscopy bilateral ureters were noted with peristalsis and efflux visualized, small blood clots inside bladder due to cystostomies   Total IV fluids/Blood products:  1800 ml crystalloid  Urine Output:  Unable to calculate due to cystotomy and cystoscopy     Estimated blood loss:  200 mL  Drains:  rosado catheter  Specimens:  Uterus, cervix, bilateral fallopian tubes   Instrument and Sponge Count: Correct  Complications:  Known cystotomy from previous surgery and a new incidental cystotomy were both repaired by urology     Condition:  stable, transferred to post anesthesia recovery    Gini Wei DO  Ob/Gyn Resident  3/2/2022, 10:45 PM

## 2022-03-02 NOTE — ANESTHESIA PRE PROCEDURE
Department of Anesthesiology  Preprocedure Note       Name:  Federico Deleon   Age:  29 y.o.  :  1985                                          MRN:  [id number]         Date:  3/2/2022      Surgeon: Emani Ruiz):  MD Michell Mata MD    Procedure: Procedure(s):  TOTAL ABDOMINAL HYSTERECTOMY , BILATERAL SALPINGECTOMY  (DR. Méndez Man TO WORK 1ST)  CYSTOSCOPY REPAIR OF BLADDER LACERATION/DEFECT    Medications prior to admission:   Prior to Admission medications    Medication Sig Start Date End Date Taking? Authorizing Provider   oxyCODONE (ROXICODONE) 5 MG immediate release tablet Take 1 tablet by mouth every 6 hours as needed for Pain for up to 5 days. Intended supply: 5 days. Take lowest dose possible to manage pain 2/25/22 3/2/22 Yes Nigel Pollock, DO   gabapentin (NEURONTIN) 300 MG capsule Take 1 capsule by mouth 3 times daily for 7 days.  2/25/22 3/4/22 Yes Nigel Pollock, DO   oxybutynin (DITROPAN) 5 MG tablet Take 1 tablet by mouth 2 times daily as needed (bladder pain/spasms from stent) 22  Nemesio Nicholson MD   acetaminophen (TYLENOL) 500 MG tablet Take 2 tablets by mouth every 6 hours as needed for Pain 2/14/22 3/16/22  Weston Cuellar, DO   docusate sodium (COLACE) 100 MG capsule Take 1 capsule by mouth 2 times daily as needed for Constipation 22   Weston Cuellar, DO   ibuprofen (ADVIL;MOTRIN) 600 MG tablet Take 1 tablet by mouth every 6 hours as needed for Pain 2/14/22 3/16/22  Ronda Land, DO   ondansetron (ZOFRAN) 4 MG tablet Take 1 tablet by mouth every 8 hours as needed for Nausea or Vomiting 22   Weston Cuellar, DO   simethicone (MYLICON) 80 MG chewable tablet Take 1 tablet by mouth 4 times daily as needed for Flatulence 22   Weston Cuellar, DO   acetaminophen (TYLENOL) 325 MG tablet Take 650 mg by mouth every 6 hours as needed for Pain    Historical Provider, MD   ferrous sulfate (IRON 325) 325 (65 Fe) MG tablet Take 1 tablet by mouth daily (with breakfast) 1/25/22   Raymond Dawkins MD   ibuprofen (ADVIL;MOTRIN) 800 MG tablet Take 1 tablet by mouth every 8 hours as needed for Pain 10/30/21   Elvira Govea DO       Current medications:    Current Facility-Administered Medications   Medication Dose Route Frequency Provider Last Rate Last Admin    docusate sodium (COLACE) capsule 100 mg  100 mg Oral Daily Zarina Brain, DO   100 mg at 03/01/22 0839    magnesium hydroxide (MILK OF MAGNESIA) 400 MG/5ML suspension 30 mL  30 mL Oral Daily PRN Zarina Brain, DO        0.9 % sodium chloride infusion   IntraVENous Continuous Sapna Escobar MD 50 mL/hr at 03/01/22 2341 Rate Verify at 03/01/22 2341    ceFAZolin (ANCEF) 2000 mg in dextrose 5 % 50 mL IVPB  2,000 mg IntraVENous Once Zarina Brain, DO        HYDROmorphone (DILAUDID) injection 0.5 mg  0.5 mg IntraVENous Q3H PRN Jose Davila MD        [Held by provider] heparin (porcine) injection 5,000 Units  5,000 Units SubCUTAneous 3 times per day Delmar Kuo MD   5,000 Units at 03/01/22 1330    docusate sodium (COLACE) capsule 100 mg  100 mg Oral BID PRN Eric Pretty MD        oxybutynin (DITROPAN) tablet 5 mg  5 mg Oral BID PRN Eric Pretty MD   5 mg at 02/26/22 1033    simethicone (MYLICON) chewable tablet 80 mg  80 mg Oral 4x Daily PRN Eric Pretty MD        sodium chloride flush 0.9 % injection 5-40 mL  5-40 mL IntraVENous 2 times per day Eric Pretty MD   10 mL at 03/01/22 2006    sodium chloride flush 0.9 % injection 5-40 mL  5-40 mL IntraVENous PRN Eric Pretty MD   10 mL at 02/27/22 1712    0.9 % sodium chloride infusion  25 mL IntraVENous PRN Eric Pretty MD        acetaminophen (TYLENOL) tablet 650 mg  650 mg Oral Q4H PRN Eric Pretty MD        ondansetron (ZOFRAN-ODT) disintegrating tablet 4 mg  4 mg Oral Q8H PRN Eric Pretty MD        Or    ondansetron [facility]) injection 4 mg  4 mg IntraVENous Q6H PRN Adarsh Tam MD   4 mg at 22 2224    HYDROcodone-acetaminophen (NORCO) 5-325 MG per tablet 2 tablet  2 tablet Oral Q6H PRN Nabil Shah MD   2 tablet at 22 9418    famotidine (PEPCID) tablet 20 mg  20 mg Oral BID Nabil Shah MD   20 mg at 22       Allergies:     Allergies   Allergen Reactions    Seasonal Other (See Comments)     Allergic rhinnitis       Problem List:    Patient Active Problem List   Diagnosis Code    History of  section, classical Z98.891    History of  labor Z87.51    Benign essential hypertension antepartum O10.019    Heterozygous for 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 ([facility] [address] D68.52    Family planning education, guidance, and counseling Z30.09    Family history of uterine cancer Z80.53    Female pelvic pain R10.2    Iron deficiency anemia D50.9    Closed nondisplaced fracture of distal pole of navicular bone of left wrist S62.015A    Abnormal uterine bleeding (AUB) N93.9    Bilateral nipple discharge I03.90    Folliculitis F23.8    S/p Diagnostic laparoscopy, lysis of adhesions, hysteroscopy, dilatation and curettage, Mirena IUD insertion 22 Z98.890    Pelvic adhesive disease N73.6    Intramural leiomyoma of uterus D25.1    Lower abdominal pain R10.30    Intractable abdominal pain R10.9    IUD removed 22 Z30.432    Hypercoagulable state ([facility] [address] D68.59    Hypokalemia E87.6    Hypocalcemia E83.51    Bladder perforation, intraoperative N99.72    Hx CS x3 Z98.891    Marijuana use F12.90    Urine ascites R18.8       Past Medical History:        Diagnosis Date    Acne     sees dermatology, last seen 22    Benign essential hypertension antepartum 2013    Closed nondisplaced fracture of distal pole of navicular bone of left wrist 10/30/2018    COVID-19 2021    surgical screen, asymptomatic    Family history of uterine cancer     PGM    Iron deficiency anemia 2016    Mirena IUD inserted 22 02/14/2022    Lot #: [id number] Exp: 03/2024    Prothrombin gene mutation ([facility] [address]     managed per PCP, diagnosed after miscarriage, denies easy bruising, admits heavy periods    S/p Diagnostic laparoscopy, hysis of adhesions, hysteroscopy, dilatation and curettage, Mirena IUD insertion 75/68/2145    Complicated by incidental cystotomy repaired by Dr. Olvin Paulson MD Urology via Robot     Snores     denies apnea    Under care of team 12/28/2021    PCP - [address] - LAST VISIT 11/2021    Under care of team 12/28/2021    OB/GYN -  [facility]- LAST VISIT 12/2021       Past Surgical History:        Procedure Laterality Date    BLADDER SURGERY N/A 2/14/2022    XI ROBOTIC ASSISTED LAPAROSCOPIC INCIDENTAL CYSTOTOMY REPAIR performed by Olvin Paulson MD at [address]  1/10/2007, 9/2007, 6/2012    LTCS and classical CS on 6/7/12 @ 23 weeks    DILATION AND CURETTAGE OF UTERUS N/A 2/14/2022    DILATATION AND CURETTAGE HYSTEROSCOPY, POSSIBLE MIRENA IUD performed by Tristin Bai MD at [address]  02/14/2022    DIAGNOSTIC LAPAROSCROPY    LAPAROSCOPY N/A 2/14/2022    DIAGNOSTIC LAPAROSCROPY performed by Tristin Bai MD at [address] [place] [address] History:    Social History     Tobacco Use    Smoking status: Never Smoker    Smokeless tobacco: Never Used   Substance Use Topics    Alcohol use:  Yes     Alcohol/week: 3.0 standard drinks     Types: 3 Glasses of wine per week     Comment: 3 times per month                                Counseling given: Not Answered      Vital Signs (Current):   Vitals:    03/01/22 0730 03/01/22 1100 03/01/22 1141 03/01/22 2002   BP: 133/76 (!) 150/104 (!) 150/108 (!) 132/90   Pulse: 68 87 89 79   Resp: 18 20 22 18   Temp: 98.2 °F (36.8 °C) 97 °F (36.1 °C) 98.3 °F (36.8 °C) 98.3 °F (36.8 °C)   TempSrc: Oral Oral Oral Oral   SpO2: 100% 100% 96% 98%   Weight:       Height:                                                  BP Readings from Last 3 Encounters:   03/01/22 (!) 132/90   02/21/22 (!) 141/89   02/16/22 127/82       NPO Status:                                                                                 BMI:   Wt Readings from Last 3 Encounters:   02/25/22 152 lb (68.9 kg)   02/21/22 156 lb (70.8 kg)   02/16/22 162 lb (73.5 kg)     Body mass index is 28.72 kg/m². CBC:   Lab Results   Component Value Date    WBC 3.5 03/02/2022    RBC 3.85 03/02/2022    RBC 2.87 06/08/2012    HGB 9.0 03/02/2022    HCT 29.6 03/02/2022    MCV 76.9 03/02/2022    RDW 17.7 03/02/2022     03/02/2022     06/08/2012       CMP:   Lab Results   Component Value Date     03/02/2022    K 4.0 03/02/2022     03/02/2022    CO2 23 03/02/2022    BUN 4 03/02/2022    CREATININE 0.53 03/02/2022    GFRAA >60 03/02/2022    LABGLOM >60 03/02/2022    GLUCOSE 88 03/02/2022    GLUCOSE 75 05/02/2012    PROT 7.2 02/25/2022    CALCIUM 9.0 03/02/2022    BILITOT 0.34 02/25/2022    ALKPHOS 46 02/25/2022    AST 17 02/25/2022    ALT 11 02/25/2022       POC Tests: No results for input(s): POCGLU, POCNA, POCK, POCCL, POCBUN, POCHEMO, POCHCT in the last 72 hours.     Coags:   Lab Results   Component Value Date    PROTIME 10.3 05/09/2013    INR 0.9 05/09/2013    APTT 25.8 05/09/2013       HCG (If Applicable):   Lab Results   Component Value Date    PREGTESTUR Negative 08/17/2021    HCG NEGATIVE 02/14/2022    HCGQUANT <1 05/20/2021        ABGs: No results found for: PHART, PO2ART, USQ8VYD, BKM2SCG, BEART, F5IZAOQE     Type & Screen (If Applicable):  No results found for: LABABO, LABRH    Drug/Infectious Status (If Applicable):  Lab Results   Component Value Date    HEPCAB NONREACTIVE 07/26/2018       COVID-19 Screening (If Applicable):   Lab Results   Component Value Date    COVID19 Not Detected 02/25/2022    COVID19 DETECTED 12/30/2021           Anesthesia Evaluation    Airway: Mallampati: II  TM distance: >3 FB   Neck ROM: full  Mouth opening: > = 3 FB Dental: normal exam Pulmonary: breath sounds clear to auscultation  (+) recent URI:                            ROS comment: +Covid 12/30/21   Cardiovascular:    (+) hypertension:,         Rhythm: regular  Rate: normal                 ROS comment: Left ventricular systolic function and size are normal. Ejection fraction is   estimated at 55%. Mild mitral valve thickening with loose chordae tendineae and no   regurgitation. No pericardial effusion. Neuro/Psych:   Negative Neuro/Psych ROS              GI/Hepatic/Renal: Neg GI/Hepatic/Renal ROS            Endo/Other: Negative Endo/Other ROS                    Abdominal:         (-) obese Abdomen: tender. Vascular: negative vascular ROS. Other Findings:             Anesthesia Plan      general     ASA 2       Induction: intravenous. MIPS: Postoperative opioids intended. Anesthetic plan and risks discussed with patient. Plan discussed with CRNA.                   Stephania Maddox MD   3/2/2022

## 2022-03-02 NOTE — PROGRESS NOTES
Post op day  # 0  Pt reports pain not adequately controlled. She is resting comfortably. RN just gave PO Norco.  Vitals:    03/02/22 1415   BP: (!) 164/94   Pulse: 82   Resp: 14   Temp:    SpO2: 100%     In: 1700 [I.V.:1700]  Out: 500 [Urine:300]   Campbell draining dilshad urine. Abdomen flat , non distended. Appropriately tender  No active vaginal bleeding. Assessment / Plan  Stable S/P JOAQUIN, BS and repair of bladder lacerations . Agree with IM assessment postop HTN likely related to post op pain and appreciate IM input regarding management. Continue post op care. We will continue to monitor closely.

## 2022-03-02 NOTE — PROGRESS NOTES
Scott Lemus Sophia Barry, Levester [place]  Urology Progress Note    Subjective:  No acute events overnight  No fevers or chills  Some nausea no vomiting  Tolerating Campbell  Abdominal pain continues to improve    Campbell 4.75L/24h clear yellow urine      Patient Vitals for the past 24 hrs:   BP Temp Temp src Pulse Resp SpO2   03/01/22 2002 (!) 132/90 98.3 °F (36.8 °C) Oral 79 18 98 %   03/01/22 1141 (!) 150/108 98.3 °F (36.8 °C) Oral 89 22 96 %   03/01/22 1100 (!) 150/104 97 °F (36.1 °C) Oral 87 20 100 %   03/01/22 0730 133/76 98.2 °F (36.8 °C) Oral 68 18 100 %       Intake/Output Summary (Last 24 hours) at 3/2/2022 8311  Last data filed at 3/2/2022 0600  Gross per 24 hour   Intake 164.53 ml   Output 4750 ml   Net -4585.47 ml       Recent Labs     02/27/22  1615 03/01/22  0607 03/02/22  0610   WBC 7.3 3.6 3.5   HGB 9.3* 8.9* 9.0*   HCT 29.7* 29.0* 29.6*   MCV 75.6* 76.1* 76.9*    353 363     Recent Labs     02/28/22  0707 03/01/22  0607 03/02/22  0610   * 141 136   K 3.6* 4.2 4.0   CL 98 102 101   CO2 23 27 23   BUN 11 4* 4*   CREATININE 1.00* 0.53 0.53       Recent Labs     02/27/22  1844   COLORU Dark Yellow*   PHUR 5.0   WBCUA 2 TO 5   RBCUA 0 TO 2   SPECGRAV 1.048*   LEUKOCYTESUR NEGATIVE   UROBILINOGEN Normal   BILIRUBINUR NEGATIVE  Verified by ictotest.*       Additional Lab/culture results: None    Physical Exam:   AAOx3  NAD  Unlabored breathing  Normal rate  Abdomen soft, mild epigastric tenderness, nondistended  : Campbell in place draining yellow urine  No calf tenderness to palpation     Interval Imaging Findings: None    Impression:   29 y.o. 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 Plan to join OBGYN in [address] today.  Will need a cystogram prior to Campbell removal  -Patient is going to the OR with OB/GYN for hysterectomy, we will join to repair her bladder injury at that time  -Creatinine 0.53, at baseline  -Follow-up with urology outpatient after discharge     [facility], DO  PGY-2,Urology  7:27 AM 3/2/2022

## 2022-03-02 NOTE — BRIEF OP NOTE
Brief Operative Note  Department of Obstetrics and Gynecology  [facility]     Patient: Keith Echols   : 1985  MRN: [id number]       Acct: [de-identified]   Date of Procedure: 3/2/22     Pre-operative Diagnosis: 29 y.o. female     Abdominal Pain    Cystotomy    Fibroid Uterus    Chronic Pelvic Pain                Abnormal uterine bleeding   Anemia    History of 3  Sections     Post-operative Diagnosis: Same as above, left ovarian simple cyst     Procedure: Total abdominal hysterectomy, lysis of adhesions, bilateral salpingectomy, aspiration of left ovarian simple cyst, repair of cystostomies and cystoscopy by urology     Surgeon: Dr Polly Schmitt, Dr Felisa Noble      Assistant: Bijal Florence PGY2, Angelito Vazquez PGY3    Anesthesia: General     Findings: Normal appearing uterus with bladder completely adhered to the entire anterior uterine serosal surface, normal appearing bilateral fallopian tubes, normal appearing right ovary, enlarged left ovary with simple cyst (when aspirated the cyst contents were clear and blood tinged), on cystoscopy bilateral ureters were noted with peristalsis and efflux visualized, small blood clots inside bladder due to cystostomies   Total IV fluids/Blood products:  1800 ml crystalloid  Urine Output:  Unable to calculate due to cystotomy and cystoscopy     Estimated blood loss:  200 mL  Drains:  rosado catheter  Specimens:  Uterus, cervix, bilateral fallopian tubes   Instrument and Sponge Count: Correct  Complications:  Known cystotomy from previous surgery and a new incidental cystotomy were both repaired by urology     Condition:  stable, transferred to post anesthesia recovery    See full operative report for further details.        Cari Allen DO  Ob/Gyn Resident   3/2/2022, 1:12 PM

## 2022-03-02 NOTE — OP NOTE
Operative Note      Patient: Deacon Parekh  YOB: 1985  MRN: [id number]    Date of Procedure: 3/2/2022    Pre-Op Diagnosis: BLADDER LACERATION    Post-Op Diagnosis: Same       Procedures:   1. Cystoscopy  2. Cystotomy closure x 2 (repair of bladder laceration)     Surgeon: Oneida Duverney, MD     Resident:   Gm Smalls MD PGY3    Anesthesia: General  Estimated Blood Loss (mL): Minimal    Complications: None    Specimens:   None    Implants:  None      Drains:   10 Western Dorothy ÁNGEL drain, 16 [place] Campbell catheter    Findings:  Anterior and posterior bladder lacerations that were previously repaired,  during cystoscopy slight leakage from anterior bladder repair after closure, fixed with interrupted suture and watertight after closure    Detailed Description of Procedure:   Urology was called in for closure of bladder lacerations that was previously repaired. OB/GYN had already taken out the uterus. Please see their operative report for their part of the case. The structures were already exposed and the bladder was exposed. We repaired the posterior bladder defect with 2-0 Vicryl sutures in a running fashion and ensured it was watertight. We then focused our attention on the anterior bladder defect which was approximately 3 cm. We irrigated the Campbell catheter that was previously in place with normal saline and saw that it was leaking from the previous anterior repair. Using suture scissors we removed the previous stitches. Then using 2-0 Vicryl sutures, we repaired it in a running fashion. We then decided to remove the Campbell catheter and place a cystoscope. Using a 25 Western Dorothy with a 30 degree lens cystoscope with a entering the bladder with ease. Both ureteral orifices were noted with good clear efflux. We then filled up the bladder with our irrigation fluid. We did note leakage at the anterior repair, we then inserted two 2-0 Vicryl figure-of-eight sutures to ensure watertight closure.   We then tested the anastomosis again and ensured that no leakage was visible. This concluded our portion of the case, the remainder the case was then handed off to OB/GYN. Plan:   Maintain Acmpbell catheter for 10 days, prior to removal she will need to undergo cystogram.  Maintain ÁNGEL drain, will obtain ÁNGEL creatinine in the next upcoming days to ensure no leakage.     Electronically signed by Neville Sparks MD on 3/2/2022 at 4:10 PM

## 2022-03-02 NOTE — PROGRESS NOTES
[facility]  Office: [address], DO, Anastacia Bai, DO, Kent Mohs, DO, Linda Purcell Gisela, DO, Skyler Love MD, Chela Yeung MD, Ilda Castañeda MD, Eron Allen MD, Marie Lockett MD, Oneyda Cadena MD, Aryan Blackman MD, Jamia Schmitt, DO, Hector Villa, DO, Asa Walters MD,  Piter Ladd, DO, Sebas Henriquez MD, Babatunde Jacobs MD, Elaina Morrison MD, Suad Sams MD, John Deng MD, Natalie Calhoun, Svitlana Jackson, CNP, Quentin Nguyễn, CNP, Du Nation, CNS, Sharon More, [facility], Janet Soto, CNP, Slime Kruse, CNP, Brii Storey, CNP, Mala Ramírez, CNP, Rony Casiano PA-C, Jaxon Gonzalez, [facility], Eladio Goldberg, [facility], [place], CNP, Theresa Miller, CNP, Rosalino Giraldo, CNP, Shanell Ochoa, CNP, Lia Trujillo, [facility], Venida Cap, [facility]    Progress Note    Name:   Luke Harrington  MRN:     [id number]     Acct:      [de-identified]   Room:   [address] [place] [address] Day:  4  Admit Date:  2/25/2022  9:04 AM    PCP:   Francesca Griffin MD  Code Status:  Full Code    Subjective:     C/C:   Chief Complaint   Patient presents with    Abdominal Pain     Interval History Status: not changed. Patient underwent total abdominal salpingectomy- hysterectomy with repair of cystostomies this afternoon. Elevated /91, c/o uncontrolled abdominal pain. CBC, BMP unremarkable. Brief History:   28-year-old presented to ED with complaints of abdominal pain, after a recent robotic diagnostic laparoscopy with lysis of additions and had cystoscopy D&C and IUD insertion on 2/14/2022 intraoperatively there was a 2 cm injury to the bladder requiring repair. She is ultimately found to have 2 cm anterior dome defect with urinary ascites, BETHANY which improved after Campbell placement and diversion of urine. Review of Systems:     Unable to review reliably. Medications: Allergies:     Allergies   Allergen Reactions    Seasonal Other (See Comments)     Allergic rhinnitis       Current Meds:   Scheduled Meds:    ketorolac  30 mg IntraVENous Q6H    Followed by   Monty Garrido ON 3/3/2022] ibuprofen  600 mg Oral Q6H    acetaminophen  1,000 mg Oral Q6H    sennosides-docusate sodium  2 tablet Oral BID    enoxaparin  40 mg SubCUTAneous Q24H    docusate sodium  100 mg Oral Daily    sodium chloride flush  5-40 mL IntraVENous 2 times per day    famotidine  20 mg Oral BID     Continuous Infusions:    sodium chloride 50 mL/hr at 03/01/22 2341    sodium chloride       PRN Meds: cyclobenzaprine, gabapentin, famotidine, HYDROmorphone, diphenhydrAMINE **OR** diphenhydrAMINE, benzocaine-menthol, calcium carbonate, simethicone, magnesium hydroxide, HYDROmorphone, docusate sodium, oxybutynin, simethicone, sodium chloride flush, sodium chloride, acetaminophen, ondansetron **OR** ondansetron, HYDROcodone 5 mg - acetaminophen    Data:     Past Medical History:   has a past medical history of Acne, Benign essential hypertension antepartum, Closed nondisplaced fracture of distal pole of navicular bone of left wrist, COVID-19, Family history of uterine cancer, Iron deficiency anemia, Mirena IUD inserted 2/14/22, Prothrombin gene mutation [facility]), S/p Diagnostic laparoscopy, hysis of adhesions, hysteroscopy, dilatation and curettage, Mirena IUD insertion, Snores, Under care of team, and Under care of team.    Social History:   reports that she has never smoked. She has never used smokeless tobacco. She reports current alcohol use of about 3.0 standard drinks of alcohol per week. She reports current drug use. Frequency: 2.00 times per week. Drug: Marijuana Nusrat Peals).      Family History:   Family History   Problem Relation Age of Onset    Diabetes Paternal Grandmother     Endometrial Cancer Paternal Grandmother     No Known Problems Mother     No Known Problems Father     Hypertension Maternal Grandmother     Breast Cancer Neg Hx     Cancer Neg Hx  Colon Cancer Neg Hx     Eclampsia Neg Hx     Ovarian Cancer Neg Hx      Labor Neg Hx     Spont Abortions Neg Hx     Stroke Neg Hx        Vitals:  BP (!) 164/94   Pulse 82   Temp 97.1 °F (36.2 °C) (Temporal)   Resp 14   Ht 5' 1\" (1.549 m)   Wt 152 lb (68.9 kg)   SpO2 100%   BMI 28.72 kg/m²   Temp (24hrs), Av.9 °F (36.6 °C), Min:94.2 °F (34.6 °C), Max:98.7 °F (37.1 °C)    No results for input(s): POCGLU in the last 72 hours. I/O (24Hr): Intake/Output Summary (Last 24 hours) at 3/2/2022 1643  Last data filed at 3/2/2022 1427  Gross per 24 hour   Intake 1864.53 ml   Output 3650 ml   Net -1785.47 ml       Labs:  Hematology:  Recent Labs     22  0607 22  0610   WBC 3.6 3.5   RBC 3.81* 3.85*   HGB 8.9* 9.0*   HCT 29.0* 29.6*   MCV 76.1* 76.9*   MCH 23.4* 23.4*   MCHC 30.7 30.4   RDW 18.0* 17.7*    363   MPV 9.9 10.2     Chemistry:  Recent Labs     22  0707 22  0607 22  0610   * 141 136   K 3.6* 4.2 4.0   CL 98 102 101   CO2 23 27 23   GLUCOSE 81 83 88   BUN 11 4* 4*   CREATININE 1.00* 0.53 0.53   ANIONGAP 9 12 12   LABGLOM >60 >60 >60   GFRAA >60 >60 >60   CALCIUM 8.3* 8.9 9.0   CAION  --  1.10*  --    No results for input(s): PROT, LABALBU, LABA1C, G4YJJFA, L7UYWZX, FT4, TSH, AST, ALT, LDH, GGT, ALKPHOS, LABGGT, BILITOT, BILIDIR, AMMONIA, AMYLASE, LIPASE, LACTATE, CHOL, HDL, LDLCHOLESTEROL, CHOLHDLRATIO, TRIG, VLDL, ZMF82CL, PHENYTOIN, PHENYF, URICACID, POCGLU in the last 72 hours. ABG:No results found for: POCPH, PHART, PH, POCPCO2, DAU9ZHO, PCO2, POCPO2, PO2ART, PO2, POCHCO3, MUW4QGR, HCO3, NBEA, PBEA, BEART, BE, THGBART, THB, SWI2NRC, CKVI1QGH, F1ZVFSBG, O2SAT, FIO2  Lab Results   Component Value Date/Time    SPECIAL NOT REPORTED 2022 12:54 PM     Lab Results   Component Value Date/Time    CULTURE NO GROWTH 2022 02:15 PM       Radiology:  XR CHEST (2 VW)    Result Date: 2022  No acute process.   There is no change from prior examination. CT ABDOMEN PELVIS W IV CONTRAST    Result Date: 2022  1. Urinary bladder is filled with contrast. Track of contrast extends from the anterior dome of the urinary bladder into the peritoneal cavity consistent with active extravasation from the urinary bladder indicative of bladder injury/tear. 2. Interval development of large volume free intraperitoneal fluid of density higher than simple fluid. This is attributed to extravasation of contrast/urine from the urinary bladder. 3. Small sliding hiatal hernia. Findings were discussed with SHI Kline at 4:02 pm on 2022. CT ABDOMEN PELVIS W IV CONTRAST Additional Contrast? None    Result Date: 2022  1. Mild stranding around the urinary bladder and uterus as well as tiny amount of free fluid in the pelvic cul-de-sac, likely expected postoperative change. No pelvic fluid collection or gas. 2.  No acute findings in the upper abdomen. CT CYSTOGRAM W CONTRAST    Result Date: 2022  Defect in the bladder dome is confirmed with intraperitoneal contrast extravasation and large volume abdominopelvic fluid. Physical Examination:        General appearance:  Somnolent, not in distress  Mental Status:  Unable to evaluate. Lungs:  Good b/l air entry anteriorly.    Heart:  regular rate and rhythm, no murmur  Abdomen:  soft, nondistended    Extremities:  no edema, redness, tenderness in the calves  Skin:  no gross lesions, rashes, induration    Assessment:        Hospital Problems           Last Modified POA    * (Principal) Urine ascites 2022 Yes    History of  section, classical 2022 Yes    Overview Signed 2013 10:48 AM by Carlos Perez MD     2012         [id number] [facility] 2022 Yes    Family history of uterine cancer 2022 Yes    Overview Signed 2015  9:08 AM by Serena Adams MA     [address]         Female pelvic pain 2022 Yes    Overview Addendum 2/18/2020  8:15 AM by Alvarez Cuellar DO     Pelvic US ordered, appt on 7/21/16: unremarkable          Abnormal uterine bleeding (AUB) 2/25/2022 Yes    S/p Diagnostic laparoscopy, lysis of adhesions, hysteroscopy, dilatation and curettage, Mirena IUD insertion 2/14/22 2/25/2022 Yes    Overview Signed 2/14/2022  1:27 PM by Carlos Chaves DO     Complicated by incidental cystotomy repaired by Dr. Jackson Anglin MD Urology via Robot           Pelvic adhesive disease 2/28/2022 Yes    Intramural leiomyoma of uterus 2/28/2022 Yes    Lower abdominal pain 2/28/2022 Yes    Intractable abdominal pain 2/26/2022 Yes    IUD removed 2/21/22 2/27/2022 Yes    Overview Signed 2/27/2022  8:37 AM by Alvarez Cuellar DO     Patient presented with rash. There was concern for possible allergy. IUD was removed at that time         Hypercoagulable state ([address] 2/28/2022 Yes    Hypokalemia 2/28/2022 Yes    Hypocalcemia 2/28/2022 Yes    Bladder perforation, intraoperative 2/28/2022 Yes    Hx CS x3 2/28/2022 Yes    Marijuana use 2/28/2022 Yes    S/p JOAQUIN, BS, L ovarian cyst drainage, Cystotomy repair, Cysto 3/2/22 3/2/2022 Yes              Plan:      -Recurrent bladder wall defect with urinary ascites after a recent diagnostic lap:  S/p hysterectomy and bladder injury repair this afternoon. Postop hypertension: started on IV pain medications per Gyn. Hopefully with improved pain control her BP will improve. Will add PRN hydralazine for SBP>180/DBP>110.  -BETHANY is resolved, continue Campbell monitor urine output.   -Chronic iron deficiency anemia :continue to monitor Hb. Will start Po iron likely AM.   -DVT/GI prophylaxis.     Marie Lockett MD  3/2/2022

## 2022-03-02 NOTE — ANESTHESIA POSTPROCEDURE EVALUATION
Department of Anesthesiology  Postprocedure Note    Patient: Luke Harrington  MRN: [id number]  YOB: 1985  Date of evaluation: 3/2/2022  Time:  4:15 PM     Procedure Summary     Date: 03/02/22 Room / Location: [address]    Anesthesia Start: 0900 Anesthesia Stop: 8945    Procedures:       TOTAL ABDOMINAL HYSTERECTOMY , BILATERAL SALPINGECTOMY (N/A Abdomen)      CYSTOSCOPY REPAIR OF BLADDER LACERATION/DEFECT (N/A Bladder) Diagnosis: (UTERINE FIBROIDS, ABNORMAL UTERINE BLEEDING, PELVIC PAIN, BLADDER LACERATION)    Surgeons: Sandra Blount MD; Cornelius Lawrence MD Responsible Provider: Edgar Martinez MD    Anesthesia Type: general ASA Status: 2          Anesthesia Type: general    Bharath Phase I: Bharath Score: 5    Bharath Phase II:      Last vitals: Reviewed and per EMR flowsheets.    POST-OP ANESTHESIA NOTE       BP (!) 164/94   Pulse 82   Temp 97.1 °F (36.2 °C) (Temporal)   Resp 14   Ht 5' 1\" (1.549 m)   Wt 152 lb (68.9 kg)   SpO2 100%   BMI 28.72 kg/m²    Pain Assessment: FLACC  Pain Level: 7       Anesthesia Post Evaluation    Patient location during evaluation: PACU  Patient participation: complete - patient participated  Level of consciousness: awake  Pain score: 7  Airway patency: patent  Nausea & Vomiting: no vomiting and no nausea  Complications: no  Cardiovascular status: hemodynamically stable  Respiratory status: acceptable  Hydration status: stable

## 2022-03-02 NOTE — PROGRESS NOTES
RN attempted to call report to 4C. RN was informed that the pt's nurse was off to lunch and there was no coverage for her patients. Will call back in 20min as instructed.

## 2022-03-02 NOTE — PROGRESS NOTES
The patient was seen in the preoperative holding area. There have been no changes in her condition since sh was seen and examined earlier this morning. . I have personally explained the planned procedure to her. The indication, risks and alternatives were reviewed. All of her questions were answered to the best of my ability. Ok to proceed to the OR.

## 2022-03-03 PROBLEM — N99.89: Status: ACTIVE | Noted: 2022-03-03

## 2022-03-03 LAB
ABSOLUTE EOS #: <0.03 K/UL (ref 0–0.44)
ABSOLUTE IMMATURE GRANULOCYTE: <0.03 K/UL (ref 0–0.3)
ABSOLUTE LYMPH #: 0.77 K/UL (ref 1.1–3.7)
ABSOLUTE MONO #: 0.74 K/UL (ref 0.1–1.2)
ANION GAP SERPL CALCULATED.3IONS-SCNC: 11 MMOL/L (ref 9–17)
BASOPHILS # BLD: 0 % (ref 0–2)
BASOPHILS ABSOLUTE: <0.03 K/UL (ref 0–0.2)
BUN BLDV-MCNC: 4 MG/DL (ref 6–20)
CALCIUM SERPL-MCNC: 8.5 MG/DL (ref 8.6–10.4)
CHLORIDE BLD-SCNC: 103 MMOL/L (ref 98–107)
CO2: 22 MMOL/L (ref 20–31)
CREAT SERPL-MCNC: 0.4 MG/DL (ref 0.5–0.9)
EOSINOPHILS RELATIVE PERCENT: 0 % (ref 1–4)
GFR AFRICAN AMERICAN: >60 ML/MIN
GFR NON-AFRICAN AMERICAN: >60 ML/MIN
GFR SERPL CREATININE-BSD FRML MDRD: ABNORMAL ML/MIN/{1.73_M2}
GLUCOSE BLD-MCNC: 85 MG/DL (ref 70–99)
HCT VFR BLD CALC: 28.1 % (ref 36.3–47.1)
HEMOGLOBIN: 8.3 G/DL (ref 11.9–15.1)
IMMATURE GRANULOCYTES: 0 %
LYMPHOCYTES # BLD: 11 % (ref 24–43)
MCH RBC QN AUTO: 23.6 PG (ref 25.2–33.5)
MCHC RBC AUTO-ENTMCNC: 29.5 G/DL (ref 28.4–34.8)
MCV RBC AUTO: 79.8 FL (ref 82.6–102.9)
MONOCYTES # BLD: 10 % (ref 3–12)
NRBC AUTOMATED: 0 PER 100 WBC
PDW BLD-RTO: 18.1 % (ref 11.8–14.4)
PLATELET # BLD: 368 K/UL (ref 138–453)
PMV BLD AUTO: 10.2 FL (ref 8.1–13.5)
POTASSIUM SERPL-SCNC: 3.7 MMOL/L (ref 3.7–5.3)
RBC # BLD: 3.52 M/UL (ref 3.95–5.11)
RBC # BLD: ABNORMAL 10*6/UL
SEG NEUTROPHILS: 78 % (ref 36–65)
SEGMENTED NEUTROPHILS ABSOLUTE COUNT: 5.56 K/UL (ref 1.5–8.1)
SODIUM BLD-SCNC: 136 MMOL/L (ref 135–144)
SURGICAL PATHOLOGY REPORT: NORMAL
WBC # BLD: 7.1 K/UL (ref 3.5–11.3)

## 2022-03-03 PROCEDURE — 80048 BASIC METABOLIC PNL TOTAL CA: CPT

## 2022-03-03 PROCEDURE — 6360000002 HC RX W HCPCS: Performed by: STUDENT IN AN ORGANIZED HEALTH CARE EDUCATION/TRAINING PROGRAM

## 2022-03-03 PROCEDURE — 85025 COMPLETE CBC W/AUTO DIFF WBC: CPT

## 2022-03-03 PROCEDURE — 6370000000 HC RX 637 (ALT 250 FOR IP): Performed by: STUDENT IN AN ORGANIZED HEALTH CARE EDUCATION/TRAINING PROGRAM

## 2022-03-03 PROCEDURE — 2580000003 HC RX 258: Performed by: INTERNAL MEDICINE

## 2022-03-03 PROCEDURE — 2580000003 HC RX 258: Performed by: STUDENT IN AN ORGANIZED HEALTH CARE EDUCATION/TRAINING PROGRAM

## 2022-03-03 PROCEDURE — 6370000000 HC RX 637 (ALT 250 FOR IP): Performed by: EMERGENCY MEDICINE

## 2022-03-03 PROCEDURE — 1200000000 HC SEMI PRIVATE

## 2022-03-03 PROCEDURE — 99232 SBSQ HOSP IP/OBS MODERATE 35: CPT | Performed by: INTERNAL MEDICINE

## 2022-03-03 RX ORDER — CYCLOBENZAPRINE HCL 10 MG
10 TABLET ORAL NIGHTLY
Status: DISCONTINUED | OUTPATIENT
Start: 2022-03-03 | End: 2022-03-05 | Stop reason: HOSPADM

## 2022-03-03 RX ORDER — SIMETHICONE 80 MG
80 TABLET,CHEWABLE ORAL EVERY 6 HOURS
Status: DISCONTINUED | OUTPATIENT
Start: 2022-03-03 | End: 2022-03-05 | Stop reason: HOSPADM

## 2022-03-03 RX ORDER — IBUPROFEN 600 MG/1
600 TABLET ORAL EVERY 6 HOURS
Status: DISCONTINUED | OUTPATIENT
Start: 2022-03-03 | End: 2022-03-05 | Stop reason: HOSPADM

## 2022-03-03 RX ORDER — FAMOTIDINE 20 MG/1
20 TABLET, FILM COATED ORAL DAILY
Status: DISCONTINUED | OUTPATIENT
Start: 2022-03-04 | End: 2022-03-05 | Stop reason: HOSPADM

## 2022-03-03 RX ORDER — IBUPROFEN 600 MG/1
600 TABLET ORAL EVERY 6 HOURS
Status: DISCONTINUED | OUTPATIENT
Start: 2022-03-04 | End: 2022-03-03

## 2022-03-03 RX ORDER — SENNA AND DOCUSATE SODIUM 50; 8.6 MG/1; MG/1
2 TABLET, FILM COATED ORAL DAILY
Status: DISCONTINUED | OUTPATIENT
Start: 2022-03-04 | End: 2022-03-05 | Stop reason: HOSPADM

## 2022-03-03 RX ORDER — KETOROLAC TROMETHAMINE 30 MG/ML
30 INJECTION, SOLUTION INTRAMUSCULAR; INTRAVENOUS ONCE
Status: COMPLETED | OUTPATIENT
Start: 2022-03-03 | End: 2022-03-03

## 2022-03-03 RX ORDER — GABAPENTIN 400 MG/1
400 CAPSULE ORAL EVERY 8 HOURS
Status: DISCONTINUED | OUTPATIENT
Start: 2022-03-04 | End: 2022-03-05 | Stop reason: HOSPADM

## 2022-03-03 RX ADMIN — GABAPENTIN 300 MG: 300 CAPSULE ORAL at 20:47

## 2022-03-03 RX ADMIN — DOCUSATE SODIUM 50 MG AND SENNOSIDES 8.6 MG 2 TABLET: 8.6; 5 TABLET, FILM COATED ORAL at 20:48

## 2022-03-03 RX ADMIN — FAMOTIDINE 20 MG: 20 TABLET, FILM COATED ORAL at 20:47

## 2022-03-03 RX ADMIN — ACETAMINOPHEN 1000 MG: 500 TABLET ORAL at 15:02

## 2022-03-03 RX ADMIN — KETOROLAC TROMETHAMINE 30 MG: 30 INJECTION, SOLUTION INTRAMUSCULAR; INTRAVENOUS at 05:36

## 2022-03-03 RX ADMIN — SODIUM CHLORIDE: 9 INJECTION, SOLUTION INTRAVENOUS at 13:18

## 2022-03-03 RX ADMIN — IBUPROFEN 600 MG: 600 TABLET, FILM COATED ORAL at 23:21

## 2022-03-03 RX ADMIN — CYCLOBENZAPRINE 10 MG: 10 TABLET, FILM COATED ORAL at 16:17

## 2022-03-03 RX ADMIN — SODIUM CHLORIDE, PRESERVATIVE FREE 10 ML: 5 INJECTION INTRAVENOUS at 09:40

## 2022-03-03 RX ADMIN — ACETAMINOPHEN 1000 MG: 500 TABLET ORAL at 20:47

## 2022-03-03 RX ADMIN — CYCLOBENZAPRINE 10 MG: 10 TABLET, FILM COATED ORAL at 23:21

## 2022-03-03 RX ADMIN — OXYCODONE 10 MG: 5 TABLET ORAL at 15:02

## 2022-03-03 RX ADMIN — HYDROMORPHONE HYDROCHLORIDE 1 MG: 1 INJECTION, SOLUTION INTRAMUSCULAR; INTRAVENOUS; SUBCUTANEOUS at 08:13

## 2022-03-03 RX ADMIN — OXYCODONE 10 MG: 5 TABLET ORAL at 09:31

## 2022-03-03 RX ADMIN — SODIUM CHLORIDE: 9 INJECTION, SOLUTION INTRAVENOUS at 03:17

## 2022-03-03 RX ADMIN — ONDANSETRON 4 MG: 4 TABLET, ORALLY DISINTEGRATING ORAL at 09:30

## 2022-03-03 RX ADMIN — SIMETHICONE 80 MG: 80 TABLET, CHEWABLE ORAL at 23:21

## 2022-03-03 RX ADMIN — KETOROLAC TROMETHAMINE 30 MG: 30 INJECTION, SOLUTION INTRAMUSCULAR at 17:14

## 2022-03-03 RX ADMIN — ENOXAPARIN SODIUM 40 MG: 100 INJECTION SUBCUTANEOUS at 20:49

## 2022-03-03 ASSESSMENT — PAIN DESCRIPTION - ONSET: ONSET: ON-GOING

## 2022-03-03 ASSESSMENT — PAIN SCALES - GENERAL
PAINLEVEL_OUTOF10: 7
PAINLEVEL_OUTOF10: 7
PAINLEVEL_OUTOF10: 5
PAINLEVEL_OUTOF10: 10
PAINLEVEL_OUTOF10: 8
PAINLEVEL_OUTOF10: 7
PAINLEVEL_OUTOF10: 7
PAINLEVEL_OUTOF10: 6
PAINLEVEL_OUTOF10: 0

## 2022-03-03 ASSESSMENT — PAIN DESCRIPTION - ORIENTATION: ORIENTATION: RIGHT;LEFT;MID

## 2022-03-03 ASSESSMENT — PAIN DESCRIPTION - DESCRIPTORS: DESCRIPTORS: ACHING;CONSTANT;DISCOMFORT

## 2022-03-03 ASSESSMENT — PAIN DESCRIPTION - PAIN TYPE: TYPE: SURGICAL PAIN

## 2022-03-03 ASSESSMENT — PAIN - FUNCTIONAL ASSESSMENT: PAIN_FUNCTIONAL_ASSESSMENT: ACTIVITIES ARE NOT PREVENTED

## 2022-03-03 ASSESSMENT — PAIN DESCRIPTION - LOCATION: LOCATION: ABDOMEN

## 2022-03-03 ASSESSMENT — PAIN DESCRIPTION - FREQUENCY: FREQUENCY: CONTINUOUS

## 2022-03-03 ASSESSMENT — PAIN DESCRIPTION - PROGRESSION: CLINICAL_PROGRESSION: NOT CHANGED

## 2022-03-03 NOTE — PROGRESS NOTES
Physical Therapy        Physical Therapy Cancel Note      DATE: 3/3/2022    NAME: Vicki Johnson  MRN: [id number]   : 1985      Patient not seen this date for Physical Therapy due to:    Patient Declined: Pt refused x2 this date, on first attempt, pt reports that she is unwilling to attempt to ambulate until she gets a shower, pt was educated on the importance of mobility in the hospital and after surgery but continues to refuse. On second attempt, pt reports that she is too nauseated and continues to refuse despite max encouragement. Will check back on 3/4.       Electronically signed by Idania Kearney PT on 3/3/2022 at 2:43 PM

## 2022-03-03 NOTE — PROGRESS NOTES
place and functioning      Interval Imaging Findings:    Impression:    81-year-old female  Recurrent bladder wall defect at anterior dome, 2 cm or less, intraperitoneal defect, seen on CT cystogram 2/27/2022 and CT abdomen pelvis with contrast on 2/27/2022  Urinary ascites  Status post intraoperative cystotomy repair at anterior dome on 2/14/2022  Status post repeat intraoperative cystotomy repair a posterior and anterior portion of the bladder and hysterectomy on 3/2/2022    Plan:   Maintain Campbell catheter for 10 days interval.  Will need cystogram prior to removal of Campbell catheter which will be done in outpatient setting  Pain control antiemetics as needed  Maintain ÁNGEL drain, will send ÁNGEL creatinine tomorrow to assess for urinary leak  Urine culture shows no growth  Follow-up morning labs  Appreciate medical management per primary team  Urology will continue to follow    Smitha Fernandez MD  8:30 AM 3/3/2022

## 2022-03-03 NOTE — PROGRESS NOTES
Gynecology Progress Note    Date: 3/3/2022  Time: 6:55 AM    Keith Echols 29 y.o. female [id number], POD # 1 s/p JOAQUIN, BS, Aspiration of L ovarian cyst, cystotomy repair with co-procedure with Urology on 3/2/22    Patient seen and examined. She complained of nothing. Pain is controlled. Patient is  tolerating oral intake. She is urinating well via rosado catheter. She denies any vaginal bleeding. She is not ambulating without difficulty. She is not passing flatus. She denies Fever/Chills, Chest Pain, SOB, N/V, Calf Pain. Vitals:  Vitals:    03/02/22 1633 03/02/22 1939 03/02/22 2338 03/03/22 0536   BP: (!) 146/70 (!) 154/91 135/88 133/81   Pulse: 76 92 96 80   Resp: 18 18 18 18   Temp: 97.1 °F (36.2 °C) 98.2 °F (36.8 °C) 98.3 °F (36.8 °C) 98.2 °F (36.8 °C)   TempSrc: Oral Oral Oral Oral   SpO2:  94% 98% 98%   Weight:       Height:             Intake/Output:   Last Shift: I/O last 3 completed shifts: In: 2397.8 [I.V.:2397.8]  Out: 6187 [Urine:5050; Blood:200]  Current Shift: I/O this shift: In: 873.1 [I.V.:873.1]  Out: 1975 [KQXPD:0290; Drains:200]    Date 03/03/22 0000 - 03/03/22 2359   Shift 5286-1609 2669-9906 7175-6238 24 Hour Total   INTAKE   I.V.(mL/kg) 873. 1(12.7)   873. 1(12.7)   Shift Total(mL/kg) 873. 1(12.7)   873. 1(12.7)   OUTPUT   Urine(mL/kg/hr) 725   725   Drains(mL/kg) 50(0.7)   50(0.7)   Shift Total(mL/kg) 775(11.2)   775(11.2)   Weight (kg) 68.9 68.9 68.9 68.9       UOP: 160 ml/hr for the last 12h- yellow blood tinged urine  LLQ ÁNGEL drain: 10ml/hr over the last 12- bloody serosanguinous drainage    Physical Exam:  General:  no apparent distress, alert and cooperative  Neurologic:  alert, oriented, normal speech, no focal findings or movement disorder noted  Lungs:  No increased work of breathing, good air exchange, clear to auscultation bilaterally, no crackles or wheezing  Heart:  Regular rate and rhythm, normal S1 and S2, no S3 or S4, and no murmur noted    Abdomen: soft, no rebound, guarding, rigidity, non-distended, appropriate tenderness, no CVA tenderness, hypoactive bowel sounds  Incision: Pfannenstiel incision: steri strips in place without erythema/edema/drainage, LLQ ÁNGEL drain site: without erythema or edema  Extremities:  no calf tenderness, non edematous, SCDs in place and functioning    Lab:  Lab Results   Component Value Date    WBC 3.5 2022    HGB 9.0 (L) 2022    HCT 29.6 (L) 2022    MCV 76.9 (L) 2022     2022     Lab Results   Component Value Date     2022    K 4.0 2022     2022    CO2 23 2022    BUN 4 (L) 2022    CREATININE 0.53 2022    GLUCOSE 88 2022    CALCIUM 9.0 2022    PROT 7.2 2022    LABALBU 4.1 2022    BILITOT 0.34 2022    ALKPHOS 46 2022    AST 17 2022    ALT 11 2022    LABGLOM >60 2022    GFRAA >60 2022       Assessment/Plan:  Magno MENDEZ Olden 29 y.o. female , POD # 1 s/p JOAQUIN, BS, Aspiration of L ovarian cyst, cystotomy repair with co-procedure with Urology on 3/2/22   - Doing well, vitals stable   - Drains:    - Campbell catheter, UOP adequate    - ÁNGEL drain in LLQ: 10ml/hr over 12 hours   - IVF: NS 125ml/hr   - Pain control: Toradol/Dilaudid, Motrin/Tylenol/Virgen/Gabapentin/Flexeril ordered   - Labs: CBC, BMP qd   - GI: Pepcid 20mg BID    - DVT Proph: Lovenox 40 qd, SCDs   - Abx: S/p Ancef pre-operatively   - Diet: general diet   - Path: pending   - Dressing: Sutures and steri strips   - Encourage ambulation and use of incentive spirometer   - Continue post-op care     Admission for Urine ascites due to recurrent bladder dome defect s/p repair of intraoperative cystotomy during diagnostic laparoscopy, MICK, D&C, and Mirena IUD insertion 22 and another repair during hysterectomy on 3/2/22   - Cr 0.63>5.25>4.54>1.00>0.53   - Urology consulted and following, recommending ÁNGEL drain fluid creatinine on 3/4/22 prior to removal, outpatient cystogram, pt to be discharged home with rosado catheter for 10 days   - Recommending Ciprofloxacin 3d around removal of rosado catheter    - Pt to go home with rosado catheter for 10 days until 3/11/22, Urology will f/u as outpatient    Iron deficiency Anemia (10>9.6>8.9>9.0)   - S/p Venofer x1 3/1/22    - Pt denies any s/s anemia   - Iron supplementation as outpatient    HTN   - Stable on no medications   - BPs stable    Hetero Prothrombin Mutation   - Caprini Risk score of 5 as high risk category, will consider discharge home with DVT prophylaxis up to 10 days- would appreciate Internal medicine recommendations    - DVT prophylaxis: Lovenox 40 qd, SCDs ordered    BMI 29    Please contact Ob/Gyn resident physician on call for any questions or concerns (Dr. Julien Spurling from 7:00AM-5:00PM and Dr. 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 ([facility]    Family history of uterine cancer    Female pelvic pain    Abnormal uterine bleeding (AUB)    S/p Diagnostic laparoscopy, lysis of adhesions, hysteroscopy, dilatation and curettage, Mirena IUD insertion 22    Pelvic adhesive disease    Intramural leiomyoma of uterus    Lower abdominal pain    Intractable abdominal pain    IUD removed 22    Hypercoagulable state ([address]    Hypokalemia    Hypocalcemia    Bladder perforation, intraoperative    Hx CS x3    Marijuana use    S/p JOAQUIN, BS, L ovarian cyst drainage, Cystotomy repair, Cysto 3/2/22  Resolved Problems:    * No resolved hospital problems. *    Will update Dr. Nina Greene, DO  Ob/Gyn Resident, PGY4  3/3/2022, 6:55 AM        Attending Physician Statement  I have discussed the care of [facility], including pertinent history and exam findings,  with the resident.  I have seen and examined the patient and the key elements of all parts of the encounter have been performed by me. I agree with the assessment, plan and orders as documented by the resident. Operative findings and procedures performed were discussed with the patient. She expressed understanding of the plan for rosado to remain in place for 10 days,  cystogram as outpatient and urology follow up for removal of rosado. All of the patients questions were answered.     (GC Modifier)

## 2022-03-03 NOTE — PROGRESS NOTES
[facility]  Office: [address], DO, Yuli March, DO, Lee Zhu, DO, Carroll Steven Blood, DO, Kate Gomez MD, Luis Carlos Cochran MD, April Mcmahan MD, Alcides Ross MD, Lamonte Gutierrez MD, Floyd Abreu MD, Bro Wilkinson MD, Kira Mcneill, DO, Acacia Amaro, DO, Jhon Diop MD,  Lizeth Martini, DO, Karo Skelton MD, Roro Bales MD, Laura Alejandro MD, Irish Maddox MD, Patti Mullins MD, Hector Moore, Cara Qureshi, CNP, Julien Castillo, CNP, Gail Bob, CNS, Rakan Presley, CNP, David Delgadillo, CNP, Mack Love, CNP, Re Urena, CNP, Beulah Sanchez, CNP, Arun Segura, PA-C, Alireza Amaro [facility], Claudette Anis, [facility], Ender Clayton, CNP, Elmer Gonsales, CNP, Helder Ovalle, CNP, Alcides Mendoza, CNP, Estuardo Figueroa, [facility], Lucas Gear, [address] [place] [address]    Progress Note    Name:   William Batista  MRN:     [id number]     Acct:      [de-identified]   Room:   [facility] Day:  5  Admit Date:  2/25/2022  9:04 AM    PCP:   Lucinda Mendiola MD  Code Status:  Full Code    Subjective:     C/C:   Chief Complaint   Patient presents with    Abdominal Pain     Interval History Status: improved. Patient indicates abdominal pain is controlled with current regimen,+ nausea, has not passed flatus. No fevers or chills, is afebrile, hemodynamically stable blood pressure is improved. CBC BMP reviewed. Hemoglobin 8.3 stable  Brief History:   69-year-old presented to ED with complaints of abdominal pain, after a recent robotic diagnostic laparoscopy with lysis of additions and had cystoscopy D&C and IUD insertion on 2/14/2022 intraoperatively there was a 2 cm injury to the bladder requiring repair. She is ultimately found to have 2 cm anterior dome defect with urinary ascites, BETHANY which improved after Campbell placement and diversion of urine.   Patient underwent total abdominal salpingectomy- hysterectomy with repair of cystostomies 03/02/22. Review of Systems:   Review of Systems - General ROS: negative for - chills, fever or night sweats  Respiratory ROS: no cough, shortness of breath, or wheezing  Cardiovascular ROS: no chest pain or dyspnea on exertion  Gastrointestinal ROS: no flatus yet postop  Musculoskeletal ROS: negative for - muscular weakness  Medications: Allergies: Allergies   Allergen Reactions    Seasonal Other (See Comments)     Allergic rhinnitis       Current Meds:   Scheduled Meds:    magnesium hydroxide  30 mL Oral Daily    ketorolac  30 mg IntraVENous Q6H    Followed by   Duana Big ibuprofen  600 mg Oral Q6H    acetaminophen  1,000 mg Oral Q6H    sennosides-docusate sodium  2 tablet Oral BID    sodium chloride flush  10 mL IntraVENous 2 times per day    enoxaparin  40 mg SubCUTAneous Q24H    docusate sodium  100 mg Oral Daily    sodium chloride flush  5-40 mL IntraVENous 2 times per day    famotidine  20 mg Oral BID     Continuous Infusions:    sodium chloride      sodium chloride 100 mL/hr at 03/03/22 1318     PRN Meds: ondansetron **OR** ondansetron, cyclobenzaprine, gabapentin, famotidine, sodium chloride flush, sodium chloride, diphenhydrAMINE **OR** diphenhydrAMINE, benzocaine-menthol, calcium carbonate, hydrALAZINE, oxyCODONE **OR** oxyCODONE, magnesium hydroxide, docusate sodium, oxybutynin, simethicone, sodium chloride flush, acetaminophen    Data:     Past Medical History:   has a past medical history of Acne, Benign essential hypertension antepartum, Closed nondisplaced fracture of distal pole of navicular bone of left wrist, COVID-19, Family history of uterine cancer, Iron deficiency anemia, Mirena IUD inserted 2/14/22, Prothrombin gene mutation [facility]), S/p Diagnostic laparoscopy, hysis of adhesions, hysteroscopy, dilatation and curettage, Mirena IUD insertion, Snores, Under care of team, and Under care of team.    Social History:   reports that she has never smoked.  She has never used smokeless tobacco. She reports current alcohol use of about 3.0 standard drinks of alcohol per week. She reports current drug use. Frequency: 2.00 times per week. Drug: Marijuana Yadiel Budge). Family History:   Family History   Problem Relation Age of Onset    Diabetes Paternal Grandmother     Endometrial Cancer Paternal Grandmother     No Known Problems Mother     No Known Problems Father     Hypertension Maternal Grandmother     Breast Cancer Neg Hx     Cancer Neg Hx     Colon Cancer Neg Hx     Eclampsia Neg Hx     Ovarian Cancer Neg Hx      Labor Neg Hx     Spont Abortions Neg Hx     Stroke Neg Hx        Vitals:  /82   Pulse 82   Temp 98.4 °F (36.9 °C) (Oral)   Resp 16   Ht 5' 1\" (1.549 m)   Wt 152 lb (68.9 kg)   SpO2 98%   BMI 28.72 kg/m²   Temp (24hrs), Av °F (36.7 °C), Min:97.1 °F (36.2 °C), Max:98.4 °F (36.9 °C)    No results for input(s): POCGLU in the last 72 hours. I/O (24Hr): Intake/Output Summary (Last 24 hours) at 3/3/2022 1423  Last data filed at 3/3/2022 0536  Gross per 24 hour   Intake 1406.37 ml   Output 2275 ml   Net -868.63 ml       Labs:  Hematology:  Recent Labs     22  0607 22  0610 22  0756   WBC 3.6 3.5 7.1   RBC 3.81* 3.85* 3.52*   HGB 8.9* 9.0* 8.3*   HCT 29.0* 29.6* 28.1*   MCV 76.1* 76.9* 79.8*   MCH 23.4* 23.4* 23.6*   MCHC 30.7 30.4 29.5   RDW 18.0* 17.7* 18.1*    363 368   MPV 9.9 10.2 10.2     Chemistry:  Recent Labs     22  0607 22  0610 22  0756    136 136   K 4.2 4.0 3.7    101 103   CO2 27 23 22   GLUCOSE 83 88 85   BUN 4* 4* 4*   CREATININE 0.53 0.53 0.40*   ANIONGAP 12 12 11   LABGLOM >60 >60 >60   GFRAA >60 >60 >60   CALCIUM 8.9 9.0 8.5*   CAION 1.10*  --   --      Radiology:  XR CHEST (2 VW)    Result Date: 2022  No acute process. There is no change from prior examination. CT ABDOMEN PELVIS W IV CONTRAST  Result Date: 2022  1.  Urinary bladder is filled with contrast. Track of contrast extends from the anterior dome of the urinary bladder into the peritoneal cavity consistent with active extravasation from the urinary bladder indicative of bladder injury/tear. 2. Interval development of large volume free intraperitoneal fluid of density higher than simple fluid. This is attributed to extravasation of contrast/urine from the urinary bladder. 3. Small sliding hiatal hernia. Findings were discussed with SHI Guillen at 4:02 pm on 2022. CT ABDOMEN PELVIS W IV CONTRAST Additional Contrast? None  Result Date: 2022  1. Mild stranding around the urinary bladder and uterus as well as tiny amount of free fluid in the pelvic cul-de-sac, likely expected postoperative change. No pelvic fluid collection or gas. 2.  No acute findings in the upper abdomen. CT CYSTOGRAM W CONTRAST  Result Date: 2022  Defect in the bladder dome is confirmed with intraperitoneal contrast extravasation and large volume abdominopelvic fluid. Physical Examination:        General appearance:  Alert, awake, not in any distress  Mental Status: Oriented to name,time, place and person. Lungs:  Good b/l air entry anteriorly.    Heart:  regular rate and rhythm, no murmur  Abdomen:  soft, nondistended    Extremities:  no edema, redness, tenderness in the calves  Skin:  no gross lesions, rashes, induration    Assessment:        Hospital Problems           Last Modified POA    * (Principal) Urine ascites 2022 Yes    History of  section, classical 2022 Yes    Overview Signed 2013 10:48 AM by Anjelica Rodriguez MD     2012         Heter[facility]thrombin [id number] [facility] 2022 Yes    Family history of uterine cancer 2022 Yes    Overview Signed 2015  9:08 AM by Sherice Almodovar MA     [facility]         Female pelvic pain 2022 Yes    Overview Addendum 2020  8:15 AM by Robinson Sidhu, DO     Pelvic US ordered, appt on 16: unremarkable Abnormal uterine bleeding (AUB) 2/25/2022 Yes    S/p Diagnostic laparoscopy, lysis of adhesions, hysteroscopy, dilatation and curettage, Mirena IUD insertion 2/14/22 2/25/2022 Yes    Overview Signed 2/14/2022  1:27 PM by Andrew Ernandez DO     Complicated by incidental cystotomy repaired by Dr. Geo Barraza MD Urology via Robot           Pelvic adhesive disease 2/28/2022 Yes    Intramural leiomyoma of uterus 2/28/2022 Yes    Lower abdominal pain 2/28/2022 Yes    Intractable abdominal pain 2/26/2022 Yes    IUD removed 2/21/22 2/27/2022 Yes    Overview Signed 2/27/2022  8:37 AM by Janina Bedolla DO     Patient presented with rash. There was concern for possible allergy. IUD was removed at that time         Hypercoagulable state ([address] 2/28/2022 Yes    Hypokalemia 2/28/2022 Yes    Hypocalcemia 2/28/2022 Yes    Bladder perforation, intraoperative 2/28/2022 Yes    Hx CS x3 2/28/2022 Yes    Marijuana use 2/28/2022 Yes    S/p JOAQUIN, BS, L ovarian cyst drainage, Cystotomy repair, Cysto 3/2/22 3/2/2022 Yes              Plan:      -Recurrent bladder wall defect with urinary ascites after a recent diagnostic lap:  S/p hysterectomy and bladder injury repair 03/02/2022. Postop hypertension: resolved. Continue to monitor. -BETHANY is resolved, continue Campbell monitor urine output.   -Chronic iron deficiency anemia :continue to monitor Hb.   -DVT/GI prophylaxis. Will reach out to Gyn if can assume care primarily. Kolby Rodriguez MD  3/3/2022  Addend: Gyn team has graciously agreed to take over patient's care. Discussed need for prolonged DVT chemoprophylaxis. With heterozygous prothrombin mutation and no personal h/o thrombosis in past, she doesnot need prolong postop chemoprophylaxis. Duration and use of lovenox based on her surgical risk alone. We will s/o. pls call with questions.      Kolby Rodriguez MD

## 2022-03-04 LAB
ABSOLUTE EOS #: 0.05 K/UL (ref 0–0.44)
ABSOLUTE IMMATURE GRANULOCYTE: 0.04 K/UL (ref 0–0.3)
ABSOLUTE LYMPH #: 1.31 K/UL (ref 1.1–3.7)
ABSOLUTE MONO #: 0.64 K/UL (ref 0.1–1.2)
ANION GAP SERPL CALCULATED.3IONS-SCNC: 10 MMOL/L (ref 9–17)
BASOPHILS # BLD: 0 % (ref 0–2)
BASOPHILS ABSOLUTE: 0.03 K/UL (ref 0–0.2)
BUN BLDV-MCNC: 7 MG/DL (ref 6–20)
CALCIUM SERPL-MCNC: 7.9 MG/DL (ref 8.6–10.4)
CHLORIDE BLD-SCNC: 104 MMOL/L (ref 98–107)
CO2: 23 MMOL/L (ref 20–31)
CREAT SERPL-MCNC: 0.5 MG/DL (ref 0.5–0.9)
CREATININE FLUID: 0.5 MG/DL
EOSINOPHILS RELATIVE PERCENT: 1 % (ref 1–4)
GFR AFRICAN AMERICAN: >60 ML/MIN
GFR NON-AFRICAN AMERICAN: >60 ML/MIN
GFR SERPL CREATININE-BSD FRML MDRD: ABNORMAL ML/MIN/{1.73_M2}
GLUCOSE BLD-MCNC: 78 MG/DL (ref 70–99)
HCT VFR BLD CALC: 24.8 % (ref 36.3–47.1)
HEMOGLOBIN: 7.4 G/DL (ref 11.9–15.1)
IMMATURE GRANULOCYTES: 1 %
LYMPHOCYTES # BLD: 19 % (ref 24–43)
MCH RBC QN AUTO: 23.1 PG (ref 25.2–33.5)
MCHC RBC AUTO-ENTMCNC: 29.8 G/DL (ref 28.4–34.8)
MCV RBC AUTO: 77.5 FL (ref 82.6–102.9)
MONOCYTES # BLD: 9 % (ref 3–12)
NRBC AUTOMATED: 0 PER 100 WBC
PDW BLD-RTO: 18.3 % (ref 11.8–14.4)
PLATELET # BLD: 347 K/UL (ref 138–453)
PMV BLD AUTO: 10.1 FL (ref 8.1–13.5)
POTASSIUM SERPL-SCNC: 3.8 MMOL/L (ref 3.7–5.3)
RBC # BLD: 3.2 M/UL (ref 3.95–5.11)
RBC # BLD: ABNORMAL 10*6/UL
SEG NEUTROPHILS: 71 % (ref 36–65)
SEGMENTED NEUTROPHILS ABSOLUTE COUNT: 4.95 K/UL (ref 1.5–8.1)
SODIUM BLD-SCNC: 137 MMOL/L (ref 135–144)
SPECIMEN TYPE: NORMAL
WBC # BLD: 7 K/UL (ref 3.5–11.3)

## 2022-03-04 PROCEDURE — 97165 OT EVAL LOW COMPLEX 30 MIN: CPT

## 2022-03-04 PROCEDURE — 97162 PT EVAL MOD COMPLEX 30 MIN: CPT

## 2022-03-04 PROCEDURE — 2580000003 HC RX 258: Performed by: INTERNAL MEDICINE

## 2022-03-04 PROCEDURE — 97116 GAIT TRAINING THERAPY: CPT

## 2022-03-04 PROCEDURE — 80048 BASIC METABOLIC PNL TOTAL CA: CPT

## 2022-03-04 PROCEDURE — 6360000002 HC RX W HCPCS: Performed by: STUDENT IN AN ORGANIZED HEALTH CARE EDUCATION/TRAINING PROGRAM

## 2022-03-04 PROCEDURE — 1200000000 HC SEMI PRIVATE

## 2022-03-04 PROCEDURE — 6370000000 HC RX 637 (ALT 250 FOR IP): Performed by: STUDENT IN AN ORGANIZED HEALTH CARE EDUCATION/TRAINING PROGRAM

## 2022-03-04 PROCEDURE — 2580000003 HC RX 258: Performed by: STUDENT IN AN ORGANIZED HEALTH CARE EDUCATION/TRAINING PROGRAM

## 2022-03-04 PROCEDURE — 97535 SELF CARE MNGMENT TRAINING: CPT

## 2022-03-04 PROCEDURE — 85025 COMPLETE CBC W/AUTO DIFF WBC: CPT

## 2022-03-04 PROCEDURE — 97530 THERAPEUTIC ACTIVITIES: CPT

## 2022-03-04 PROCEDURE — 82570 ASSAY OF URINE CREATININE: CPT

## 2022-03-04 PROCEDURE — 36415 COLL VENOUS BLD VENIPUNCTURE: CPT

## 2022-03-04 RX ORDER — HYDROCODONE BITARTRATE AND ACETAMINOPHEN 5; 325 MG/1; MG/1
2 TABLET ORAL EVERY 4 HOURS PRN
Status: DISCONTINUED | OUTPATIENT
Start: 2022-03-04 | End: 2022-03-05 | Stop reason: HOSPADM

## 2022-03-04 RX ORDER — ACETAMINOPHEN 325 MG/1
650 TABLET ORAL EVERY 6 HOURS
Status: DISCONTINUED | OUTPATIENT
Start: 2022-03-04 | End: 2022-03-05 | Stop reason: HOSPADM

## 2022-03-04 RX ORDER — CALCIUM CARBONATE 200(500)MG
1000 TABLET,CHEWABLE ORAL 2 TIMES DAILY
Status: DISCONTINUED | OUTPATIENT
Start: 2022-03-04 | End: 2022-03-05 | Stop reason: HOSPADM

## 2022-03-04 RX ORDER — HYDROCODONE BITARTRATE AND ACETAMINOPHEN 5; 325 MG/1; MG/1
1 TABLET ORAL EVERY 4 HOURS PRN
Status: DISCONTINUED | OUTPATIENT
Start: 2022-03-04 | End: 2022-03-05 | Stop reason: HOSPADM

## 2022-03-04 RX ADMIN — ACETAMINOPHEN 650 MG: 325 TABLET ORAL at 08:28

## 2022-03-04 RX ADMIN — GABAPENTIN 400 MG: 400 CAPSULE ORAL at 20:43

## 2022-03-04 RX ADMIN — ANTACID TABLETS 1000 MG: 500 TABLET, CHEWABLE ORAL at 20:43

## 2022-03-04 RX ADMIN — IRON SUCROSE 200 MG: 20 INJECTION, SOLUTION INTRAVENOUS at 06:50

## 2022-03-04 RX ADMIN — IBUPROFEN 600 MG: 600 TABLET, FILM COATED ORAL at 11:24

## 2022-03-04 RX ADMIN — GABAPENTIN 400 MG: 400 CAPSULE ORAL at 13:53

## 2022-03-04 RX ADMIN — SIMETHICONE 80 MG: 80 TABLET, CHEWABLE ORAL at 23:24

## 2022-03-04 RX ADMIN — FAMOTIDINE 20 MG: 20 TABLET, FILM COATED ORAL at 08:28

## 2022-03-04 RX ADMIN — SIMETHICONE 80 MG: 80 TABLET, CHEWABLE ORAL at 11:23

## 2022-03-04 RX ADMIN — SODIUM CHLORIDE, PRESERVATIVE FREE 10 ML: 5 INJECTION INTRAVENOUS at 08:29

## 2022-03-04 RX ADMIN — SIMETHICONE 80 MG: 80 TABLET, CHEWABLE ORAL at 15:27

## 2022-03-04 RX ADMIN — ENOXAPARIN SODIUM 40 MG: 100 INJECTION SUBCUTANEOUS at 20:42

## 2022-03-04 RX ADMIN — ACETAMINOPHEN 650 MG: 325 TABLET ORAL at 03:51

## 2022-03-04 RX ADMIN — MAGNESIUM HYDROXIDE 30 ML: 400 SUSPENSION ORAL at 08:27

## 2022-03-04 RX ADMIN — SODIUM CHLORIDE: 9 INJECTION, SOLUTION INTRAVENOUS at 00:06

## 2022-03-04 RX ADMIN — SODIUM CHLORIDE, PRESERVATIVE FREE 10 ML: 5 INJECTION INTRAVENOUS at 21:01

## 2022-03-04 RX ADMIN — CYCLOBENZAPRINE 10 MG: 10 TABLET, FILM COATED ORAL at 20:43

## 2022-03-04 RX ADMIN — HYDROCODONE BITARTRATE AND ACETAMINOPHEN 1 TABLET: 5; 325 TABLET ORAL at 13:27

## 2022-03-04 RX ADMIN — ACETAMINOPHEN 650 MG: 325 TABLET ORAL at 20:42

## 2022-03-04 RX ADMIN — GABAPENTIN 400 MG: 400 CAPSULE ORAL at 05:44

## 2022-03-04 RX ADMIN — DOCUSATE SODIUM 50 MG AND SENNOSIDES 8.6 MG 2 TABLET: 8.6; 5 TABLET, FILM COATED ORAL at 08:31

## 2022-03-04 RX ADMIN — SIMETHICONE 80 MG: 80 TABLET, CHEWABLE ORAL at 03:51

## 2022-03-04 RX ADMIN — IBUPROFEN 600 MG: 600 TABLET, FILM COATED ORAL at 15:27

## 2022-03-04 RX ADMIN — IBUPROFEN 600 MG: 600 TABLET, FILM COATED ORAL at 05:44

## 2022-03-04 ASSESSMENT — PAIN DESCRIPTION - LOCATION
LOCATION: ABDOMEN
LOCATION: ABDOMEN

## 2022-03-04 ASSESSMENT — PAIN SCALES - GENERAL
PAINLEVEL_OUTOF10: 6
PAINLEVEL_OUTOF10: 7
PAINLEVEL_OUTOF10: 7
PAINLEVEL_OUTOF10: 6
PAINLEVEL_OUTOF10: 5
PAINLEVEL_OUTOF10: 5
PAINLEVEL_OUTOF10: 7
PAINLEVEL_OUTOF10: 5
PAINLEVEL_OUTOF10: 6
PAINLEVEL_OUTOF10: 6

## 2022-03-04 ASSESSMENT — PAIN DESCRIPTION - FREQUENCY: FREQUENCY: CONTINUOUS

## 2022-03-04 ASSESSMENT — PAIN - FUNCTIONAL ASSESSMENT: PAIN_FUNCTIONAL_ASSESSMENT: PREVENTS OR INTERFERES SOME ACTIVE ACTIVITIES AND ADLS

## 2022-03-04 ASSESSMENT — PAIN DESCRIPTION - DESCRIPTORS
DESCRIPTORS: ACHING;DISCOMFORT
DESCRIPTORS: ACHING;DISCOMFORT

## 2022-03-04 ASSESSMENT — PAIN DESCRIPTION - PAIN TYPE
TYPE: SURGICAL PAIN
TYPE: SURGICAL PAIN

## 2022-03-04 NOTE — PROGRESS NOTES
Aubery Oven, [address] [facility], [address], [place] Santana [place]  Urology Progress Note     Subjective:   AFVSS   No N/V  Tolerating Campbell  Urine output 2.050L/24 hours  ÁNGEL drain 70cc/24hours  Pending morning labs    Patient Vitals for the past 24 hrs:   BP Temp Temp src Pulse Resp SpO2   03/04/22 0350 116/77 99 °F (37.2 °C) Oral 94 15 99 %   03/03/22 2319 112/76 99.5 °F (37.5 °C) Oral 108 15 100 %   03/03/22 1939 132/61 98.2 °F (36.8 °C) Oral 120 -- 94 %   03/03/22 1600 (!) 150/99 98.2 °F (36.8 °C) Oral 94 18 99 %   03/03/22 1153 139/82 98.4 °F (36.9 °C) Oral 82 16 98 %       Intake/Output Summary (Last 24 hours) at 3/4/2022 0700  Last data filed at 3/4/2022 0558  Gross per 24 hour   Intake 1650 ml   Output 2120 ml   Net -470 ml       Recent Labs     03/02/22  0610 03/03/22  0756 03/04/22  0344   WBC 3.5 7.1 7.0   HGB 9.0* 8.3* 7.4*   HCT 29.6* 28.1* 24.8*   MCV 76.9* 79.8* 77.5*    368 347     Recent Labs     03/02/22  0610 03/03/22  0756 03/04/22  0344    136 137   K 4.0 3.7 3.8    103 104   CO2 23 22 23   BUN 4* 4* 7   CREATININE 0.53 0.40* 0.50       No results for input(s): COLORU, PHUR, LABCAST, WBCUA, RBCUA, MUCUS, TRICHOMONAS, YEAST, BACTERIA, CLARITYU, SPECGRAV, LEUKOCYTESUR, UROBILINOGEN, BILIRUBINUR, BLOODU in the last 72 hours.     Invalid input(s): NITRATE, GLUCOSEUKETONESUAMORPHOUS    Additional Lab/culture results:    Physical Exam:   General: A/O x3, no acute distress  Respiratory: Normal respiratory effort on room air  Cardiac: Regular rate  Abdomen: Soft, appropriately tender around incision sites, nondistended, pfannenstiel incision with Steri-Strips in place appears C/D/I  : Campbell in place with yellow urine  Extremities: SCDs in place and functioning      Interval Imaging Findings:    Impression:    36-year-old female  Recurrent bladder wall defect at anterior dome, 2 cm or less, intraperitoneal defect, seen on CT cystogram 2/27/2022 and CT abdomen pelvis with contrast on 2/27/2022  Urinary ascites  Status post intraoperative cystotomy repair at anterior dome on 2/14/2022  Status post repeat intraoperative cystotomy repair a posterior and anterior portion of the bladder and hysterectomy on 3/2/2022    Plan:   Maintain Campbell catheter for 10 days interval.  Will need cystogram prior to removal of Campbell catheter which will be done in outpatient setting  Pain control antiemetics as needed  ÁNGEL fluid Cr 0.5  Urine culture shows no growth  Follow-up morning labs  Appreciate medical management per primary team  Urology will continue to follow    [facility], DO  7:00 AM 3/4/2022

## 2022-03-04 NOTE — PROGRESS NOTES
Occupational Therapy   Occupational Therapy Initial Assessment  Date: 3/4/2022   Patient Name: Hardy Schilder  MRN: [id number]     : 1985    Date of Service: 3/4/2022    Discharge Recommendations: No therapy recommended at discharge. OT Equipment Recommendations  Equipment Needed: No    Assessment   Performance deficits / Impairments: Decreased functional mobility ; Decreased ADL status; Decreased endurance;Decreased high-level IADLs  Assessment: Pt demonstrated functional sit<>stand transfers with SBA, functional mobility with SBA-CGA (CGA d/t 1 LOB w/ self correction) and no AD. Pt engaged in showering activity per request and RN permission. Pt completed UB bathing, UB dressing, LB bathing and LB dressing with SBA for safety d/t pain and slight unsteadiness initially. Pt did improve with increased activity. Completed oral hygiene IND standing sinkside. Pt is expected to benefit from OT services during their acute hospitalization stay to address the above noted deficits through skilled occupational therapy intervention for promotion of increased independence throughout ADLs, IADLs and functional mobility tasks. Prognosis: Good  Decision Making: Low Complexity  Patient Education: Pt educated on OT role, OT POC, balance main, safety awarenss-good return  REQUIRES OT FOLLOW UP: Yes  Activity Tolerance  Activity Tolerance: Patient Tolerated treatment well;Patient limited by pain  Safety Devices  Safety Devices in place: Yes  Type of devices: Gait belt;Call light within reach; Left in bed;Nurse notified  Restraints  Initially in place: No           Patient Diagnosis(es): The primary encounter diagnosis was Lower abdominal pain. A diagnosis of S/p Diagnostic laparoscopy, lysis of adhesions, hysteroscopy, dilatation and curettage, Mirena IUD insertion 22 was also pertinent to this visit.      has a past medical history of Acne, Benign essential hypertension antepartum, Closed nondisplaced fracture of distal pole of navicular bone of left wrist, COVID-19, Family history of uterine cancer, Iron deficiency anemia, Mirena IUD inserted 22, Prothrombin gene mutation [facility]), S/p Diagnostic laparoscopy, hysis of adhesions, hysteroscopy, dilatation and curettage, Mirena IUD insertion, Snores, Under care of team, and Under care of team.   has a past surgical history that includes  section (1/10/2007, 2007, 2012); Cervix surgery; laparoscopy (2022); laparoscopy (N/A, 2022); Dilation and curettage of uterus (N/A, 2022); Bladder surgery (N/A, 2022); Hysterectomy (2022); Hysterectomy, total abdominal (N/A, 3/2/2022); and Cystoscopy (N/A, 3/2/2022). Restrictions  Restrictions/Precautions  Restrictions/Precautions: Up as Tolerated  Required Braces or Orthoses?: Yes  Required Braces or Orthoses  Other: Abdominal Binder  Position Activity Restriction  Other position/activity restrictions: S/p JOAQUIN, BS, L ovarian cyst drainage, Cystotomy repair, Cysto 3/2/22; posterior and anterior portion of the bladder and hysterectomy on 3/2/2022    Subjective   General  Patient assessed for rehabilitation services?: Yes  Family / Caregiver Present: No  General Comment  Comments: RN ok'd for OT zoila this PM. Pt agreeable to session, pleasent/cooperative throughout. Patient Currently in Pain: Yes  Pain Assessment  Pain Assessment: 0-10  Pain Level: 6  Pain Type: Surgical pain  Pain Location: Abdomen  Pain Descriptors: Aching;Discomfort  Pain Frequency: Continuous  Non-Pharmaceutical Pain Intervention(s): Ambulation/Increased Activity; Distraction  Response to Pain Intervention: Patient Satisfied  Vital Signs  Patient Currently in Pain: Yes     Social/Functional History  Social/Functional History  Lives With: Daughter (18 YO)  Type of Home: House  Home Layout: One level  Home Access: Level entry  Bathroom Shower/Tub: Tub/Shower unit  Bathroom Toilet: Standard  Bathroom Equipment:  (reports none)  Home Equipment:  (pt reports not owning or using any DME at a baseline)  ADL Assistance: [address]: Independent  Homemaking Responsibilities: Yes  Ambulation Assistance: Independent  Transfer Assistance: Independent  Active : Yes  Mode of Transportation: Car  Occupation: Full time employment  Type of occupation:   Leisure & Hobbies: writing, playing cards (spades)  Additional Comments: Pt reports that her daughter's father would be able to assist her during the day if need be. Objective   Vision: Within Functional Limits  Hearing: Within functional limits    Orientation  Overall Orientation Status: Within Functional Limits  Observation/Palpation  Posture: Fair  Balance  Sitting Balance: Independent (Seated EOB to don/doff socks)  Standing Balance: Stand by assistance  Standing Balance  Time: 17 minutes  Activity: Engagnig in UB/LB bathing, UB dressing, grooming tasks  Comment: SBA initially, but progressed to SUP with time. Functional Mobility  Functional - Mobility Device: No device  Assist Level: Contact guard assistance  Functional Mobility Comments: CGA initially EOB to bathroom w/ 1 LOB but self correction. Limited d/t pain.  Pt then progressed after activity in bathroom to SBA  ADL  Grooming: Independent (Completed oral hygiene sinkside)  UE Bathing: Stand by assistance (Pt washed BUEs, abdomen, chest, and back with SBA standing in shower)  LE Bathing: Stand by assistance (Pt washed BLEs, bottom and pericare region with SBA standing in shower)  UE Dressing: Stand by assistance (Pt donned/doffed hospital gown standing in bathroom)  LE Dressing: Modified independent  (Pt donned/doffed B socks with Mod IND, use of figure four tech and increased time d/t pain, seated EOB)  Toileting: Stand by assistance  Tone RUE  RUE Tone: Normotonic  Tone LUE  LUE Tone: Normotonic  Coordination  Movements Are Fluid And Coordinated: Yes     Bed mobility  Supine to Sit: Stand by assistance  Sit to Supine: Stand by assistance  Scooting: Stand by assistance  Comment: Increased effort/time, HOB 30 degrees  Transfers  Sit to stand: Stand by assistance  Stand to sit: Stand by assistance  Transfer Comments: No AD     Cognition  Overall Cognitive Status: WFL        Sensation  Overall Sensation Status: WFL (pt denies any numbness/tingling)        LUE AROM (degrees)  LUE AROM : WFL  Left Hand AROM (degrees)  Left Hand AROM: WFL  RUE AROM (degrees)  RUE AROM : WFL  Right Hand AROM (degrees)  Right Hand AROM: WFL  LUE Strength  Gross LUE Strength: WFL  L Hand General: 4+/5  LUE Strength Comment: Grossly 4+/5  RUE Strength  Gross RUE Strength: WFL  R Hand General: 4+/5  RUE Strength Comment: Grossly 4+/5                   Plan   Plan  Times per week: 1-2 sessions  Current Treatment Recommendations: Safety Education & Training,Balance Training,Patient/Caregiver Education & Training,Self-Care / ADL,Functional Mobility Training,Home Management Training,Endurance Training      AM-PAC Score        AM-[facility] Inpatient Daily Activity Raw Score: 20 (03/04/22 1458)  AM-PAC Inpatient ADL T-Scale Score : 42.03 (03/04/22 1458)  ADL Inpatient CMS 0-100% Score: 38.32 (03/04/22 1458)  ADL Inpatient CMS G-Code Modifier : Leora Hector (03/04/22 1458)    Goals  Short term goals  Time Frame for Short term goals: By discharge, pt will:  Short term goal 1: Demo functional sit<>stand transfers and functional mobility IND and no LOB  Short term goal 2: Demo item retrieval of 8 items from various surface levels at SUP to promote increased IND in ADLs/IADLs  Short term goal 3: Demo ADLs IND  Short term goal 4: Demo good safety awareness with 0 VCs throughout all functional tasks       Therapy Time   Individual Concurrent Group Co-treatment   Time In 1310         Time Out 1337         Minutes 27         Timed Code Treatment Minutes: 23 Minutes       Travis Garcias, OTR/L

## 2022-03-04 NOTE — CARE COORDINATION
TRansitional Planning    Spoke to patient about plan for discharge. She plans to go home. Has 14 yo dtr at home. She has had rosado before and is comfortable taking care of it. She does not think she needs home care.

## 2022-03-04 NOTE — PROGRESS NOTES
Comprehensive Nutrition Assessment    Type and Reason for Visit:  Initial (Length of stay)    Nutrition Recommendations/Plan:    - Continue current diet. Send Magic Cup ONS once daily.    - Monitor weight. Nutrition Assessment:  Pt s/p JOAQUIN, BS, aspiration of L ovarian cyst, cystotomy repair x2, cystoscopy 3/2/22. Reports very good appetite/PO intake PTA, but decreased overall during current admission. States appetite improving over past couple of days. Reports eating 25-50% of meals. Noted weight loss of ~9.5% UBW over past 7 months (not clinically significant for timeframe). Pt interested in Frozen ONS. Malnutrition Assessment:  Malnutrition Status: At risk for malnutrition (Comment)    Context:  Acute Illness     Findings of the 6 clinical characteristics of malnutrition:  Energy Intake:  Mild decrease in energy intake (Comment)  Weight Loss:  No significant weight loss (9.5% loss x 7 months per EHR)     Body Fat Loss:  No significant body fat loss     Muscle Mass Loss:  No significant muscle mass loss    Fluid Accumulation:  No significant fluid accumulation     Strength:  Not Performed    Estimated Daily Nutrient Needs:  Energy (kcal):  6316-3986 kcals/day; Weight Used for Energy Requirements:  Current     Protein (g):  60-70 gm/day; Weight Used for Protein Requirements:  Ideal (1.3-1.4)        Fluid (ml/day):  35 mL/kg = 2400 mL/day or per MD; Method Used for Fluid Requirements:  ml/Kg      Nutrition Related Findings:  meds/labs reviewed      Wounds:  Multiple,Surgical Incision       Current Nutrition Therapies:    ADULT DIET; Regular  ADULT ORAL NUTRITION SUPPLEMENT; Dinner; Frozen Oral Supplement    Anthropometric Measures:  · Height: 5' 1\" (154.9 cm)  · Current Body Weight: 151 lb 14.4 oz (68.9 kg)   · Ideal Body Weight: 105 lbs; % Ideal Body Weight 144.7 %   · BMI: 28.7  · BMI Categories: Overweight (BMI 25.0-29. 9)       Nutrition Diagnosis:   · Inadequate oral intake related to  (appetite) as evidenced by intake 26-50%      Nutrition Interventions:   Food and/or Nutrient Delivery:  Continue Current Diet,Start Oral Nutrition Supplement  Nutrition Education/Counseling:  No recommendation at this time   Coordination of Nutrition Care:  Continue to monitor while inpatient    Goals:  Meet at least 75% of estimated nutrition needs       Nutrition Monitoring and Evaluation:   Behavioral-Environmental Outcomes:  None Identified   Food/Nutrient Intake Outcomes:  Food and Nutrient Intake,Supplement Intake  Physical Signs/Symptoms Outcomes:  Weight,Biochemical Data,Nutrition Focused Physical Findings,Skin     Discharge Planning:     Too soon to determine     Electronically signed by Farshad Gleason MS, RD, LD on 3/4/22 at 1:30 PM EST    Contact: [phone]

## 2022-03-04 NOTE — PLAN OF CARE
Nutrition Problem #1: Inadequate oral intake  Intervention: Food and/or Nutrient Delivery: Continue Current Diet,Start Oral Nutrition Supplement  Nutritional Goals: Meet at least 75% of estimated nutrition needs

## 2022-03-04 NOTE — PROGRESS NOTES
Gynecology Progress Note    Date: 3/4/2022  Time: 6:14 AM    Vicki Johnson 29 y.o. female [id number], POD # 2 s/p JOAQUIN, BS, Aspiration of L ovarian cyst, cystotomy repair with co-procedure with Urology on 3/2/22    Patient seen and examined. She complained of soreness. Pain is controlled. Patient is  tolerating oral intake. She is urinating well via rosado catheter. She denies any vaginal bleeding. She is  ambulating without difficulty. She is  passing flatus. She denies Fever/Chills, Chest Pain, SOB, N/V, Calf Pain. Vitals:  Vitals:    03/03/22 1600 03/03/22 1939 03/03/22 2319 03/04/22 0350   BP: (!) 150/99 132/61 112/76 116/77   Pulse: 94 120 108 94   Resp: 18  15 15   Temp: 98.2 °F (36.8 °C) 98.2 °F (36.8 °C) 99.5 °F (37.5 °C) 99 °F (37.2 °C)   TempSrc: Oral Oral Oral Oral   SpO2: 99% 94% 100% 99%   Weight:       Height:             Intake/Output:   Last Shift: I/O last 3 completed shifts: In: 3106.4 [I.V.:3106.4]  Out: 9058 [Urine:3425; Drains:235; Blood:200]  Current Shift: I/O this shift:  In: 5302 [P.O.:450; I.V.:1200]  Out: 735 [Urine:700; Drains:35]    Date 03/04/22 0000 - 03/04/22 2359   Shift 7247-5844 2761-8676 5989-5778 24 Hour Total   INTAKE   P.O.(mL/kg/hr) 450   450   I. V.(mL/kg) 3241(09.4)   1200(17.4)   Shift Total(mL/kg) 8099(54.4)   6953(40.9)   OUTPUT   Urine(mL/kg/hr) 300   300   Drains(mL/kg) 35(0.5)   35(0.5)   Shift Total(mL/kg) 335(4.9)   335(4.9)   Weight (kg) 68.9 68.9 68.9 68.9     UOP: 700ml overnight- 58ml/hr for the last 12h-  Clear/yellow urine  LLQ ÁNGEL drain: 35ml overnight- 2ml/hr over the last 12h- serosanguinous drainage    Physical Exam:  General:  no apparent distress, alert and cooperative  Neurologic:  alert, oriented, normal speech, no focal findings or movement disorder noted  Lungs:  No increased work of breathing, good air exchange, clear to auscultation bilaterally, no crackles or wheezing  Heart:  Regular rate and rhythm, normal S1 and S2, no S3 or S4, and no murmur noted    Abdomen: soft, no rebound, guarding, rigidity, non-distended, appropriate tenderness, no CVA tenderness, hypoactive bowel sounds  Incision: Pfannenstiel incision: with steri strips with minimal drainage without erythema/edema, LLQ ÁNGEL drain site: without erythema/edema  Extremities:  no calf tenderness, non edematous, SCDs in place and functioning     Lab:  Lab Results   Component Value Date    WBC 7.0 03/04/2022    HGB 7.4 (L) 03/04/2022    HCT 24.8 (L) 03/04/2022    MCV 77.5 (L) 03/04/2022     03/04/2022     Lab Results   Component Value Date     03/04/2022    K 3.8 03/04/2022     03/04/2022    CO2 23 03/04/2022    BUN 7 03/04/2022    CREATININE 0.50 03/04/2022    GLUCOSE 78 03/04/2022    CALCIUM 7.9 (L) 03/04/2022    PROT 7.2 02/25/2022    LABALBU 4.1 02/25/2022    BILITOT 0.34 02/25/2022    ALKPHOS 46 02/25/2022    AST 17 02/25/2022    ALT 11 02/25/2022    LABGLOM >60 03/04/2022    GFRAA >60 03/04/2022       Assessment/Plan:  Raúl Saleem 29 y.o. female [id number], POD # 2 s/p JOAQUIN, BS, Aspiration of L ovarian cyst, cystotomy repair with co-procedure with Urology on 3/2/22   - Ob/Gyn team primary   - Internal medicine team- signed off   - Doing well, vitals stable   - Drains:    - Campbell catheter, UOP adequate    - ÁNGEL drain in LLQ: 2ml/hr over 12 hours- 35ml total   - IVF: NS 125ml/hr   - Pain control: Tylenol 650mg q6h, Gabapentin 400mg q8h, Motrin 600mg q6h, Simethicone 80mg q6h scheduled with Flexeril 10mg hs, Norco PRN with Dilaudid IV PRN (4 doses), abdominal binder   - Labs: CBC, BMP qd   - GI: Senokot/MOM scheduled    - DVT Proph: Lovenox 40 qd, SCDs   - Abx: S/p Ancef pre-operatively   - Diet: general diet   - Path:  Intramural leiomyoma, adenomyosis, proliferative endometrium, unremarkable cervix, benign b/l fallopian tubes with benign paratubal cysts   - Dressing: Sutures and steri strips of pfannenstiel incision   - Encourage ambulation and use of incentive spirometer   - Continue post-op care   - Pt to work with PT/OT today more with ambulating the halls- pt agreeable     Admission for Urine ascites due to recurrent bladder dome defect s/p repair of intraoperative cystotomy during diagnostic laparoscopy, MICK, D&C, and Mirena IUD insertion 22 and another repair during hysterectomy on 3/2/22   - Cr 0.63>5.25>4.54>>>0.50   - Urology consulted and following, recommending ÁNGEL drain fluid creatinine on 3/4/22 prior to removal, outpatient cystogram, pt to be discharged home with rosado catheter for 10 days   - ÁNGEL drain fluid Cr level pending   - Recommending Ciprofloxacin 3d around removal of rosado catheter    - Pt to go home with rosado catheter for 10 days until 3/11/22, Urology will f/u as outpatient    Iron deficiency Anemia (10>9.6>8.9>9.0>8.3>7.4)   - S/p Venofer x1 3/1/22    - Pt denies any s/s anemia   - Iron supplementation as outpatient   - Will order another Venofer 200mg IV x1 today    - Will monitor closely and trend Hgb    HTN   - Stable on no medications   - BPs stable    Hetero Prothrombin Mutation   - Discussed recommendations with Internal Medicine about DVT prophylaxis with anticoagulation upon discharge and no indication at this time    - DVT prophylaxis: Lovenox 40 qd, SCDs ordered    BMI 29    Please contact Ob/Gyn resident physician on call for any questions or concerns.     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 (HCC)    Family history of uterine cancer    Female pelvic pain    Abnormal uterine bleeding (AUB)    S/p Diagnostic laparoscopy, lysis of adhesions, hysteroscopy, dilatation and curettage, Mirena IUD insertion 22    Pelvic adhesive disease    Intramural leiomyoma of uterus    Lower abdominal pain    Intractable abdominal pain    IUD removed 22    Hypercoagulable state ([address]    Hypokalemia    Hypocalcemia    Bladder perforation, intraoperative    Hx CS x3    Marijuana use    S/p JOAQUIN, BS, L ovarian cyst drainage, Cystotomy repair, Cysto 3/2/22    Postoperative surgical complication involving genitourinary system associated with genitourinary procedure  Resolved Problems:    * No resolved hospital problems. *    Will update Dr. Diego Amaya,   Ob/Gyn Resident, PGY4  3/4/2022, 6:14 AM           Attending Physician Statement  I have discussed the care of [facility], including pertinent history and exam findings,  with the resident. I have seen and examined the patient and the key elements of all parts of the encounter have been performed by me. I agree with the assessment, plan and orders as documented by the resident. Pain management improved but still remains concern for patient. She is OOB infrequently and ambulation encouraged. She will benefit from additional PT while inpatient. ÁNGEL creatinine normal. If output 30 ml or less over 24 hours it can be removed. Repeat CBC in AM. If patient becomes symptomatic secondary to anemia, transfusion of 1 unit PRBC will be considered. I don't think that is necessary at this time. Anticipate discharge to home tomorrow.    (GC Modifier)

## 2022-03-04 NOTE — PLAN OF CARE
Problem: Falls - Risk of:  Goal: Will remain free from falls  Description: Will remain free from falls  Outcome: Met This Shift  Goal: Absence of physical injury  Description: Absence of physical injury  Outcome: Met This Shift     Problem: Pain:  Goal: Pain level will decrease  Description: Pain level will decrease  Outcome: Ongoing  Goal: Control of acute pain  Description: Control of acute pain  Outcome: Ongoing  Goal: Control of chronic pain  Description: Control of chronic pain  Outcome: Ongoing     Problem: HEMODYNAMIC STATUS  Goal: Patient has stable vital signs and fluid balance  Outcome: Ongoing     Problem: OXYGENATION/RESPIRATORY FUNCTION  Goal: Patient will achieve/maintain normal respiratory rate/effort  Outcome: Ongoing     Problem: MOBILITY  Goal: Early mobilization is achieved  Outcome: Ongoing     Problem: ELIMINATION  Goal: Elimination patterns are normal or improving  Description: Elimination patterns return to pre-surgery normal patterns  Outcome: Ongoing     Problem: SKIN INTEGRITY  Goal: Skin integrity is maintained or improved  Outcome: Ongoing

## 2022-03-04 NOTE — PROGRESS NOTES
Obstetric/Gynecology Resident Interval Note    Patient seen and evaluated as she is requesting Dilaudid for pain control. She is resting comfortably in bed with her daughter and her daughter's partner. Patient states her pain has increased over the day today and Roxicodone has not helped her pain. Patient admits to not wanting to take extra medications. Patient has been up out of bed to use the restroom and shower. She did not work with PT today due to pain level. She is eating better today and tolerating general diet. Discussed due to history of chronic pelvic pain that pain may be difficult to control but how pain control is very important for postoperative healing. Patient agreeable to scheduling Tylenol 650 every 6 hours, gabapentin 400 every 8 hours, Motrin 600 every 6 hours, simethicone 80 every 6 hours. Patient requesting to change the Roxicodone to Norco.  Will order Dilaudid pushes x4 but only to be used if oral pain medications are not controlling pain and patient is not tolerating oral intake. Will offer abdominal binder for additional support. Flexeril ordered nightly for pain control overnight for sleep. Patient verbalized and expressed understanding and is agreeable to this plan. All questions were answered. All questions from family were answered. Reviewed plan with RN who is agreeable to plan.     Jenny Lam DO  OBGYN Resident, [address]  3/3/2022, 9:59 PM

## 2022-03-04 NOTE — DISCHARGE SUMMARY
Gyn Discharge Summary  [facility]      Patient Name: Christel Obrien  Patient : 1985  Primary Care Physician: Vimal Sen MD  Admit Date: 2022    Principal Diagnosis: Post-operative care s/p JOAQUIN, BS, aspiration of L ovarian cyst, cystotomy repair x2, cystoscopy 3/2/22    Other Diagnosis:   Lower abdominal pain [R10.30]  Intractable abdominal pain [R10.9]  Postoperative surgical complication involving genitourinary system associated with genitourinary procedure, unspecified complication [P98.12]  Patient Active Problem List   Diagnosis    History of  section, classical    History of  labor    Benign essential hypertension antepartum    Heterozygous for prothrombin [id number] mutation ([facility] [address]    Family planning education, guidance, and counseling    Family history of uterine cancer    Female pelvic pain    Iron deficiency anemia    Closed nondisplaced fracture of distal pole of navicular bone of left wrist    Abnormal uterine bleeding (AUB)    Bilateral nipple discharge    Folliculitis    S/p Diagnostic laparoscopy, lysis of adhesions, hysteroscopy, dilatation and curettage, Mirena IUD insertion 22    Pelvic adhesive disease    Intramural leiomyoma of uterus    Lower abdominal pain    Intractable abdominal pain    IUD removed 22    Hypercoagulable state ([facility] [address]    Hypokalemia    Hypocalcemia    Bladder perforation, intraoperative    Hx CS x3    Marijuana use    Urine ascites    S/p JOAQUIN, BS, L ovarian cyst drainage, Cystotomy repair, Cysto 3/2/22    Postoperative surgical complication involving genitourinary system associated with genitourinary procedure       Infection: No  Hospital Acquired: N/A    Surgical Operations & Procedures: JOAQUIN, BS, Aspiration Left Ovarian Cyst, Cystotomy Repair x 2, Cystoscopy on 3/2/22    Consultations: IM    Pertinent Findings & Procedures:   Christel Obrien is a 29 y.o. female , admitted for daily as needed for Constipation     ferrous sulfate 325 (65 Fe) MG tablet  Commonly known as: IRON 325  Take 1 tablet by mouth daily (with breakfast)     * ibuprofen 800 MG tablet  Commonly known as: ADVIL;MOTRIN  Take 1 tablet by mouth every 8 hours as needed for Pain     * ibuprofen 600 MG tablet  Commonly known as: ADVIL;MOTRIN  Take 1 tablet by mouth every 6 hours as needed for Pain     ondansetron 4 MG tablet  Commonly known as: Zofran  Take 1 tablet by mouth every 8 hours as needed for Nausea or Vomiting     oxybutynin 5 MG tablet  Commonly known as: DITROPAN  Take 1 tablet by mouth 2 times daily as needed (bladder pain/spasms from stent)     simethicone 80 MG chewable tablet  Commonly known as: MYLICON  Take 1 tablet by mouth 4 times daily as needed for Flatulence         * This list has 2 medication(s) that are the same as other medications prescribed for you. Read the directions carefully, and ask your doctor or other care provider to review them with you. ASK your doctor about these medications    * oxyCODONE 5 MG immediate release tablet  Commonly known as: Roxicodone  Take 1 tablet by mouth every 6 hours as needed for Pain for up to 5 days. Intended supply: 5 days. Take lowest dose possible to manage pain  Ask about: Should I take this medication? phenazopyridine 200 MG tablet  Commonly known as: Pyridium  Take 1 tablet by mouth 3 times daily as needed for Pain  Ask about: Should I take this medication? * This list has 1 medication(s) that are the same as other medications prescribed for you. Read the directions carefully, and ask your doctor or other care provider to review them with you.                Where to Get Your Medications      These medications were sent to [facility] [address] [address]    Phone: [phone]   · acetaminophen 500 MG tablet  · oxyCODONE 5 MG immediate release tablet  · oxyCODONE 5 MG immediate release tablet  · phenazopyridine 200 MG tablet           Activity: pelvic rest x 8 weeks, no driving on narcotics, no lifting greater than 15 lbs  Diet: regular diet  Follow up: Follow up in 1 week (3/11) with urology and 10 days with Gyn    Condition on discharge: good and stable   Discharge Date: 3/5/22    Comments:  Home care, Follow-up care, restrictions reviewed.     Zuleima Valente DO  Ob/Gyn Resident  [address] [facility]  3/5/2022, 4:33 PM

## 2022-03-04 NOTE — PROGRESS NOTES
Physical Therapy    Facility/Department: [facility] ONC/MED SURG  Initial Assessment    NAME: Marlin Hoffman  : 1985  MRN: [id number]    Date of Service: 3/4/2022  Chief Complaint   Patient presents with    Abdominal Pain      Discharge Recommendations:  Patient would benefit from continued therapy after discharge   PT Equipment Recommendations  Equipment Needed: No    Assessment   Body structures, Functions, Activity limitations: Decreased functional mobility ; Decreased posture;Decreased endurance;Decreased strength;Decreased balance  Assessment: Pt with mobility deficits requiring CGA to ambulate 300 feet with no device. Pt mildly unsteady with ambulation this date with multiple staggers that the pt was able to correct without physical assistance. Pt would benefit from additional therapy upon discharge to address balance deficits, decreased endurance and BLE strength. Pt will require 24/7 assistance with mobility upon discharge. Prognosis: Good  Decision Making: Medium Complexity  PT Education: Goals;Transfer Training;PT Role;Functional Mobility Training;General Safety;Plan of Care;Gait Training;Disease Specific Education  REQUIRES PT FOLLOW UP: Yes  Activity Tolerance  Activity Tolerance: Patient limited by endurance; Patient limited by fatigue       Patient Diagnosis(es): The primary encounter diagnosis was Lower abdominal pain. A diagnosis of S/p Diagnostic laparoscopy, lysis of adhesions, hysteroscopy, dilatation and curettage, Mirena IUD insertion 22 was also pertinent to this visit.      has a past medical history of Acne, Benign essential hypertension antepartum, Closed nondisplaced fracture of distal pole of navicular bone of left wrist, COVID-19, Family history of uterine cancer, Iron deficiency anemia, Mirena IUD inserted 22, Prothrombin gene mutation [facility]), S/p Diagnostic laparoscopy, hysis of adhesions, hysteroscopy, dilatation and curettage, Mirena IUD insertion, Snores, Under care of team, and Under care of team.   has a past surgical history that includes  section (1/10/2007, 2007, 2012); Cervix surgery; laparoscopy (2022); laparoscopy (N/A, 2022); Dilation and curettage of uterus (N/A, 2022); Bladder surgery (N/A, 2022); Hysterectomy (2022); Hysterectomy, total abdominal (N/A, 3/2/2022); and Cystoscopy (N/A, 3/2/2022). Restrictions  Restrictions/Precautions  Restrictions/Precautions: Up as Tolerated  Required Braces or Orthoses?: Yes  Required Braces or Orthoses  Other: Abdominal Binder  Position Activity Restriction  Other position/activity restrictions: S/p JOAQUIN, BS, L ovarian cyst drainage, Cystotomy repair, Cysto 3/2/22; posterior and anterior portion of the bladder and hysterectomy on 3/2/2022      Subjective  General  Patient assessed for rehabilitation services?: Yes  Response To Previous Treatment: Not applicable  Family / Caregiver Present: No  Follows Commands: Within Functional Limits  Subjective  Subjective: Pt supine in bed and requires max encouragement to participate but is agreeable to therapy. RN agreeable to therapy. Pt cooperative throughout today's session.   Pain Screening  Patient Currently in Pain: Yes  Pain Assessment  Pain Assessment: 0-10  Pain Level: 6  Pain Type: Surgical pain  Pain Location: Abdomen  Pain Descriptors: Aching;Discomfort  Functional Pain Assessment: Prevents or interferes some active activities and ADLs  Non-Pharmaceutical Pain Intervention(s): Ambulation/Increased Activity;Repositioned;Distraction  Response to Pain Intervention: Patient Satisfied  Vital Signs  Patient Currently in Pain: Yes       Social/Functional History  Social/Functional History  Lives With: Daughter (18 YO)  Type of Home: House  Home Layout: One level  Home Access: Level entry  Bathroom Shower/Tub: Tub/Shower unit  Bathroom Toilet: Standard  Bathroom Equipment:  (reports none)  Home Equipment:  (pt reports not owning or using any DME Static: Fair  Standing - Dynamic: Fair  Comments: standing balance assessed while using no device        Plan   Plan  Times per week: 5x  Times per day: Daily  Current Treatment Recommendations: Stephany Beckers Training,Gait Training,Functional Mobility Training,Safety Education & Training,Home Exercise Program,Equipment Evaluation, Education, & procurement,Patient/Caregiver Education & Training  Safety Devices  Type of devices: Left in bed,Call light within reach,Gait belt,Nurse notified (left sitting, EOB, RN aware and okay'd)  Restraints  Initially in place: No                                                 AM-PAC Score     AM-PAC Inpatient Mobility without Stair Climbing Raw Score : 16 (03/04/22 1256)  AM-PAC Inpatient without Stair Climbing T-Scale Score : 45.54 (03/04/22 1256)  Mobility Inpatient CMS 0-100% Score: 40.64 (03/04/22 1256)  Mobility Inpatient without Stair CMS G-Code Modifier : CK (03/04/22 1256)       Goals  Short term goals  Time Frame for Short term goals: 14 visits  Short term goal 1: Pt will ambulate 300 feet with no device independently to increase functional independence. Short term goal 2: Pt will demonstrate good standing balance to decrease fall risk. Short term goal 3: Pt will perform sit<>stand transfer independently to increase functional independence. Short term goal 4: Pt will perform bed mobility independently to increase functional independence.        Therapy Time   Individual Concurrent Group Co-treatment   Time In 0815; 8692         Time Out 0820; 1050         Minutes 5 + 19= 24         Timed Code Treatment Minutes: [address], PT

## 2022-03-05 VITALS
OXYGEN SATURATION: 100 % | RESPIRATION RATE: 16 BRPM | TEMPERATURE: 98.8 F | WEIGHT: 152 LBS | SYSTOLIC BLOOD PRESSURE: 121 MMHG | HEIGHT: 61 IN | HEART RATE: 99 BPM | DIASTOLIC BLOOD PRESSURE: 82 MMHG | BODY MASS INDEX: 28.7 KG/M2

## 2022-03-05 LAB
ABSOLUTE EOS #: 0.12 K/UL (ref 0–0.44)
ABSOLUTE IMMATURE GRANULOCYTE: 0.07 K/UL (ref 0–0.3)
ABSOLUTE LYMPH #: 1.05 K/UL (ref 1.1–3.7)
ABSOLUTE MONO #: 0.59 K/UL (ref 0.1–1.2)
ANION GAP SERPL CALCULATED.3IONS-SCNC: 12 MMOL/L (ref 9–17)
BASOPHILS # BLD: 0 % (ref 0–2)
BASOPHILS ABSOLUTE: 0.03 K/UL (ref 0–0.2)
BUN BLDV-MCNC: 7 MG/DL (ref 6–20)
CALCIUM SERPL-MCNC: 8.5 MG/DL (ref 8.6–10.4)
CHLORIDE BLD-SCNC: 105 MMOL/L (ref 98–107)
CO2: 22 MMOL/L (ref 20–31)
CREAT SERPL-MCNC: 0.58 MG/DL (ref 0.5–0.9)
EOSINOPHILS RELATIVE PERCENT: 2 % (ref 1–4)
GFR AFRICAN AMERICAN: >60 ML/MIN
GFR NON-AFRICAN AMERICAN: >60 ML/MIN
GFR SERPL CREATININE-BSD FRML MDRD: ABNORMAL ML/MIN/{1.73_M2}
GLUCOSE BLD-MCNC: 92 MG/DL (ref 70–99)
HCT VFR BLD CALC: 27.1 % (ref 36.3–47.1)
HEMOGLOBIN: 8.1 G/DL (ref 11.9–15.1)
IMMATURE GRANULOCYTES: 1 %
LYMPHOCYTES # BLD: 14 % (ref 24–43)
MCH RBC QN AUTO: 23 PG (ref 25.2–33.5)
MCHC RBC AUTO-ENTMCNC: 29.9 G/DL (ref 28.4–34.8)
MCV RBC AUTO: 77 FL (ref 82.6–102.9)
MONOCYTES # BLD: 8 % (ref 3–12)
NRBC AUTOMATED: 0 PER 100 WBC
PDW BLD-RTO: 19 % (ref 11.8–14.4)
PLATELET # BLD: 378 K/UL (ref 138–453)
PMV BLD AUTO: 9.7 FL (ref 8.1–13.5)
POTASSIUM SERPL-SCNC: 4 MMOL/L (ref 3.7–5.3)
RBC # BLD: 3.52 M/UL (ref 3.95–5.11)
RBC # BLD: ABNORMAL 10*6/UL
SEG NEUTROPHILS: 75 % (ref 36–65)
SEGMENTED NEUTROPHILS ABSOLUTE COUNT: 5.85 K/UL (ref 1.5–8.1)
SODIUM BLD-SCNC: 139 MMOL/L (ref 135–144)
WBC # BLD: 7.7 K/UL (ref 3.5–11.3)

## 2022-03-05 PROCEDURE — 6370000000 HC RX 637 (ALT 250 FOR IP): Performed by: STUDENT IN AN ORGANIZED HEALTH CARE EDUCATION/TRAINING PROGRAM

## 2022-03-05 PROCEDURE — 36415 COLL VENOUS BLD VENIPUNCTURE: CPT

## 2022-03-05 PROCEDURE — 80048 BASIC METABOLIC PNL TOTAL CA: CPT

## 2022-03-05 PROCEDURE — 85025 COMPLETE CBC W/AUTO DIFF WBC: CPT

## 2022-03-05 RX ORDER — ACETAMINOPHEN 500 MG
1000 TABLET ORAL EVERY 6 HOURS PRN
Qty: 80 TABLET | Refills: 1 | Status: SHIPPED | OUTPATIENT
Start: 2022-03-05 | End: 2022-08-09 | Stop reason: ALTCHOICE

## 2022-03-05 RX ORDER — OXYCODONE HYDROCHLORIDE 5 MG/1
5 TABLET ORAL EVERY 6 HOURS PRN
Qty: 20 TABLET | Refills: 0 | Status: SHIPPED | OUTPATIENT
Start: 2022-03-05 | End: 2022-03-10

## 2022-03-05 RX ADMIN — SIMETHICONE 80 MG: 80 TABLET, CHEWABLE ORAL at 15:49

## 2022-03-05 RX ADMIN — GABAPENTIN 400 MG: 400 CAPSULE ORAL at 06:28

## 2022-03-05 RX ADMIN — ANTACID TABLETS 1000 MG: 500 TABLET, CHEWABLE ORAL at 09:55

## 2022-03-05 RX ADMIN — ACETAMINOPHEN 650 MG: 325 TABLET ORAL at 09:55

## 2022-03-05 RX ADMIN — MAGNESIUM HYDROXIDE 30 ML: 400 SUSPENSION ORAL at 09:55

## 2022-03-05 RX ADMIN — ACETAMINOPHEN 650 MG: 325 TABLET ORAL at 03:30

## 2022-03-05 RX ADMIN — ACETAMINOPHEN 650 MG: 325 TABLET ORAL at 15:49

## 2022-03-05 RX ADMIN — DOCUSATE SODIUM 100 MG: 100 CAPSULE ORAL at 10:55

## 2022-03-05 RX ADMIN — IBUPROFEN 600 MG: 600 TABLET, FILM COATED ORAL at 06:28

## 2022-03-05 RX ADMIN — SIMETHICONE 80 MG: 80 TABLET, CHEWABLE ORAL at 10:54

## 2022-03-05 RX ADMIN — SIMETHICONE 80 MG: 80 TABLET, CHEWABLE ORAL at 03:30

## 2022-03-05 RX ADMIN — IBUPROFEN 600 MG: 600 TABLET, FILM COATED ORAL at 00:32

## 2022-03-05 RX ADMIN — GABAPENTIN 400 MG: 400 CAPSULE ORAL at 13:25

## 2022-03-05 RX ADMIN — FAMOTIDINE 20 MG: 20 TABLET, FILM COATED ORAL at 09:54

## 2022-03-05 RX ADMIN — DOCUSATE SODIUM 50 MG AND SENNOSIDES 8.6 MG 2 TABLET: 8.6; 5 TABLET, FILM COATED ORAL at 09:54

## 2022-03-05 RX ADMIN — IBUPROFEN 600 MG: 600 TABLET, FILM COATED ORAL at 13:25

## 2022-03-05 ASSESSMENT — PAIN SCALES - GENERAL
PAINLEVEL_OUTOF10: 8
PAINLEVEL_OUTOF10: 6
PAINLEVEL_OUTOF10: 4
PAINLEVEL_OUTOF10: 5
PAINLEVEL_OUTOF10: 6
PAINLEVEL_OUTOF10: 5

## 2022-03-05 ASSESSMENT — PAIN DESCRIPTION - FREQUENCY
FREQUENCY: CONTINUOUS
FREQUENCY: CONTINUOUS

## 2022-03-05 ASSESSMENT — PAIN DESCRIPTION - LOCATION
LOCATION: ABDOMEN
LOCATION: ABDOMEN

## 2022-03-05 ASSESSMENT — PAIN DESCRIPTION - PAIN TYPE
TYPE: SURGICAL PAIN
TYPE: SURGICAL PAIN

## 2022-03-05 NOTE — PROGRESS NOTES
Pt's Iv removed, no complications, cathter intact, dressing applied. Pt given leg bag catheter, and instructions on how to use. Pt also given dressing for drain site. Pt received discharge instructions, verbalzed understanding.  Pt discharged to home for self care

## 2022-03-05 NOTE — PROGRESS NOTES
Lisseth Kilpatrick, Ian Men, [facility] [place] [facility]  Urology Progress Note     Subjective:   Diet: Regular diet  No acute events overnight  Denies nausea, vomiting, fevers and chills  Passing flatus/no bowel movement  Pain improving  Minimal soakage on left lower quadrant drain, drain output 135, serosanguineous    Patient Vitals for the past 24 hrs:   BP Temp Temp src Pulse Resp SpO2 Height   22 0330 123/79 98.1 °F (36.7 °C) Oral 99 16 -- --   22 1959 135/72 98.3 °F (36.8 °C) Oral 91 16 98 % --   22 1534 120/81 98.2 °F (36.8 °C) Axillary 101 17 98 % --   22 1259 -- -- -- -- -- -- 5' 1\" (1.549 m)       Intake/Output Summary (Last 24 hours) at 3/5/2022 0920  Last data filed at 3/5/2022 3107  Gross per 24 hour   Intake 750 ml   Output 1285 ml   Net -535 ml       Recent Labs     22  0756 22  0344 22  0715   WBC 7.1 7.0 7.7   HGB 8.3* 7.4* 8.1*   HCT 28.1* 24.8* 27.1*   MCV 79.8* 77.5* 77.0*    347 378     Recent Labs     22  0756 22  0344 22  0715    137 139   K 3.7 3.8 4.0    104 105   CO2 22 23 22   BUN 4* 7 7   CREATININE 0.40* 0.50 0.58       No results for input(s): COLORU, PHUR, LABCAST, WBCUA, RBCUA, MUCUS, TRICHOMONAS, YEAST, BACTERIA, CLARITYU, SPECGRAV, LEUKOCYTESUR, UROBILINOGEN, BILIRUBINUR, BLOODU in the last 72 hours.     Invalid input(s): NITRATE, GLUCOSEUKETONESUAMORPHOUS    Additional Lab/culture results:    Physical Exam:   AO X 3  Neck: Supple   Chest: bilateral symmetrical chest rise/ Non labored breathing   Circulatory: Peripheries warm , well perfused   P/A: soft, nondistended, incision sites appropriately tender  ÁNGEL drain with serosanguineous output        Interval Imaging Findings:    Impression:    Patient Active Problem List   Diagnosis    History of  section, classical    History of  labor    Benign essential hypertension antepartum    Heterozygous for prothrombin [id number] mutation [facility]

## 2022-03-05 NOTE — PROGRESS NOTES
Gynecology Progress Note      Kaleb Ring is a 29 y.o. female  POD # 3 s/p JOAQUIN, BS, Aspiration of L ovarian cyst, cystotomy repair with co-procedure with Urology on 3/2/22     Patient seen and examined. Overall has no complaints. Reports her abdominal pain is improving. She was able to ambulate yesterday. Tolerating general diet. She is passing gas but has not had a bowel movement yet. Admits to some light vaginal spotting. Denies any urinary complaints. The LLQ drain is not bothering her. Denies chest pain, SOB, headaches, N/V or F/C. Vitals:  Vitals:    03/04/22 1259 03/04/22 1534 03/04/22 1959 03/05/22 0330   BP:  120/81 135/72 123/79   Pulse:  101 91 99   Resp:  17 16 16   Temp:  98.2 °F (36.8 °C) 98.3 °F (36.8 °C) 98.1 °F (36.7 °C)   TempSrc:  Axillary Oral Oral   SpO2:  98% 98%    Weight:       Height: 5' 1\" (1.549 m)            Intake/Output:   Last Shift: I/O last 3 completed shifts: In: 2400 [P.O.:1200; I.V.:1200]  Out: 3940 [Urine:2650; Drains:145]  Current Shift: No intake/output data recorded.     In 10 hours drain output has been 20 mL     Physical Exam:  General appearance: no apparent distress, alert and cooperative  HEENT: head atraumatic, normocephalic, trachea midline, moist mucous membranes   Neurologic: alert, oriented, normal speech, no focal findings or movement disorder noted  Lungs: no increased work of breathing, good air exchange, clear to auscultation bilaterally, no crackles or wheezing  Heart: regular rate and rhythm   Abdomen: soft, non distended, no guarding or rebound, appropriately tender around incision, pfannenstiel incision healing well with no areas of erythema, steri strips overtop with no saturation, LLQ drain with blood tinged fluid in tubing, no leaking   Extremities:  no calf tenderness bilaterally, non-edematous bilaterally, SCDs in place   Musculoskeletal: no gross abnormalities, range of motion appropriate for age   Psychiatric: mood appropriate, normal affect Lab:  Lab Results   Component Value Date    WBC 7.0 03/04/2022    HGB 7.4 (L) 03/04/2022    HCT 24.8 (L) 03/04/2022    MCV 77.5 (L) 03/04/2022     03/04/2022       Assessment/Plan:  Vicki Johnson 29 y.o. female POD # 3 s/p JOAQUIN, BS, Aspiration of L ovarian cyst, cystotomy repair with co-procedure with Urology on 3/2/22   - VSS              - Internal medicine team- signed off   - PT/OT following to help encourage ambulation               - Drains:                          - Rosado catheter, UOP adequate, urine clear                           - ÁNGEL drain in LLQ: 20 mL out in 10 hours               - Pain control: Tylenol 650mg q6h, Gabapentin 400mg q8h, Motrin 600mg q6h, Simethicone 80mg q6h scheduled with Flexeril 10 mg hs, Norco PRN with Dilaudid IV PRN (4 doses), abdominal binder              - Labs: CBC, BMP QD                - GI: Senokot/MOM scheduled               - DVT Proph: Lovenox 40 QD, SCDs              - Abx: S/p Ancef pre-operatively              - Diet: tolerating general diet               - Dressing: Sutures and steri strips on pfannenstiel incision, incision healing well               - Continue post-op care with frequent ambulation and use of  incentive spirometer     Admission for 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9>9.0>8.3>7.4              - S/p Venofer x2 (3/1 and 3/4)                - Pt denies any s/s anemia              - Iron supplementation as outpatient   - Awaiting Hgb this morning to see if she needs blood transfusion      Hypertension               - Blood pressures normotensive without medication      Hetero Prothrombin Mutation              - Discussed recommendations with Internal Medicine about DVT prophylaxis with anticoagulation upon discharge and no indication at this time               - DVT prophylaxis: Lovenox 40 QD while admitted, SCDs        Attendings: Dr Celi Lopez DO   OB/GYN Resident   [facility], [address]  3/5/2022, 4:06 AM           Attending Physician Statement  I have discussed the care of [facility], including pertinent history and exam findings,  with the resident. I have seen and examined the patient and the key elements of all parts of the encounter have been performed by me. I agree with the assessment, plan and orders as documented by the resident. Pt reports nausea , tolerating general diet, positive flatus , no BM yet. There is incisional pain which she reports reason for not ambulating very much. Encouraged ambulation. Pt is hemodynamically stable. Good urine output through rosado. ÁNGEL drained 130 ml last 24 hrs, only few ml in bulb for past several hrs. ÁNGEL can be removed if [id number] Yudelka Monge with urology. Anticipate discharge to home later today. DC instructions reviewed. Follow up in clinic 3/15/22. Urology follow up for OP cystogram and removal of rosado.     (GC Modifier)

## 2022-03-05 NOTE — PLAN OF CARE
Problem: Pain:  Goal: Pain level will decrease  Description: Pain level will decrease  Outcome: Ongoing  Goal: Control of acute pain  Description: Control of acute pain  Outcome: Ongoing     Problem: Falls - Risk of:  Goal: Will remain free from falls  Description: Will remain free from falls  Outcome: Ongoing  Goal: Absence of physical injury  Description: Absence of physical injury  Outcome: Ongoing     Problem: HEMODYNAMIC STATUS  Goal: Patient has stable vital signs and fluid balance  Outcome: Ongoing     Problem: OXYGENATION/RESPIRATORY FUNCTION  Goal: Patient will achieve/maintain normal respiratory rate/effort  Outcome: Ongoing     Problem: MOBILITY  Goal: Early mobilization is achieved  Outcome: Ongoing     Problem: ELIMINATION  Goal: Elimination patterns are normal or improving  Description: Elimination patterns return to pre-surgery normal patterns  Outcome: Ongoing     Problem: SKIN INTEGRITY  Goal: Skin integrity is maintained or improved  Outcome: Ongoing     Problem: Nutrition  Goal: Optimal nutrition therapy  Outcome: Ongoing

## 2022-03-05 NOTE — PROGRESS NOTES
Obstetric/Gynecology Resident Interval Note    Patient seen and examined. She is doing well and requesting discharge home at this time. VSS, afebrile, tolerating PO intake without N/V, passing flatus. ÁNGEL drain was removed by Urology this AM. Site is covered with clean gauze. Patient aware to follow up with urology on 3/11 with urology for rosado removal and to start cipro 3 days prior to that date. Also aware of rosado up with GYN on approximately 3/15. Attending updated and in agreement.     Kayden Min DO  OB/GYN Resident, PGY3  [facility]  3/5/2022, 4:21 PM

## 2022-03-06 ENCOUNTER — TELEPHONE (OUTPATIENT)
Dept: OBGYN | Age: 37
End: 2022-03-06

## 2022-03-06 NOTE — TELEPHONE ENCOUNTER
Follow up call to patient after discharge to home 3/5/22. No answer left message. Office staff will call tomorrow to schedule post op visit.

## 2022-03-07 ENCOUNTER — TELEPHONE (OUTPATIENT)
Dept: OBGYN | Age: 37
End: 2022-03-07

## 2022-03-07 DIAGNOSIS — K59.03 CONSTIPATION DUE TO PAIN MEDICATION: Primary | ICD-10-CM

## 2022-03-07 RX ORDER — POLYETHYLENE GLYCOL 3350 17 G/17G
17 POWDER, FOR SOLUTION ORAL DAILY PRN
Qty: 225 G | Refills: 5 | Status: SHIPPED | OUTPATIENT
Start: 2022-03-07 | End: 2022-04-06

## 2022-03-07 NOTE — TELEPHONE ENCOUNTER
----- Message from Judee Baumgarten, MD sent at 3/5/2022  8:43 PM EST -----  Regarding: post op visit  Please call the patient and schedule postop visit with me on 3/15/22. Afternoon is Ok.

## 2022-03-07 NOTE — TELEPHONE ENCOUNTER
Left message for patient that post op appt is scheduled for 3/15/22 at 1:30 pm.  She is to call if she is unable to keep this appt.

## 2022-03-08 ENCOUNTER — TELEPHONE (OUTPATIENT)
Dept: INTERNAL MEDICINE | Age: 37
End: 2022-03-08

## 2022-03-08 NOTE — TELEPHONE ENCOUNTER
[facility] Transitions Initial Follow Up Call    Outreach made within 2 business days of discharge: Yes    Patient: Juanpablo Crump   Patient : 1985   MRN: [id number]    Reason for Admission: urine ascites  Discharge Date: 3/5/2022       Spoke with: Christen Miller asked if she could call writer back in an hour. Pt advised to call office and ask for writer.     Discharge department/facility: [facility] 4C Onc/MedSurg      Scheduled appointment with PCP within 7-14 days    Follow Up  Future Appointments   Date Time Provider Hugo Haskins   3/15/2022  1:30 PM Sommer Newby MD [facility] OB/Gyn Janey Beaulieu RN

## 2022-03-09 NOTE — TELEPHONE ENCOUNTER
[facility] Transitions Initial Follow Up Call     Outreach made within 2 business days of discharge: Yes     Patient: Lydia Goddard     Patient : 1985   MRN: [id number]           Reason for Admission: urine ascites  Discharge Date: 3/5/2022                      Spoke with: Kacey Frankel     Discharge department/facility: [facility]  Onc/MedSurg    TCM Interactive Patient Contact:  Was patient able to fill all prescriptions: Yes  Was patient instructed to bring all medications to the follow-up visit: Yes  Is patient taking all medications as directed in the discharge summary? Yes  Does patient understand their discharge instructions: Yes  Does patient have questions or concerns that need addressed prior to 7-14 day follow up office visit: no    Scheduled appointment with PCP within 7-14 days    PCP is booked for several weeks out. Writer spoke with both Akil Herrera and Dr. Camilo Frank re: pt f/u appt. Sharon has placed pt on short waiting list to be called in case of cancellations, worse case scenario pt will follow up in early April with PCP. Per Dr. Camilo Frank, pt really needs to be following up with urology and ob/gyn, which pt states she is doing. PC back to pt to let her know plan, call was sent to . Writer LM letting pt know plan is to call if/when there is a cancellation to see if pt able to make appt, if not appt will be made for pt to be seen in early April.     Follow Up  Future Appointments   Date Time Provider Hugo Haskins   3/15/2022  1:30 PM Judee Baumgarten, MD [facility] OB/Gyn Raghu Celis RN

## 2022-03-15 ENCOUNTER — TELEPHONE (OUTPATIENT)
Dept: OBGYN | Age: 37
End: 2022-03-15

## 2022-03-16 NOTE — TELEPHONE ENCOUNTER
I spoke to the pt earlier today regarding missed post op appointment. She reported feeling tired and preferred to rest. She is otherwise feeling well. Campbell was removed last week by urology. She has not had any voiding problems. Pt is tolerating a regular diet and having normal bowel movements. There is still incisional pain but that continues to improve. Pt denies any S/S to suggest incisional infection. Steri strips are still in place. She was advised to remove them at anytime she wishes. Post op instructions reviewed. Still no driving and continue pelvic rest.  OK to follow up in the office in 2 weeks with me.

## 2022-03-29 ENCOUNTER — OFFICE VISIT (OUTPATIENT)
Dept: OBGYN | Age: 37
End: 2022-03-29

## 2022-03-29 VITALS
HEART RATE: 94 BPM | WEIGHT: 143 LBS | BODY MASS INDEX: 27.02 KG/M2 | SYSTOLIC BLOOD PRESSURE: 118 MMHG | DIASTOLIC BLOOD PRESSURE: 81 MMHG

## 2022-03-29 DIAGNOSIS — Z09 POSTOP CHECK: Primary | ICD-10-CM

## 2022-03-29 DIAGNOSIS — K59.01 SLOW TRANSIT CONSTIPATION: ICD-10-CM

## 2022-03-29 DIAGNOSIS — R63.4 WEIGHT LOSS: ICD-10-CM

## 2022-03-29 PROCEDURE — 99024 POSTOP FOLLOW-UP VISIT: CPT | Performed by: OBSTETRICS & GYNECOLOGY

## 2022-03-29 NOTE — LETTER
[address] [address]  Phone: [phone]  Fax: [phone]    Peter Howard MD        March 29, 2022    Marlin Davalos [address] [place] [address] 3      To Whom It May Concern:    Luis Carlos Jaquez can return to work today, 3/29/2022,  with restrictions of no lifting. If you have any questions or concerns, please don't hesitate to call.     Sincerely,        Peter Howard MD

## 2022-03-29 NOTE — PROGRESS NOTES
Mattie Shipley [place]  3/29/2022  11:23 AM      Jose Saleem  Procedure: JOAQUIN, MICK, bilateral salpingectomy, aspiration of ovarian cyst and repair of cystotomy by urology       Saima Liao is a 29 y.o. female [id number]      The patient was seen, she denies any complaints. She admits to decrease appetite. Denies pain. She denied any shortness of breath, chest pain or dizziness. She denied any nausea, vomiting, or diarrhea. There is constipation not relieved by colace and miralax . She declines other meds due to have a normal BM this morning. She want to monitor things for now. There is no fever, chills, or rigors. The patient reports small bloody discharge yesterday. Denies any voiding problems. All of her pre-operative complaints are now resolved. She is wanting to return to work at the [employer] as a . Blood pressure 118/81, pulse 94, weight 143 lb (64.9 kg), not currently breastfeeding. Abdominal Exam: soft non-tender. Good bowel sounds. No guarding, rebound or rigidity. No costal vertebral angle tenderness bilateral. No hernias    Incision: healing well    Extremities: No edema or calf pain noted bilaterally. Pelvic Exam:Exam deferred. Assessment:      Diagnosis Orders   1. Postop check     2. Weight loss  TSH With Reflex Ft4   3. Slow transit constipation          Stable   Pathology reviewed and found to be benign. Yes    Plan:   1. Postop check  - Ok to return to work. Letter provided today      2. Weight loss    - TSH With Reflex Ft4; Future    3. Slow transit constipation  - pt wants to monitor  - continue Colace and miralax  - GI referral discussed. Pt wants to hold off on that       Continue with restrictions for an additional 2 weeks. .   Pelvic rest. No lifting or intercourse. No baths or pools. No douching or tampons.    ok to return to work as of today   Return to office 2 weeks to examine the vaginal cuff

## 2022-04-01 ENCOUNTER — HOSPITAL ENCOUNTER (OUTPATIENT)
Age: 37
Setting detail: SPECIMEN
Discharge: HOME | End: 2022-04-01

## 2022-04-01 DIAGNOSIS — R63.4 WEIGHT LOSS: ICD-10-CM

## 2022-04-01 LAB — TSH SERPL DL<=0.05 MIU/L-ACNC: 0.69 UIU/ML (ref 0.3–5)

## 2022-04-13 ENCOUNTER — OFFICE VISIT (OUTPATIENT)
Dept: OBGYN | Age: 37
End: 2022-04-13

## 2022-04-13 VITALS
DIASTOLIC BLOOD PRESSURE: 83 MMHG | BODY MASS INDEX: 27 KG/M2 | SYSTOLIC BLOOD PRESSURE: 122 MMHG | HEIGHT: 61 IN | WEIGHT: 143 LBS | HEART RATE: 84 BPM

## 2022-04-13 DIAGNOSIS — Z09 POSTOPERATIVE EXAMINATION: Primary | ICD-10-CM

## 2022-04-13 PROCEDURE — 99024 POSTOP FOLLOW-UP VISIT: CPT | Performed by: STUDENT IN AN ORGANIZED HEALTH CARE EDUCATION/TRAINING PROGRAM

## 2022-04-13 NOTE — LETTER
[address] [address]  Phone: [phone]  Fax: [phone] [address] [phone], JV        April 13, 2022     Patient: Kinsey Schwartz   YOB: 1985   Date of Visit: 4/13/2022       To Whom it May Concern:    Viktor Barrett was seen in my clinic on 4/13/2022. She may return to work with no restrictions. If you have any questions or concerns, please don't hesitate to call.     Sincerely,         Giovany Pérez, DO

## 2022-04-14 NOTE — PROGRESS NOTES
Attending Physician Statement  I have discussed the care of [facility], including pertinent history and exam findings,  with the resident. I have reviewed the key elements of all parts of the encounter with the resident. I agree with the assessment, plan and orders as documented by the resident.   (GE Modifier)    Robby Erazo DO
associated with genitourinary procedure 03/03/2022    S/p JOAQUIN, BS, L ovarian cyst drainage, Cystotomy repair, Cysto 3/2/22 03/02/2022    Hypercoagulable state ([facility] [address] 02/28/2022    Hypokalemia 02/28/2022    Hypocalcemia 02/28/2022    Bladder perforation, intraoperative 02/28/2022    Hx CS x3 02/28/2022    Marijuana use 02/28/2022    Urine ascites 02/28/2022    IUD removed 2/21/22 02/27/2022     Patient presented with rash. There was concern for possible allergy. IUD was removed at that time      Intractable abdominal pain 02/26/2022    Lower abdominal pain 02/25/2022    S/p Diagnostic laparoscopy, lysis of adhesions, hysteroscopy, dilatation and curettage, Mirena IUD insertion 2/14/22 54/52/8738     Complicated by incidental cystotomy repaired by Dr. Paul Lang MD Urology via Robot        Pelvic adhesive disease 02/14/2022    Intramural leiomyoma of uterus     Abnormal uterine bleeding (AUB) 05/19/2021    Bilateral nipple discharge 67/39/9273    Folliculitis 09/60/9885    Closed nondisplaced fracture of distal pole of navicular bone of left wrist 10/30/2018    Iron deficiency anemia 07/24/2016    Female pelvic pain 07/18/2016     Pelvic US ordered, appt on 7/21/16: unremarkable       Family history of uterine cancer      PGM      Family planning education, guidance, and counseling 10/15/2014    Heterozygous for prothrombin [id number] mutation ([facility] 04/29/2014    Benign essential hypertension antepartum 05/06/2013     Updating deleted diagnoses              Procedure: see above   Stable   Pathology reviewed and found to be benign. Yes    Plan:   Return in about 1 year (around 4/13/2023) for Annual.  No further restrictions, return to activity as tolerated.

## 2022-08-09 ENCOUNTER — OFFICE VISIT (OUTPATIENT)
Dept: INTERNAL MEDICINE | Age: 37
End: 2022-08-09
Payer: COMMERCIAL

## 2022-08-09 VITALS
HEART RATE: 58 BPM | DIASTOLIC BLOOD PRESSURE: 82 MMHG | TEMPERATURE: 98.3 F | WEIGHT: 154.2 LBS | BODY MASS INDEX: 29.14 KG/M2 | SYSTOLIC BLOOD PRESSURE: 132 MMHG

## 2022-08-09 DIAGNOSIS — Z13.31 POSITIVE DEPRESSION SCREENING: ICD-10-CM

## 2022-08-09 DIAGNOSIS — R03.0 ELEVATED BP WITHOUT DIAGNOSIS OF HYPERTENSION: Primary | ICD-10-CM

## 2022-08-09 DIAGNOSIS — D50.0 IRON DEFICIENCY ANEMIA DUE TO CHRONIC BLOOD LOSS: ICD-10-CM

## 2022-08-09 PROCEDURE — 99211 OFF/OP EST MAY X REQ PHY/QHP: CPT | Performed by: INTERNAL MEDICINE

## 2022-08-09 PROCEDURE — G8427 DOCREV CUR MEDS BY ELIG CLIN: HCPCS | Performed by: INTERNAL MEDICINE

## 2022-08-09 PROCEDURE — 1036F TOBACCO NON-USER: CPT | Performed by: INTERNAL MEDICINE

## 2022-08-09 PROCEDURE — G8417 CALC BMI ABV UP PARAM F/U: HCPCS | Performed by: INTERNAL MEDICINE

## 2022-08-09 PROCEDURE — 99214 OFFICE O/P EST MOD 30 MIN: CPT | Performed by: INTERNAL MEDICINE

## 2022-08-09 RX ORDER — IBUPROFEN 800 MG/1
800 TABLET ORAL EVERY 8 HOURS PRN
Qty: 30 TABLET | Refills: 0 | Status: SHIPPED | OUTPATIENT
Start: 2022-08-09

## 2022-08-09 RX ORDER — FERROUS SULFATE 325(65) MG
325 TABLET ORAL
Qty: 30 TABLET | Refills: 5 | Status: SHIPPED | OUTPATIENT
Start: 2022-08-09

## 2022-08-09 RX ORDER — BLOOD PRESSURE TEST KIT
KIT MISCELLANEOUS
Qty: 1 EACH | Refills: 0 | Status: SHIPPED | OUTPATIENT
Start: 2022-08-09 | End: 2022-08-16

## 2022-08-09 ASSESSMENT — ENCOUNTER SYMPTOMS
EYE ITCHING: 0
WHEEZING: 0
ABDOMINAL PAIN: 0
COUGH: 0
PHOTOPHOBIA: 0
BLOOD IN STOOL: 0
SHORTNESS OF BREATH: 0
EYE DISCHARGE: 0
CONSTIPATION: 0

## 2022-08-09 ASSESSMENT — PATIENT HEALTH QUESTIONNAIRE - PHQ9
5. POOR APPETITE OR OVEREATING: 2
4. FEELING TIRED OR HAVING LITTLE ENERGY: 3
2. FEELING DOWN, DEPRESSED OR HOPELESS: 3
SUM OF ALL RESPONSES TO PHQ QUESTIONS 1-9: 14
7. TROUBLE CONCENTRATING ON THINGS, SUCH AS READING THE NEWSPAPER OR WATCHING TELEVISION: 2
1. LITTLE INTEREST OR PLEASURE IN DOING THINGS: 1
SUM OF ALL RESPONSES TO PHQ QUESTIONS 1-9: 14
6. FEELING BAD ABOUT YOURSELF - OR THAT YOU ARE A FAILURE OR HAVE LET YOURSELF OR YOUR FAMILY DOWN: 0
SUM OF ALL RESPONSES TO PHQ QUESTIONS 1-9: 14
3. TROUBLE FALLING OR STAYING ASLEEP: 3
SUM OF ALL RESPONSES TO PHQ QUESTIONS 1-9: 14
10. IF YOU CHECKED OFF ANY PROBLEMS, HOW DIFFICULT HAVE THESE PROBLEMS MADE IT FOR YOU TO DO YOUR WORK, TAKE CARE OF THINGS AT HOME, OR GET ALONG WITH OTHER PEOPLE: 0
8. MOVING OR SPEAKING SO SLOWLY THAT OTHER PEOPLE COULD HAVE NOTICED. OR THE OPPOSITE, BEING SO FIGETY OR RESTLESS THAT YOU HAVE BEEN MOVING AROUND A LOT MORE THAN USUAL: 0
SUM OF ALL RESPONSES TO PHQ9 QUESTIONS 1 & 2: 4
9. THOUGHTS THAT YOU WOULD BE BETTER OFF DEAD, OR OF HURTING YOURSELF: 0

## 2022-08-09 NOTE — LETTER
[address]  [address] [facility] [address]  Phone: [phone]  Fax: [phone]    Estelle Patel MD        August 9, 2022     Patient: Reinaldo Cervantes   YOB: 1985   Date of Visit: 8/9/2022       To Whom It May Concern: It is my medical opinion that [facility] may return to work on 8/10/22. Patient was seen in the office today. If you have any questions or concerns, please don't hesitate to call.     Sincerely,        Estelle Patel MD

## 2022-08-09 NOTE — PROGRESS NOTES
[facility]    Progress Note    Date of patient's visit: 8/9/2022    Patient's Name:  Eduarda Alexandre    YOB: 1985            Patient Care Team:  Luann Perez MD as PCP - General (Internal Medicine)  Luann Perez MD as PCP - [facility] Empaneled Provider    REASON FOR VISIT: Routine outpatient follow     Chief Complaint   Patient presents with    Headache     Pt c/o having L sided headache x 1 month, pt describes it as a throbbing pain          HISTORY OF PRESENT ILLNESS:    History was obtained from the patient. Eduarda Alexandre is a 40 y.o. is here for follow-up. She is tearful. She has dysphoric symptoms. Her depression screen is positive. She is having trouble with her significant other. She is working. She is also been stressed as she had hysterectomy and other medical issues in the last few months. She is currently back at work. Her wounds have healed well. She had a complete hysterectomy done. She is having headaches when she gets up in the morning. Blood pressure is high. Her postoperative blood pressures were all normal.  This is the first time her blood pressures are high. She does not have a blood pressure cuff at home. Advised her to check blood pressures and to come back in a week again for follow-up. She appears quite distressed today and was tearful. She wants to follow-up at [facility] behavioral health where she has been seen when she was a teenager. But she agrees to talk to one of her behavioral therapist also. Advised to avoid salt in her diet.   Recent labs including TSH and BMP were normal.      Past Medical History:   Diagnosis Date    Acne     sees dermatology, last seen 1/31/22    Benign essential hypertension antepartum 05/06/2013    Closed nondisplaced fracture of distal pole of navicular bone of left wrist 10/30/2018    COVID-19 12/30/2021    surgical screen, asymptomatic    Family history of uterine cancer     PGM Iron deficiency anemia 07/24/2016    Mirena IUD inserted 2/14/22 02/14/2022    Lot #: [id number] Exp: 03/2024    Prothrombin gene mutation ([address]     managed per PCP, diagnosed after miscarriage, denies easy bruising, admits heavy periods    S/p Diagnostic laparoscopy, hysis of adhesions, hysteroscopy, dilatation and curettage, Mirena IUD insertion 75/17/3253    Complicated by incidental cystotomy repaired by Dr. Alex Gross MD Urology via Robot     Snores     denies apnea    Under care of team 12/28/2021    PCP - [address] - LAST VISIT 11/2021    Under care of team 12/28/2021    OB/GYN -   [facility]- LAST VISIT 12/2021       Past Surgical History:   Procedure Laterality Date    BLADDER SURGERY N/A 2/14/2022    XI ROBOTIC ASSISTED LAPAROSCOPIC INCIDENTAL CYSTOTOMY REPAIR performed by Alex Gross MD at [address] [place] [address]  1/10/2007, 9/2007, 6/2012    LTCS and classical CS on 6/7/12 @ 23 weeks    CYSTOSCOPY N/A 3/2/2022    CYSTOSCOPY REPAIR OF BLADDER LACERATION/DEFECT performed by Cassie Browning MD at [address] [place] [address] N/A 2/14/2022    DILATATION AND CURETTAGE HYSTEROSCOPY, POSSIBLE MIRENA IUD performed by Carline Parker MD at [place] [address] ([address] [place] [address])  03/02/2022        TOTAL ABDOMINAL HYSTERECTOMY , BILATERAL SALPINGECTOMY (N/A Abdomen)     HYSTERECTOMY, TOTAL ABDOMINAL (CERVIX REMOVED) N/A 3/2/2022    TOTAL ABDOMINAL HYSTERECTOMY , BILATERAL SALPINGECTOMY performed by Carline Parker MD at [address] [place] [address]  02/14/2022    DIAGNOSTIC LAPAROSCROPY    LAPAROSCOPY N/A 2/14/2022    DIAGNOSTIC LAPAROSCROPY performed by Carline Parker MD at [facility]      Allergies   Allergen Reactions    Seasonal Other (See Comments)     Allergic rhinnitis       MEDICATIONS:      Current Outpatient Medications on File Prior to Visit   Medication Sig Dispense Refill    ferrous sulfate (IRON 325) 325 (65 Fe) MG tablet Take 1 tablet 04/13/22 143 lb (64.9 kg)   03/29/22 143 lb (64.9 kg)       Physical Exam  Vitals and nursing note reviewed. Constitutional:       Appearance: Normal appearance. HENT:      Head: Normocephalic and atraumatic. Eyes:      Extraocular Movements: Extraocular movements intact. Conjunctiva/sclera: Conjunctivae normal.      Pupils: Pupils are equal, round, and reactive to light. Cardiovascular:      Rate and Rhythm: Normal rate and regular rhythm. Heart sounds: No murmur heard. Pulmonary:      Effort: Pulmonary effort is normal.      Breath sounds: Normal breath sounds. No wheezing. Musculoskeletal:      Right lower leg: No edema. Left lower leg: No edema. Neurological:      General: No focal deficit present. Mental Status: She is alert and oriented to person, place, and time.    Psychiatric:      Comments: Tearful         LABORATORY FINDINGS:    CBC:  Lab Results   Component Value Date/Time    WBC 7.7 03/05/2022 07:15 AM    HGB 8.1 03/05/2022 07:15 AM     03/05/2022 07:15 AM     06/08/2012 06:24 AM     BMP:    Lab Results   Component Value Date/Time     03/05/2022 07:15 AM    K 4.0 03/05/2022 07:15 AM     03/05/2022 07:15 AM    CO2 22 03/05/2022 07:15 AM    BUN 7 03/05/2022 07:15 AM    CREATININE 0.58 03/05/2022 07:15 AM    GLUCOSE 92 03/05/2022 07:15 AM    GLUCOSE 75 05/02/2012 02:30 PM     HEMOGLOBIN A1C: No results found for: LABA1C  MICROALBUMIN URINE: No results found for: MICROALBUR  FASTING LIPID PANEL:No results found for: CHOL, HDL, TRIG  No results found for: LDLCALC, LDLCHOLESTEROL, LDLDIRECT    LIVER PROFILE:  Lab Results   Component Value Date/Time    ALT 11 02/25/2022 09:35 AM    AST 17 02/25/2022 09:35 AM    PROT 7.2 02/25/2022 09:35 AM    BILITOT 0.34 02/25/2022 09:35 AM    LABALBU 4.1 02/25/2022 09:35 AM    LABALBU 4.1 05/02/2012 02:30 PM      THYROID FUNCTION:   Lab Results   Component Value Date/Time    TSH 0.69 04/01/2022 10:43 AM

## 2022-08-16 ENCOUNTER — OFFICE VISIT (OUTPATIENT)
Dept: INTERNAL MEDICINE | Age: 37
End: 2022-08-16
Payer: COMMERCIAL

## 2022-08-16 VITALS
WEIGHT: 153 LBS | BODY MASS INDEX: 28.89 KG/M2 | HEIGHT: 61 IN | DIASTOLIC BLOOD PRESSURE: 88 MMHG | TEMPERATURE: 98.2 F | HEART RATE: 74 BPM | SYSTOLIC BLOOD PRESSURE: 135 MMHG

## 2022-08-16 DIAGNOSIS — I10 PRIMARY HYPERTENSION: Primary | ICD-10-CM

## 2022-08-16 DIAGNOSIS — R45.89 DYSPHORIC MOOD: ICD-10-CM

## 2022-08-16 PROCEDURE — 1036F TOBACCO NON-USER: CPT | Performed by: INTERNAL MEDICINE

## 2022-08-16 PROCEDURE — 99211 OFF/OP EST MAY X REQ PHY/QHP: CPT | Performed by: INTERNAL MEDICINE

## 2022-08-16 PROCEDURE — G8417 CALC BMI ABV UP PARAM F/U: HCPCS | Performed by: INTERNAL MEDICINE

## 2022-08-16 PROCEDURE — 99213 OFFICE O/P EST LOW 20 MIN: CPT | Performed by: INTERNAL MEDICINE

## 2022-08-16 PROCEDURE — G8427 DOCREV CUR MEDS BY ELIG CLIN: HCPCS | Performed by: INTERNAL MEDICINE

## 2022-08-16 ASSESSMENT — PATIENT HEALTH QUESTIONNAIRE - PHQ9
SUM OF ALL RESPONSES TO PHQ QUESTIONS 1-9: 0
2. FEELING DOWN, DEPRESSED OR HOPELESS: 0
1. LITTLE INTEREST OR PLEASURE IN DOING THINGS: 0
SUM OF ALL RESPONSES TO PHQ9 QUESTIONS 1 & 2: 0
SUM OF ALL RESPONSES TO PHQ QUESTIONS 1-9: 0

## 2022-08-16 NOTE — PROGRESS NOTES
[facility]    Progress Note    Date of patient's visit: 8/16/2022    Patient's Name:  Jc Camarillo    YOB: 1985            Patient Care Team:  Jevon Jung MD as PCP - General (Internal Medicine)  Jevon Jung MD as PCP - [facility] Empaneled Provider    REASON FOR VISIT: Routine outpatient follow     Chief Complaint   Patient presents with    Hypertension     1 week f/u          HISTORY OF PRESENT ILLNESS:    History was obtained from the patient. Jc Camarillo is a 40 y.o. is here for follow-up on hypertension and headaches. Blood pressure is a little better. She has not been able to check her blood pressures at home as she could not get a cuff. We will send her home today with a blood pressure monitor. She is getting some headaches but not as much as before. She has depressive symptoms. She has been having difficulty with sleep and her moods in the last few months. She has an appointment coming up with behavioral therapy. She does eat a lot of unhealthy foods including chips daily. Discussed DASH diet and exercise. I will have her take home her blood pressure cuff and follow-up with the nurse in 1 to 2 weeks for blood pressure check. She is to call me if blood pressures are elevated at home. Advised to keep an appointment with behavioral therapist later this month.           Past Medical History:   Diagnosis Date    Acne     sees dermatology, last seen 1/31/22    Benign essential hypertension antepartum 05/06/2013    Closed nondisplaced fracture of distal pole of navicular bone of left wrist 10/30/2018    COVID-19 12/30/2021    surgical screen, asymptomatic    Family history of uterine cancer     PGM    Iron deficiency anemia 07/24/2016    Mirena IUD inserted 2/14/22 02/14/2022    Lot #: [id number] Exp: 03/2024    Prothrombin gene mutation ([facility] [address]     managed per PCP, diagnosed after miscarriage, denies easy bruising, admits heavy periods Conjunctiva/sclera: Conjunctivae normal.     Pupils: Pupils are equal, round, and reactive to light. Cardiovascular:     Rate and Rhythm: Normal rate and regular rhythm. Heart sounds: No murmur heard. Pulmonary:     Effort: Pulmonary effort is normal.     Breath sounds: Normal breath sounds. No wheezing. Musculoskeletal:     Right lower leg: No edema. Left lower leg: No edema. Neurological:     General: No focal deficit present. Mental Status: She is alert and oriented to person, place, and time. LABORATORY FINDINGS:    CBC:  Lab Results   Component Value Date/Time    WBC 7.7 03/05/2022 07:15 AM    HGB 8.1 03/05/2022 07:15 AM     03/05/2022 07:15 AM     06/08/2012 06:24 AM     BMP:    Lab Results   Component Value Date/Time     03/05/2022 07:15 AM    K 4.0 03/05/2022 07:15 AM     03/05/2022 07:15 AM    CO2 22 03/05/2022 07:15 AM    BUN 7 03/05/2022 07:15 AM    CREATININE 0.58 03/05/2022 07:15 AM    GLUCOSE 92 03/05/2022 07:15 AM    GLUCOSE 75 05/02/2012 02:30 PM     HEMOGLOBIN A1C: No results found for: LABA1C  MICROALBUMIN URINE: No results found for: MICROALBUR  FASTING LIPID PANEL:No results found for: CHOL, HDL, TRIG  No results found for: LDLCALC, LDLCHOLESTEROL, LDLDIRECT    LIVER PROFILE:  Lab Results   Component Value Date/Time    ALT 11 02/25/2022 09:35 AM    AST 17 02/25/2022 09:35 AM    PROT 7.2 02/25/2022 09:35 AM    BILITOT 0.34 02/25/2022 09:35 AM    LABALBU 4.1 02/25/2022 09:35 AM    LABALBU 4.1 05/02/2012 02:30 PM      THYROID FUNCTION:   Lab Results   Component Value Date/Time    TSH 0.69 04/01/2022 10:43 AM      URINEANALYSIS: No results found for: LABURIN  ASSESSMENT AND PLAN:    1. Primary hypertension  Follow up in 1-2 weeks with RN for BP check  Dash diet  Bring home BP readings    2. Dysphoric mood  Follow up with Behavioral therapsist          FOLLOW UP AND INSTRUCTIONS:   Return in about 1 week (around 8/23/2022).     Ayesha Salter received counseling on the following healthy behaviors: nutrition and exercise    Reviewed prior labs and health maintenance. Discussed use, benefit, and side effects of prescribed medications. Barriers to medication compliance addressed. All patient questions answered. Pt voiced understanding.        Opal Real  Attending Physician, [address] [place] [address], Internal Medicine Residency Program  [address] [place] [address]  8/16/2022, 3:52 PM

## 2022-08-19 ENCOUNTER — TELEPHONE (OUTPATIENT)
Dept: INTERNAL MEDICINE | Age: 37
End: 2022-08-19

## 2022-08-19 NOTE — TELEPHONE ENCOUNTER
Patient calling stating that she was seen on 8/16 and was told that there would be blood pressure medication sent to her pharmacy but the only medication at the pharmacy was her Ferrous Sulfate. Patient is asking for blood pressure medication sent to her pharmacy.

## 2022-08-19 NOTE — TELEPHONE ENCOUNTER
Attempted PC to pt re: recent BP readings, was sent to . Left message stating BP readings are needed before meds can be prescribed, left writer's direct number with  ability for pt to leave readings. Will follow up.

## 2022-08-19 NOTE — TELEPHONE ENCOUNTER
She has an appt with Vida for BP check soon. I am waiting for her to call in some home BP readings and follow up with her after BP checks.

## 2022-08-22 NOTE — TELEPHONE ENCOUNTER
PC to pt to f/u on BP readings, no answer. Left HIPAA compliant message identifying self and nature of call, requested call back to writer, phone number given.

## 2022-08-29 ENCOUNTER — TELEPHONE (OUTPATIENT)
Age: 37
End: 2022-08-29

## 2022-08-29 NOTE — TELEPHONE ENCOUNTER
Called pt to confirm or reschedule appt. Pt answered and said she could attend appt, but did not have access to MyChart. When offered to troubleshoot MyChart over the phone or to conduct the session by phone, pt requested to end phone call and be called back in 10 minutes. Called back twice, once 10 minutes and once 20 minutes later; pt did not answer, and voicemail was not set up.

## 2022-08-29 NOTE — TELEPHONE ENCOUNTER
PC to pt to confirm appt with writer for BP check tomorrow at 4, no answer and mailbox not set up. When writer went to document PC an unsaved number came up in pt's chart ([phone]) but there is a different number listed as mobile number. Previous number no longer in service. PC to new mobile number and that is where the VM is not set up. PC to pt's EC Darlene, she confirmed pt has new phone number but she did not save it. Agreed to give pt message if able that pt has appt tomorrow at 4 to check BP.

## 2022-08-30 ENCOUNTER — NURSE ONLY (OUTPATIENT)
Dept: INTERNAL MEDICINE | Age: 37
End: 2022-08-30
Payer: COMMERCIAL

## 2022-08-30 VITALS — DIASTOLIC BLOOD PRESSURE: 93 MMHG | HEART RATE: 80 BPM | SYSTOLIC BLOOD PRESSURE: 145 MMHG

## 2022-08-30 DIAGNOSIS — I10 PRIMARY HYPERTENSION: Primary | ICD-10-CM

## 2022-08-30 PROCEDURE — 99211 OFF/OP EST MAY X REQ PHY/QHP: CPT

## 2022-08-30 RX ORDER — HYDROCHLOROTHIAZIDE 25 MG/1
25 TABLET ORAL EVERY MORNING
Qty: 90 TABLET | Refills: 1 | Status: SHIPPED | OUTPATIENT
Start: 2022-08-30

## 2022-10-26 ENCOUNTER — NURSE ONLY (OUTPATIENT)
Dept: INTERNAL MEDICINE | Age: 37
End: 2022-10-26
Payer: COMMERCIAL

## 2022-10-26 ENCOUNTER — TELEPHONE (OUTPATIENT)
Dept: INTERNAL MEDICINE | Age: 37
End: 2022-10-26

## 2022-10-26 VITALS
HEART RATE: 88 BPM | DIASTOLIC BLOOD PRESSURE: 98 MMHG | BODY MASS INDEX: 27.89 KG/M2 | WEIGHT: 147.6 LBS | SYSTOLIC BLOOD PRESSURE: 144 MMHG

## 2022-10-26 DIAGNOSIS — I10 PRIMARY HYPERTENSION: Primary | ICD-10-CM

## 2022-10-26 DIAGNOSIS — Z13.29 SCREENING FOR THYROID DISORDER: Primary | ICD-10-CM

## 2022-10-26 PROCEDURE — 99211 OFF/OP EST MAY X REQ PHY/QHP: CPT | Performed by: INTERNAL MEDICINE

## 2022-10-26 PROCEDURE — 3078F DIAST BP <80 MM HG: CPT | Performed by: INTERNAL MEDICINE

## 2022-10-26 PROCEDURE — 3074F SYST BP LT 130 MM HG: CPT | Performed by: INTERNAL MEDICINE

## 2022-10-26 RX ORDER — AMLODIPINE BESYLATE 5 MG/1
5 TABLET ORAL DAILY
Qty: 30 TABLET | Refills: 3 | Status: SHIPPED | OUTPATIENT
Start: 2022-10-26

## 2022-10-26 NOTE — LETTER
[address]  [address] [facility] [address]  Phone: [phone]  Fax: [phone]          October 26, 2022    Khari Calvin MENDEZ [address] [place] [id number]      To Whom It May Concern  [facility] was evaluated in office this morning, her appointment concluded at 10:45 am.     If you have any questions or concerns, please don't hesitate to call.     Sincerely,          Kenisha Morfin RN

## 2022-10-26 NOTE — PROGRESS NOTES
Patient here today for a blood pressure check since last appointment with PCP. Patient denies any complaints of symptoms such as blurred vision, nausea, lightheadedness. Endorses headaches    BP1--170/94 HR-88 in left arm  BP2--144/98  in left arm    Does patient take BP medication? Yes  Was medication taken this morning? No  Usually takes HCTZ before 9 in morning, did not take it this morning. Dr. Heike Ruby consulted and notified of blood pressure and positive for headaches. Dr. Heike Ruby advised adding therapy with amlodipine 5 mg daily. Writer discussed attendings plan with patient. Patient verbalized understanding.

## 2022-10-26 NOTE — TELEPHONE ENCOUNTER
Dr. Emir Lucas, I spoke with pt re: starting amlodipine, advised her to take it daily in addition to other medications. Pt is wondering if she should have her thyroid rechecked. TSH was last checked in April this year and results were WNL, I am unsure it would be appropriate to check it again but I have pended the labs just in case. Pt would like to have it checked d/t concern that it is affecting her BP. I also advised pt that there is an order for her to get BMP checked. Please route the encounter back to me so I may notify pt either way. Thank you!

## 2022-10-28 NOTE — TELEPHONE ENCOUNTER
PC to pt to let her know there is no need to recheck TSH at this time. Reminded pt there is lab work to check kidney function and electrolytes. Pt states she was contacted yesterday that amlodipine is ready to , pt states she will start medication tomorrow.

## 2022-11-18 ENCOUNTER — HOSPITAL ENCOUNTER (EMERGENCY)
Age: 37
Discharge: LEFT BEFORE DISCHARGE | End: 2022-11-18
Attending: EMERGENCY MEDICINE
Payer: COMMERCIAL

## 2022-11-18 VITALS
TEMPERATURE: 98.4 F | SYSTOLIC BLOOD PRESSURE: 137 MMHG | OXYGEN SATURATION: 99 % | HEART RATE: 79 BPM | DIASTOLIC BLOOD PRESSURE: 112 MMHG | RESPIRATION RATE: 16 BRPM

## 2022-11-18 DIAGNOSIS — L98.9 FACIAL LESION: ICD-10-CM

## 2022-11-18 DIAGNOSIS — R51.9 NONINTRACTABLE EPISODIC HEADACHE, UNSPECIFIED HEADACHE TYPE: Primary | ICD-10-CM

## 2022-11-18 PROCEDURE — 99281 EMR DPT VST MAYX REQ PHY/QHP: CPT

## 2022-11-18 ASSESSMENT — ENCOUNTER SYMPTOMS
COUGH: 0
ABDOMINAL PAIN: 0
SHORTNESS OF BREATH: 1
BACK PAIN: 0

## 2022-11-18 NOTE — ED NOTES
PT seen walking out of department after registration left room. Dr. Willis Jiménez notified.       Tramaine Bob, RN  11/18/22 2649

## 2022-11-18 NOTE — ED PROVIDER NOTES
[facility]     Emergency Department     Faculty Attestation    I performed a history and physical examination of the patient and discussed management with the resident. I reviewed the resident´s note and agree with the documented findings and plan of care. Any areas of disagreement are noted on the chart. I was personally present for the key portions of any procedures. I have documented in the chart those procedures where I was not present during the key portions. I have reviewed the emergency nurses triage note. I agree with the chief complaint, past medical history, past surgical history, allergies, medications, social and family history as documented unless otherwise noted below. For Physician Assistant/ Nurse Practitioner cases/documentation I have personally evaluated this patient and have completed at least one if not all key elements of the E/M (history, physical exam, and MDM). Additional findings are as noted. Skin lesion right nasolabial fold.      Glynn Caraballo MD  11/18/22 0476

## 2022-11-18 NOTE — ED PROVIDER NOTES
[facility]  ED  Emergency Department Encounter  Emergency Medicine Resident     Pt Name: Shaneka Newton  MRN: [id number]  [place] 1985  Date of evaluation: 11/18/22  PCP:  Emely Parikh MD    CHIEF COMPLAINT       Chief Complaint   Patient presents with    Hypertension    Other     Mole bleeding        HISTORY OFPRESENT ILLNESS  (Location/Symptom, Timing/Onset, Context/Setting, Quality, Duration, Modifying Factors,Severity.)      Shaneka Newton is a 40 y. o.yo female who presents with complaints of headache. States she has chronic headaches, states this feels like her typical headache. States she has hypertension, believes she has a headache as her blood pressure is high. Has not recently checked her blood pressure. States she takes hydrochlorothiazide. Additionally states she was supposed to be taking another blood pressure medicine however she is unsure when she does not has not yet picked it up from the pharmacy. Denies any vision changes. States she also recently was told that she needs glasses and believes this may be contributing to her headaches. Also patient has a lesion on her right face near her nasolabial fold. States she recently was at an urgent care where they did something with the lesion and since it was bleeding and advised her to come into the emergency department for further evaluation and removal.  States she has a dermatologist however does not have an appointment with them until January. States the lesion is no longer bleeding however she does not like how it looks on her face. Denies any fevers. Denies any chest pain. States she has had some intermittent shortness of breath, associated when her head pain gets worse. Patient states she is mainly here to get her facial lesion removed.     PAST MEDICAL / SURGICAL / SOCIAL / FAMILY HISTORY      has a past medical history of Acne, Benign essential hypertension antepartum, Closed nondisplaced fracture of Maternal Grandmother     Breast Cancer Neg Hx     Cancer Neg Hx     Colon Cancer Neg Hx     Eclampsia Neg Hx     Ovarian Cancer Neg Hx      Labor Neg Hx     Spont Abortions Neg Hx     Stroke Neg Hx         Allergies:  Seasonal    Home Medications:  Prior to Admission medications    Medication Sig Start Date End Date Taking? Authorizing Provider   amLODIPine (NORVASC) 5 MG tablet Take 1 tablet by mouth daily 10/26/22   Muriel Isaac MD   hydroCHLOROthiazide (HYDRODIURIL) 25 MG tablet Take 1 tablet by mouth every morning 22   Muriel Isaac MD   ferrous sulfate (IRON 325) 325 (65 Fe) MG tablet Take 1 tablet by mouth daily (with breakfast) 22   Muriel Isaac MD   ibuprofen (ADVIL;MOTRIN) 800 MG tablet Take 1 tablet by mouth every 8 hours as needed for Pain 22   Muriel Isaac MD       REVIEW OFSYSTEMS    (2-9 systems for level 4, 10 or more for level 5)      Review of Systems   Constitutional:  Negative for chills and fever. HENT:  Negative for congestion. Positive for facial lesion   Eyes:  Negative for visual disturbance. Respiratory:  Positive for shortness of breath. Negative for cough. Cardiovascular:  Negative for chest pain. Gastrointestinal:  Negative for abdominal pain. Musculoskeletal:  Negative for back pain. Skin:         Positive for lesion   Neurological:  Positive for headaches. Negative for dizziness and light-headedness. Hematological:  Does not bruise/bleed easily. Psychiatric/Behavioral:  Negative for confusion. PHYSICAL EXAM   (up to 7 for level 4, 8 or more forlevel 5)      INITIAL VITALS:   Vitals:    22 1711   BP: (!) 137/112   Pulse: 79   Resp: 16   Temp: 98.4 °F (36.9 °C)   TempSrc: Oral   SpO2: 99%         Physical Exam  Vitals reviewed. Exam conducted with a chaperone present. Constitutional:       General: She is not in acute distress. Appearance: Normal appearance. HENT:      Head: Normocephalic and atraumatic. Comments: Round raised approximately half centimeter lesion noted near right nasolabial fold. Not actively bleeding. No surrounding erythema. Right Ear: External ear normal.      Left Ear: External ear normal.      Nose: Nose normal.      Mouth/Throat:      Mouth: Mucous membranes are moist.   Eyes:      General:         Right eye: No discharge. Left eye: No discharge. Cardiovascular:      Rate and Rhythm: Normal rate and regular rhythm. Pulmonary:      Effort: Pulmonary effort is normal. No respiratory distress. Breath sounds: Normal breath sounds. Musculoskeletal:      Cervical back: Normal range of motion. No rigidity. Comments: Moving all 4 extremities   Skin:     General: Skin is warm. Comments: Lesion on face. Patient refused a picture to be placed in the chart of the lesion   Neurological:      General: No focal deficit present. Mental Status: She is alert and oriented to person, place, and time. Cranial Nerves: No cranial nerve deficit. Psychiatric:      Comments: Angry       DIFFERENTIAL  DIAGNOSIS     PLAN (LABS / IMAGING / EKG):  No orders of the defined types were placed in this encounter. MEDICATIONS ORDERED:  No orders of the defined types were placed in this encounter. DDX: Facial lesion, headache, hypertension    Initial MDM/Plan: 40 y.o. female who presents with multiple complaints. Patient states she has chronic headaches as well as chronic hypertension. States she is supposed to be on a new blood pressure medicine however has not yet picked it up from the pharmacy. Additionally patient states she is here to have a lesion on her face removed. States that urgent care told her to come to the emergency department for removal of this. Does have a dermatologist however does not have an appointment with them for the next few months.   Patient well-appearing initial evaluation, no acute distress, afebrile, elevated diastolic blood pressure, otherwise stable vital signs. Explained to patient that we do not remove lesions on the face electively. There are no signs that needs to emergently be removed, no surrounding cellulitis or erythema, no active drainage, not actively bleeding. Patient very angry when she found out we will not electively remove this. Offered patient surgery clinic follow-up as well as dermatology follow-up. Patient states she has a dermatologist that she will follow-up with on her own. Patient refused to allow us to put a picture in her chart of the lesion. When patient found out that we would not be removing this today she states that she did not want to be here any longer. Patient attempted to call the urgent care to asked them why they sent her here. Offered patient work-up and treatment for the headache and hypertension and patient stated she would think about it however she is unsure if she would like us to do this as we will not remove the lesion. Plan dependent on what patient decides. DIAGNOSTIC RESULTS / EMERGENCYDEPARTMENT COURSE / MDM     LABS:  Labs Reviewed - No data to display    EMERGENCY DEPARTMENT COURSE:  ED Course as of 11/18/22 1753   Fri Nov 18, 2022   1732 Unable to find patient in room. Staff watched patient walk out. Patient eloped. [AB]      ED Course User Index  [AB] Елена Mahmood DO          PROCEDURES:  None    CONSULTS:  None    CRITICAL CARE:  See attending physician note    FINAL IMPRESSION      1. Nonintractable episodic headache, unspecified headache type    2. Facial lesion          DISPOSITION / PLAN     DISPOSITION Eloped - Left Before Treatment Complete 11/18/2022 05:31:58 PM      PATIENT REFERRED TO:  No follow-up provider specified.       DISCHARGE MEDICATIONS:  Discharge Medication List as of 11/18/2022  5:38 PM          Indy Guerrero DO  Emergency Medicine Resident    (Please note that portions of this note were completed with a voice recognition program.Efforts were made to edit the dictations but occasionally words are mis-transcribed.)        Chen Hall DO  Resident  11/18/22 9721

## 2022-11-18 NOTE — ED NOTES
Pt to ED for c/o hypertension and bump on face that has been bleeding. Pt states she went to urgent care today and they gave her a cream to put on it to stop the bleeding and then sent her here. Pt states that she wants it removed. Pt states that she has HTN that she takes medication for but presents with a headache and can tell that it's high. Pt noted to have high blood pressure upon arrival to ED  Bleeding controlled on site. Pt a/o x4. Dr. Keyshawn Davies at bedside to evaluate.          Selena Moore RN  11/18/22 5629

## 2022-11-21 ENCOUNTER — OFFICE VISIT (OUTPATIENT)
Dept: DERMATOLOGY | Age: 37
End: 2022-11-21
Payer: COMMERCIAL

## 2022-11-21 VITALS
HEART RATE: 74 BPM | TEMPERATURE: 97.9 F | BODY MASS INDEX: 27.85 KG/M2 | DIASTOLIC BLOOD PRESSURE: 81 MMHG | SYSTOLIC BLOOD PRESSURE: 122 MMHG | OXYGEN SATURATION: 100 % | WEIGHT: 147.4 LBS

## 2022-11-21 DIAGNOSIS — D48.9 NEOPLASM OF UNCERTAIN BEHAVIOR: Primary | ICD-10-CM

## 2022-11-21 DIAGNOSIS — L82.1 DERMATOSIS PAPULOSA NIGRA: ICD-10-CM

## 2022-11-21 DIAGNOSIS — L81.0 POST-INFLAMMATORY HYPERPIGMENTATION: ICD-10-CM

## 2022-11-21 PROCEDURE — 1036F TOBACCO NON-USER: CPT | Performed by: PHYSICIAN ASSISTANT

## 2022-11-21 PROCEDURE — 11102 TANGNTL BX SKIN SINGLE LES: CPT | Performed by: PHYSICIAN ASSISTANT

## 2022-11-21 PROCEDURE — 99203 OFFICE O/P NEW LOW 30 MIN: CPT | Performed by: PHYSICIAN ASSISTANT

## 2022-11-21 PROCEDURE — G8417 CALC BMI ABV UP PARAM F/U: HCPCS | Performed by: PHYSICIAN ASSISTANT

## 2022-11-21 PROCEDURE — G8427 DOCREV CUR MEDS BY ELIG CLIN: HCPCS | Performed by: PHYSICIAN ASSISTANT

## 2022-11-21 PROCEDURE — G8484 FLU IMMUNIZE NO ADMIN: HCPCS | Performed by: PHYSICIAN ASSISTANT

## 2022-11-21 RX ORDER — LIDOCAINE HYDROCHLORIDE 10 MG/ML
5 INJECTION, SOLUTION INFILTRATION; PERINEURAL ONCE
Status: SHIPPED | OUTPATIENT
Start: 2022-11-21

## 2022-11-21 RX ORDER — HYDROQUINONE 40 MG/G
CREAM TOPICAL
Qty: 28 G | Refills: 2 | Status: SHIPPED | OUTPATIENT
Start: 2022-11-21 | End: 2022-11-30 | Stop reason: SDUPTHER

## 2022-11-22 NOTE — PROGRESS NOTES
Dermatology Patient Note  [address]  [facility]  Dept: [phone]  Dept Fax: [phone]      VISITDATE: 2022   REFERRING PROVIDER: No ref. provider found      Ad Ordoñez is a 40 y.o. female  who presents today in the office for:    Mole (On R of nose, pt states it wont stop bleeding x 1m)      HISTORY OF PRESENT ILLNESS:  As above. Pt admits to tying dental floss around lesion yesterday. Also c/o \"dark spots\" on face, secondary to picking at acne lesions. MEDICAL PROBLEMS:  Patient Active Problem List    Diagnosis Date Noted    History of  section, classical 2013     Priority: Medium           History of  labor 2013     Priority: Medium    Postoperative surgical complication involving genitourinary system associated with genitourinary procedure 2022    S/p JOAQUIN, BS, L ovarian cyst drainage, Cystotomy repair, Cysto 3/2/22 2022    Hypercoagulable state ([address] 2022    Hypokalemia 2022    Hypocalcemia 2022    Bladder perforation, intraoperative 2022    Hx CS x3 2022    Marijuana use 2022    Urine ascites 2022    IUD removed 2022     Patient presented with rash. There was concern for possible allergy.  IUD was removed at that time      Intractable abdominal pain 2022    Lower abdominal pain 2022    S/p Diagnostic laparoscopy, lysis of adhesions, hysteroscopy, dilatation and curettage, Mirena IUD insertion 22      Complicated by incidental cystotomy repaired by Dr. 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 ([facility] [address] 04/29/2014    Benign essential hypertension, antepartum 05/06/2013     Updating deleted diagnoses         CURRENT MEDICATIONS:   Current Outpatient Medications   Medication Sig Dispense Refill    hydroquinone 4 % cream Apply 2 months on, 2 months off to dark spots 28 g 2    amLODIPine (NORVASC) 5 MG tablet Take 1 tablet by mouth daily 30 tablet 3    hydroCHLOROthiazide (HYDRODIURIL) 25 MG tablet Take 1 tablet by mouth every morning 90 tablet 1    ferrous sulfate (IRON 325) 325 (65 Fe) MG tablet Take 1 tablet by mouth daily (with breakfast) 30 tablet 5    ibuprofen (ADVIL;MOTRIN) 800 MG tablet Take 1 tablet by mouth every 8 hours as needed for Pain 30 tablet 0     Current Facility-Administered Medications   Medication Dose Route Frequency Provider Last Rate Last Admin    lidocaine 1 % injection 5 mg  5 mg IntraDERmal Once Eran Mckinney PA-C           ALLERGIES:   Allergies   Allergen Reactions    Seasonal Other (See Comments)     Allergic rhinnitis       SOCIAL HISTORY:  Social History     Tobacco Use    Smoking status: Never    Smokeless tobacco: Never   Substance Use Topics    Alcohol use: Yes     Alcohol/week: 3.0 standard drinks     Types: 3 Glasses of wine per week     Comment: 3 times per month       Pertinent ROS:  Review of Systems  Skin: Denies any new changing, growing or bleeding lesions or rashes except as described in the HPI   Constitutional: Denies fevers, chills, and malaise.     PHYSICAL EXAM:   /81   Pulse 74   Temp 97.9 °F (36.6 °C) (Temporal)   Wt 147 lb 6.4 oz (66.9 kg)   LMP  (LMP Unknown)   SpO2 100%   BMI 27.85 kg/m²     The patient is generally well appearing, well nourished, alert and conversational. Affect is normal.    Cutaneous Exam:  Physical Exam  Face and neck only was examined. Facial covering was removed during examination. Diagnoses/exam findings/medical history pertinent to this visit are listed below:    Assessment:   Diagnosis Orders   1. Neoplasm of uncertain behavior  AR TANGENTIAL BIOPSY SKIN SINGLE LESION    Surgical Pathology    lidocaine 1 % injection 5 mg      2. Post-inflammatory hyperpigmentation  hydroquinone 4 % cream      3. Dermatosis papulosa nigra             Plan:  1. Post-inflammatory hyperpigmentation  - hydroquinone 4 % cream; Apply 2 months on, 2 months off to dark spots  Dispense: 28 g; Refill: 2    2. Neoplasm of uncertain behavior  Shave Biopsy (right maxillary cheek - r/o pyogenic granuloma): The procedure and its risks were explained including but not limited to pain, bleeding, infection, permanent scar, permanent pigment alteration and need for an additional procedure. Consent to proceed with the procedure was obtained from the patient or the parent. After cleaning with alcohol the lesion was anesthetized with 1% lidocaine with epinephrine and was removed with a dermablade. Hemostasis was achieved with aluminum chloride and Vaseline and a bandage were applied.   - AR TANGENTIAL BIOPSY SKIN SINGLE LESION  - Surgical Pathology; Future  - lidocaine 1 % injection 5 mg    3. Dermatosis papulosa nigra  - reassurance and education       RTC Per Bx    Future Appointments   Date Time Provider Hugo Haskins   1/25/2023  1:45 PM Ni Villanueva MD  derm [email or web address]         There are no Patient Instructions on file for this visit.       Electronically signed by Vernadine Nissen, PA-C on 11/21/22 at 10:18 PM EST

## 2022-11-29 DIAGNOSIS — D48.9 NEOPLASM OF UNCERTAIN BEHAVIOR: ICD-10-CM

## 2022-11-29 NOTE — RESULT ENCOUNTER NOTE
We have received and reviewed your biopsy results, which demonstrated a benign skin lesion called a pyogenic granuloma. No further Tx is required for this lesion, unless it recurs.

## 2022-11-30 DIAGNOSIS — L81.0 POST-INFLAMMATORY HYPERPIGMENTATION: ICD-10-CM

## 2022-11-30 RX ORDER — HYDROQUINONE 40 MG/G
CREAM TOPICAL
Qty: 28 G | Refills: 2 | Status: SHIPPED | OUTPATIENT
Start: 2022-11-30

## 2022-11-30 NOTE — TELEPHONE ENCOUNTER
Pt called stating she wanted her hydroquinone 4% cream sent to [facility] instead of printed out

## 2023-03-13 ENCOUNTER — OFFICE VISIT (OUTPATIENT)
Dept: DERMATOLOGY | Age: 38
End: 2023-03-13
Payer: COMMERCIAL

## 2023-03-13 VITALS
SYSTOLIC BLOOD PRESSURE: 133 MMHG | WEIGHT: 147 LBS | HEIGHT: 61 IN | HEART RATE: 71 BPM | DIASTOLIC BLOOD PRESSURE: 90 MMHG | BODY MASS INDEX: 27.75 KG/M2 | TEMPERATURE: 96.7 F

## 2023-03-13 DIAGNOSIS — L70.0 ACNE VULGARIS: ICD-10-CM

## 2023-03-13 DIAGNOSIS — L82.1 DERMATOSIS PAPULOSA NIGRA: Primary | ICD-10-CM

## 2023-03-13 PROCEDURE — G8417 CALC BMI ABV UP PARAM F/U: HCPCS | Performed by: DERMATOLOGY

## 2023-03-13 PROCEDURE — G8427 DOCREV CUR MEDS BY ELIG CLIN: HCPCS | Performed by: DERMATOLOGY

## 2023-03-13 PROCEDURE — 1036F TOBACCO NON-USER: CPT | Performed by: DERMATOLOGY

## 2023-03-13 PROCEDURE — 99214 OFFICE O/P EST MOD 30 MIN: CPT | Performed by: DERMATOLOGY

## 2023-03-13 PROCEDURE — G8484 FLU IMMUNIZE NO ADMIN: HCPCS | Performed by: DERMATOLOGY

## 2023-03-13 RX ORDER — DOXYCYCLINE HYCLATE 100 MG/1
CAPSULE ORAL
Qty: 60 CAPSULE | Refills: 2 | Status: CANCELLED | OUTPATIENT
Start: 2023-03-13

## 2023-03-13 RX ORDER — CLINDAMYCIN PHOSPHATE 10 UG/ML
LOTION TOPICAL
Qty: 60 ML | Refills: 3 | Status: SHIPPED | OUTPATIENT
Start: 2023-03-13

## 2023-03-13 RX ORDER — SPIRONOLACTONE 50 MG/1
TABLET, FILM COATED ORAL
Qty: 30 TABLET | Refills: 2 | Status: SHIPPED | OUTPATIENT
Start: 2023-03-13

## 2023-03-13 NOTE — PROGRESS NOTES
Dermatology Patient Note  [address]  [address] [facility] [address] [address]  Dept: [phone]  Dept Fax: [phone]      VISITDATE: 3/13/2023   REFERRING PROVIDER: No ref. provider found      Ad Ordoñez is a 40 y.o. female  who presents today in the office for:    Follow-up (Bleeding mole, acne medication refill )      HISTORY OF PRESENT ILLNESS:  She was previously seen by Soni Ríos PA-C on 2022 for bleeding lesion on the right nose. Biopsy demonstrated pyogenic granuloma. She needs refills of acne medications. She was last seen for acne 2021 and prescribed Doxycycline 100 mg BID x 3 months, spironolactone 100 mg QD, BPO wash, clindamycin lotion, and tretinoin cream. She denies taking doxycycline or spironolactone. The patient continues to have menstrual flares of acne. She adds that she is primarily bothered by dark lesions on the face. MEDICAL PROBLEMS:  Patient Active Problem List    Diagnosis Date Noted    History of  section, classical 2013     Priority: Medium           History of  labor 2013     Priority: Medium    Postoperative surgical complication involving genitourinary system associated with genitourinary procedure 2022    S/p JOAQUIN, BS, L ovarian cyst drainage, Cystotomy repair, Cysto 3/2/22 2022    Hypercoagulable state ([facility] [address] 2022    Hypokalemia 2022    Hypocalcemia 2022    Bladder perforation, intraoperative 2022    Hx CS x3 2022    Marijuana use 2022    Urine ascites 2022    IUD removed 2022     Patient presented with rash. There was concern for possible allergy.  IUD was removed at that time      Intractable abdominal pain 2022    Lower abdominal pain 2022    S/p Diagnostic laparoscopy, lysis of adhesions, hysteroscopy, dilatation and curettage, Mirena IUD insertion 22 47/14/7932     Complicated by incidental cystotomy repaired by Dr. Cynthia Flores MD Urology via [facility]        Pelvic adhesive disease 02/14/2022    Intramural leiomyoma of uterus     Abnormal uterine bleeding (AUB) 05/19/2021    Bilateral nipple discharge 03/21/6041    Folliculitis 26/14/7480    Closed nondisplaced fracture of distal pole of navicular bone of left wrist 10/30/2018    Iron deficiency anemia 07/24/2016    Female pelvic pain 07/18/2016     Pelvic US ordered, appt on 7/21/16: unremarkable       Family history of uterine cancer      PGM      Family planning education, guidance, and counseling 10/15/2014    Heterozygous for prothrombin [id number] mutation ([facility] [address] 04/29/2014    Benign essential hypertension, antepartum 05/06/2013     Updating deleted diagnoses         CURRENT MEDICATIONS:   Current Outpatient Medications   Medication Sig Dispense Refill    hydroquinone 4 % cream Apply 2 months on, 2 months off to dark spots 28 g 2    amLODIPine (NORVASC) 5 MG tablet Take 1 tablet by mouth daily 30 tablet 3    hydroCHLOROthiazide (HYDRODIURIL) 25 MG tablet Take 1 tablet by mouth every morning 90 tablet 1    ferrous sulfate (IRON 325) 325 (65 Fe) MG tablet Take 1 tablet by mouth daily (with breakfast) 30 tablet 5    ibuprofen (ADVIL;MOTRIN) 800 MG tablet Take 1 tablet by mouth every 8 hours as needed for Pain 30 tablet 0     Current Facility-Administered Medications   Medication Dose Route Frequency Provider Last Rate Last Admin    lidocaine 1 % injection 5 mg  5 mg IntraDERmal Once Norberto Blackwell PA-C           ALLERGIES:   Allergies   Allergen Reactions    Seasonal Other (See Comments)     Allergic rhinnitis       SOCIAL HISTORY:  Social History     Tobacco Use    Smoking status: Never    Smokeless tobacco: Never   Substance Use Topics    Alcohol use:  Yes     Alcohol/week: 3.0 standard drinks     Types: 3 Glasses of wine per week     Comment: 3 times per month       Pertinent ROS:  Review of Systems  Skin: Denies any new changing, growing or bleeding lesions or rashes except as described in the HPI   Constitutional: Denies fevers, chills, and malaise. PHYSICAL EXAM:   BP (!) 133/90 (Site: Left Upper Arm, Position: Sitting, Cuff Size: Medium Adult)   Pulse 71   Temp (!) 96.7 °F (35.9 °C) (Temporal)   Ht 5' 1\" (1.549 m)   Wt 147 lb (66.7 kg)   LMP  (LMP Unknown)   BMI 27.78 kg/m²     The patient is generally well appearing, well nourished, alert and conversational. Affect is normal.    Cutaneous Exam:  Physical Exam  Acne exam: exam of face, neck, chest, and back was performed. Facial covering was removed during examination. Diagnoses/exam findings/medical history pertinent to this visit are listed below:    Assessment:   Diagnosis Orders   1. Dermatosis papulosa nigra        2. Acne vulgaris             Plan:  Acne vulgaris of face   - chronic illness with progression and/or exacerbation  - benzoyl peroxide 5% wash daily (or OTC alternative, options listed in AVS)  - clindamycin 1% lotion daily  - tretinoin 0.025% cream nightly  - spironolactone 50 mg daily (start 25 mg daily x 7 days)  Discussed spironolactone with patient. The patient was informed of common side effects such as increased urination/urinary frequency, menstrual irregularities with mid-cycle spotting, breast tenderness, decreased libido, fatigue, headache, and dizziness. Patient informed to stop taking medication and to immediately report any new onset muscle cramps or weakness, or palpitations. Patient informed that pregnancy needs to be avoided while on this medication. Discussed risk of elevated potassium and the need to stay away from potassium supplements while on the medication.     Dermatosis papulosa nigra   - reassurance and education     RTC 3 months     Future Appointments   Date Time Provider Hugo Divine   6/28/2023 10:45 AM Elodia Dinero MD  derm [facility]         Patient Instructions   Restart benzoyl peroxide wash, clindamycin lotion, and tretinoin cream for acne. Start doxycycline and spironolactone as prescribed. Marisela Childs, personally scribed the services dictated to me by Dr. Jayro Licea in this documentation. I, Dr. Jayro Licea, personally performed the services described in this documentation, as scribed by [facility] in my presence, and it is both accurate and complete.   ¬

## 2023-03-13 NOTE — LETTER
March 13, 2023       Vinh Burrell YOB: 1985   [address] [place] [address] Date of Visit:  3/13/2023       To Whom It May Concern: It is my medical opinion that [facility] may return to work on 03/14/2023. .    If you have any questions or concerns, please don't hesitate to call.     Sincerely,        Pineda Arias MD

## 2023-03-13 NOTE — PATIENT INSTRUCTIONS
Restart benzoyl peroxide wash, clindamycin lotion, and tretinoin cream for acne. Start doxycycline and spironolactone as prescribed.

## 2023-04-28 ENCOUNTER — HOSPITAL ENCOUNTER (OUTPATIENT)
Age: 38
Setting detail: SPECIMEN
Discharge: HOME | End: 2023-04-28

## 2023-04-28 ENCOUNTER — OFFICE VISIT (OUTPATIENT)
Dept: OBGYN | Age: 38
End: 2023-04-28

## 2023-04-28 VITALS
HEART RATE: 79 BPM | BODY MASS INDEX: 27.78 KG/M2 | DIASTOLIC BLOOD PRESSURE: 84 MMHG | SYSTOLIC BLOOD PRESSURE: 123 MMHG | WEIGHT: 147 LBS

## 2023-04-28 DIAGNOSIS — I10 PRIMARY HYPERTENSION: ICD-10-CM

## 2023-04-28 DIAGNOSIS — R19.8 CHANGE IN BOWEL MOVEMENT: Primary | ICD-10-CM

## 2023-04-28 DIAGNOSIS — R61 NIGHT SWEATS: ICD-10-CM

## 2023-04-28 DIAGNOSIS — Z90.710 S/P TOTAL ABDOMINAL HYSTERECTOMY: ICD-10-CM

## 2023-04-28 LAB
ANION GAP SERPL CALCULATED.3IONS-SCNC: 15 MMOL/L (ref 9–17)
BUN SERPL-MCNC: 12 MG/DL (ref 6–20)
CALCIUM SERPL-MCNC: 9.3 MG/DL (ref 8.6–10.4)
CHLORIDE SERPL-SCNC: 104 MMOL/L (ref 98–107)
CO2 SERPL-SCNC: 24 MMOL/L (ref 20–31)
CREAT SERPL-MCNC: 0.71 MG/DL (ref 0.5–0.9)
ESTRADIOL LEVEL: 371.1 PG/ML (ref 27–314)
FOLLICLE STIMULATING HORMONE: 2.6 MIU/ML (ref 1.7–21.5)
GFR SERPL CREATININE-BSD FRML MDRD: >60 ML/MIN/1.73M2
GLUCOSE SERPL-MCNC: 84 MG/DL (ref 70–99)
HCT VFR BLD AUTO: 43 % (ref 36.3–47.1)
HGB BLD-MCNC: 14.5 G/DL (ref 11.9–15.1)
MCH RBC QN AUTO: 31.3 PG (ref 25.2–33.5)
MCHC RBC AUTO-ENTMCNC: 33.7 G/DL (ref 28.4–34.8)
MCV RBC AUTO: 92.9 FL (ref 82.6–102.9)
NRBC AUTOMATED: 0 PER 100 WBC
PDW BLD-RTO: 11.9 % (ref 11.8–14.4)
PLATELET # BLD AUTO: 235 K/UL (ref 138–453)
PMV BLD AUTO: 11 FL (ref 8.1–13.5)
POTASSIUM SERPL-SCNC: 4.6 MMOL/L (ref 3.7–5.3)
RBC # BLD: 4.63 M/UL (ref 3.95–5.11)
SODIUM SERPL-SCNC: 143 MMOL/L (ref 135–144)
TSH SERPL-ACNC: 0.45 UIU/ML (ref 0.3–5)
WBC # BLD AUTO: 4.9 K/UL (ref 3.5–11.3)

## 2023-04-28 NOTE — PROGRESS NOTES
OB/GYN Problem Visit    Mitali Casillas  4/28/2023                       Primary Care Physician: Negin Smith MD    CC:   Chief Complaint   Patient presents with    Follow-up     Pt Having three bowel movements a day, soft stool , and this is unusual for her            HPI: Mitali Casillas is a 40 y.o. female [id number]    The patient was seen and examined. She reports over the past month, she has been having 3 episodes of loose stools per day. She reports an associated decreased appetite but denies any weight loss. She denies abdominal pain prior to, during, or after defecation. She denies melanotic stools, greasy floating stools, or constipation. She has never experienced anything like this before and reports previously normal bowel movements daily. She has had one episode of bleeding with defecation in which she noted blood when she wiped but did not look into the toilet. She denies rectal pain or hemorrhoids. Patient denies changes in her diet or new medications. The patient also reports episodes of night sweats that happen up to 3 times per week. She reports she has to sleep in light clothing with a fan on, otherwise she gets overheated. She is unsure if this is a perimenopausal symptom and is requesting lab work. The patient is s/p JOAQUIN-BS with cystotomy repair 3/2/22. She denies abnormal vaginal discharge, vaginal prolapse, or dyspareunia. She denies vaginal bleeding. She is sexually active with male partners and denies concerns for STIs at this time.          REVIEW OF SYSTEMS:   Constitutional: negative fever, negative chills, positive night sweats, positive decreased appetite  HEENT: negative visual disturbances, negative headaches  Respiratory: negative dyspnea, negative cough  Cardiovascular: negative chest pain,  negative palpitations  Gastrointestinal: negative abdominal pain, negative RUQ pain, negative N/V, positive diarrhea, negative constipation  Genitourinary: negative dysuria,

## 2023-04-28 NOTE — PROGRESS NOTES
Attending Physician Statement  I have discussed the care of [facility], including pertinent history and exam findings, with the resident. I have reviewed the key elements of all parts of the encounter with the resident. I agree with the assessment, plan and orders as documented by the resident.   (GE Modifier)    Hortensia Chambers DO  [address]  4/28/2023, 3:51 PM

## 2023-05-03 ENCOUNTER — TELEPHONE (OUTPATIENT)
Dept: OBGYN | Age: 38
End: 2023-05-03

## 2023-05-03 ENCOUNTER — APPOINTMENT (OUTPATIENT)
Dept: ULTRASOUND IMAGING | Age: 38
End: 2023-05-03
Payer: COMMERCIAL

## 2023-05-03 ENCOUNTER — HOSPITAL ENCOUNTER (INPATIENT)
Age: 38
LOS: 2 days | Discharge: HOME | End: 2023-05-05
Attending: EMERGENCY MEDICINE | Admitting: OBSTETRICS & GYNECOLOGY
Payer: COMMERCIAL

## 2023-05-03 ENCOUNTER — APPOINTMENT (OUTPATIENT)
Dept: CT IMAGING | Age: 38
End: 2023-05-03
Payer: COMMERCIAL

## 2023-05-03 ENCOUNTER — APPOINTMENT (OUTPATIENT)
Dept: MRI IMAGING | Age: 38
End: 2023-05-03
Payer: COMMERCIAL

## 2023-05-03 DIAGNOSIS — R19.00 PELVIC MASS: Primary | ICD-10-CM

## 2023-05-03 DIAGNOSIS — D48.9 NEOPLASM OF UNCERTAIN BEHAVIOR: ICD-10-CM

## 2023-05-03 DIAGNOSIS — G89.18 POSTOPERATIVE PAIN: ICD-10-CM

## 2023-05-03 PROBLEM — Z30.432 ENCOUNTER FOR IUD REMOVAL: Status: RESOLVED | Noted: 2022-02-27 | Resolved: 2023-05-03

## 2023-05-03 PROBLEM — E87.6 HYPOKALEMIA: Status: RESOLVED | Noted: 2022-02-28 | Resolved: 2023-05-03

## 2023-05-03 PROBLEM — E83.51 HYPOCALCEMIA: Status: RESOLVED | Noted: 2022-02-28 | Resolved: 2023-05-03

## 2023-05-03 PROBLEM — N94.9 ADNEXAL CYST: Status: ACTIVE | Noted: 2023-05-03

## 2023-05-03 LAB
BILIRUBIN URINE: NEGATIVE
CANCER AG125 SERPL-ACNC: 9 U/ML
CANCER AG19-9 SERPL IA-ACNC: <1 U/ML (ref 0–35)
CEA SERPL-MCNC: 1.7 NG/ML
COLOR: YELLOW
COMMENT UA: ABNORMAL
GLUCOSE UR STRIP.AUTO-MCNC: NEGATIVE MG/DL
HCT VFR BLD AUTO: 41.7 % (ref 36.3–47.1)
HGB BLD-MCNC: 14.7 G/DL (ref 11.9–15.1)
KETONES UR STRIP.AUTO-MCNC: ABNORMAL MG/DL
LACTIC ACID, WHOLE BLOOD: 1.2 MMOL/L (ref 0.7–2.1)
LEUKOCYTE ESTERASE UR QL STRIP.AUTO: NEGATIVE
LIPASE SERPL-CCNC: 21 U/L (ref 13–60)
MCH RBC QN AUTO: 31.7 PG (ref 25.2–33.5)
MCHC RBC AUTO-ENTMCNC: 35.3 G/DL (ref 28.4–34.8)
MCV RBC AUTO: 89.9 FL (ref 82.6–102.9)
NITRITE UR QL STRIP.AUTO: NEGATIVE
NRBC AUTOMATED: 0 PER 100 WBC
PDW BLD-RTO: 11.8 % (ref 11.8–14.4)
PLATELET # BLD AUTO: 240 K/UL (ref 138–453)
PMV BLD AUTO: 10.3 FL (ref 8.1–13.5)
PROT UR STRIP.AUTO-MCNC: 7.5 MG/DL (ref 5–8)
PROT UR STRIP.AUTO-MCNC: NEGATIVE MG/DL
RBC # BLD: 4.64 M/UL (ref 3.95–5.11)
SPECIFIC GRAVITY UA: 1.06 (ref 1–1.03)
TURBIDITY: CLEAR
URINE HGB: NEGATIVE
UROBILINOGEN, URINE: NORMAL
WBC # BLD AUTO: 9.2 K/UL (ref 3.5–11.3)

## 2023-05-03 PROCEDURE — 2580000003 HC RX 258: Performed by: STUDENT IN AN ORGANIZED HEALTH CARE EDUCATION/TRAINING PROGRAM

## 2023-05-03 PROCEDURE — 6360000004 HC RX CONTRAST MEDICATION: Performed by: STUDENT IN AN ORGANIZED HEALTH CARE EDUCATION/TRAINING PROGRAM

## 2023-05-03 PROCEDURE — 83605 ASSAY OF LACTIC ACID: CPT

## 2023-05-03 PROCEDURE — 81003 URINALYSIS AUTO W/O SCOPE: CPT

## 2023-05-03 PROCEDURE — 96374 THER/PROPH/DIAG INJ IV PUSH: CPT

## 2023-05-03 PROCEDURE — 6360000002 HC RX W HCPCS: Performed by: EMERGENCY MEDICINE

## 2023-05-03 PROCEDURE — 96375 TX/PRO/DX INJ NEW DRUG ADDON: CPT

## 2023-05-03 PROCEDURE — 96376 TX/PRO/DX INJ SAME DRUG ADON: CPT

## 2023-05-03 PROCEDURE — 36415 COLL VENOUS BLD VENIPUNCTURE: CPT

## 2023-05-03 PROCEDURE — 1200000000 HC SEMI PRIVATE

## 2023-05-03 PROCEDURE — 85027 COMPLETE CBC AUTOMATED: CPT

## 2023-05-03 PROCEDURE — 6370000000 HC RX 637 (ALT 250 FOR IP): Performed by: STUDENT IN AN ORGANIZED HEALTH CARE EDUCATION/TRAINING PROGRAM

## 2023-05-03 PROCEDURE — 74177 CT ABD & PELVIS W/CONTRAST: CPT

## 2023-05-03 PROCEDURE — 6360000004 HC RX CONTRAST MEDICATION: Performed by: EMERGENCY MEDICINE

## 2023-05-03 PROCEDURE — 83690 ASSAY OF LIPASE: CPT

## 2023-05-03 PROCEDURE — 86301 IMMUNOASSAY TUMOR CA 19-9: CPT

## 2023-05-03 PROCEDURE — 72197 MRI PELVIS W/O & W/DYE: CPT

## 2023-05-03 PROCEDURE — 71260 CT THORAX DX C+: CPT

## 2023-05-03 PROCEDURE — A9579 GAD-BASE MR CONTRAST NOS,1ML: HCPCS | Performed by: STUDENT IN AN ORGANIZED HEALTH CARE EDUCATION/TRAINING PROGRAM

## 2023-05-03 PROCEDURE — 76857 US EXAM PELVIC LIMITED: CPT

## 2023-05-03 PROCEDURE — 82378 CARCINOEMBRYONIC ANTIGEN: CPT

## 2023-05-03 PROCEDURE — 99285 EMERGENCY DEPT VISIT HI MDM: CPT

## 2023-05-03 PROCEDURE — 6360000002 HC RX W HCPCS: Performed by: STUDENT IN AN ORGANIZED HEALTH CARE EDUCATION/TRAINING PROGRAM

## 2023-05-03 PROCEDURE — 86304 IMMUNOASSAY TUMOR CA 125: CPT

## 2023-05-03 PROCEDURE — 80053 COMPREHEN METABOLIC PANEL: CPT

## 2023-05-03 RX ORDER — ONDANSETRON 2 MG/ML
4 INJECTION INTRAMUSCULAR; INTRAVENOUS EVERY 6 HOURS PRN
Status: DISCONTINUED | OUTPATIENT
Start: 2023-05-03 | End: 2023-05-05 | Stop reason: HOSPADM

## 2023-05-03 RX ORDER — 0.9 % SODIUM CHLORIDE 0.9 %
20 INTRAVENOUS SOLUTION INTRAVENOUS ONCE
Status: DISCONTINUED | OUTPATIENT
Start: 2023-05-03 | End: 2023-05-05 | Stop reason: HOSPADM

## 2023-05-03 RX ORDER — MORPHINE SULFATE 4 MG/ML
4 INJECTION, SOLUTION INTRAMUSCULAR; INTRAVENOUS ONCE
Status: COMPLETED | OUTPATIENT
Start: 2023-05-03 | End: 2023-05-03

## 2023-05-03 RX ORDER — SODIUM CHLORIDE 9 MG/ML
INJECTION, SOLUTION INTRAVENOUS CONTINUOUS
Status: DISCONTINUED | OUTPATIENT
Start: 2023-05-03 | End: 2023-05-05 | Stop reason: HOSPADM

## 2023-05-03 RX ORDER — SODIUM CHLORIDE 9 MG/ML
INJECTION, SOLUTION INTRAVENOUS PRN
Status: DISCONTINUED | OUTPATIENT
Start: 2023-05-03 | End: 2023-05-05 | Stop reason: HOSPADM

## 2023-05-03 RX ORDER — SODIUM CHLORIDE 0.9 % (FLUSH) 0.9 %
10 SYRINGE (ML) INJECTION PRN
Status: DISCONTINUED | OUTPATIENT
Start: 2023-05-03 | End: 2023-05-05 | Stop reason: HOSPADM

## 2023-05-03 RX ORDER — PROCHLORPERAZINE EDISYLATE 5 MG/ML
10 INJECTION INTRAMUSCULAR; INTRAVENOUS EVERY 6 HOURS PRN
Status: DISCONTINUED | OUTPATIENT
Start: 2023-05-03 | End: 2023-05-05 | Stop reason: HOSPADM

## 2023-05-03 RX ORDER — ONDANSETRON 4 MG/1
4 TABLET, ORALLY DISINTEGRATING ORAL EVERY 8 HOURS PRN
Status: DISCONTINUED | OUTPATIENT
Start: 2023-05-03 | End: 2023-05-05 | Stop reason: HOSPADM

## 2023-05-03 RX ORDER — SPIRONOLACTONE 25 MG/1
50 TABLET ORAL DAILY
Status: DISCONTINUED | OUTPATIENT
Start: 2023-05-03 | End: 2023-05-05 | Stop reason: HOSPADM

## 2023-05-03 RX ORDER — DOCUSATE SODIUM 100 MG/1
100 CAPSULE, LIQUID FILLED ORAL 2 TIMES DAILY
Status: DISCONTINUED | OUTPATIENT
Start: 2023-05-03 | End: 2023-05-05 | Stop reason: HOSPADM

## 2023-05-03 RX ORDER — ONDANSETRON 2 MG/ML
4 INJECTION INTRAMUSCULAR; INTRAVENOUS ONCE
Status: COMPLETED | OUTPATIENT
Start: 2023-05-03 | End: 2023-05-03

## 2023-05-03 RX ORDER — HYDROCHLOROTHIAZIDE 25 MG/1
25 TABLET ORAL EVERY MORNING
Status: DISCONTINUED | OUTPATIENT
Start: 2023-05-03 | End: 2023-05-05 | Stop reason: HOSPADM

## 2023-05-03 RX ORDER — ACETAMINOPHEN 500 MG
1000 TABLET ORAL EVERY 6 HOURS PRN
Status: DISCONTINUED | OUTPATIENT
Start: 2023-05-03 | End: 2023-05-05 | Stop reason: HOSPADM

## 2023-05-03 RX ORDER — AMLODIPINE BESYLATE 5 MG/1
5 TABLET ORAL DAILY
Status: DISCONTINUED | OUTPATIENT
Start: 2023-05-03 | End: 2023-05-05 | Stop reason: HOSPADM

## 2023-05-03 RX ORDER — IBUPROFEN 400 MG/1
600 TABLET ORAL EVERY 6 HOURS PRN
Status: DISCONTINUED | OUTPATIENT
Start: 2023-05-03 | End: 2023-05-05 | Stop reason: HOSPADM

## 2023-05-03 RX ORDER — SODIUM CHLORIDE 0.9 % (FLUSH) 0.9 %
5-40 SYRINGE (ML) INJECTION PRN
Status: DISCONTINUED | OUTPATIENT
Start: 2023-05-03 | End: 2023-05-05 | Stop reason: HOSPADM

## 2023-05-03 RX ORDER — SODIUM CHLORIDE 0.9 % (FLUSH) 0.9 %
5-40 SYRINGE (ML) INJECTION EVERY 12 HOURS SCHEDULED
Status: DISCONTINUED | OUTPATIENT
Start: 2023-05-03 | End: 2023-05-05 | Stop reason: HOSPADM

## 2023-05-03 RX ADMIN — IOPAMIDOL 75 ML: 755 INJECTION, SOLUTION INTRAVENOUS at 08:32

## 2023-05-03 RX ADMIN — SODIUM CHLORIDE: 9 INJECTION, SOLUTION INTRAVENOUS at 16:28

## 2023-05-03 RX ADMIN — AMLODIPINE BESYLATE 5 MG: 5 TABLET ORAL at 16:25

## 2023-05-03 RX ADMIN — SODIUM CHLORIDE: 9 INJECTION, SOLUTION INTRAVENOUS at 19:54

## 2023-05-03 RX ADMIN — DIATRIZOATE MEGLUMINE AND DIATRIZOATE SODIUM 30 ML: 660; 100 LIQUID ORAL; RECTAL at 15:47

## 2023-05-03 RX ADMIN — SPIRONOLACTONE 50 MG: 25 TABLET ORAL at 16:25

## 2023-05-03 RX ADMIN — ONDANSETRON 4 MG: 2 INJECTION INTRAMUSCULAR; INTRAVENOUS at 11:10

## 2023-05-03 RX ADMIN — ONDANSETRON 4 MG: 2 INJECTION INTRAMUSCULAR; INTRAVENOUS at 16:25

## 2023-05-03 RX ADMIN — HYDROMORPHONE HYDROCHLORIDE 1 MG: 1 INJECTION, SOLUTION INTRAMUSCULAR; INTRAVENOUS; SUBCUTANEOUS at 09:54

## 2023-05-03 RX ADMIN — MORPHINE SULFATE 4 MG: 4 INJECTION INTRAVENOUS at 08:16

## 2023-05-03 RX ADMIN — MORPHINE SULFATE 4 MG: 4 INJECTION INTRAVENOUS at 09:03

## 2023-05-03 RX ADMIN — GADOTERIDOL 12 ML: 279.3 INJECTION, SOLUTION INTRAVENOUS at 15:18

## 2023-05-03 RX ADMIN — Medication 50 ML: at 15:18

## 2023-05-03 RX ADMIN — IOPAMIDOL 75 ML: 755 INJECTION, SOLUTION INTRAVENOUS at 15:47

## 2023-05-03 RX ADMIN — HYDROCHLOROTHIAZIDE 25 MG: 25 TABLET ORAL at 16:25

## 2023-05-03 ASSESSMENT — PAIN SCALES - GENERAL: PAINLEVEL_OUTOF10: 8

## 2023-05-03 ASSESSMENT — PAIN - FUNCTIONAL ASSESSMENT: PAIN_FUNCTIONAL_ASSESSMENT: 0-10

## 2023-05-03 NOTE — CARE COORDINATION
transportation at discharge: (P) [employer]    Financial    Payor: [facility]  [address] / Plan: [facility]  [address] / Product Type: *No Product type* /     Does insurance require precert for SNF: Yes    Potential assistance Purchasing Medications: (P) No ([employer] [address])  Meds-to-Beds request:        [facility] #[id number] - [address] [facility], [address] [place] [address] [phone] Franny Less [phone]  [address] [place] [id number]  Phone: [phone] Fax: [phone]      Notes:    Factors facilitating achievement of predicted outcomes: Cooperative and Pleasant    Barriers to discharge: medical clearance    Additional Case Management Notes: goal is home, family/friend to provide transportation    The Plan for Transition of Care is related to the following treatment goals of Pelvic mass [A30.77]    IF APPLICABLE: The Patient and/or patient representative Lily Samano and her family were provided with a choice of provider and agrees with the discharge plan. Freedom of choice list with basic dialogue that supports the patient's individualized plan of care/goals and shares the quality data associated with the providers was provided to: (P) Patient   Patient Representative Name:       The Patient and/or Patient Representative Agree with the Discharge Plan?  (P) Yes    Yin Hwang RN  Case Management Department  Ph: [phone]

## 2023-05-03 NOTE — ED PROVIDER NOTES
similar when she had leakage of urine. On exam her tenderness is in suprapubic region, but she appears very uncomfortable. We will check labs, urinalysis as well possible obstruction we will plan on imaging. Pain control her symptoms do not sound infectious. No fevers we will also check lactate. Amount and/or Complexity of Data Reviewed  External Data Reviewed: notes. Details: previous admission and op notes 2/2022  Labs: ordered. Radiology: ordered. Risk  Prescription drug management. Decision regarding hospitalization. EMERGENCY DEPARTMENT COURSE:      ED Course as of 05/03/23 1346   Wed May 03, 2023   9471 Patient uncomfortable still, initial dose of pain medication with minimal relief [CE]   0941 Ct scan results reviewed, and will consult surgery, patient updated on results [CE]   1059 Patient reassessed, nauseated at this time, zofran ordered, gen surg repaged. [CE]   7435 DIOLZ with obgyn [CE]   [id number] Spoke with obgyn and they plan to admit and get MRI [CE]      ED Course User Index  [CE] Nellie Robledo DO       PROCEDURES:      CONSULTS:  IP CONSULT TO GENERAL SURGERY  IP CONSULT TO IV TEAM  IP CONSULT TO OB GYN  IP CONSULT TO OB GYN        FINAL IMPRESSION      PElvic mass      DISPOSITION / PLAN     DISPOSITION Decision To Admit 05/03/2023 01:44:13 PM      PATIENT REFERRED TO:  No follow-up provider specified.     DISCHARGE MEDICATIONS:  New Prescriptions    No medications on file       Lady Tarango DO  Emergency Medicine Resident    (Please note that portions of thisnote were completed with a voice recognition program.  Efforts were made to edit the dictations but occasionally words are mis-transcribed.)        Nellie Robledo DO  05/03/23 1340

## 2023-05-03 NOTE — ED TRIAGE NOTES
Pt reports that she had a full hysterectomy a year ago. Pt had a complication involving the bladder. Since she had the complication resolved has not had pain. About 2-3 days ago she developed LLQ pain and pelvic pain that she reports feels like \"extreme cramping. \"

## 2023-05-03 NOTE — H&P
post hysterectomy. The ovaries are not identified.    - General Surgery consulted   - Will plan to admit for serial abdominal exams and further imaging ATSO Dr. Laura Yepez. Low suspicion for ovarian torsion and possibly even ovarian involvement at this time, however will await further imaging.   - CBC, BMP qd   - Motrin/Tylenol prn   - Dilaudid pushes   - Zofran/Compazine   - SCDs for DVT prophylaxis   - NPO   - IVF: NS @ 125mL/hr   - Home meds: HCTZ, Norvasc, Aldactone ordered   - Will await CT with contrast ordered by General Surgery and continue with serial abdominal exams at this time.   Patient Active Problem List    Diagnosis Date Noted    History of  section, classical 2013     Priority: Medium           History of  labor 2013     Priority: Medium    Pelvic mass 2023    Postoperative surgical complication involving genitourinary system associated with genitourinary procedure 2022    S/p JOAQUIN, BS, L ovarian cyst drainage, Cystotomy repair, Cysto 3/2/22 2022    Hypercoagulable state ([address] 2022    Bladder perforation, intraoperative 2022    Hx CS x3 2022    Marijuana use 2022    Urine ascites 2022    Intractable abdominal pain 2022    Lower abdominal pain 2022    S/p Diagnostic laparoscopy, lysis of adhesions, hysteroscopy, dilatation and curettage, Mirena IUD insertion 22      Complicated by incidental cystotomy repaired by Dr. Natacha Martines MD Urology via [facility]        Pelvic adhesive disease 2022    Intramural leiomyoma of uterus     Abnormal uterine bleeding (AUB) 2021    Bilateral nipple discharge     Folliculitis     Closed nondisplaced fracture of distal pole of navicular bone of left wrist 10/30/2018    Iron deficiency anemia 2016    Female pelvic pain 2016     Pelvic US ordered, appt on 16: unremarkable       Family history of uterine cancer      PGM

## 2023-05-03 NOTE — PROGRESS NOTES
OB/GYN Resident Interval Note    Patient reevaluated and resting comfortably in bed. Patient complaining of some nausea, antiemetics ordered. Reports pain is the same as before and improves with medication, abdominal exam unchanged, no concerns for acute abdomen at this time. Awaiting MRI results at this time. CT w/ contrast showing \"12 cm multi locular cystic mass centered to the left of midline in the pelvis, unchanged. Small amount of free fluid the pelvis which may be physiologic. No acute findings elsewhere in the chest, abdomen or pelvis. \" Continue to monitor closely.     Vitals:    05/03/23 0802   BP: (!) 143/84   Pulse: 82   Resp: 16   Temp: 98.6 °F (37 °C)   TempSrc: Oral   SpO2: 99%     Recent Results (from the past 24 hour(s))   CBC    Collection Time: 05/03/23  8:12 AM   Result Value Ref Range    WBC 9.2 3.5 - 11.3 k/uL    RBC 4.64 3.95 - 5.11 m/uL    Hemoglobin 14.7 11.9 - 15.1 g/dL    Hematocrit 41.7 36.3 - 47.1 %    MCV 89.9 82.6 - 102.9 fL    MCH 31.7 25.2 - 33.5 pg    MCHC 35.3 (H) 28.4 - 34.8 g/dL    RDW 11.8 11.8 - 14.4 %    Platelets 735 832 - 681 k/uL    MPV 10.3 8.1 - 13.5 fL    NRBC Automated 0.0 0.0 per 100 WBC   Comprehensive Metabolic Panel    Collection Time: 05/03/23  8:12 AM   Result Value Ref Range    Glucose 80 70 - 99 mg/dL    BUN 8 6 - 20 mg/dL    Creatinine 0.66 0.50 - 0.90 mg/dL    Est, Glom Filt Rate >60 >60 mL/min/1.73m2    Calcium 9.2 8.6 - 10.4 mg/dL    Sodium 138 135 - 144 mmol/L    Potassium 3.9 3.7 - 5.3 mmol/L    Chloride 104 98 - 107 mmol/L    CO2 PENDING mmol/L    Anion Gap PENDING mmol/L    Alkaline Phosphatase 51 35 - 104 U/L    ALT 11 5 - 33 U/L    AST 18 <32 U/L    Total Bilirubin 1.2 0.3 - 1.2 mg/dL    Total Protein 7.6 6.4 - 8.3 g/dL    Albumin 4.1 3.5 - 5.2 g/dL    Albumin/Globulin Ratio 1.2 1.0 - 2.5   Lipase    Collection Time: 05/03/23  8:12 AM   Result Value Ref Range    Lipase 21 13 - 60 U/L   Lactic Acid    Collection Time: 05/03/23  8:12 AM   Result Value Ref

## 2023-05-03 NOTE — CONSULTS
General Surgery  Consult    PATIENT NAME: [place]  AGE: 40 y.o. MEDICAL RECORD NO. [id number]  DATE: 5/3/2023  SURGEON: Germain Smith  PRIMARY CARE PHYSICIAN: Marilee Riley MD    Patient evaluated at the request of  Dr. Anna Mcdonald  Reason for evaluation: Pelvic cyst    Patient information was obtained from patient and past medical records. History/Exam limitations: none. Patient presented to the Emergency Department by private vehicle. IMPRESSION:     Patient Active Problem List   Diagnosis    History of  section, classical    History of  labor    Benign essential hypertension, antepartum    Heterozygous for prothrombin P23140S mutation ([facility] [address]    Family planning education, guidance, and counseling    Family history of uterine cancer    Female pelvic pain    Iron deficiency anemia    Closed nondisplaced fracture of distal pole of navicular bone of left wrist    Abnormal uterine bleeding (AUB)    Bilateral nipple discharge    Folliculitis    S/p Diagnostic laparoscopy, lysis of adhesions, hysteroscopy, dilatation and curettage, Mirena IUD insertion 22    Pelvic adhesive disease    Intramural leiomyoma of uterus    Lower abdominal pain    Intractable abdominal pain    Hypercoagulable state ([address]    Bladder perforation, intraoperative    Hx CS x3    Marijuana use    Urine ascites    S/p JOAQUIN, BS, L ovarian cyst drainage, Cystotomy repair, Cysto 3/2/22    Postoperative surgical complication involving genitourinary system associated with genitourinary procedure    Pelvic mass    Pelvic cyst     60-year-old female s/p total abdominal hysterectomy with incidental cystostomy repair presenting with 3-day history of pelvic pain that is worse upon voiding.   CT a/p, U/S, and pelvic MRI demonstrate complex cystic structure superior to the bladder, concerning for ovarian abscess vs peritoneal inclusion cyst.      PLAN:   We recommend interventional radiology consult for drainage of the patient's pelvic
normal atraumatic, no neck masses  Respiratory: No labored breathing or conversational dyspnea  Cardiovascular: regular rate and rhythm  Abdomen: soft, tenderness to palpation in LLQ and suprapubic region, non-distended, and no right upper quadrant tenderness, no rebound, guarding or rigidity; no concerns for acute abdomen at this time  Pelvic Exam: deferred by patient at this time  Musculoskeletal: no gross abnormalities  Extremities: non-tender BLE and non-edematous  Psych:  oriented to time, place and person, mood and affect are within normal limits       LAB RESULTS:  Results for orders placed or performed during the hospital encounter of 05/03/23   CBC   Result Value Ref Range    WBC 9.2 3.5 - 11.3 k/uL    RBC 4.64 3.95 - 5.11 m/uL    Hemoglobin 14.7 11.9 - 15.1 g/dL    Hematocrit 41.7 36.3 - 47.1 %    MCV 89.9 82.6 - 102.9 fL    MCH 31.7 25.2 - 33.5 pg    MCHC 35.3 (H) 28.4 - 34.8 g/dL    RDW 11.8 11.8 - 14.4 %    Platelets 391 876 - 690 k/uL    MPV 10.3 8.1 - 13.5 fL    NRBC Automated 0.0 0.0 per 100 WBC   Comprehensive Metabolic Panel   Result Value Ref Range    Glucose 80 70 - 99 mg/dL    BUN 8 6 - 20 mg/dL    Creatinine 0.66 0.50 - 0.90 mg/dL    Est, Glom Filt Rate >60 >60 mL/min/1.73m2    Calcium 9.2 8.6 - 10.4 mg/dL    Sodium 138 135 - 144 mmol/L    Potassium 3.9 3.7 - 5.3 mmol/L    Chloride 104 98 - 107 mmol/L    CO2 PENDING mmol/L    Anion Gap PENDING mmol/L    Alkaline Phosphatase 51 35 - 104 U/L    ALT 11 5 - 33 U/L    AST 18 <32 U/L    Total Bilirubin 1.2 0.3 - 1.2 mg/dL    Total Protein 7.6 6.4 - 8.3 g/dL    Albumin 4.1 3.5 - 5.2 g/dL    Albumin/Globulin Ratio 1.2 1.0 - 2.5   Lipase   Result Value Ref Range    Lipase 21 13 - 60 U/L   Lactic Acid   Result Value Ref Range    Lactic Acid, Whole Blood 1.2 0.7 - 2.1 mmol/L   Urinalysis with Reflex to Culture    Specimen: Urine   Result Value Ref Range    Color, UA Yellow Yellow    Turbidity UA Clear Clear    Glucose, Ur NEGATIVE NEGATIVE    Bilirubin
post hysterectomy. The ovaries are not identified.    - General Surgery consulted   - Will plan to admit for serial abdominal exams and further imaging ATSO Dr. Sarah Perry. Low suspicion for ovarian torsion and possibly even ovarian involvement at this time, however will await further imaging.   - CBC, BMP qd   - Motrin/Tylenol prn   - Dilaudid pushes   - Zofran/Compazine   - SCDs for DVT prophylaxis   - NPO   - IVF: NS @ 125mL/hr   - Home meds: HCTZ, Norvasc, Aldactone ordered   - Will await CT with contrast ordered by General Surgery and continue with serial abdominal exams at this time.   Patient Active Problem List    Diagnosis Date Noted    History of  section, classical 2013     Priority: Medium           History of  labor 2013     Priority: Medium    Pelvic mass 2023    Postoperative surgical complication involving genitourinary system associated with genitourinary procedure 2022    S/p JOAQUIN, BS, L ovarian cyst drainage, Cystotomy repair, Cysto 3/2/22 2022    Hypercoagulable state ([address] 2022    Bladder perforation, intraoperative 2022    Hx CS x3 2022    Marijuana use 2022    Urine ascites 2022    Intractable abdominal pain 2022    Lower abdominal pain 2022    S/p Diagnostic laparoscopy, lysis of adhesions, hysteroscopy, dilatation and curettage, Mirena IUD insertion 6784     Complicated by incidental cystotomy repaired by Dr. Chantale Ye MD Urology via [facility]        Pelvic adhesive disease 2022    Intramural leiomyoma of uterus     Abnormal uterine bleeding (AUB) 2021    Bilateral nipple discharge     Folliculitis     Closed nondisplaced fracture of distal pole of navicular bone of left wrist 10/30/2018    Iron deficiency anemia 2016    Female pelvic pain 2016     Pelvic US ordered, appt on 16: unremarkable       Family history of uterine cancer      PGM

## 2023-05-03 NOTE — DISCHARGE SUMMARY
Gyn Discharge Summary  [facility]      Patient Name: Damion Tobin  Patient : 1985  Primary Care Physician: Lorenza Ha MD  Admit Date: 5/3/2023    Principal Diagnosis: Pelvic cyst    Other Diagnosis:   Pelvic mass [R19.00]  Neoplasm of uncertain behavior [E94.0]  Patient Active Problem List   Diagnosis    History of  section, classical    History of  labor    Benign essential hypertension, antepartum    Heterozygous for prothrombin [id number] mutation ([facility]    Family planning education, guidance, and counseling    Family history of uterine cancer    Female pelvic pain    Iron deficiency anemia    Closed nondisplaced fracture of distal pole of navicular bone of left wrist    Abnormal uterine bleeding (AUB)    Bilateral nipple discharge    Folliculitis    S/p Diagnostic laparoscopy, lysis of adhesions, hysteroscopy, dilatation and curettage, Mirena IUD insertion 22    Pelvic adhesive disease    Intramural leiomyoma of uterus    Lower abdominal pain    Intractable abdominal pain    Hypercoagulable state ([facility]    Bladder perforation, intraoperative    Hx CS x3    Marijuana use    Urine ascites    S/p JOAQUIN, BS, L ovarian cyst drainage, Cystotomy repair, Cysto 3/2/22    Postoperative surgical complication involving genitourinary system associated with genitourinary procedure    Pelvic mass    Pelvic cyst       Infection: No  Hospital Acquired: n/a    Surgical Operations & Procedures: IR drainage of pelvic cyst    Consultations: General surgery and Interventional Radiology    Pertinent Findings & Procedures:   Damion Tobin is a 40 y.o. female , admitted for serial abdominal exams and pelvic cyst management; received IV fluids, pain management. She underwent IR drainage of pelvic cyst on 23. Post-op course normal, discharged home. Follow up in 2 weeks. Discharge instructions reviewed and questions answered.     HD#1: CT showing cystic mass with

## 2023-05-03 NOTE — TELEPHONE ENCOUNTER
Received call from [facility] ER staff - pt is currently in ER for pelvic & abdominal pain, pt is most likely going to be admitted. .. will not be discharged today in time for appt at 3pm.    Appt has been cancelled.

## 2023-05-04 ENCOUNTER — APPOINTMENT (OUTPATIENT)
Dept: ULTRASOUND IMAGING | Age: 38
End: 2023-05-04
Payer: COMMERCIAL

## 2023-05-04 ENCOUNTER — APPOINTMENT (OUTPATIENT)
Dept: CT IMAGING | Age: 38
End: 2023-05-04
Payer: COMMERCIAL

## 2023-05-04 LAB
ABSOLUTE EOS #: 0.04 K/UL (ref 0–0.44)
ABSOLUTE IMMATURE GRANULOCYTE: 0.03 K/UL (ref 0–0.3)
ABSOLUTE LYMPH #: 1.36 K/UL (ref 1.1–3.7)
ABSOLUTE MONO #: 0.62 K/UL (ref 0.1–1.2)
ALBUMIN SERPL-MCNC: 4.1 G/DL (ref 3.5–5.2)
ALBUMIN/GLOBULIN RATIO: 1.2 (ref 1–2.5)
ALP SERPL-CCNC: 51 U/L (ref 35–104)
ALT SERPL-CCNC: 11 U/L (ref 5–33)
ANION GAP SERPL CALCULATED.3IONS-SCNC: 15 MMOL/L (ref 9–17)
ANION GAP SERPL CALCULATED.3IONS-SCNC: 19 MMOL/L (ref 9–17)
AST SERPL-CCNC: 18 U/L
BASOPHILS # BLD: 0 % (ref 0–2)
BASOPHILS ABSOLUTE: 0.03 K/UL (ref 0–0.2)
BILIRUB SERPL-MCNC: 1.2 MG/DL (ref 0.3–1.2)
BUN SERPL-MCNC: 6 MG/DL (ref 6–20)
BUN SERPL-MCNC: 8 MG/DL (ref 6–20)
CALCIUM SERPL-MCNC: 9.1 MG/DL (ref 8.6–10.4)
CALCIUM SERPL-MCNC: 9.2 MG/DL (ref 8.6–10.4)
CHLORIDE SERPL-SCNC: 100 MMOL/L (ref 98–107)
CHLORIDE SERPL-SCNC: 104 MMOL/L (ref 98–107)
CO2 SERPL-SCNC: 15 MMOL/L (ref 20–31)
CO2 SERPL-SCNC: 19 MMOL/L (ref 20–31)
CREAT SERPL-MCNC: 0.57 MG/DL (ref 0.5–0.9)
CREAT SERPL-MCNC: 0.66 MG/DL (ref 0.5–0.9)
CREATININE FLUID: 0.6 MG/DL
EOSINOPHILS RELATIVE PERCENT: 1 % (ref 1–4)
GFR SERPL CREATININE-BSD FRML MDRD: >60 ML/MIN/1.73M2
GFR SERPL CREATININE-BSD FRML MDRD: >60 ML/MIN/1.73M2
GLUCOSE SERPL-MCNC: 78 MG/DL (ref 70–99)
GLUCOSE SERPL-MCNC: 80 MG/DL (ref 70–99)
HCT VFR BLD AUTO: 43.2 % (ref 36.3–47.1)
HGB BLD-MCNC: 14.7 G/DL (ref 11.9–15.1)
IMMATURE GRANULOCYTES: 0 %
INR PPP: 0.9
LYMPHOCYTES # BLD: 18 % (ref 24–43)
MCH RBC QN AUTO: 31.1 PG (ref 25.2–33.5)
MCHC RBC AUTO-ENTMCNC: 34 G/DL (ref 28.4–34.8)
MCV RBC AUTO: 91.5 FL (ref 82.6–102.9)
MONOCYTES # BLD: 8 % (ref 3–12)
NRBC AUTOMATED: 0 PER 100 WBC
PARTIAL THROMBOPLASTIN TIME: 28.3 SEC (ref 23–36.5)
PDW BLD-RTO: 11.6 % (ref 11.8–14.4)
PLATELET # BLD AUTO: 236 K/UL (ref 138–453)
PMV BLD AUTO: 9.5 FL (ref 8.1–13.5)
POTASSIUM SERPL-SCNC: 3.8 MMOL/L (ref 3.7–5.3)
POTASSIUM SERPL-SCNC: 3.9 MMOL/L (ref 3.7–5.3)
PROT SERPL-MCNC: 7.6 G/DL (ref 6.4–8.3)
PROTHROMBIN TIME: 12.3 SEC (ref 11.7–14.9)
RBC # BLD: 4.72 M/UL (ref 3.95–5.11)
SEG NEUTROPHILS: 73 % (ref 36–65)
SEGMENTED NEUTROPHILS ABSOLUTE COUNT: 5.64 K/UL (ref 1.5–8.1)
SODIUM SERPL-SCNC: 134 MMOL/L (ref 135–144)
SODIUM SERPL-SCNC: 138 MMOL/L (ref 135–144)
SPECIMEN TYPE: NORMAL
WBC # BLD AUTO: 7.7 K/UL (ref 3.5–11.3)

## 2023-05-04 PROCEDURE — 6360000002 HC RX W HCPCS: Performed by: RADIOLOGY

## 2023-05-04 PROCEDURE — 82570 ASSAY OF URINE CREATININE: CPT

## 2023-05-04 PROCEDURE — 36415 COLL VENOUS BLD VENIPUNCTURE: CPT

## 2023-05-04 PROCEDURE — 2709999900 US GUIDED NEEDLE PLACEMENT

## 2023-05-04 PROCEDURE — 87070 CULTURE OTHR SPECIMN AEROBIC: CPT

## 2023-05-04 PROCEDURE — 85730 THROMBOPLASTIN TIME PARTIAL: CPT

## 2023-05-04 PROCEDURE — 80048 BASIC METABOLIC PNL TOTAL CA: CPT

## 2023-05-04 PROCEDURE — 99231 SBSQ HOSP IP/OBS SF/LOW 25: CPT | Performed by: STUDENT IN AN ORGANIZED HEALTH CARE EDUCATION/TRAINING PROGRAM

## 2023-05-04 PROCEDURE — 85610 PROTHROMBIN TIME: CPT

## 2023-05-04 PROCEDURE — 1200000000 HC SEMI PRIVATE

## 2023-05-04 PROCEDURE — 99223 1ST HOSP IP/OBS HIGH 75: CPT | Performed by: SURGERY

## 2023-05-04 PROCEDURE — 6360000002 HC RX W HCPCS: Performed by: STUDENT IN AN ORGANIZED HEALTH CARE EDUCATION/TRAINING PROGRAM

## 2023-05-04 PROCEDURE — 0W9H3ZX DRAINAGE OF RETROPERITONEUM, PERCUTANEOUS APPROACH, DIAGNOSTIC: ICD-10-PCS | Performed by: PHYSICIAN ASSISTANT

## 2023-05-04 PROCEDURE — 85025 COMPLETE CBC W/AUTO DIFF WBC: CPT

## 2023-05-04 PROCEDURE — 10140 I&D HMTMA SEROMA/FLUID COLLJ: CPT

## 2023-05-04 PROCEDURE — 87205 SMEAR GRAM STAIN: CPT

## 2023-05-04 PROCEDURE — C1729 CATH, DRAINAGE: HCPCS

## 2023-05-04 PROCEDURE — 2580000003 HC RX 258: Performed by: STUDENT IN AN ORGANIZED HEALTH CARE EDUCATION/TRAINING PROGRAM

## 2023-05-04 PROCEDURE — 2709999900 CT ABSCESS DRAINAGE W CATH PLACEMENT S&I

## 2023-05-04 PROCEDURE — 6370000000 HC RX 637 (ALT 250 FOR IP): Performed by: STUDENT IN AN ORGANIZED HEALTH CARE EDUCATION/TRAINING PROGRAM

## 2023-05-04 PROCEDURE — 87075 CULTR BACTERIA EXCEPT BLOOD: CPT

## 2023-05-04 RX ORDER — OXYCODONE HYDROCHLORIDE 5 MG/1
5 TABLET ORAL EVERY 4 HOURS PRN
Status: DISCONTINUED | OUTPATIENT
Start: 2023-05-04 | End: 2023-05-05 | Stop reason: HOSPADM

## 2023-05-04 RX ORDER — FENTANYL CITRATE 50 UG/ML
INJECTION, SOLUTION INTRAMUSCULAR; INTRAVENOUS PRN
Status: COMPLETED | OUTPATIENT
Start: 2023-05-04 | End: 2023-05-04

## 2023-05-04 RX ORDER — OXYCODONE HYDROCHLORIDE 5 MG/1
10 TABLET ORAL EVERY 4 HOURS PRN
Status: DISCONTINUED | OUTPATIENT
Start: 2023-05-04 | End: 2023-05-05 | Stop reason: HOSPADM

## 2023-05-04 RX ADMIN — HYDROCHLOROTHIAZIDE 25 MG: 25 TABLET ORAL at 16:24

## 2023-05-04 RX ADMIN — SODIUM CHLORIDE, PRESERVATIVE FREE 10 ML: 5 INJECTION INTRAVENOUS at 20:16

## 2023-05-04 RX ADMIN — HYDROMORPHONE HYDROCHLORIDE 0.5 MG: 1 INJECTION, SOLUTION INTRAMUSCULAR; INTRAVENOUS; SUBCUTANEOUS at 07:31

## 2023-05-04 RX ADMIN — FENTANYL CITRATE 50 MCG: 50 INJECTION, SOLUTION INTRAMUSCULAR; INTRAVENOUS at 14:05

## 2023-05-04 RX ADMIN — DOCUSATE SODIUM 100 MG: 100 CAPSULE, LIQUID FILLED ORAL at 20:16

## 2023-05-04 RX ADMIN — FENTANYL CITRATE 50 MCG: 50 INJECTION, SOLUTION INTRAMUSCULAR; INTRAVENOUS at 14:09

## 2023-05-04 RX ADMIN — SPIRONOLACTONE 50 MG: 25 TABLET ORAL at 16:24

## 2023-05-04 RX ADMIN — AMLODIPINE BESYLATE 5 MG: 5 TABLET ORAL at 16:24

## 2023-05-04 RX ADMIN — DOCUSATE SODIUM 100 MG: 100 CAPSULE, LIQUID FILLED ORAL at 16:24

## 2023-05-04 RX ADMIN — SODIUM CHLORIDE: 9 INJECTION, SOLUTION INTRAVENOUS at 06:50

## 2023-05-04 ASSESSMENT — PAIN DESCRIPTION - LOCATION: LOCATION: ABDOMEN

## 2023-05-04 ASSESSMENT — PAIN SCALES - GENERAL
PAINLEVEL_OUTOF10: 8
PAINLEVEL_OUTOF10: 8

## 2023-05-04 NOTE — PROGRESS NOTES
Gynecology Progress Note    Date: 5/4/2023  Time: 11:35 AM    sEe Mascorro is a 40 y.o. female [id number] HD# 2    Patient was seen and examined. She is resting comfortably. She reports being able to get some sleep overnight. Pain is  controlled. She did not take any pain meds overnight. She is urinating well . She denies any vaginal bleeding. She is  ambulating without difficulty. She is  passing flatus. She denies Fever/Chills, Chest Pain, SOB, N/V, Calf Pain    Vitals:  Vitals:    05/03/23 1945 05/04/23 0444 05/04/23 0731 05/04/23 0735   BP:  128/85  132/80   Pulse:  72  77   Resp:  18 18 16   Temp:  98.1 °F (36.7 °C)  99.3 °F (37.4 °C)   TempSrc:    Oral   SpO2:  97%  100%   Weight: 145 lb (65.8 kg)      Height: 5' 1\" (1.549 m)            Intake/Output:   Last Shift: I/O last 3 completed shifts:  In: -   Out: 350 [Urine:350]  Current Shift: No intake/output data recorded.     Physical Exam:  General:  no apparent distress, alert, and cooperative  Neurologic:  alert, oriented, normal speech, no focal findings or movement disorder noted  Lungs:  No increased work of breathing, good air exchange  Heart:  regular rate  Abdomen: Abdomen soft, non-distended, moderate tenderness to deep palpation in the suprapubic region and LLQ, no rebound or rigidity   Extremities:  no calf tenderness, non edematous    Lab:  Complete Blood Count:   Recent Labs     05/03/23  0812 05/04/23 0646   WBC 9.2 7.7   HGB 14.7 14.7   HCT 41.7 43.2    236        PT/INR:    Lab Results   Component Value Date/Time    PROTIME 12.3 05/04/2023 09:43 AM    INR 0.9 05/04/2023 09:43 AM     PTT:    Lab Results   Component Value Date/Time    APTT 28.3 05/04/2023 09:43 AM       Comprehensive Metabolic Profile:   Recent Labs     05/03/23  0812 05/04/23  0646    134*   K 3.9 3.8    100   CO2 15* 19*   BUN 8 6   CREATININE 0.66 0.57   GLUCOSE 80 78   CALCIUM 9.2 9.1   PROT 7.6  --    LABALBU 4.1  --    BILITOT 1.2  --    ALKPHOS 51

## 2023-05-04 NOTE — PROGRESS NOTES
Ob/Gyn Interval Note     Attempted to evaluate patient and she was resting comfortably and asleep.      Vitals:    05/03/23 0802 05/03/23 1944 05/03/23 1945   BP: (!) 143/84 138/89    Pulse: 82 78    Resp: 16 16    Temp: 98.6 °F (37 °C) 97.9 °F (36.6 °C)    TempSrc: Oral Oral    SpO2: 99% 98%    Weight:   145 lb (65.8 kg)   Height:   5' 1\" (1.549 m)     [address]hman[address]ino [facility]  5/4/2023 4:25 AM

## 2023-05-04 NOTE — BRIEF OP NOTE
Brief Postoperative Note    Simin Avila  YOB: 1985  [id number]    Pre-operative Diagnosis: Pelvic fluid collection    Post-operative Diagnosis: Same    Procedure: Pelvic fluid aspiration    Anesthesia: Local and fentanyl    Surgeons/Assistants: MD Edison    Estimated Blood Loss: less than 50     Complications: None    Specimens: Was Obtained:     Findings: Successful aspiration of pelvic fluid collection 60 mL bright yellow fluid. Some residual fluid left that didn't drain with aspiration of pocket that was accessed do to loculations.      Electronically signed by ERASTO Cronin on 5/4/2023 at 2:24 PM

## 2023-05-04 NOTE — PLAN OF CARE
Problem: Pain  Goal: Verbalizes/displays adequate comfort level or baseline comfort level  5/4/2023 1707 by Hugo Buckner RN  Outcome: Progressing  5/4/2023 0408 by Rich Cummings RN  Outcome: Progressing

## 2023-05-04 NOTE — PROGRESS NOTES
Ob/Gyn Progress Note    Patient seen and evaluated. She is now s/p IR drainage of pelvic cyst. She states that she is already feeling better. She is tolerating a normal diet and her pain is controlled. Will plan to discontinue dilaudid and transition to oral shravan tonight. Plan for discharge tomorrow morning.     Paul De La Fuente DO, PGY-3  Ob/Gyn Resident  [facility]  05/04/23

## 2023-05-05 VITALS
BODY MASS INDEX: 27.38 KG/M2 | WEIGHT: 145 LBS | DIASTOLIC BLOOD PRESSURE: 83 MMHG | RESPIRATION RATE: 16 BRPM | SYSTOLIC BLOOD PRESSURE: 136 MMHG | HEIGHT: 61 IN | OXYGEN SATURATION: 100 % | TEMPERATURE: 98.2 F | HEART RATE: 84 BPM

## 2023-05-05 LAB
ABSOLUTE EOS #: <0.03 K/UL (ref 0–0.44)
ABSOLUTE IMMATURE GRANULOCYTE: <0.03 K/UL (ref 0–0.3)
ABSOLUTE LYMPH #: 1.51 K/UL (ref 1.1–3.7)
ABSOLUTE MONO #: 0.68 K/UL (ref 0.1–1.2)
ANION GAP SERPL CALCULATED.3IONS-SCNC: 14 MMOL/L (ref 9–17)
BASOPHILS # BLD: 1 % (ref 0–2)
BASOPHILS ABSOLUTE: 0.03 K/UL (ref 0–0.2)
BUN SERPL-MCNC: 8 MG/DL (ref 6–20)
CALCIUM SERPL-MCNC: 9.6 MG/DL (ref 8.6–10.4)
CHLORIDE SERPL-SCNC: 98 MMOL/L (ref 98–107)
CO2 SERPL-SCNC: 24 MMOL/L (ref 20–31)
CREAT SERPL-MCNC: 0.7 MG/DL (ref 0.5–0.9)
EOSINOPHILS RELATIVE PERCENT: 0 % (ref 1–4)
GFR SERPL CREATININE-BSD FRML MDRD: >60 ML/MIN/1.73M2
GLUCOSE SERPL-MCNC: 72 MG/DL (ref 70–99)
HCT VFR BLD AUTO: 45.9 % (ref 36.3–47.1)
HGB BLD-MCNC: 15.8 G/DL (ref 11.9–15.1)
IMMATURE GRANULOCYTES: 0 %
LYMPHOCYTES # BLD: 23 % (ref 24–43)
MCH RBC QN AUTO: 30.9 PG (ref 25.2–33.5)
MCHC RBC AUTO-ENTMCNC: 34.4 G/DL (ref 28.4–34.8)
MCV RBC AUTO: 89.6 FL (ref 82.6–102.9)
MONOCYTES # BLD: 10 % (ref 3–12)
NRBC AUTOMATED: 0 PER 100 WBC
PDW BLD-RTO: 11.3 % (ref 11.8–14.4)
PLATELET # BLD AUTO: 251 K/UL (ref 138–453)
PMV BLD AUTO: 9.9 FL (ref 8.1–13.5)
POTASSIUM SERPL-SCNC: 3.7 MMOL/L (ref 3.7–5.3)
RBC # BLD: 5.12 M/UL (ref 3.95–5.11)
SEG NEUTROPHILS: 66 % (ref 36–65)
SEGMENTED NEUTROPHILS ABSOLUTE COUNT: 4.4 K/UL (ref 1.5–8.1)
SODIUM SERPL-SCNC: 136 MMOL/L (ref 135–144)
WBC # BLD AUTO: 6.7 K/UL (ref 3.5–11.3)

## 2023-05-05 PROCEDURE — 6370000000 HC RX 637 (ALT 250 FOR IP): Performed by: STUDENT IN AN ORGANIZED HEALTH CARE EDUCATION/TRAINING PROGRAM

## 2023-05-05 PROCEDURE — 94760 N-INVAS EAR/PLS OXIMETRY 1: CPT

## 2023-05-05 PROCEDURE — 36415 COLL VENOUS BLD VENIPUNCTURE: CPT

## 2023-05-05 PROCEDURE — 85025 COMPLETE CBC W/AUTO DIFF WBC: CPT

## 2023-05-05 PROCEDURE — 2580000003 HC RX 258: Performed by: STUDENT IN AN ORGANIZED HEALTH CARE EDUCATION/TRAINING PROGRAM

## 2023-05-05 PROCEDURE — 80048 BASIC METABOLIC PNL TOTAL CA: CPT

## 2023-05-05 RX ORDER — OXYCODONE HYDROCHLORIDE 5 MG/1
5 TABLET ORAL EVERY 6 HOURS PRN
Qty: 8 TABLET | Refills: 0 | Status: SHIPPED | OUTPATIENT
Start: 2023-05-05 | End: 2023-05-10

## 2023-05-05 RX ORDER — ACETAMINOPHEN 500 MG
1000 TABLET ORAL EVERY 6 HOURS PRN
Qty: 60 TABLET | Refills: 1 | Status: SHIPPED | OUTPATIENT
Start: 2023-05-05

## 2023-05-05 RX ORDER — IBUPROFEN 800 MG/1
800 TABLET ORAL EVERY 8 HOURS PRN
Qty: 60 TABLET | Refills: 1 | Status: SHIPPED | OUTPATIENT
Start: 2023-05-05

## 2023-05-05 RX ADMIN — DOCUSATE SODIUM 100 MG: 100 CAPSULE, LIQUID FILLED ORAL at 08:47

## 2023-05-05 RX ADMIN — SODIUM CHLORIDE, PRESERVATIVE FREE 10 ML: 5 INJECTION INTRAVENOUS at 08:47

## 2023-05-05 RX ADMIN — SPIRONOLACTONE 50 MG: 25 TABLET ORAL at 08:47

## 2023-05-05 RX ADMIN — ACETAMINOPHEN 1000 MG: 500 TABLET ORAL at 08:50

## 2023-05-05 RX ADMIN — HYDROCHLOROTHIAZIDE 25 MG: 25 TABLET ORAL at 08:47

## 2023-05-05 RX ADMIN — AMLODIPINE BESYLATE 5 MG: 5 TABLET ORAL at 08:47

## 2023-05-05 RX ADMIN — IBUPROFEN 600 MG: 400 TABLET, FILM COATED ORAL at 08:49

## 2023-05-05 ASSESSMENT — PAIN DESCRIPTION - LOCATION: LOCATION: ABDOMEN;HEAD

## 2023-05-05 ASSESSMENT — PAIN SCALES - GENERAL: PAINLEVEL_OUTOF10: 5

## 2023-05-05 NOTE — DISCHARGE SUMMARY
Discharge teaching and instructions completed with patient using teachback method. AVS reviewed. Patient voiced understanding regarding prescriptions, follow up appointments, and care of self at home. Discharged home with family. All questions answered.

## 2023-05-05 NOTE — PROGRESS NOTES
CLINICAL PHARMACY NOTE: MEDS TO BEDS    Total # of Prescriptions Filled: 3   The following medications were delivered to the patient:  Ibuprofen  Acetaminophen  oxycodone    Additional Documentation:

## 2023-05-05 NOTE — PLAN OF CARE
Problem: Pain  Goal: Verbalizes/displays adequate comfort level or baseline comfort level  5/5/2023 0401 by Nara Parrish RN  Outcome: Progressing  5/4/2023 1707 by Canelo Duncan RN  Outcome: Progressing

## 2023-05-05 NOTE — PLAN OF CARE
Problem: Pain  Goal: Verbalizes/displays adequate comfort level or baseline comfort level  5/5/2023 0945 by Bryon Lubin RN  Outcome: Completed  5/5/2023 0401 by Rich Cummings RN  Outcome: Progressing

## 2023-05-05 NOTE — PROGRESS NOTES
Gynecology Progress Note    Date: 5/5/2023  Time: 7:22 AM    Lyla Yip 40 y.o. female [id number], admitted for pelvic cyst, s/p IR drainage 5/4/23    Patient seen and examined. She has no complaints or concerns this morning and states she is feeling much better. Pain is controlled. Patient is  tolerating oral intake. She is urinating well. She denies any vaginal bleeding. She is  ambulating without difficulty. She is  passing flatus. She denies Fever/Chills, Chest Pain, SOB, N/V, Calf Pain    Vitals:  Vitals:    05/04/23 1422 05/04/23 1630 05/04/23 1948 05/05/23 0500   BP: 130/84 (!) 140/92 132/80 110/71   Pulse: 66 75 68 70   Resp: 12  15 16   Temp:  98 °F (36.7 °C) 98 °F (36.7 °C) 97.9 °F (36.6 °C)   TempSrc:  Oral Oral    SpO2: 98% 100% 100% 97%   Weight:       Height:             Intake/Output:   Last Shift: I/O last 3 completed shifts:  In: -   Out: 900 [Urine:900]  Current Shift: No intake/output data recorded.         Physical Exam:  General:  no apparent distress, alert, and cooperative  Neurologic:  alert, oriented, normal speech, no focal findings or movement disorder noted  Lungs:  No increased work of breathing, good air exchange, clear to auscultation bilaterally, no crackles or wheezing  Heart:  regular rate and rhythm    Abdomen: soft, non-distended, minimally tender in LLQ, no CVA tenderness, normal bowel sounds  Incision: clean, dry, intact  Extremities:  no calf tenderness, non edematous, SCDs in place and functioning    Lab:  Recent Results (from the past 12 hour(s))   Basic Metabolic Panel    Collection Time: 05/05/23  5:47 AM   Result Value Ref Range    Glucose 72 70 - 99 mg/dL    BUN 8 6 - 20 mg/dL    Creatinine 0.70 0.50 - 0.90 mg/dL    Est, Glom Filt Rate >60 >60 mL/min/1.73m2    Calcium 9.6 8.6 - 10.4 mg/dL    Sodium 136 135 - 144 mmol/L    Potassium 3.7 3.7 - 5.3 mmol/L    Chloride 98 98 - 107 mmol/L    CO2 24 20 - 31 mmol/L    Anion Gap 14 9 - 17 mmol/L   CBC with Auto Differential

## 2023-05-07 LAB
MICROORGANISM SPEC CULT: ABNORMAL
MICROORGANISM/AGENT SPEC: ABNORMAL
MICROORGANISM/AGENT SPEC: ABNORMAL
SPECIMEN DESCRIPTION: ABNORMAL

## 2023-05-08 ENCOUNTER — TELEPHONE (OUTPATIENT)
Dept: INTERNAL MEDICINE | Age: 38
End: 2023-05-08

## 2023-05-08 NOTE — TELEPHONE ENCOUNTER
[facility] Transitions Initial Follow Up Call    Outreach made within 2 business days of discharge: Yes    Patient: Alicia Greer   Patient : 1985   MRN: [id number]    Reason for Admission: pelvic mass  Discharge Date: 2023       Spoke with: Sabrina Levin    Discharge department/facility: [address] Onc/MedSurg    TCM Interactive Patient Contact:  Was patient able to fill all prescriptions: Yes  Was patient instructed to bring all medications to the follow-up visit: Yes  Is patient taking all medications as directed in the discharge summary? Yes  Does patient understand their discharge instructions: Yes  Does patient have questions or concerns that need addressed prior to 7-14 day follow up office visit: no    Scheduled appointment with PCP within 7-14 days    Pt had already called and scheduled hospital f/u appt, writer contacted pt and was able to offer appt with PCP for tomorrow instead. Pt states she was supposed to get a letter uploaded to her [employer] for work stating she is not to go up and down stairs, writer does not see note in chart. Pt will obtain letter tomorrow at appt.     Follow Up  Future Appointments   Date Time Provider Hugo Haskins   5/10/2023  2:20 PM Percy Salas MD [facility]   2023 10:45 AM Khris Pleitez MD [facility]   8/3/2023  2:00 PM Patel Alejandro MD [facility] OB/Gyn Damion Gar RN

## 2023-05-09 ENCOUNTER — OFFICE VISIT (OUTPATIENT)
Dept: INTERNAL MEDICINE | Age: 38
End: 2023-05-09

## 2023-05-09 VITALS
TEMPERATURE: 98.4 F | BODY MASS INDEX: 25.98 KG/M2 | WEIGHT: 137.6 LBS | SYSTOLIC BLOOD PRESSURE: 112 MMHG | HEART RATE: 96 BPM | DIASTOLIC BLOOD PRESSURE: 79 MMHG | HEIGHT: 61 IN

## 2023-05-09 DIAGNOSIS — R19.00 PELVIC MASS: Primary | ICD-10-CM

## 2023-05-09 DIAGNOSIS — I10 PRIMARY HYPERTENSION: ICD-10-CM

## 2023-05-09 DIAGNOSIS — Z09 HOSPITAL DISCHARGE FOLLOW-UP: ICD-10-CM

## 2023-05-09 SDOH — ECONOMIC STABILITY: INCOME INSECURITY: HOW HARD IS IT FOR YOU TO PAY FOR THE VERY BASICS LIKE FOOD, HOUSING, MEDICAL CARE, AND HEATING?: NOT VERY HARD

## 2023-05-09 SDOH — ECONOMIC STABILITY: FOOD INSECURITY: WITHIN THE PAST 12 MONTHS, THE FOOD YOU BOUGHT JUST DIDN'T LAST AND YOU DIDN'T HAVE MONEY TO GET MORE.: NEVER TRUE

## 2023-05-09 SDOH — ECONOMIC STABILITY: FOOD INSECURITY: WITHIN THE PAST 12 MONTHS, YOU WORRIED THAT YOUR FOOD WOULD RUN OUT BEFORE YOU GOT MONEY TO BUY MORE.: NEVER TRUE

## 2023-05-09 SDOH — ECONOMIC STABILITY: HOUSING INSECURITY
IN THE LAST 12 MONTHS, WAS THERE A TIME WHEN YOU DID NOT HAVE A STEADY PLACE TO SLEEP OR SLEPT IN A SHELTER (INCLUDING NOW)?: NO

## 2023-05-09 ASSESSMENT — PATIENT HEALTH QUESTIONNAIRE - PHQ9
SUM OF ALL RESPONSES TO PHQ9 QUESTIONS 1 & 2: 1
SUM OF ALL RESPONSES TO PHQ QUESTIONS 1-9: 1
SUM OF ALL RESPONSES TO PHQ QUESTIONS 1-9: 1
1. LITTLE INTEREST OR PLEASURE IN DOING THINGS: 0
SUM OF ALL RESPONSES TO PHQ QUESTIONS 1-9: 1
SUM OF ALL RESPONSES TO PHQ QUESTIONS 1-9: 1
2. FEELING DOWN, DEPRESSED OR HOPELESS: 1

## 2023-05-09 ASSESSMENT — ENCOUNTER SYMPTOMS
COUGH: 0
DIARRHEA: 0
SHORTNESS OF BREATH: 0
PHOTOPHOBIA: 0
ABDOMINAL PAIN: 0
CONSTIPATION: 0
WHEEZING: 0

## 2023-05-09 NOTE — PROGRESS NOTES
Post-Discharge Transitional Care Follow Up      Joni Ridley   YOB: 1985    Date of Office Visit:  5/9/2023  Date of Hospital Admission: 5/3/23  Date of Hospital Discharge: 5/5/23  Readmission Risk Score (high >=14%. Medium >=10%):Readmission Risk Score: 6.1      Care management risk score Rising risk (score 2-5) and Complex Care (Scores >=6): No Risk Score On File     Non face to face  following discharge, date last encounter closed (first attempt may have been earlier): 05/08/2023     Call initiated 2 business days of discharge: Yes     Pelvic mass  Advised to follow-up with oncology for serial exams and ultrasounds as needed  Tumor markers negative in the hospital    -     [facility]; Starting Tue 5/9/2023, Disp-1 each, R-0, PrintDx: Pain Temporary 2 weeks  Hospital discharge follow-up  -     [place] DISCHARGE MEDS RECONCILED W/ CURRENT OUTPATIENT MED LIST  Primary hypertension  Advised to stop hydrochlorothiazide. She is not taking amlodipine. Advised spironolactone as recommended by dermatology for her acne. Follow-up in 6 weeks time to recheck blood pressure. Labs reviewed with her    Medical Decision Making: moderate complexity  Return in 3 months (on 8/9/2023). On this date 5/9/2023 I have spent 25 minutes reviewing previous notes, test results and face to face with the patient discussing the diagnosis and importance of compliance with the treatment plan as well as documenting on the day of the visit. Subjective:   HPI patient is here for follow-up after recent hospitalization for a pelvic cyst.  She had a CT and an MRI. This showed a multiloculated cystic mass. Patient was admitted with gynecology service. She underwent a cyst drainage with IR. Patient has a history of previous hysterectomy. Patient was discharged on Tylenol and oxycodone as needed for pain. Currently she is denying any complaints.   No fever or chills  She is not sure when her next follow-up

## 2023-05-10 ENCOUNTER — TELEPHONE (OUTPATIENT)
Dept: INTERNAL MEDICINE | Age: 38
End: 2023-05-10

## 2023-05-10 DIAGNOSIS — R19.00 PELVIC MASS: ICD-10-CM

## 2023-06-27 ENCOUNTER — TELEPHONE (OUTPATIENT)
Dept: OBGYN | Age: 38
End: 2023-06-27

## 2023-06-27 DIAGNOSIS — R10.2 PELVIC PAIN: Primary | ICD-10-CM

## 2023-06-28 ENCOUNTER — OFFICE VISIT (OUTPATIENT)
Dept: DERMATOLOGY | Age: 38
End: 2023-06-28
Payer: COMMERCIAL

## 2023-06-28 VITALS
BODY MASS INDEX: 25.49 KG/M2 | OXYGEN SATURATION: 99 % | TEMPERATURE: 98.2 F | HEIGHT: 61 IN | WEIGHT: 135 LBS | HEART RATE: 74 BPM | SYSTOLIC BLOOD PRESSURE: 122 MMHG | DIASTOLIC BLOOD PRESSURE: 85 MMHG

## 2023-06-28 DIAGNOSIS — L82.1 DERMATOSIS PAPULOSA NIGRA: ICD-10-CM

## 2023-06-28 DIAGNOSIS — L70.0 ACNE VULGARIS: Primary | ICD-10-CM

## 2023-06-28 PROCEDURE — 1036F TOBACCO NON-USER: CPT | Performed by: DERMATOLOGY

## 2023-06-28 PROCEDURE — G8427 DOCREV CUR MEDS BY ELIG CLIN: HCPCS | Performed by: DERMATOLOGY

## 2023-06-28 PROCEDURE — 99214 OFFICE O/P EST MOD 30 MIN: CPT | Performed by: DERMATOLOGY

## 2023-06-28 PROCEDURE — G8417 CALC BMI ABV UP PARAM F/U: HCPCS | Performed by: DERMATOLOGY

## 2023-06-28 RX ORDER — SPIRONOLACTONE 100 MG/1
100 TABLET, FILM COATED ORAL DAILY
Qty: 30 TABLET | Refills: 3 | Status: CANCELLED | OUTPATIENT
Start: 2023-06-28

## 2023-06-28 RX ORDER — CLINDAMYCIN PHOSPHATE 10 UG/ML
LOTION TOPICAL
Qty: 60 ML | Refills: 3 | Status: SHIPPED | OUTPATIENT
Start: 2023-06-28

## 2023-06-28 RX ORDER — TRETINOIN 1 MG/G
CREAM TOPICAL
Qty: 45 G | Refills: 3 | Status: SHIPPED | OUTPATIENT
Start: 2023-06-28

## 2023-06-28 RX ORDER — SPIRONOLACTONE 50 MG/1
TABLET, FILM COATED ORAL
Qty: 60 TABLET | Refills: 3 | Status: SHIPPED | OUTPATIENT
Start: 2023-06-28

## 2023-06-30 ENCOUNTER — HOSPITAL ENCOUNTER (EMERGENCY)
Age: 38
Discharge: HOME | End: 2023-06-30
Attending: EMERGENCY MEDICINE
Payer: COMMERCIAL

## 2023-06-30 ENCOUNTER — ANCILLARY PROCEDURE (OUTPATIENT)
Dept: OBGYN | Age: 38
End: 2023-06-30
Payer: COMMERCIAL

## 2023-06-30 VITALS
SYSTOLIC BLOOD PRESSURE: 113 MMHG | TEMPERATURE: 98.3 F | BODY MASS INDEX: 25.85 KG/M2 | HEIGHT: 61 IN | OXYGEN SATURATION: 100 % | DIASTOLIC BLOOD PRESSURE: 83 MMHG | RESPIRATION RATE: 18 BRPM | HEART RATE: 68 BPM | WEIGHT: 136.91 LBS

## 2023-06-30 DIAGNOSIS — R10.2 PELVIC PAIN: Primary | ICD-10-CM

## 2023-06-30 DIAGNOSIS — R10.2 PELVIC PAIN: ICD-10-CM

## 2023-06-30 DIAGNOSIS — N30.00 ACUTE CYSTITIS WITHOUT HEMATURIA: ICD-10-CM

## 2023-06-30 DIAGNOSIS — N94.9 ADNEXAL CYST: ICD-10-CM

## 2023-06-30 LAB
BACTERIA URNS QL MICRO: ABNORMAL
BILIRUB UR QL STRIP: NEGATIVE
CASTS #/AREA URNS LPF: ABNORMAL /LPF (ref 0–8)
CLARITY UR: CLEAR
COLOR UR: YELLOW
EPI CELLS #/AREA URNS HPF: ABNORMAL /HPF (ref 0–5)
GLUCOSE UR STRIP-MCNC: NEGATIVE MG/DL
HGB UR QL STRIP.AUTO: NEGATIVE
KETONES UR STRIP-MCNC: NEGATIVE MG/DL
LEUKOCYTE ESTERASE UR QL STRIP: NEGATIVE
NITRITE UR QL STRIP: POSITIVE
PH UR STRIP: 6 [PH] (ref 5–8)
PROT UR STRIP-MCNC: NEGATIVE MG/DL
RBC #/AREA URNS HPF: ABNORMAL /HPF (ref 0–4)
SP GR UR STRIP: 1.03 (ref 1–1.03)
UROBILINOGEN UR STRIP-ACNC: NORMAL
WBC #/AREA URNS HPF: ABNORMAL /HPF (ref 0–5)

## 2023-06-30 PROCEDURE — 99284 EMERGENCY DEPT VISIT MOD MDM: CPT

## 2023-06-30 PROCEDURE — 81001 URINALYSIS AUTO W/SCOPE: CPT

## 2023-06-30 PROCEDURE — 76856 US EXAM PELVIC COMPLETE: CPT | Performed by: RADIOLOGY

## 2023-06-30 PROCEDURE — 6370000000 HC RX 637 (ALT 250 FOR IP): Performed by: EMERGENCY MEDICINE

## 2023-06-30 RX ORDER — ACETAMINOPHEN 500 MG
1000 TABLET ORAL ONCE
Status: COMPLETED | OUTPATIENT
Start: 2023-06-30 | End: 2023-06-30

## 2023-06-30 RX ORDER — CEPHALEXIN 500 MG/1
500 CAPSULE ORAL 2 TIMES DAILY
Qty: 14 CAPSULE | Refills: 0 | Status: SHIPPED | OUTPATIENT
Start: 2023-06-30 | End: 2023-07-07

## 2023-06-30 RX ORDER — OXYCODONE HYDROCHLORIDE 5 MG/1
5 TABLET ORAL EVERY 8 HOURS PRN
Qty: 9 TABLET | Refills: 0 | Status: SHIPPED | OUTPATIENT
Start: 2023-06-30 | End: 2023-07-03

## 2023-06-30 RX ADMIN — ACETAMINOPHEN 1000 MG: 500 TABLET ORAL at 10:26

## 2023-06-30 ASSESSMENT — ENCOUNTER SYMPTOMS
VOMITING: 0
ABDOMINAL PAIN: 0
NAUSEA: 0
DIARRHEA: 0

## 2023-06-30 ASSESSMENT — PAIN - FUNCTIONAL ASSESSMENT: PAIN_FUNCTIONAL_ASSESSMENT: 0-10

## 2023-06-30 ASSESSMENT — PAIN SCALES - GENERAL: PAINLEVEL_OUTOF10: 6

## 2023-07-06 ENCOUNTER — OFFICE VISIT (OUTPATIENT)
Dept: OBGYN | Age: 38
End: 2023-07-06
Payer: COMMERCIAL

## 2023-07-06 ENCOUNTER — TELEPHONE (OUTPATIENT)
Dept: OBGYN | Age: 38
End: 2023-07-06

## 2023-07-06 VITALS
SYSTOLIC BLOOD PRESSURE: 166 MMHG | HEIGHT: 61 IN | BODY MASS INDEX: 26.09 KG/M2 | DIASTOLIC BLOOD PRESSURE: 99 MMHG | WEIGHT: 138.2 LBS | HEART RATE: 68 BPM

## 2023-07-06 DIAGNOSIS — I10 PRIMARY HYPERTENSION: ICD-10-CM

## 2023-07-06 DIAGNOSIS — R10.2 FEMALE PELVIC PAIN: Primary | ICD-10-CM

## 2023-07-06 DIAGNOSIS — N83.201 CYSTS OF BOTH OVARIES: ICD-10-CM

## 2023-07-06 DIAGNOSIS — N83.202 CYSTS OF BOTH OVARIES: ICD-10-CM

## 2023-07-06 PROBLEM — Z90.710 S/P TOTAL ABDOMINAL HYSTERECTOMY: Chronic | Status: ACTIVE | Noted: 2022-03-02

## 2023-07-06 PROBLEM — N93.9 ABNORMAL UTERINE BLEEDING (AUB): Status: RESOLVED | Noted: 2021-05-19 | Resolved: 2023-07-06

## 2023-07-06 PROCEDURE — 99211 OFF/OP EST MAY X REQ PHY/QHP: CPT | Performed by: STUDENT IN AN ORGANIZED HEALTH CARE EDUCATION/TRAINING PROGRAM

## 2023-07-06 RX ORDER — MEDROXYPROGESTERONE ACETATE 150 MG/ML
150 INJECTION, SUSPENSION INTRAMUSCULAR ONCE
Status: DISCONTINUED | OUTPATIENT
Start: 2023-07-06 | End: 2023-07-06

## 2023-07-06 RX ORDER — MEDROXYPROGESTERONE ACETATE 150 MG/ML
150 INJECTION, SUSPENSION INTRAMUSCULAR ONCE
Qty: 1 ML | Refills: 3 | Status: SHIPPED | OUTPATIENT
Start: 2023-07-06 | End: 2023-07-06 | Stop reason: ALTCHOICE

## 2023-07-06 RX ORDER — ACETAMINOPHEN AND CODEINE PHOSPHATE 120; 12 MG/5ML; MG/5ML
1 SOLUTION ORAL DAILY
Qty: 28 TABLET | Refills: 2 | Status: SHIPPED | OUTPATIENT
Start: 2023-07-06

## 2023-07-06 RX ORDER — MEDROXYPROGESTERONE ACETATE 150 MG/ML
150 INJECTION, SUSPENSION INTRAMUSCULAR ONCE
Qty: 1 ML | Refills: 3
Start: 2023-07-06 | End: 2023-07-06 | Stop reason: CLARIF

## 2023-07-06 NOTE — PROGRESS NOTES
Attending Physician Statement  I have discussed the care of [facility], including pertinent history and exam findings,  with the resident. I have seen and examined the patient and the key elements of all parts of the encounter have been performed by me. I agree with the assessment, plan and orders as documented by the resident.  After the patient left the office and upon further review of the US images, it is uncertain whether the cystic structures in the area of the right adnexa are entirely originating from that ovary, recommend MRI to further image that structure and to better determine if this in hole or part be related to a peritoneal cyst. I spoke to the patient by phone and she agrees with the plan to obtain MRI.  (GC Modifier)
02/26/2022    Lower abdominal pain 02/25/2022    S/p Diagnostic laparoscopy, lysis of adhesions, hysteroscopy, dilatation and curettage, Mirena IUD insertion 2/14/22 89/96/4605     Complicated by incidental cystotomy repaired by Dr. Fatuma Mcleod MD Urology via [facility]          Pelvic adhesive disease 02/14/2022    Bilateral nipple discharge 46/26/6089    Folliculitis 95/01/9976    Closed nondisplaced fracture of distal pole of navicular bone of left wrist 10/30/2018    Iron deficiency anemia 07/24/2016    Female pelvic pain 07/18/2016     Pelvic US ordered, appt on 7/21/16: unremarkable         Family history of uterine cancer      PGM        Heterozygous for prothrombin [id number] mutation ([address]) 04/29/2014    Hypertension 05/06/2013       Return in about 2 months (around 9/6/2023) for follow up. Counseling Completed:    Counseled about STD counseling and prevention. Counseled about Hereditary Breast, Ovarian, Colon and Uterine Cancer screening. Tobacco & Secondary smoke risks discussed; with recommendation for cessation and avoidance. Routine health maintenance per patients PCP discussed. Patient was seen with total face to face time of 30 minutes. More than 50% of this visit was on counseling and education regarding her diagnose(s) as listed below and options. She was also counseled on her preventative health maintenance recommendations and follow-up. Diagnosis Orders   1. Female pelvic pain  US PELVIS COMPLETE    norethindrone (ORTHO MICRONOR) 0.35 MG tablet    Ambulatory referral to Physical Therapy    DISCONTINUED: medroxyPROGESTERone (DEPO-PROVERA) 150 MG/ML injection    DISCONTINUED: medroxyPROGESTERone (DEPO-PROVERA) 150 MG/ML injection    DISCONTINUED: medroxyPROGESTERone (DEPO-PROVERA) injection 150 mg      2. Cysts of both ovaries  US PELVIS COMPLETE    DISCONTINUED: medroxyPROGESTERone (DEPO-PROVERA) 150 MG/ML injection      3.  Primary hypertension            Laly Zarco, DO  Ob/Gyn

## 2023-07-06 NOTE — TELEPHONE ENCOUNTER
I discussed plan to add MRI of pelvis to evaluation of pelvic cyst. Pt agrees with plan. Ordered entered.

## 2023-07-07 NOTE — TELEPHONE ENCOUNTER
Spoke to patient and gave her scheduling information and phone number. She will schedule the appointment.

## 2023-07-10 ENCOUNTER — TELEPHONE (OUTPATIENT)
Dept: INTERNAL MEDICINE | Age: 38
End: 2023-07-10

## 2023-07-10 DIAGNOSIS — N83.202 BILATERAL OVARIAN CYSTS: Primary | ICD-10-CM

## 2023-07-10 DIAGNOSIS — N83.201 BILATERAL OVARIAN CYSTS: Primary | ICD-10-CM

## 2023-07-12 ENCOUNTER — TELEPHONE (OUTPATIENT)
Dept: INTERNAL MEDICINE | Age: 38
End: 2023-07-12

## 2023-07-12 DIAGNOSIS — R19.00 PELVIC MASS: ICD-10-CM

## 2023-07-12 NOTE — TELEPHONE ENCOUNTER
I have not evaluated the patient for her handicap before so cannot sign this order.    Please send it to Dr Fercho Morrison

## 2023-07-12 NOTE — TELEPHONE ENCOUNTER
Dr Carlita Hernandez will you print and sign dr Jyotsna Saez patient handicapped placard , patients job has lost it , order pended

## 2023-07-13 NOTE — TELEPHONE ENCOUNTER
Pt found her handicap placard and was notified that it was to have only for two weeks after her surgery. Pt asking for pcp to please review OBGYN notes to allow pt to be written to have handicap placard for longer due to the amount of pain pt is in. Pt was advised that she may need to come in to be re evaluated for the need of placard but pt says she was just in to see OBGYN on 7/6/23.  Please advise

## 2023-07-18 ENCOUNTER — TELEPHONE (OUTPATIENT)
Dept: OBGYN | Age: 38
End: 2023-07-18

## 2023-07-18 DIAGNOSIS — R10.2 PELVIC PAIN: Primary | ICD-10-CM

## 2023-07-18 RX ORDER — LIDOCAINE 4 G/G
1 PATCH TOPICAL DAILY
Qty: 30 PATCH | Refills: 0 | Status: SHIPPED | OUTPATIENT
Start: 2023-07-18 | End: 2023-08-17

## 2023-07-18 NOTE — TELEPHONE ENCOUNTER
Patient called the office stating she is having a lot of pain because of the cysts that are on her ovaries. She states she has been taking the ibuprofen and oxycodone that was prescribed and its not helping.   She said her MRI is scheduled at the end of the month and she wants to know what can she do in the meantime to help with the pain

## 2023-07-19 ENCOUNTER — TELEPHONE (OUTPATIENT)
Dept: INTERNAL MEDICINE | Age: 38
End: 2023-07-19

## 2023-07-19 NOTE — TELEPHONE ENCOUNTER
Pt called stated she needs a new handicap placard due to having cysts. One was given to this patient back in May but patient never took it to the [facility]. Dr. Carlo Aggarwal agreed to write a new one for 30 days but pt called back stating the DMV minimum requirement is 2 months. Dr. Carlo Aggarwal does not feel comfortable writing this and would like pt to follow up with Dr. Markus Benavides when she returns next week. Writer explained all of this to pt and pt was fine with that and plans to also check with her OBGYN office to see if they will do this order if PCP will not. Dr. Markus Benavides - if you agree, please redo handicap placard for 2 mths and patient will  next week when you return to office.

## 2023-07-19 NOTE — TELEPHONE ENCOUNTER
Signed to increase duration to 1 month as per DMV cannot be shorter than 1 month. PCP out of office.

## 2023-07-20 ENCOUNTER — TELEPHONE (OUTPATIENT)
Dept: OBGYN | Age: 38
End: 2023-07-20

## 2023-07-20 DIAGNOSIS — G89.29 CHRONIC PELVIC PAIN IN FEMALE: Primary | ICD-10-CM

## 2023-07-20 DIAGNOSIS — R10.2 CHRONIC PELVIC PAIN IN FEMALE: Primary | ICD-10-CM

## 2023-07-20 NOTE — TELEPHONE ENCOUNTER
Left message for pt to call back    Dr. Rikki Ferguson reviewed patients paperwork of her requesting accommodations for her cyst.  Unfortunately, we are unable to complete these forms.

## 2023-07-20 NOTE — TELEPHONE ENCOUNTER
SBAR report to Dr. Eleazar Peña for her employer for accommodations are completed and will be faxed by Taya today. New RX for Handicap Card printed and signed for patient to  from [employer]. Voice mail left for this patient to call our office back.

## 2023-07-21 NOTE — TELEPHONE ENCOUNTER
Paperwork faxed 7/20/23, confirmation received. Patient was informed that her paperwork is completed and her handicap plaque is ready.   She will be in the office around 1pm to pick this up

## 2023-07-29 ENCOUNTER — HOSPITAL ENCOUNTER (OUTPATIENT)
Dept: MRI IMAGING | Age: 38
End: 2023-07-29
Attending: OBSTETRICS & GYNECOLOGY
Payer: COMMERCIAL

## 2023-07-29 DIAGNOSIS — N83.202 BILATERAL OVARIAN CYSTS: ICD-10-CM

## 2023-07-29 DIAGNOSIS — N83.201 BILATERAL OVARIAN CYSTS: ICD-10-CM

## 2023-07-29 LAB
EGFR, POC: >60 ML/MIN/1.73M2
POC CREATININE: 0.5 MG/DL (ref 0.51–1.19)

## 2023-07-29 PROCEDURE — A9576 INJ PROHANCE MULTIPACK: HCPCS | Performed by: OBSTETRICS & GYNECOLOGY

## 2023-07-29 PROCEDURE — 6360000004 HC RX CONTRAST MEDICATION: Performed by: OBSTETRICS & GYNECOLOGY

## 2023-07-29 PROCEDURE — 72197 MRI PELVIS W/O & W/DYE: CPT

## 2023-07-29 PROCEDURE — 82565 ASSAY OF CREATININE: CPT

## 2023-07-29 RX ORDER — 0.9 % SODIUM CHLORIDE 0.9 %
30 INTRAVENOUS SOLUTION INTRAVENOUS ONCE
Status: DISCONTINUED | OUTPATIENT
Start: 2023-07-29 | End: 2023-08-01 | Stop reason: HOSPADM

## 2023-07-29 RX ADMIN — GADOTERIDOL 12 ML: 279.3 INJECTION, SOLUTION INTRAVENOUS at 09:55

## 2023-07-31 ENCOUNTER — TELEPHONE (OUTPATIENT)
Dept: OBGYN | Age: 38
End: 2023-07-31

## 2023-07-31 NOTE — TELEPHONE ENCOUNTER
Pt completed MRI on 7/29/23. Report is still pending. I call patient to inform her status of report. I will call her tomorrow anticipating MRI report will have been completed. She relates to \"little pain \" around the navel, otherwise she is feeling well.

## 2023-08-02 ENCOUNTER — TELEPHONE (OUTPATIENT)
Dept: OBGYN | Age: 38
End: 2023-08-02

## 2023-08-02 NOTE — TELEPHONE ENCOUNTER
Results of Pelvic MRI W WO contrast performed on 7/29/23 discussed. Cystic area on the left side of the pelvis greatly decreased. Enlargement of right adnexal cyst , 6.3 cm in greatest dimension. 6 week follow up US recommended by radiology. Pt reports episodes of pain lasting about 10-12 minutes. Denies any aggravating factors. There is pain with BMs at times. She is not sure if there is relief with passing gas or BM. Pt is in agreement with plan to monitor cyst with follow up US in 6 weeks.

## 2023-09-07 ENCOUNTER — TELEPHONE (OUTPATIENT)
Dept: OBGYN | Age: 38
End: 2023-09-07

## 2023-09-07 NOTE — TELEPHONE ENCOUNTER
Patient came to the office to  a work excuse for her appointment on 9/8/23. Patient then asked for a note excusing her from work 9/5/23. Patient states she was having pain due to her ovarian cysts. Please advise.

## 2023-09-08 ENCOUNTER — TELEPHONE (OUTPATIENT)
Dept: OBGYN | Age: 38
End: 2023-09-08

## 2023-09-08 DIAGNOSIS — K66.8 PERITONEAL CYST: Primary | ICD-10-CM

## 2023-09-08 NOTE — TELEPHONE ENCOUNTER
I discussed result of follow up pelvic ultrasound that is showing left sided tubular cystic structure. Considering this has been drained by IR in the past, there is history of multiple abdominal pelvic surgeries including JOAQUIN, BS and repair of cystostomies, I am recommending consultation with gyn oncology. Pt agrees with plan. Referral placed to Lisy Marie and Rhonda Swann.

## 2023-09-14 ENCOUNTER — TELEPHONE (OUTPATIENT)
Dept: GYNECOLOGIC ONCOLOGY | Age: 38
End: 2023-09-14

## 2023-09-14 ENCOUNTER — OFFICE VISIT (OUTPATIENT)
Dept: GYNECOLOGIC ONCOLOGY | Age: 38
End: 2023-09-14
Payer: COMMERCIAL

## 2023-09-14 ENCOUNTER — OFFICE VISIT (OUTPATIENT)
Dept: OBGYN | Age: 38
End: 2023-09-14

## 2023-09-14 ENCOUNTER — PREP FOR PROCEDURE (OUTPATIENT)
Dept: GYNECOLOGIC ONCOLOGY | Age: 38
End: 2023-09-14

## 2023-09-14 VITALS
BODY MASS INDEX: 26.81 KG/M2 | DIASTOLIC BLOOD PRESSURE: 101 MMHG | HEIGHT: 61 IN | SYSTOLIC BLOOD PRESSURE: 149 MMHG | WEIGHT: 142 LBS | HEART RATE: 80 BPM | OXYGEN SATURATION: 96 %

## 2023-09-14 VITALS — SYSTOLIC BLOOD PRESSURE: 142 MMHG | DIASTOLIC BLOOD PRESSURE: 96 MMHG | HEART RATE: 67 BPM

## 2023-09-14 DIAGNOSIS — Z98.890 HISTORY OF CYSTOSTOMY: ICD-10-CM

## 2023-09-14 DIAGNOSIS — R19.00 PELVIC MASS: Primary | ICD-10-CM

## 2023-09-14 DIAGNOSIS — Z23 NEED FOR TDAP VACCINATION: ICD-10-CM

## 2023-09-14 DIAGNOSIS — E61.1 IRON DEFICIENCY: ICD-10-CM

## 2023-09-14 DIAGNOSIS — R10.2 FEMALE PELVIC PAIN: ICD-10-CM

## 2023-09-14 DIAGNOSIS — Z01.419 WOMEN'S ANNUAL ROUTINE GYNECOLOGICAL EXAMINATION: Primary | ICD-10-CM

## 2023-09-14 DIAGNOSIS — Z23 NEED FOR HPV VACCINE: ICD-10-CM

## 2023-09-14 DIAGNOSIS — R39.15 URINARY URGENCY: ICD-10-CM

## 2023-09-14 DIAGNOSIS — Z01.818 PRE-OP EVALUATION: Primary | ICD-10-CM

## 2023-09-14 DIAGNOSIS — Z23 INFLUENZA VACCINE NEEDED: ICD-10-CM

## 2023-09-14 DIAGNOSIS — L70.0 ACNE VULGARIS: ICD-10-CM

## 2023-09-14 PROCEDURE — 3074F SYST BP LT 130 MM HG: CPT | Performed by: OBSTETRICS & GYNECOLOGY

## 2023-09-14 PROCEDURE — 99205 OFFICE O/P NEW HI 60 MIN: CPT | Performed by: OBSTETRICS & GYNECOLOGY

## 2023-09-14 PROCEDURE — 3078F DIAST BP <80 MM HG: CPT | Performed by: OBSTETRICS & GYNECOLOGY

## 2023-09-14 PROCEDURE — 99417 PROLNG OP E/M EACH 15 MIN: CPT | Performed by: OBSTETRICS & GYNECOLOGY

## 2023-09-14 RX ORDER — CLINDAMYCIN PHOSPHATE 10 UG/ML
LOTION TOPICAL
Qty: 60 ML | Refills: 3 | Status: SHIPPED | OUTPATIENT
Start: 2023-09-14

## 2023-09-14 RX ORDER — SODIUM CHLORIDE 0.9 % (FLUSH) 0.9 %
5-40 SYRINGE (ML) INJECTION EVERY 12 HOURS SCHEDULED
Status: CANCELLED | OUTPATIENT
Start: 2023-09-14

## 2023-09-14 RX ORDER — ENOXAPARIN SODIUM 100 MG/ML
40 INJECTION SUBCUTANEOUS ONCE
Status: CANCELLED | OUTPATIENT
Start: 2023-09-14 | End: 2023-09-14

## 2023-09-14 RX ORDER — SODIUM CHLORIDE 9 MG/ML
INJECTION, SOLUTION INTRAVENOUS PRN
Status: CANCELLED | OUTPATIENT
Start: 2023-09-14

## 2023-09-14 RX ORDER — SODIUM CHLORIDE 9 MG/ML
INJECTION, SOLUTION INTRAVENOUS CONTINUOUS
Status: CANCELLED | OUTPATIENT
Start: 2023-09-14

## 2023-09-14 RX ORDER — SODIUM CHLORIDE 0.9 % (FLUSH) 0.9 %
5-40 SYRINGE (ML) INJECTION PRN
Status: CANCELLED | OUTPATIENT
Start: 2023-09-14

## 2023-09-14 ASSESSMENT — ENCOUNTER SYMPTOMS
ABDOMINAL PAIN: 1
RESPIRATORY NEGATIVE: 1
NAUSEA: 1
EYES NEGATIVE: 1

## 2023-09-14 NOTE — PROGRESS NOTES
Review of Systems   Constitutional:  Positive for chills. HENT:  Negative. Eyes: Negative. Respiratory: Negative. Cardiovascular: Negative. Gastrointestinal:  Positive for abdominal pain and nausea (once or twice). Endocrine: Negative. Genitourinary:  Positive for pelvic pain. Musculoskeletal: Negative. Skin: Negative. Neurological:  Positive for headaches. Hematological: Negative.
suggest hemorrhagic components  Pelvic Ultrasound (09/08/23). Transabdominal and Transvaginal Views  COMPARISON: 06/30/23, MRI pelvis from 06/29/23  Surgically absent uterus  2.7x2. 7x2.7cm complex right adnexal cystic structure   3.7x3. 2x3.1cm complex left adnexal cystic structure  8.3x2. 1x2.2cm tubular structure in the left adnexa, likely left-sided hydrosalpinx  No evidence of free fluid    Results management note, Dr. Ritchie Hernandez (09/08/23)  Tubular structure seen in the area of the left adnexa. Results discussed with the patient. Since she had aspiration of a similar structure 5/3/23 and is experiencing continuous pain, recommend consult with gyn oncology. 2. Iron Deficiency Anemia (Diagnosed 2022)  DIAGNOSTIC STUDIES:  LAB EVALUATION:  CBC (03/04/22): Hemoglobin 7.4 (L) and Hematocrit 24.8 (L) => Normal Hemoglobin 15.8 (05/05/23)  Ferritin (11/13/13): 5 (L) => 9 (L) (03/01/22)  Iron Panel (11/13/13): Iron 29 (L) and Iron Sat 9 (L) => Iron 13 (L) and Iron Sat 4 (L) (03/01/22)     TREATMENT:  325mg Iron PO daily    HEALTH MAINTENANCE:     She denies to have any history of cervix dysplasia. Last pap test normal. Endocervix component present. HPV negative (01/11/17, a copy of the report has been reviewed). No further testing indicated. Vaccines: Tdap (Not UTD,  due now). Influenza and HPV (Not UTD). COVID-19 (Not UTD). The following laboratory results were reviewed:   CBC (03/04/22): Hemoglobin 7.4 (L) and Hematocrit 24.8 (L) => Normal Hemoglobin 15.8 (05/05/23)  BMP (05/05/23): Normal Creatinine 0.7  TSH (04/28/23): Normal   Ferritin (11/13/13): 5 (L) => 9 (L) (03/01/22)  Iron Panel (11/13/13): Iron 29 (L) and Iron Sat 9 (L) => Iron 13 (L) and Iron Sat 4 (L) (03/01/22)  Hepatitis B SAg (06/28/17 and 05/20/21): Non-reactive  HIV screening (05/02/12, 01/08/15, 06/28/17, 07/26/18 and 05/20/21):  Non-reactive  Hepatitis Panel (Hepatitis B SAg, Hepatitis C Ab, Hepatitis B Core Ab, Hepatitis A IgM) (07/26/18):
pain is 10/10 constant chronic pelvic pain. She has attempted tylenol, ibuprofen and even oxycodone for pain without relief. - Pelvic examination today was extremely limited secondary to patient intolerance and discomfort. Limited bimanual examination did reveal non-mobile midline pelvic mass palpated anterior to sacrum. 2. Urinary Incontinence    - Patient reports symptoms of urinary incontinence with predominantly urge symptoms.    - She states that her symptoms presented in April 2022 after undergoing her RALH, BS, Cytotomy repair ***   - The patient reports today that she has difficulty making it to the restroom on time, with strong sense of urge. - Plan to obtain *** voiding urethrogram to     *** PUT A PLAN ***    *** PUT ORDERS IN AND SIGN ***    *** PUT FOLLOW UP DETAILS ***  *** Return for surgical management at [facility]. The patient will be informed once the date and time of the surgery has been established. [Return to the office in 1 year or sooner for any abnormal symptoms. Return precautions have been reviewed.] ***        Health Maintenance    DISPOSITION:     The patient verbalized understanding of our extensive discussion today and of follow up instructions. All questions were answered to her apparent satisfaction. Therefore, she agreed to proceed as planned. Return for surgical management at [facility]. The patient will be informed once the date and time of the surgery has been established. [Return to the office in 1 year or sooner for any abnormal symptoms. Return precautions have been reviewed.]    We reviewed *** diagnosis and plan of care at length. *** asked numerous good questions. I did my best to answer these to their satisfaction. She and her *** expressed gratitude for the time spent with them. TIME SUMMARY:  Total time spent: *** minutes.   In total, *** minutes included records review, independent interpretation of

## 2023-09-14 NOTE — TELEPHONE ENCOUNTER
LVM on patient phone as per her request.    Surgery Date Provider  [facility] location  Date [email or web address] arrive at 5:30am  OLGA [email or web address] patient aware to be fasting for this appt  Post op [email or web address] with Sandra [place] with antibiotic prep was given to patient in office patient voiced understanding with prep instructions. I advised if patient has any questions to please contact the office.

## 2023-09-14 NOTE — PROGRESS NOTES
Mark Swan Drumore  2023              41 y.o. Chief Complaint   Patient presents with    Results     Patient is here for US results          No LMP recorded (lmp unknown). Patient has had a hysterectomy. Primary Care Physician: Tania Castillo MD    HPI : Eulalio Carrera is a 41 y.o. female [id number]    Patient is here today for her well women annual exam. She was seen and examined. Patient has no chief complaint today. Patient denies any headache, visual changes, difficulty breathing, RUQ pain, N/V, F/C, and pain/swelling in lower extremities. Denies any dysuria, vaginal discharge or changes in her bowels. There are no voiding complaints. The patient has been having an issue with her bilateral pelvic cystic masses that cause pain and discomfort. She saw Dr. Danya Foster today for surgical booking and will be having a robotic assisted laparoscopic surgery to remove her ovaries and any fallopian tubal remnants. The patient's BP is elevated today, she has a history of hypertension and was previously on amlodipine and HCTZ but her PCP stopped these due to noncompliance.  She is supposed to follow up with them for BP management and was encouraged to do so.    ________________________________________________________________________  OB History    Para Term  AB Living   3 3 1 2   1   SAB IAB Ectopic Molar Multiple Live Births             2      # Outcome Date GA Lbr Anastacio/2nd Weight Sex Delivery Anes PTL Lv   3  12 23w4d   M CS-Classical  Y       Birth Comments: breech,    2   25w0d   M CS-LTranv Gen  DEC   1 Term  40w0d   F CS-LTranv EPI  EDY     Past Medical History:   Diagnosis Date    Acne     sees dermatology, last seen 22    Benign essential hypertension antepartum 2013    Closed nondisplaced fracture of distal pole of navicular bone of left wrist 10/30/2018    COVID-19 2021    surgical screen, asymptomatic    Family history of
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
## 2023-09-21 RX ORDER — SODIUM CHLORIDE, SODIUM LACTATE, POTASSIUM CHLORIDE, CALCIUM CHLORIDE 600; 310; 30; 20 MG/100ML; MG/100ML; MG/100ML; MG/100ML
INJECTION, SOLUTION INTRAVENOUS CONTINUOUS
OUTPATIENT
Start: 2023-09-21

## 2023-09-21 NOTE — DISCHARGE INSTRUCTIONS
Pre-operative Instructions    Please arrive at the surgery center by 5:45 AM on 10/10/2023  (or as directed by your surgeon's office). See Directons to Surgery Center below. FASTING    NOTHING TO EAT OR DRINK AFTER MIDNIGHT the night prior to surgery (This includes gum, candy, mints, chewing tobacco, etc). MEDICATIONS    What to STOP: ANY BLOOD THINNING MEDICATION(S) as directed by your surgeon or prescribing physician. FAILURE TO STOP CERTAIN MEDICATIONS MAY INTERFERE WITH YOUR SCHEDULED SURGERY. According to the medication list you provided today, PLEASE STOP: ibuprofen (Motrin)    2. What to CONTINUE leading up to your surgery:   Please take all your other daily medications except the medications listed above that you were instructed to hold. 3. What to TAKE MORNING OF SURGERY with SMALL SIP OF WATER:                        IF APPLICABLE:  -If you have been given a blood band, you must bring it with you the day of surgery, unclasped.  -Use routine inhalers and bring inhalers the day of surgery.   -Bring C-Pap/Bi-pap with you morning of surgery if planning on staying in the hospital overnight.  -Do not take diabetic medications on the day of surgery.  -Any weekly antidiabetic injections must be stopped one week prior to surgery and plan discussed with prescribing provider. OTHER IMPORTANT REMINDERS    1) You may be required to provide a urine sample upon your arrival to the pre-op area, so please take this into consideration. 2) If  NOT planning on staying in the hospital overnight : A. You will need an adult family member /friend to drive you home after your procedure.  Taxi cabs or any form of public transportation ALONE is not acceptable.   -Your  must be 25years of age or older and able to sign off on your discharge instructions.     -It is preferable that the friend or family member stay at the hospital throughout your

## 2023-09-25 ENCOUNTER — TELEPHONE (OUTPATIENT)
Dept: GYNECOLOGIC ONCOLOGY | Age: 38
End: 2023-09-25

## 2023-09-25 ENCOUNTER — HOSPITAL ENCOUNTER (OUTPATIENT)
Dept: PREADMISSION TESTING | Age: 38
Discharge: HOME | End: 2023-09-29
Payer: COMMERCIAL

## 2023-09-25 VITALS
BODY MASS INDEX: 26.24 KG/M2 | SYSTOLIC BLOOD PRESSURE: 159 MMHG | DIASTOLIC BLOOD PRESSURE: 107 MMHG | HEIGHT: 61 IN | OXYGEN SATURATION: 100 % | WEIGHT: 139 LBS | RESPIRATION RATE: 18 BRPM | TEMPERATURE: 97.4 F | HEART RATE: 70 BPM

## 2023-09-25 DIAGNOSIS — I10 PRIMARY HYPERTENSION: ICD-10-CM

## 2023-09-25 DIAGNOSIS — Z01.818 PRE-OP EVALUATION: ICD-10-CM

## 2023-09-25 LAB
ABO + RH BLD: NORMAL
ALBUMIN SERPL-MCNC: 4.2 G/DL (ref 3.5–5.2)
ALBUMIN/GLOB SERPL: 1.3 {RATIO} (ref 1–2.5)
ALP SERPL-CCNC: 43 U/L (ref 35–104)
ALT SERPL-CCNC: 11 U/L (ref 5–33)
ANION GAP SERPL CALCULATED.3IONS-SCNC: 8 MMOL/L (ref 9–17)
ARM BAND NUMBER: NORMAL
AST SERPL-CCNC: 19 U/L
BASOPHILS # BLD: <0.03 K/UL (ref 0–0.2)
BASOPHILS NFR BLD: 0 % (ref 0–2)
BILIRUB SERPL-MCNC: 0.8 MG/DL (ref 0.3–1.2)
BLOOD BANK SAMPLE EXPIRATION: NORMAL
BLOOD GROUP ANTIBODIES SERPL: NEGATIVE
BUN SERPL-MCNC: 12 MG/DL (ref 6–20)
CALCIUM SERPL-MCNC: 9.1 MG/DL (ref 8.6–10.4)
CHLORIDE SERPL-SCNC: 101 MMOL/L (ref 98–107)
CHOLEST SERPL-MCNC: 159 MG/DL
CHOLESTEROL/HDL RATIO: 2.2
CO2 SERPL-SCNC: 27 MMOL/L (ref 20–31)
CREAT SERPL-MCNC: 0.7 MG/DL (ref 0.5–0.9)
EOSINOPHIL # BLD: <0.03 K/UL (ref 0–0.44)
EOSINOPHILS RELATIVE PERCENT: 0 % (ref 1–4)
ERYTHROCYTE [DISTWIDTH] IN BLOOD BY AUTOMATED COUNT: 11.5 % (ref 11.8–14.4)
EST. AVERAGE GLUCOSE BLD GHB EST-MCNC: 91 MG/DL
GFR SERPL CREATININE-BSD FRML MDRD: >60 ML/MIN/1.73M2
GLUCOSE SERPL-MCNC: 85 MG/DL (ref 70–99)
HBA1C MFR BLD: 4.8 % (ref 4–6)
HCT VFR BLD AUTO: 46.6 % (ref 36.3–47.1)
HDLC SERPL-MCNC: 73 MG/DL
HGB BLD-MCNC: 15.7 G/DL (ref 11.9–15.1)
IMM GRANULOCYTES # BLD AUTO: <0.03 K/UL (ref 0–0.3)
IMM GRANULOCYTES NFR BLD: 0 %
LDLC SERPL CALC-MCNC: 74 MG/DL (ref 0–130)
LYMPHOCYTES NFR BLD: 1.05 K/UL (ref 1.1–3.7)
LYMPHOCYTES RELATIVE PERCENT: 30 % (ref 24–43)
MCH RBC QN AUTO: 31.6 PG (ref 25.2–33.5)
MCHC RBC AUTO-ENTMCNC: 33.7 G/DL (ref 28.4–34.8)
MCV RBC AUTO: 93.8 FL (ref 82.6–102.9)
MONOCYTES NFR BLD: 0.24 K/UL (ref 0.1–1.2)
MONOCYTES NFR BLD: 7 % (ref 3–12)
NEUTROPHILS NFR BLD: 63 % (ref 36–65)
NEUTS SEG NFR BLD: 2.23 K/UL (ref 1.5–8.1)
NRBC BLD-RTO: 0 PER 100 WBC
PLATELET # BLD AUTO: 252 K/UL (ref 138–453)
PMV BLD AUTO: 9.7 FL (ref 8.1–13.5)
POTASSIUM SERPL-SCNC: 3.8 MMOL/L (ref 3.7–5.3)
PROT SERPL-MCNC: 7.5 G/DL (ref 6.4–8.3)
RBC # BLD AUTO: 4.97 M/UL (ref 3.95–5.11)
SODIUM SERPL-SCNC: 136 MMOL/L (ref 135–144)
TRIGL SERPL-MCNC: 59 MG/DL
WBC OTHER # BLD: 3.6 K/UL (ref 3.5–11.3)

## 2023-09-25 PROCEDURE — 80053 COMPREHEN METABOLIC PANEL: CPT

## 2023-09-25 PROCEDURE — 86901 BLOOD TYPING SEROLOGIC RH(D): CPT

## 2023-09-25 PROCEDURE — 85025 COMPLETE CBC W/AUTO DIFF WBC: CPT

## 2023-09-25 PROCEDURE — 86900 BLOOD TYPING SEROLOGIC ABO: CPT

## 2023-09-25 PROCEDURE — 36415 COLL VENOUS BLD VENIPUNCTURE: CPT

## 2023-09-25 PROCEDURE — 80061 LIPID PANEL: CPT

## 2023-09-25 PROCEDURE — 86850 RBC ANTIBODY SCREEN: CPT

## 2023-09-25 PROCEDURE — 83036 HEMOGLOBIN GLYCOSYLATED A1C: CPT

## 2023-09-25 RX ORDER — HYDROCHLOROTHIAZIDE 25 MG/1
25 TABLET ORAL EVERY MORNING
Qty: 30 TABLET | Refills: 0 | OUTPATIENT
Start: 2023-09-25

## 2023-09-25 ASSESSMENT — PAIN DESCRIPTION - PAIN TYPE: TYPE: ACUTE PAIN

## 2023-09-25 ASSESSMENT — PAIN DESCRIPTION - FREQUENCY: FREQUENCY: CONTINUOUS

## 2023-09-25 ASSESSMENT — PAIN DESCRIPTION - ONSET: ONSET: ON-GOING

## 2023-09-25 ASSESSMENT — PAIN SCALES - GENERAL: PAINLEVEL_OUTOF10: 7

## 2023-09-25 ASSESSMENT — PAIN DESCRIPTION - LOCATION: LOCATION: ABDOMEN

## 2023-09-25 ASSESSMENT — PAIN DESCRIPTION - DESCRIPTORS: DESCRIPTORS: OTHER (COMMENT)

## 2023-09-25 NOTE — PROGRESS NOTES
Anesthesia Focused Assessment    Hx of anesthesia complications:  no  Family hx of anesthesia complications:  no      Tested positive for Covid-19 in last 8 weeks: no      STOP-BANG Sleep Apnea Questionnaire    SNORE loudly (heard through closed doors)? No  TIRED, fatigued, sleepy during daytime? No  OBSERVED stopping breathing during sleep? No  High blood PRESSURE or being treated? Yes    BMI over 35? No  AGE over 48? No  NECK circumference over 16\"? No  GENDER (male)? No             Total 1  High risk 5-8  Intermediate risk 3-4  Low risk 0-2    ----------------------------------------------------------------------------------------------------------------------  MAC                              No  If yes, machine? DM1                                            No  DM2                   No    Coronary Artery Disease      No  HTN         Yes, no meds  Defib/AICD/Pacemaker               No             Renal Failure                   No  If yes, on dialysis           Active smoker? No  Drinks alcohol? Yes, occasionally  Illicit drugs?         Yes, THC occasionally  Dentition?        benign      Past Medical History:   Diagnosis Date    Acne     sees dermatology, last seen 1/31/22    Benign essential hypertension antepartum 05/06/2013    Closed nondisplaced fracture of distal pole of navicular bone of left wrist 10/30/2018    COVID-19 12/30/2021    surgical screen, asymptomatic    COVID-19 vaccine series not administered     Cyst of ovary     Family history of uterine cancer     PGM    Iron deficiency anemia 07/24/2016    Migraine     Mirena IUD inserted 2/14/22 02/14/2022    Lot #: [id number] Exp: 03/2024    Prothrombin gene mutation ([address])     managed per PCP    S/p Diagnostic laparoscopy, hysis of adhesions, hysteroscopy, dilatation and curettage, Mirena IUD insertion 78/96/9598    Complicated by incidental cystotomy repaired by Dr. Arianna Porter MD Urology via Robot

## 2023-09-25 NOTE — TELEPHONE ENCOUNTER
Received call from PAT dept stating patient will need medical clearance for surgery. They will send clearance request to PCP.

## 2023-09-25 NOTE — TELEPHONE ENCOUNTER
Pt went in for PAT today, BP elevated. Pt scheduled for same day appt for this Wednesday to address, requesting refill of BP medication that was stopped in May. Review of flowsheets shows pt's BP has been subsequently high on several occasions. Short supply of HCTZ pended, pt coming in for same day appt to address further.

## 2023-09-27 ENCOUNTER — OFFICE VISIT (OUTPATIENT)
Dept: INTERNAL MEDICINE | Age: 38
End: 2023-09-27

## 2023-09-27 ENCOUNTER — TELEPHONE (OUTPATIENT)
Dept: GYNECOLOGIC ONCOLOGY | Age: 38
End: 2023-09-27

## 2023-09-27 VITALS
HEART RATE: 75 BPM | WEIGHT: 142 LBS | SYSTOLIC BLOOD PRESSURE: 153 MMHG | DIASTOLIC BLOOD PRESSURE: 92 MMHG | TEMPERATURE: 98.2 F | BODY MASS INDEX: 26.83 KG/M2

## 2023-09-27 DIAGNOSIS — N94.9 ADNEXAL CYST: ICD-10-CM

## 2023-09-27 DIAGNOSIS — R19.00 PELVIC MASS: Primary | ICD-10-CM

## 2023-09-27 DIAGNOSIS — R10.2 FEMALE PELVIC PAIN: ICD-10-CM

## 2023-09-27 DIAGNOSIS — Z98.890 HISTORY OF CYSTOSTOMY: ICD-10-CM

## 2023-09-27 DIAGNOSIS — Z01.818 PREOPERATIVE CLEARANCE: ICD-10-CM

## 2023-09-27 DIAGNOSIS — I10 HYPERTENSION, UNSPECIFIED TYPE: Primary | Chronic | ICD-10-CM

## 2023-09-27 DIAGNOSIS — R39.15 URINARY URGENCY: ICD-10-CM

## 2023-09-27 RX ORDER — HYDROCHLOROTHIAZIDE 25 MG/1
25 TABLET ORAL EVERY MORNING
Qty: 90 TABLET | Refills: 1 | Status: SHIPPED | OUTPATIENT
Start: 2023-09-27

## 2023-09-27 NOTE — PATIENT INSTRUCTIONS
Please take your hydrochlorothiazide as advised  Check you blood pressure regularly at home  Proceed with surgery with low risk for cardiac and non cardiac complications

## 2023-09-27 NOTE — TELEPHONE ENCOUNTER
[address] [facility], [facility] [address], [address]  [facility] [address]    I have called the patient today to discuss the importance of completing the CT-urogram prior to the procedure scheduled for 10/10/23. I have also enforced the importance of compliance with the PCP optimization of the Hypertension. I have stressed the importance of compliance with the anti-hypertensive medications that have been prescribed.     Yodit Gallego MD  Gynecologic Oncology

## 2023-09-30 LAB
EKG ATRIAL RATE: 60 BPM
EKG P AXIS: 23 DEGREES
EKG P-R INTERVAL: 180 MS
EKG Q-T INTERVAL: 400 MS
EKG QRS DURATION: 80 MS
EKG QTC CALCULATION (BAZETT): 400 MS
EKG R AXIS: 51 DEGREES
EKG T AXIS: 32 DEGREES
EKG VENTRICULAR RATE: 60 BPM

## 2023-10-04 ENCOUNTER — HOSPITAL ENCOUNTER (OUTPATIENT)
Dept: CT IMAGING | Age: 38
Discharge: HOME | End: 2023-10-06
Attending: OBSTETRICS & GYNECOLOGY
Payer: COMMERCIAL

## 2023-10-04 DIAGNOSIS — R10.2 FEMALE PELVIC PAIN: ICD-10-CM

## 2023-10-04 DIAGNOSIS — Z98.890 HISTORY OF CYSTOSTOMY: ICD-10-CM

## 2023-10-04 DIAGNOSIS — R19.00 PELVIC MASS: ICD-10-CM

## 2023-10-04 DIAGNOSIS — R39.15 URINARY URGENCY: ICD-10-CM

## 2023-10-04 PROCEDURE — 74178 CT ABD&PLV WO CNTR FLWD CNTR: CPT | Performed by: OBSTETRICS & GYNECOLOGY

## 2023-10-04 PROCEDURE — 6360000004 HC RX CONTRAST MEDICATION: Performed by: OBSTETRICS & GYNECOLOGY

## 2023-10-04 RX ADMIN — IOPAMIDOL 130 ML: 755 INJECTION, SOLUTION INTRAVENOUS at 09:55

## 2023-10-05 ENCOUNTER — TELEPHONE (OUTPATIENT)
Dept: GYNECOLOGIC ONCOLOGY | Age: 38
End: 2023-10-05

## 2023-10-09 ENCOUNTER — TELEPHONE (OUTPATIENT)
Dept: GYNECOLOGIC ONCOLOGY | Age: 38
End: 2023-10-09

## 2023-10-09 ENCOUNTER — ANESTHESIA EVENT (OUTPATIENT)
Dept: OPERATING ROOM | Age: 38
End: 2023-10-09
Payer: COMMERCIAL

## 2023-10-09 RX ORDER — NEOMYCIN SULFATE 500 MG/1
TABLET ORAL
Qty: 6 TABLET | Refills: 0 | Status: SHIPPED | OUTPATIENT
Start: 2023-10-09

## 2023-10-09 RX ORDER — METRONIDAZOLE 500 MG/1
TABLET ORAL
Qty: 4 TABLET | Refills: 0 | Status: SHIPPED | OUTPATIENT
Start: 2023-10-09

## 2023-10-09 NOTE — TELEPHONE ENCOUNTER
Called patient and reviewed instructions over the phone. Will send my chart message with instructions. Asked that patient read instructions and call office with any questions. Medications were not sent to pharmacy. She would like medications sent to [facility] pharmacy on [address]. She voiced understanding.

## 2023-10-09 NOTE — H&P
GYN/Onc Pre-Op H&P  [address]    Patient Name: Lola Parham     Patient : 1985  Room/Bed: [facility] OR [facility]/NONE  Admission Date/Time: 10/10/2023  5:28 AM  Primary Care Physician: Raul Angeles MD  MRN: [id number]    Date: 10/10/2023  Time: 7:00 AM    The patient was seen in pre-op holding. She is here for [facility] Robotic Laparoscopic Resection of Pelvic Mass, possible Oophorectomy, Possible Staging, any other indicated procedure. Please refer to Progress Note dated 23 by Dr. Ming Marquis for further details. There have been no changes to health history or medications since this visit. The procedure risks and complications were reviewed. The labs, Consent, and H&P were reviewed and updated. The patient was counseled on the possibility of  the need of a second surgery. The patient voiced understanding and had all of her questions answered. The possibility of incomplete removal of abnormal tissue was discussed.       ALLERGIES:  Allergies as of 2023 - Fully Reviewed 2023   Allergen Reaction Noted    Seasonal Other (See Comments) 2022    Depo-provera [medroxyprogesterone acetate] Rash 2023       MEDICATIONS:  Current Facility-Administered Medications   Medication Dose Route Frequency Provider Last Rate Last Admin    0.9 % sodium chloride infusion   IntraVENous Continuous Chana Dietrich PA-C        sodium chloride flush 0.9 % injection 5-40 mL  5-40 mL IntraVENous 2 times per day Casper Rome PA-C        sodium chloride flush 0.9 % injection 5-40 mL  5-40 mL IntraVENous PRN Chana Dietrich PA-C        0.9 % sodium chloride infusion   IntraVENous PRN Chana Dietrich PA-C        ceFAZolin (ANCEF) 2000 mg in sterile water 20 mL IV syringe  2,000 mg IntraVENous On Call to 100 Chana Vu PA-C        lactated ringers IV soln infusion   IntraVENous Continuous Mayela Torrez MD 50 mL/hr at 10/10/23 0636 New Bag at 10/10/23 0636         VITALS:  Vitals: result. Cholesterol, Fasting 09/25/2023 159  <200 mg/dL Final    Comment:    Cholesterol Guidelines:      <200  Desirable   200-240  Borderline      >240  Undesirable         HDL 09/25/2023 73  >40 mg/dL Final    Comment:    HDL Guidelines:    <40     Undesirable   40-59    Borderline    >59     Desirable         LDL Cholesterol 09/25/2023 74  0 - 130 mg/dL Final    Comment:    LDL Guidelines:     <100    Desirable   100-129   Near to/above Desirable   130-159   Borderline      >159   Undesirable     Direct (measured) LDL and calculated LDL are not interchangeable tests. Chol/HDL Ratio 09/25/2023 2.2  <5 Final            Triglyceride, Fasting 09/25/2023 59  <150 mg/dL Final    Comment:    Triglyceride Guidelines:     <150   Desirable   150-199  Borderline   200-499  High     >499   Very high   Based on AHA Guidelines for fasting triglyceride, October 2012. DIAGNOSTICS:    CT UROGRAM    Result Date: 10/8/2023  EXAMINATION: CT UROGRAM 10/4/2023 9:31 am TECHNIQUE: CT of the abdomen and pelvis was performed before and after the administration of intravenous contrast as per CT urogram protocol. Multiplanar reformatted images as well as MIP urogram images are provided for review. Dose modulation, iterative reconstruction, and/or weight based adjustment of the mA/kV was utilized to reduce the radiation dose to as low as reasonably achievable. COMPARISON: 05/03/2023. HISTORY: ORDERING SYSTEM PROVIDED HISTORY: Pelvic mass TECHNOLOGIST PROVIDED HISTORY: pelvic pain, urinary urgency, history of cystostomy, pelvis mass FINDINGS: Lower Chest: The lung bases are clear. Kidneys and Urinary Tract: The kidneys demonstrate no calcifications. No ureteral or bladder calculi are seen. There is symmetric enhancement of the kidneys. No abnormal enhancing masses are seen. No hydronephrosis is seen. The visualized ureters appear unremarkable in course and caliber. No bladder masses are seen. Organs:  The liver,

## 2023-10-09 NOTE — TELEPHONE ENCOUNTER
Pt called asking if there are specific instructions for her surgery tomorrow. She doesn't remember if there is any bowel prep. Please advise pt.

## 2023-10-10 ENCOUNTER — ANESTHESIA (OUTPATIENT)
Dept: OPERATING ROOM | Age: 38
End: 2023-10-10
Payer: COMMERCIAL

## 2023-10-10 ENCOUNTER — HOSPITAL ENCOUNTER (INPATIENT)
Age: 38
LOS: 4 days | Discharge: HOME | End: 2023-10-14
Attending: OBSTETRICS & GYNECOLOGY | Admitting: OBSTETRICS & GYNECOLOGY
Payer: COMMERCIAL

## 2023-10-10 DIAGNOSIS — R10.2 PELVIC PAIN: ICD-10-CM

## 2023-10-10 DIAGNOSIS — R19.00 PELVIC MASS: ICD-10-CM

## 2023-10-10 DIAGNOSIS — G89.18 POSTOPERATIVE PAIN: Primary | ICD-10-CM

## 2023-10-10 PROBLEM — N83.209 OVARIAN CYST: Status: ACTIVE | Noted: 2023-10-10

## 2023-10-10 PROBLEM — Z98.890 POSTOPERATIVE STATE: Status: ACTIVE | Noted: 2023-10-10

## 2023-10-10 LAB
CASE NUMBER:: NORMAL
POTASSIUM BLD-SCNC: 3.6 MMOL/L (ref 3.5–4.5)
SPECIMEN DESCRIPTION: NORMAL

## 2023-10-10 PROCEDURE — 6360000002 HC RX W HCPCS: Performed by: PHYSICIAN ASSISTANT

## 2023-10-10 PROCEDURE — 0TJB8ZZ INSPECTION OF BLADDER, VIA NATURAL OR ARTIFICIAL OPENING ENDOSCOPIC: ICD-10-PCS | Performed by: OBSTETRICS & GYNECOLOGY

## 2023-10-10 PROCEDURE — 3600000009 HC SURGERY ROBOT BASE: Performed by: OBSTETRICS & GYNECOLOGY

## 2023-10-10 PROCEDURE — 0DNW4ZZ RELEASE PERITONEUM, PERCUTANEOUS ENDOSCOPIC APPROACH: ICD-10-PCS | Performed by: OBSTETRICS & GYNECOLOGY

## 2023-10-10 PROCEDURE — 7100000001 HC PACU RECOVERY - ADDTL 15 MIN: Performed by: OBSTETRICS & GYNECOLOGY

## 2023-10-10 PROCEDURE — 6360000002 HC RX W HCPCS

## 2023-10-10 PROCEDURE — 7100000000 HC PACU RECOVERY - FIRST 15 MIN: Performed by: OBSTETRICS & GYNECOLOGY

## 2023-10-10 PROCEDURE — 2580000003 HC RX 258: Performed by: OBSTETRICS & GYNECOLOGY

## 2023-10-10 PROCEDURE — 1200000000 HC SEMI PRIVATE

## 2023-10-10 PROCEDURE — 2500000003 HC RX 250 WO HCPCS

## 2023-10-10 PROCEDURE — 6360000002 HC RX W HCPCS: Performed by: ANESTHESIOLOGY

## 2023-10-10 PROCEDURE — C9290 INJ, BUPIVACAINE LIPOSOME: HCPCS | Performed by: OBSTETRICS & GYNECOLOGY

## 2023-10-10 PROCEDURE — 6370000000 HC RX 637 (ALT 250 FOR IP): Performed by: OBSTETRICS & GYNECOLOGY

## 2023-10-10 PROCEDURE — 3700000001 HC ADD 15 MINUTES (ANESTHESIA): Performed by: OBSTETRICS & GYNECOLOGY

## 2023-10-10 PROCEDURE — 2580000003 HC RX 258: Performed by: STUDENT IN AN ORGANIZED HEALTH CARE EDUCATION/TRAINING PROGRAM

## 2023-10-10 PROCEDURE — 6360000002 HC RX W HCPCS: Performed by: OBSTETRICS & GYNECOLOGY

## 2023-10-10 PROCEDURE — 2580000003 HC RX 258

## 2023-10-10 PROCEDURE — 84132 ASSAY OF SERUM POTASSIUM: CPT

## 2023-10-10 PROCEDURE — 3600000019 HC SURGERY ROBOT ADDTL 15MIN: Performed by: OBSTETRICS & GYNECOLOGY

## 2023-10-10 PROCEDURE — 6360000002 HC RX W HCPCS: Performed by: STUDENT IN AN ORGANIZED HEALTH CARE EDUCATION/TRAINING PROGRAM

## 2023-10-10 PROCEDURE — 0W9J4ZZ DRAINAGE OF PELVIC CAVITY, PERCUTANEOUS ENDOSCOPIC APPROACH: ICD-10-PCS | Performed by: OBSTETRICS & GYNECOLOGY

## 2023-10-10 PROCEDURE — 88112 CYTOPATH CELL ENHANCE TECH: CPT

## 2023-10-10 PROCEDURE — 2709999900 HC NON-CHARGEABLE SUPPLY: Performed by: OBSTETRICS & GYNECOLOGY

## 2023-10-10 PROCEDURE — 6370000000 HC RX 637 (ALT 250 FOR IP): Performed by: STUDENT IN AN ORGANIZED HEALTH CARE EDUCATION/TRAINING PROGRAM

## 2023-10-10 PROCEDURE — 88305 TISSUE EXAM BY PATHOLOGIST: CPT

## 2023-10-10 PROCEDURE — 3700000000 HC ANESTHESIA ATTENDED CARE: Performed by: OBSTETRICS & GYNECOLOGY

## 2023-10-10 PROCEDURE — S2900 ROBOTIC SURGICAL SYSTEM: HCPCS | Performed by: OBSTETRICS & GYNECOLOGY

## 2023-10-10 PROCEDURE — 2720000010 HC SURG SUPPLY STERILE: Performed by: OBSTETRICS & GYNECOLOGY

## 2023-10-10 PROCEDURE — 2580000003 HC RX 258: Performed by: ANESTHESIOLOGY

## 2023-10-10 PROCEDURE — 8E0W4CZ ROBOTIC ASSISTED PROCEDURE OF TRUNK REGION, PERCUTANEOUS ENDOSCOPIC APPROACH: ICD-10-PCS | Performed by: OBSTETRICS & GYNECOLOGY

## 2023-10-10 RX ORDER — ROCURONIUM BROMIDE 10 MG/ML
INJECTION, SOLUTION INTRAVENOUS PRN
Status: DISCONTINUED | OUTPATIENT
Start: 2023-10-10 | End: 2023-10-10 | Stop reason: SDUPTHER

## 2023-10-10 RX ORDER — SODIUM CHLORIDE 9 MG/ML
INJECTION, SOLUTION INTRAVENOUS CONTINUOUS
Status: DISCONTINUED | OUTPATIENT
Start: 2023-10-10 | End: 2023-10-13 | Stop reason: ALTCHOICE

## 2023-10-10 RX ORDER — HYDROCHLOROTHIAZIDE 25 MG/1
25 TABLET ORAL EVERY MORNING
Status: DISCONTINUED | OUTPATIENT
Start: 2023-10-11 | End: 2023-10-14 | Stop reason: HOSPADM

## 2023-10-10 RX ORDER — FAMOTIDINE 20 MG/1
20 TABLET, FILM COATED ORAL 2 TIMES DAILY
Status: DISCONTINUED | OUTPATIENT
Start: 2023-10-10 | End: 2023-10-14 | Stop reason: HOSPADM

## 2023-10-10 RX ORDER — UREA 10 %
3 LOTION (ML) TOPICAL NIGHTLY PRN
Status: DISCONTINUED | OUTPATIENT
Start: 2023-10-10 | End: 2023-10-14 | Stop reason: HOSPADM

## 2023-10-10 RX ORDER — CELECOXIB 100 MG/1
200 CAPSULE ORAL 2 TIMES DAILY
Status: DISCONTINUED | OUTPATIENT
Start: 2023-10-11 | End: 2023-10-12

## 2023-10-10 RX ORDER — ULTRASOUND COUPLING MEDIUM
GEL (GRAM) TOPICAL PRN
Status: DISCONTINUED | OUTPATIENT
Start: 2023-10-10 | End: 2023-10-10 | Stop reason: HOSPADM

## 2023-10-10 RX ORDER — SODIUM CHLORIDE 9 MG/ML
INJECTION, SOLUTION INTRAVENOUS PRN
Status: DISCONTINUED | OUTPATIENT
Start: 2023-10-10 | End: 2023-10-10 | Stop reason: HOSPADM

## 2023-10-10 RX ORDER — FENTANYL CITRATE 50 UG/ML
INJECTION, SOLUTION INTRAMUSCULAR; INTRAVENOUS PRN
Status: DISCONTINUED | OUTPATIENT
Start: 2023-10-10 | End: 2023-10-10 | Stop reason: SDUPTHER

## 2023-10-10 RX ORDER — CELECOXIB 200 MG/1
200 CAPSULE ORAL 2 TIMES DAILY PRN
Qty: 60 CAPSULE | Refills: 3 | Status: SHIPPED | OUTPATIENT
Start: 2023-10-10 | End: 2023-10-10 | Stop reason: HOSPADM

## 2023-10-10 RX ORDER — SENNOSIDES 8.6 MG
2 TABLET ORAL 2 TIMES DAILY
Qty: 120 TABLET | Refills: 1 | Status: SHIPPED | OUTPATIENT
Start: 2023-10-10

## 2023-10-10 RX ORDER — IBUPROFEN 800 MG/1
800 TABLET ORAL EVERY 8 HOURS PRN
Qty: 60 TABLET | Refills: 1 | Status: SHIPPED | OUTPATIENT
Start: 2023-10-10

## 2023-10-10 RX ORDER — SODIUM CHLORIDE 0.9 % (FLUSH) 0.9 %
5-40 SYRINGE (ML) INJECTION EVERY 12 HOURS SCHEDULED
Status: DISCONTINUED | OUTPATIENT
Start: 2023-10-10 | End: 2023-10-10 | Stop reason: HOSPADM

## 2023-10-10 RX ORDER — SODIUM CHLORIDE 0.9 % (FLUSH) 0.9 %
5-40 SYRINGE (ML) INJECTION PRN
Status: DISCONTINUED | OUTPATIENT
Start: 2023-10-10 | End: 2023-10-10 | Stop reason: HOSPADM

## 2023-10-10 RX ORDER — MIDAZOLAM HYDROCHLORIDE 1 MG/ML
INJECTION INTRAMUSCULAR; INTRAVENOUS PRN
Status: DISCONTINUED | OUTPATIENT
Start: 2023-10-10 | End: 2023-10-10 | Stop reason: SDUPTHER

## 2023-10-10 RX ORDER — PROCHLORPERAZINE EDISYLATE 5 MG/ML
10 INJECTION INTRAMUSCULAR; INTRAVENOUS EVERY 6 HOURS PRN
Status: DISCONTINUED | OUTPATIENT
Start: 2023-10-10 | End: 2023-10-14 | Stop reason: HOSPADM

## 2023-10-10 RX ORDER — FENTANYL CITRATE 50 UG/ML
25 INJECTION, SOLUTION INTRAMUSCULAR; INTRAVENOUS EVERY 5 MIN PRN
Status: DISCONTINUED | OUTPATIENT
Start: 2023-10-10 | End: 2023-10-10 | Stop reason: HOSPADM

## 2023-10-10 RX ORDER — DIPHENHYDRAMINE HCL 25 MG
25 TABLET ORAL EVERY 6 HOURS PRN
Status: DISCONTINUED | OUTPATIENT
Start: 2023-10-10 | End: 2023-10-14 | Stop reason: HOSPADM

## 2023-10-10 RX ORDER — METRONIDAZOLE 500 MG/100ML
INJECTION, SOLUTION INTRAVENOUS PRN
Status: DISCONTINUED | OUTPATIENT
Start: 2023-10-10 | End: 2023-10-10 | Stop reason: SDUPTHER

## 2023-10-10 RX ORDER — BUPIVACAINE HYDROCHLORIDE 2.5 MG/ML
INJECTION, SOLUTION INFILTRATION; PERINEURAL PRN
Status: DISCONTINUED | OUTPATIENT
Start: 2023-10-10 | End: 2023-10-10 | Stop reason: HOSPADM

## 2023-10-10 RX ORDER — TRIAMCINOLONE ACETONIDE 40 MG/ML
INJECTION, SUSPENSION INTRA-ARTICULAR; INTRAMUSCULAR PRN
Status: DISCONTINUED | OUTPATIENT
Start: 2023-10-10 | End: 2023-10-10 | Stop reason: HOSPADM

## 2023-10-10 RX ORDER — SODIUM CHLORIDE 9 MG/ML
INJECTION, SOLUTION INTRAVENOUS CONTINUOUS
Status: DISCONTINUED | OUTPATIENT
Start: 2023-10-10 | End: 2023-10-10 | Stop reason: HOSPADM

## 2023-10-10 RX ORDER — SODIUM CHLORIDE, SODIUM LACTATE, POTASSIUM CHLORIDE, CALCIUM CHLORIDE 600; 310; 30; 20 MG/100ML; MG/100ML; MG/100ML; MG/100ML
INJECTION, SOLUTION INTRAVENOUS CONTINUOUS PRN
Status: DISCONTINUED | OUTPATIENT
Start: 2023-10-10 | End: 2023-10-10 | Stop reason: SDUPTHER

## 2023-10-10 RX ORDER — SODIUM CHLORIDE 0.9 % (FLUSH) 0.9 %
5-40 SYRINGE (ML) INJECTION EVERY 12 HOURS SCHEDULED
Status: DISCONTINUED | OUTPATIENT
Start: 2023-10-10 | End: 2023-10-14 | Stop reason: HOSPADM

## 2023-10-10 RX ORDER — OXYCODONE HYDROCHLORIDE 5 MG/1
5 TABLET ORAL EVERY 6 HOURS PRN
Qty: 18 TABLET | Refills: 0 | Status: SHIPPED | OUTPATIENT
Start: 2023-10-10 | End: 2023-10-15

## 2023-10-10 RX ORDER — OXYCODONE HYDROCHLORIDE 5 MG/1
5 TABLET ORAL EVERY 4 HOURS PRN
Status: DISCONTINUED | OUTPATIENT
Start: 2023-10-10 | End: 2023-10-14 | Stop reason: HOSPADM

## 2023-10-10 RX ORDER — KETOROLAC TROMETHAMINE 30 MG/ML
30 INJECTION, SOLUTION INTRAMUSCULAR; INTRAVENOUS EVERY 6 HOURS
Status: DISPENSED | OUTPATIENT
Start: 2023-10-10 | End: 2023-10-11

## 2023-10-10 RX ORDER — SIMETHICONE 80 MG
80 TABLET,CHEWABLE ORAL 4 TIMES DAILY
Status: DISCONTINUED | OUTPATIENT
Start: 2023-10-10 | End: 2023-10-14 | Stop reason: HOSPADM

## 2023-10-10 RX ORDER — DEXAMETHASONE SODIUM PHOSPHATE 10 MG/ML
INJECTION INTRAMUSCULAR; INTRAVENOUS PRN
Status: DISCONTINUED | OUTPATIENT
Start: 2023-10-10 | End: 2023-10-10 | Stop reason: SDUPTHER

## 2023-10-10 RX ORDER — MORPHINE SULFATE 2 MG/ML
2 INJECTION, SOLUTION INTRAMUSCULAR; INTRAVENOUS EVERY 5 MIN PRN
Status: DISCONTINUED | OUTPATIENT
Start: 2023-10-10 | End: 2023-10-10 | Stop reason: HOSPADM

## 2023-10-10 RX ORDER — SODIUM CHLORIDE, SODIUM LACTATE, POTASSIUM CHLORIDE, CALCIUM CHLORIDE 600; 310; 30; 20 MG/100ML; MG/100ML; MG/100ML; MG/100ML
INJECTION, SOLUTION INTRAVENOUS CONTINUOUS
Status: DISCONTINUED | OUTPATIENT
Start: 2023-10-10 | End: 2023-10-10 | Stop reason: HOSPADM

## 2023-10-10 RX ORDER — LIDOCAINE HYDROCHLORIDE 10 MG/ML
INJECTION, SOLUTION EPIDURAL; INFILTRATION; INTRACAUDAL; PERINEURAL PRN
Status: DISCONTINUED | OUTPATIENT
Start: 2023-10-10 | End: 2023-10-10 | Stop reason: SDUPTHER

## 2023-10-10 RX ORDER — SIMETHICONE 80 MG
80 TABLET,CHEWABLE ORAL EVERY 6 HOURS PRN
Status: DISCONTINUED | OUTPATIENT
Start: 2023-10-10 | End: 2023-10-10

## 2023-10-10 RX ORDER — ONDANSETRON 2 MG/ML
INJECTION INTRAMUSCULAR; INTRAVENOUS PRN
Status: DISCONTINUED | OUTPATIENT
Start: 2023-10-10 | End: 2023-10-10 | Stop reason: SDUPTHER

## 2023-10-10 RX ORDER — DIPHENHYDRAMINE HYDROCHLORIDE 50 MG/ML
12.5 INJECTION INTRAMUSCULAR; INTRAVENOUS
Status: DISCONTINUED | OUTPATIENT
Start: 2023-10-10 | End: 2023-10-10 | Stop reason: HOSPADM

## 2023-10-10 RX ORDER — KETOROLAC TROMETHAMINE 30 MG/ML
INJECTION, SOLUTION INTRAMUSCULAR; INTRAVENOUS PRN
Status: DISCONTINUED | OUTPATIENT
Start: 2023-10-10 | End: 2023-10-10 | Stop reason: SDUPTHER

## 2023-10-10 RX ORDER — ONDANSETRON 2 MG/ML
4 INJECTION INTRAMUSCULAR; INTRAVENOUS
Status: COMPLETED | OUTPATIENT
Start: 2023-10-10 | End: 2023-10-10

## 2023-10-10 RX ORDER — OXYCODONE HYDROCHLORIDE 5 MG/1
10 TABLET ORAL EVERY 4 HOURS PRN
Status: DISCONTINUED | OUTPATIENT
Start: 2023-10-10 | End: 2023-10-14 | Stop reason: HOSPADM

## 2023-10-10 RX ORDER — MAGNESIUM HYDROXIDE 1200 MG/15ML
LIQUID ORAL CONTINUOUS PRN
Status: DISCONTINUED | OUTPATIENT
Start: 2023-10-10 | End: 2023-10-10 | Stop reason: HOSPADM

## 2023-10-10 RX ORDER — PROMETHAZINE HYDROCHLORIDE 12.5 MG/1
12.5 TABLET ORAL EVERY 6 HOURS PRN
Status: DISCONTINUED | OUTPATIENT
Start: 2023-10-10 | End: 2023-10-14 | Stop reason: HOSPADM

## 2023-10-10 RX ORDER — PROCHLORPERAZINE MALEATE 10 MG
10 TABLET ORAL EVERY 6 HOURS PRN
Status: DISCONTINUED | OUTPATIENT
Start: 2023-10-10 | End: 2023-10-14 | Stop reason: HOSPADM

## 2023-10-10 RX ORDER — ENOXAPARIN SODIUM 100 MG/ML
40 INJECTION SUBCUTANEOUS ONCE
Status: COMPLETED | OUTPATIENT
Start: 2023-10-10 | End: 2023-10-10

## 2023-10-10 RX ORDER — SENNOSIDES A AND B 8.6 MG/1
2 TABLET, FILM COATED ORAL 2 TIMES DAILY
Status: DISCONTINUED | OUTPATIENT
Start: 2023-10-10 | End: 2023-10-14 | Stop reason: HOSPADM

## 2023-10-10 RX ORDER — SODIUM CHLORIDE 0.9 % (FLUSH) 0.9 %
5-40 SYRINGE (ML) INJECTION PRN
Status: DISCONTINUED | OUTPATIENT
Start: 2023-10-10 | End: 2023-10-14 | Stop reason: HOSPADM

## 2023-10-10 RX ORDER — PROPOFOL 10 MG/ML
INJECTION, EMULSION INTRAVENOUS PRN
Status: DISCONTINUED | OUTPATIENT
Start: 2023-10-10 | End: 2023-10-10 | Stop reason: SDUPTHER

## 2023-10-10 RX ORDER — ENOXAPARIN SODIUM 100 MG/ML
40 INJECTION SUBCUTANEOUS DAILY
Status: CANCELLED | OUTPATIENT
Start: 2023-10-11

## 2023-10-10 RX ORDER — ACETAMINOPHEN 500 MG
1000 TABLET ORAL EVERY 6 HOURS PRN
Qty: 60 TABLET | Refills: 1 | Status: SHIPPED | OUTPATIENT
Start: 2023-10-10

## 2023-10-10 RX ORDER — ACETAMINOPHEN 500 MG
1000 TABLET ORAL EVERY 6 HOURS
Status: DISCONTINUED | OUTPATIENT
Start: 2023-10-10 | End: 2023-10-14 | Stop reason: HOSPADM

## 2023-10-10 RX ORDER — SODIUM CHLORIDE 9 MG/ML
INJECTION, SOLUTION INTRAVENOUS PRN
Status: DISCONTINUED | OUTPATIENT
Start: 2023-10-10 | End: 2023-10-14 | Stop reason: HOSPADM

## 2023-10-10 RX ORDER — CALCIUM CARBONATE 500 MG/1
500 TABLET, CHEWABLE ORAL 3 TIMES DAILY PRN
Status: DISCONTINUED | OUTPATIENT
Start: 2023-10-10 | End: 2023-10-14 | Stop reason: HOSPADM

## 2023-10-10 RX ADMIN — Medication 2000 MG: at 11:36

## 2023-10-10 RX ADMIN — SUGAMMADEX 150 MG: 100 INJECTION, SOLUTION INTRAVENOUS at 14:52

## 2023-10-10 RX ADMIN — SENNOSIDES 17.2 MG: 8.6 TABLET, COATED ORAL at 21:51

## 2023-10-10 RX ADMIN — ROCURONIUM BROMIDE 10 MG: 10 INJECTION, SOLUTION INTRAVENOUS at 14:12

## 2023-10-10 RX ADMIN — PROPOFOL 50 MG: 10 INJECTION, EMULSION INTRAVENOUS at 14:09

## 2023-10-10 RX ADMIN — PHENYLEPHRINE HYDROCHLORIDE 50 MCG: 10 INJECTION INTRAVENOUS at 08:26

## 2023-10-10 RX ADMIN — FENTANYL CITRATE 50 MCG: 0.05 INJECTION, SOLUTION INTRAMUSCULAR; INTRAVENOUS at 08:35

## 2023-10-10 RX ADMIN — MIDAZOLAM 2 MG: 1 INJECTION INTRAMUSCULAR; INTRAVENOUS at 07:20

## 2023-10-10 RX ADMIN — PROPOFOL 150 MG: 10 INJECTION, EMULSION INTRAVENOUS at 07:26

## 2023-10-10 RX ADMIN — KETOROLAC TROMETHAMINE 30 MG: 30 INJECTION, SOLUTION INTRAMUSCULAR; INTRAVENOUS at 14:36

## 2023-10-10 RX ADMIN — FENTANYL CITRATE 50 MCG: 0.05 INJECTION, SOLUTION INTRAMUSCULAR; INTRAVENOUS at 09:35

## 2023-10-10 RX ADMIN — SODIUM CHLORIDE, POTASSIUM CHLORIDE, SODIUM LACTATE AND CALCIUM CHLORIDE: 600; 310; 30; 20 INJECTION, SOLUTION INTRAVENOUS at 07:18

## 2023-10-10 RX ADMIN — FENTANYL CITRATE 25 MCG: 0.05 INJECTION, SOLUTION INTRAMUSCULAR; INTRAVENOUS at 13:54

## 2023-10-10 RX ADMIN — ROCURONIUM BROMIDE 30 MG: 10 INJECTION, SOLUTION INTRAVENOUS at 09:35

## 2023-10-10 RX ADMIN — PROPOFOL 50 MG: 10 INJECTION, EMULSION INTRAVENOUS at 09:35

## 2023-10-10 RX ADMIN — SODIUM CHLORIDE: 9 INJECTION, SOLUTION INTRAVENOUS at 19:29

## 2023-10-10 RX ADMIN — KETOROLAC TROMETHAMINE 30 MG: 30 INJECTION, SOLUTION INTRAMUSCULAR; INTRAVENOUS at 21:41

## 2023-10-10 RX ADMIN — FENTANYL CITRATE 25 MCG: 50 INJECTION INTRAMUSCULAR; INTRAVENOUS at 16:04

## 2023-10-10 RX ADMIN — LIDOCAINE HYDROCHLORIDE 50 MG: 10 INJECTION, SOLUTION EPIDURAL; INFILTRATION; INTRACAUDAL; PERINEURAL at 07:26

## 2023-10-10 RX ADMIN — PHENYLEPHRINE HYDROCHLORIDE 100 MCG: 10 INJECTION INTRAVENOUS at 09:17

## 2023-10-10 RX ADMIN — FENTANYL CITRATE 50 MCG: 0.05 INJECTION, SOLUTION INTRAMUSCULAR; INTRAVENOUS at 15:04

## 2023-10-10 RX ADMIN — FENTANYL CITRATE 50 MCG: 0.05 INJECTION, SOLUTION INTRAMUSCULAR; INTRAVENOUS at 11:33

## 2023-10-10 RX ADMIN — SODIUM CHLORIDE, POTASSIUM CHLORIDE, SODIUM LACTATE AND CALCIUM CHLORIDE: 600; 310; 30; 20 INJECTION, SOLUTION INTRAVENOUS at 07:19

## 2023-10-10 RX ADMIN — MORPHINE SULFATE 2 MG: 2 INJECTION, SOLUTION INTRAMUSCULAR; INTRAVENOUS at 15:22

## 2023-10-10 RX ADMIN — ROCURONIUM BROMIDE 20 MG: 10 INJECTION, SOLUTION INTRAVENOUS at 10:27

## 2023-10-10 RX ADMIN — DEXAMETHASONE SODIUM PHOSPHATE 10 MG: 10 INJECTION INTRAMUSCULAR; INTRAVENOUS at 08:00

## 2023-10-10 RX ADMIN — ROCURONIUM BROMIDE 10 MG: 10 INJECTION, SOLUTION INTRAVENOUS at 12:38

## 2023-10-10 RX ADMIN — PROCHLORPERAZINE EDISYLATE 10 MG: 5 INJECTION INTRAMUSCULAR; INTRAVENOUS at 21:54

## 2023-10-10 RX ADMIN — SODIUM CHLORIDE, SODIUM LACTATE, POTASSIUM CHLORIDE, CALCIUM CHLORIDE: 600; 310; 30; 20 INJECTION, SOLUTION INTRAVENOUS at 07:37

## 2023-10-10 RX ADMIN — ROCURONIUM BROMIDE 20 MG: 10 INJECTION, SOLUTION INTRAVENOUS at 08:26

## 2023-10-10 RX ADMIN — ACETAMINOPHEN 1000 MG: 500 TABLET ORAL at 21:50

## 2023-10-10 RX ADMIN — SODIUM CHLORIDE, POTASSIUM CHLORIDE, SODIUM LACTATE AND CALCIUM CHLORIDE: 600; 310; 30; 20 INJECTION, SOLUTION INTRAVENOUS at 06:36

## 2023-10-10 RX ADMIN — ONDANSETRON 4 MG: 2 INJECTION INTRAMUSCULAR; INTRAVENOUS at 14:31

## 2023-10-10 RX ADMIN — FENTANYL CITRATE 100 MCG: 0.05 INJECTION, SOLUTION INTRAMUSCULAR; INTRAVENOUS at 07:26

## 2023-10-10 RX ADMIN — SODIUM CHLORIDE, PRESERVATIVE FREE 10 ML: 5 INJECTION INTRAVENOUS at 21:50

## 2023-10-10 RX ADMIN — FENTANYL CITRATE 50 MCG: 0.05 INJECTION, SOLUTION INTRAMUSCULAR; INTRAVENOUS at 14:49

## 2023-10-10 RX ADMIN — METRONIDAZOLE 500 MG: 500 INJECTION, SOLUTION INTRAVENOUS at 10:22

## 2023-10-10 RX ADMIN — ROCURONIUM BROMIDE 50 MG: 10 INJECTION, SOLUTION INTRAVENOUS at 07:26

## 2023-10-10 RX ADMIN — Medication 2000 MG: at 07:48

## 2023-10-10 RX ADMIN — ENOXAPARIN SODIUM 40 MG: 100 INJECTION SUBCUTANEOUS at 06:36

## 2023-10-10 RX ADMIN — ONDANSETRON 4 MG: 2 INJECTION INTRAMUSCULAR; INTRAVENOUS at 15:21

## 2023-10-10 RX ADMIN — FENTANYL CITRATE 25 MCG: 0.05 INJECTION, SOLUTION INTRAMUSCULAR; INTRAVENOUS at 13:23

## 2023-10-10 RX ADMIN — ROCURONIUM BROMIDE 20 MG: 10 INJECTION, SOLUTION INTRAVENOUS at 11:30

## 2023-10-10 RX ADMIN — FAMOTIDINE 20 MG: 20 TABLET, FILM COATED ORAL at 21:50

## 2023-10-10 RX ADMIN — FENTANYL CITRATE 50 MCG: 0.05 INJECTION, SOLUTION INTRAMUSCULAR; INTRAVENOUS at 07:56

## 2023-10-10 RX ADMIN — ROCURONIUM BROMIDE 10 MG: 10 INJECTION, SOLUTION INTRAVENOUS at 13:32

## 2023-10-10 ASSESSMENT — PAIN DESCRIPTION - LOCATION
LOCATION: ABDOMEN
LOCATION: ABDOMEN

## 2023-10-10 ASSESSMENT — PAIN SCALES - GENERAL
PAINLEVEL_OUTOF10: 6
PAINLEVEL_OUTOF10: 3
PAINLEVEL_OUTOF10: 8
PAINLEVEL_OUTOF10: 8
PAINLEVEL_OUTOF10: 6

## 2023-10-10 ASSESSMENT — PAIN - FUNCTIONAL ASSESSMENT: PAIN_FUNCTIONAL_ASSESSMENT: 0-10

## 2023-10-10 ASSESSMENT — PAIN DESCRIPTION - DESCRIPTORS
DESCRIPTORS: SHARP
DESCRIPTORS: ACHING

## 2023-10-10 ASSESSMENT — LIFESTYLE VARIABLES: SMOKING_STATUS: 1

## 2023-10-10 NOTE — DISCHARGE SUMMARY
6 hours as needed for Pain     benzoyl peroxide 5 % external liquid  Wash affected areas once daily     clindamycin 1 % lotion  Commonly known as: CLEOCIN T  Apply to affected areas daily     ferrous sulfate 325 (65 Fe) MG tablet  Commonly known as: IRON 325  Take 1 tablet by mouth daily (with breakfast)     Handicap Placard Misc  by Does not apply route Chronic Pelvic pain   Length of handicap placard- 12 months     hydroCHLOROthiazide 25 MG tablet  Commonly known as: HYDRODIURIL  Take 1 tablet by mouth every morning     hydroquinone 4 % cream  Apply 2 months on, 2 months off to dark spots     metroNIDAZOLE 500 MG tablet  Commonly known as: Flagyl  Take 2 tablets by mouth at 1:00 pm, followed by 1 tablet by mouth at 5:00pm, followed by 1 tablet by mouth at 9:00 pm the day before surgery     neomycin 500 MG tablet  Commonly known as: MYCIFRADIN  Take 2 tablets by mouth at 1:00pm, 5:00pm, and 9:00pm one day before surgery     * tretinoin 0.025 % cream  Commonly known as: RETIN-A  Apply pea sized amount to face nightly     * tretinoin 0.1 % cream  Commonly known as: RETIN-A  Apply a pea sized amount to face nightly           * This list has 2 medication(s) that are the same as other medications prescribed for you. Read the directions carefully, and ask your doctor or other care provider to review them with you. Where to Get Your Medications        These medications were sent to [facility] [address], [address] [place] [address]      Phone: [phone]   acetaminophen 500 MG tablet  amLODIPine 5 MG tablet  enoxaparin 40 MG/0.4ML  gabapentin 300 MG capsule  ibuprofen 800 MG tablet  oxyCODONE 5 MG immediate release tablet  prochlorperazine 5 MG tablet  senna 8.6 MG Tabs tablet           Activity: pelvic rest x 6 weeks, no driving on narcotics  Diet: regular diet  Follow up:  With Jennifer Zapata PA-C, on 10/26/23    Condition on discharge: good and stable   Discharge Date: 10/14/23      Comments:  Home care, Follow-up care, restrictions reviewed.     Avril Smith MD  Ob/Gyn Resident  [address]  10/14/2023, 10:47 AM

## 2023-10-10 NOTE — ANESTHESIA PRE PROCEDURE
\"POCGLU\", \"POCNA\", \"POCK\", \"POCCL\", \"POCBUN\", \"POCHEMO\", \"POCHCT\" in the last 72 hours. Coags:   Lab Results   Component Value Date/Time    PROTIME 12.3 05/04/2023 09:43 AM    INR 0.9 05/04/2023 09:43 AM    APTT 28.3 05/04/2023 09:43 AM       HCG (If Applicable):   Lab Results   Component Value Date    PREGTESTUR Negative 08/17/2021    HCG NEGATIVE 02/14/2022    HCGQUANT <1 05/20/2021        ABGs: No results found for: \"PHART\", \"PO2ART\", \"CIX6LOW\", \"JAB0PYY\", \"BEART\", \"O8RKHCRM\"     Type & Screen (If Applicable):  No results found for: \"LABABO\", \"LABRH\"    Drug/Infectious Status (If Applicable):  Lab Results   Component Value Date/Time    HEPCAB NONREACTIVE 07/26/2018 04:33 PM       COVID-19 Screening (If Applicable):   Lab Results   Component Value Date/Time    COVID19 Not Detected 02/25/2022 02:41 PM    COVID19 DETECTED 12/30/2021 03:12 PM           Anesthesia Evaluation   no history of anesthetic complications:   Airway: Mallampati: II  TM distance: >3 FB   Neck ROM: full  Mouth opening: > = 3 FB   Dental: normal exam         Pulmonary: breath sounds clear to auscultation  (+) current smoker    (-) recent URI                          ROS comment: +Covid 12/30/21   Cardiovascular:    (+) hypertension:,     (-)  FRANCIS      Rhythm: regular  Rate: normal                 ROS comment: Left ventricular systolic function and size are normal. Ejection fraction is   estimated at 55%. Mild mitral valve thickening with loose chordae tendineae and no   regurgitation. No pericardial effusion. Neuro/Psych:   Negative Neuro/Psych ROS  (+) headaches: migraine headaches,    (-) seizures and CVA           GI/Hepatic/Renal: Neg GI/Hepatic/Renal ROS       (-) GERD       Endo/Other: Negative Endo/Other ROS       (-) diabetes mellitus                ROS comment: [id number] mutation Abdominal:         (-) obese Abdomen: tender. Vascular: negative vascular ROS.          Other Findings:             Anesthesia

## 2023-10-10 NOTE — BRIEF OP NOTE
Brief Operative Note  Department of Obstetrics and Gynecology  [facility]     Patient: Néstor Salazar   : 1985  MRN: [id number]       Acct: [de-identified]   Date of Procedure: 10/10/23     Pre-operative Diagnosis: 41 y.o. female    Pelvic Pain   Pelvic Cysts  S/p Abdominal Hysterectomy, Bilateral Salpingectomy, Cystotomy Repair  Prothrombin Gene Mutation  Family History Uterine Cancer  History of  Section x3  BMI 26    Post-operative Diagnosis: 41 y.o. female [id number]   Significant Small Bowel and Large Bowel Adhesions to Anterior Abdominal Wall  Peritoneal Cyst  Pelvic Pain   Pelvic Cysts  S/p Abdominal Hysterectomy, Bilateral Salpingectomy, Cystotomy Repair  Prothrombin Gene Mutation  Family History Uterine Cancer  History of  Section x3  BMI 26    Procedure: Xi Robotic Assisted Aspiration of Pelvic Cyst, Extensive Lysis of Adhesions, Cystoscopy, Repair of Sigmoid Colon Deserosalization    Surgeon: Dr. Anderson Cords): Shaheen Sarabia, PGY4; Terry Julien, PGY2    Anesthesia: general via ET tube    Findings:  Normal appearing external genitalia. Normal appearing vaginal mucosa. Surgically absent cervix, uterus, and fallopian tubes. Survey of pelvis reveals significant small bowel and large bowel adhesions to anterior abdominal wall, ovaries significantly adhered to small and large bowel, left sided pelvic cyst  Total IV fluids/Blood products:  2000 ml crystalloid  Urine Output:  175 ml    Estimated blood loss:  50ml  Drains:  none  Specimens:  Pelvic Washings  Instrument and Sponge Count: Correct  Complications:  none  Condition:  stable, transferred to post anesthesia recovery    See full operative report for further details. Terry Julien MD  Ob/Gyn Resident  10/10/2023, 3:10 PM    Attending Physician Statement  I was present, scrubbed and participated in the entire case. I agree with the above documentation.     Tammy Dalal MD  Gynecologic

## 2023-10-11 LAB
ALBUMIN SERPL-MCNC: 2.9 G/DL (ref 3.5–5.2)
ALBUMIN/GLOB SERPL: 1.1 {RATIO} (ref 1–2.5)
ALP SERPL-CCNC: 32 U/L (ref 35–104)
ALT SERPL-CCNC: 8 U/L (ref 5–33)
ANION GAP SERPL CALCULATED.3IONS-SCNC: 8 MMOL/L (ref 9–17)
AST SERPL-CCNC: 17 U/L
BASOPHILS # BLD: <0.03 K/UL (ref 0–0.2)
BASOPHILS NFR BLD: 0 % (ref 0–2)
BILIRUB SERPL-MCNC: 0.7 MG/DL (ref 0.3–1.2)
BUN SERPL-MCNC: 4 MG/DL (ref 6–20)
CALCIUM SERPL-MCNC: 7.9 MG/DL (ref 8.6–10.4)
CHLORIDE SERPL-SCNC: 109 MMOL/L (ref 98–107)
CO2 SERPL-SCNC: 22 MMOL/L (ref 20–31)
CREAT SERPL-MCNC: 0.5 MG/DL (ref 0.5–0.9)
EOSINOPHIL # BLD: <0.03 K/UL (ref 0–0.44)
EOSINOPHILS RELATIVE PERCENT: 0 % (ref 1–4)
ERYTHROCYTE [DISTWIDTH] IN BLOOD BY AUTOMATED COUNT: 11.3 % (ref 11.8–14.4)
GFR SERPL CREATININE-BSD FRML MDRD: >60 ML/MIN/1.73M2
GLUCOSE SERPL-MCNC: 94 MG/DL (ref 70–99)
HCT VFR BLD AUTO: 35.9 % (ref 36.3–47.1)
HGB BLD-MCNC: 12.3 G/DL (ref 11.9–15.1)
IMM GRANULOCYTES # BLD AUTO: <0.03 K/UL (ref 0–0.3)
IMM GRANULOCYTES NFR BLD: 0 %
LYMPHOCYTES NFR BLD: 0.78 K/UL (ref 1.1–3.7)
LYMPHOCYTES RELATIVE PERCENT: 12 % (ref 24–43)
MCH RBC QN AUTO: 32.1 PG (ref 25.2–33.5)
MCHC RBC AUTO-ENTMCNC: 34.3 G/DL (ref 28.4–34.8)
MCV RBC AUTO: 93.7 FL (ref 82.6–102.9)
MONOCYTES NFR BLD: 0.59 K/UL (ref 0.1–1.2)
MONOCYTES NFR BLD: 9 % (ref 3–12)
NEUTROPHILS NFR BLD: 79 % (ref 36–65)
NEUTS SEG NFR BLD: 5.02 K/UL (ref 1.5–8.1)
NRBC BLD-RTO: 0 PER 100 WBC
PLATELET # BLD AUTO: 181 K/UL (ref 138–453)
PMV BLD AUTO: 10 FL (ref 8.1–13.5)
POTASSIUM SERPL-SCNC: 3.4 MMOL/L (ref 3.7–5.3)
PROT SERPL-MCNC: 5.6 G/DL (ref 6.4–8.3)
RBC # BLD AUTO: 3.83 M/UL (ref 3.95–5.11)
SODIUM SERPL-SCNC: 139 MMOL/L (ref 135–144)
SURGICAL PATHOLOGY REPORT: NORMAL
WBC OTHER # BLD: 6.4 K/UL (ref 3.5–11.3)

## 2023-10-11 PROCEDURE — 6370000000 HC RX 637 (ALT 250 FOR IP): Performed by: STUDENT IN AN ORGANIZED HEALTH CARE EDUCATION/TRAINING PROGRAM

## 2023-10-11 PROCEDURE — 85025 COMPLETE CBC W/AUTO DIFF WBC: CPT

## 2023-10-11 PROCEDURE — 1200000000 HC SEMI PRIVATE

## 2023-10-11 PROCEDURE — 36415 COLL VENOUS BLD VENIPUNCTURE: CPT

## 2023-10-11 PROCEDURE — 80053 COMPREHEN METABOLIC PANEL: CPT

## 2023-10-11 PROCEDURE — 6370000000 HC RX 637 (ALT 250 FOR IP)

## 2023-10-11 PROCEDURE — 2580000003 HC RX 258: Performed by: STUDENT IN AN ORGANIZED HEALTH CARE EDUCATION/TRAINING PROGRAM

## 2023-10-11 PROCEDURE — 6360000002 HC RX W HCPCS: Performed by: STUDENT IN AN ORGANIZED HEALTH CARE EDUCATION/TRAINING PROGRAM

## 2023-10-11 RX ORDER — GABAPENTIN 300 MG/1
300 CAPSULE ORAL 3 TIMES DAILY
Status: DISCONTINUED | OUTPATIENT
Start: 2023-10-11 | End: 2023-10-14 | Stop reason: HOSPADM

## 2023-10-11 RX ADMIN — HYDROCHLOROTHIAZIDE 25 MG: 25 TABLET ORAL at 08:08

## 2023-10-11 RX ADMIN — SENNOSIDES 17.2 MG: 8.6 TABLET, COATED ORAL at 08:07

## 2023-10-11 RX ADMIN — ACETAMINOPHEN 1000 MG: 500 TABLET ORAL at 16:25

## 2023-10-11 RX ADMIN — FAMOTIDINE 20 MG: 20 TABLET, FILM COATED ORAL at 21:06

## 2023-10-11 RX ADMIN — GABAPENTIN 300 MG: 300 CAPSULE ORAL at 14:45

## 2023-10-11 RX ADMIN — ACETAMINOPHEN 1000 MG: 500 TABLET ORAL at 21:06

## 2023-10-11 RX ADMIN — KETOROLAC TROMETHAMINE 30 MG: 30 INJECTION, SOLUTION INTRAMUSCULAR; INTRAVENOUS at 17:33

## 2023-10-11 RX ADMIN — SIMETHICONE 80 MG: 80 TABLET, CHEWABLE ORAL at 08:08

## 2023-10-11 RX ADMIN — SENNOSIDES 17.2 MG: 8.6 TABLET, COATED ORAL at 21:06

## 2023-10-11 RX ADMIN — CELECOXIB 200 MG: 100 CAPSULE ORAL at 14:44

## 2023-10-11 RX ADMIN — ACETAMINOPHEN 1000 MG: 500 TABLET ORAL at 09:27

## 2023-10-11 RX ADMIN — SODIUM CHLORIDE: 9 INJECTION, SOLUTION INTRAVENOUS at 04:15

## 2023-10-11 RX ADMIN — FAMOTIDINE 20 MG: 20 TABLET, FILM COATED ORAL at 08:07

## 2023-10-11 RX ADMIN — SIMETHICONE 80 MG: 80 TABLET, CHEWABLE ORAL at 16:25

## 2023-10-11 RX ADMIN — SODIUM CHLORIDE, PRESERVATIVE FREE 10 ML: 5 INJECTION INTRAVENOUS at 20:16

## 2023-10-11 RX ADMIN — GABAPENTIN 300 MG: 300 CAPSULE ORAL at 21:06

## 2023-10-11 RX ADMIN — KETOROLAC TROMETHAMINE 30 MG: 30 INJECTION, SOLUTION INTRAMUSCULAR; INTRAVENOUS at 09:28

## 2023-10-11 RX ADMIN — GABAPENTIN 300 MG: 300 CAPSULE ORAL at 10:30

## 2023-10-11 ASSESSMENT — PAIN DESCRIPTION - DESCRIPTORS
DESCRIPTORS: DISCOMFORT

## 2023-10-11 ASSESSMENT — PAIN SCALES - GENERAL
PAINLEVEL_OUTOF10: 0
PAINLEVEL_OUTOF10: 7
PAINLEVEL_OUTOF10: 5
PAINLEVEL_OUTOF10: 7
PAINLEVEL_OUTOF10: 6
PAINLEVEL_OUTOF10: 4
PAINLEVEL_OUTOF10: 5
PAINLEVEL_OUTOF10: 6

## 2023-10-11 ASSESSMENT — PAIN DESCRIPTION - LOCATION
LOCATION: ABDOMEN

## 2023-10-11 NOTE — PROGRESS NOTES
Comprehensive Nutrition Assessment    Type and Reason for Visit:  Positive Nutrition Screen (wt loss/poor po)    Nutrition Recommendations/Plan:   Continue current diet, Regular  Modify ONS to Diabetic BID (chocolate only)  Monitor/encourage PO intake     Malnutrition Assessment:  Malnutrition Status:  No malnutrition (10/11/23 1400)    Context:  Acute Illness     Findings of the 6 clinical characteristics of malnutrition:  Energy Intake:  Mild decrease in energy intake (Comment)  Weight Loss:  No significant weight loss     Body Fat Loss:  No significant body fat loss     Muscle Mass Loss:  No significant muscle mass loss    Fluid Accumulation:  No significant fluid accumulation     Strength:  Not Performed    Nutrition Assessment:    42 yo F adm for planned procedure, p/p robotic assested aspiration of pelvic cyst, lysis of adhesions, cytoscopy, repair of sigmoid colon deserosalization (10/10). Pt reports improving appetite, stated she had just eaten a double cheeseburger her family had brought in. Pt reports she had not tried the Ensure Clear, encouraged her to trial. Pt states she like strawberry flavor and would like to try Ensure, will modify. No BM noted, hypoactive bowel sounds. No edema noted. No wt loss noted per chart review. Nutrition Related Findings:    labs/meds reviewed Wound Type: Surgical Incision (to abdomen)       Current Nutrition Intake & Therapies:    Average Meal Intake: Unable to assess (improving per pt)  Average Supplements Intake: Unable to assess  ADULT DIET; Regular  ADULT ORAL NUTRITION SUPPLEMENT; Breakfast, Dinner; Diabetic Oral Supplement    Anthropometric Measures:  Height: 5' 1\" (154.9 cm)  Ideal Body Weight (IBW): 105 lbs (48 kg)    Admission Body Weight: 155 lb 13.8 oz (70.7 kg) (10/11)  Current Body Weight: 155 lb 13.8 oz (70.7 kg) (10/11), 148.4 % IBW.     Current BMI (kg/m2): 29.5  Usual Body Weight: 147 lb 10 oz (67 kg) (10/26/22)  % Weight Change (Calculated): 5.6 BMI Categories: Overweight (BMI 25.0-29. 9)    Estimated Daily Nutrient Needs:  Energy Requirements Based On: Kcal/kg  Weight Used for Energy Requirements: Admission  Energy (kcal/day): 1500 to 1800 kcal/day  Weight Used for Protein Requirements: Ideal  Protein (g/day): 50 to 60 g/day  Method Used for Fluid Requirements: 1 ml/kcal  Fluid (ml/day): 1500 to 1800 mL/day    Nutrition Diagnosis:   Inadequate oral intake related to  (poor appetite) as evidenced by intake 0-25%, intake 26-50%    Nutrition Interventions:   Food and/or Nutrient Delivery: Continue Current Diet, Modify Oral Nutrition Supplement  Nutrition Education/Counseling: No recommendation at this time  Coordination of Nutrition Care: Continue to monitor while inpatient       Goals:  Previous Goal Met:  (goal set)  Goals: Meet at least 75% of estimated needs, prior to discharge       Nutrition Monitoring and Evaluation:   Behavioral-Environmental Outcomes: None Identified  Food/Nutrient Intake Outcomes: Food and Nutrient Intake, Supplement Intake  Physical Signs/Symptoms Outcomes: Biochemical Data, Skin, Weight    Discharge Planning:     Too soon to determine     Nancy Hamilton, [address] formerly [facility] KIMBERLY Beard, LD  Contact:   Floor Mobile: [phone]  Desk Phone: [phone]  RD Weekend Mobile: [phone]

## 2023-10-11 NOTE — PLAN OF CARE
Problem: Discharge Planning  Goal: Discharge to home or other facility with appropriate resources  10/11/2023 1040 by Pablo Ray RN  Outcome: Progressing  08/59/6159 [id number] by Rehana Acosta RN  Outcome: Progressing     Problem: Pain  Goal: Verbalizes/displays adequate comfort level or baseline comfort level  10/11/2023 1040 by Pablo Ray RN  Outcome: Progressing  29/11/7101 8312 by Rehana Acosta RN  Outcome: Progressing     Problem: ABCDS Injury Assessment  Goal: Absence of physical injury  Outcome: Progressing     Problem: Safety - Adult  Goal: Free from fall injury  Outcome: Progressing

## 2023-10-11 NOTE — PROGRESS NOTES
Upon assessment patient pain level is 5/10 on her abdomen, sore kind of pain on lap sites - offered Roxicodone tab PRN, ice pack and abdominal binder but stated not to have it for now. Education given to the patient.

## 2023-10-11 NOTE — CARE COORDINATION
Case Management Assessment  Initial Evaluation    Date/Time of Evaluation: 10/11/2023 3:58 PM  Assessment Completed by: Isa Musa RN    If patient is discharged prior to next notation, then this note serves as note for discharge by case management. Patient Name: Charlie Curtis                   YOB: 1985  Diagnosis: Pelvic pain [R10.2]  Pelvic mass [R19.00]  Postoperative state [Z98.890]                   Date / Time: 10/10/2023  5:28 AM    Patient Admission Status: Inpatient   Readmission Risk (Low < 19, Mod (19-27), High > 27): Readmission Risk Score: 10.2    Current PCP: Seth Barbosa MD  PCP verified by CM? Yes Juliana Briggs MD)    Chart Reviewed: Yes      History Provided by: Patient  Patient Orientation: Alert and Oriented    Patient Cognition: Alert    Hospitalization in the last 30 days (Readmission):  No    If yes, Readmission Assessment in  Navigator will be completed. Advance Directives:      Code Status: Full Code   Patient's Primary Decision Maker is: Legal Next of Kin      Discharge Planning:    Patient lives with: Children Type of Home: House  Primary Care Giver: Self  Patient Support Systems include: Children   Current Financial resources: None  Current community resources: None  Current services prior to admission: None            Current DME:  none            Type of Home Care services:  None    ADLS  Prior functional level: Independent in ADLs/IADLs  Current functional level: Independent in ADLs/IADLs    PT AM-PAC:   /24  OT AM-PAC:   /24    Family can provide assistance at DC: Yes  Would you like Case Management to discuss the discharge plan with any other family members/significant others, and if so, who?     Plans to Return to Present Housing: Yes  Other Identified Issues/Barriers to RETURNING to current housing: none  Potential Assistance needed at discharge: N/A           Patient expects to discharge to: [address] [place] [address] for transportation at discharge: Good

## 2023-10-11 NOTE — PROGRESS NOTES
OB/GYN Resident Interval Note    Please [facility] resident below with any questions or concerns regarding this patient.     Shaheen Sarabia, DO  OB/GYN Resident

## 2023-10-12 PROCEDURE — 2580000003 HC RX 258: Performed by: STUDENT IN AN ORGANIZED HEALTH CARE EDUCATION/TRAINING PROGRAM

## 2023-10-12 PROCEDURE — 6360000002 HC RX W HCPCS

## 2023-10-12 PROCEDURE — 1200000000 HC SEMI PRIVATE

## 2023-10-12 PROCEDURE — 6370000000 HC RX 637 (ALT 250 FOR IP)

## 2023-10-12 PROCEDURE — 6360000002 HC RX W HCPCS: Performed by: STUDENT IN AN ORGANIZED HEALTH CARE EDUCATION/TRAINING PROGRAM

## 2023-10-12 PROCEDURE — 6370000000 HC RX 637 (ALT 250 FOR IP): Performed by: STUDENT IN AN ORGANIZED HEALTH CARE EDUCATION/TRAINING PROGRAM

## 2023-10-12 RX ORDER — HYDRALAZINE HYDROCHLORIDE 20 MG/ML
5 INJECTION INTRAMUSCULAR; INTRAVENOUS EVERY 6 HOURS PRN
Status: DISCONTINUED | OUTPATIENT
Start: 2023-10-12 | End: 2023-10-13

## 2023-10-12 RX ORDER — ENOXAPARIN SODIUM 100 MG/ML
40 INJECTION SUBCUTANEOUS DAILY
Status: DISCONTINUED | OUTPATIENT
Start: 2023-10-12 | End: 2023-10-14 | Stop reason: HOSPADM

## 2023-10-12 RX ORDER — IBUPROFEN 400 MG/1
800 TABLET ORAL ONCE
Status: DISCONTINUED | OUTPATIENT
Start: 2023-10-12 | End: 2023-10-12

## 2023-10-12 RX ORDER — CELECOXIB 100 MG/1
200 CAPSULE ORAL 2 TIMES DAILY
Status: DISCONTINUED | OUTPATIENT
Start: 2023-10-12 | End: 2023-10-12

## 2023-10-12 RX ORDER — IBUPROFEN 400 MG/1
800 TABLET ORAL EVERY 8 HOURS
Status: DISCONTINUED | OUTPATIENT
Start: 2023-10-12 | End: 2023-10-14 | Stop reason: HOSPADM

## 2023-10-12 RX ADMIN — PROCHLORPERAZINE EDISYLATE 10 MG: 5 INJECTION INTRAMUSCULAR; INTRAVENOUS at 22:57

## 2023-10-12 RX ADMIN — SENNOSIDES 17.2 MG: 8.6 TABLET, COATED ORAL at 09:17

## 2023-10-12 RX ADMIN — ENOXAPARIN SODIUM 40 MG: 100 INJECTION SUBCUTANEOUS at 09:20

## 2023-10-12 RX ADMIN — SENNOSIDES 17.2 MG: 8.6 TABLET, COATED ORAL at 21:16

## 2023-10-12 RX ADMIN — SODIUM CHLORIDE, PRESERVATIVE FREE 10 ML: 5 INJECTION INTRAVENOUS at 22:57

## 2023-10-12 RX ADMIN — IBUPROFEN 800 MG: 400 TABLET, FILM COATED ORAL at 18:19

## 2023-10-12 RX ADMIN — GABAPENTIN 300 MG: 300 CAPSULE ORAL at 21:16

## 2023-10-12 RX ADMIN — OXYCODONE HYDROCHLORIDE 10 MG: 5 TABLET ORAL at 21:16

## 2023-10-12 RX ADMIN — ACETAMINOPHEN 1000 MG: 500 TABLET ORAL at 21:16

## 2023-10-12 RX ADMIN — SODIUM CHLORIDE, PRESERVATIVE FREE 10 ML: 5 INJECTION INTRAVENOUS at 20:28

## 2023-10-12 RX ADMIN — SIMETHICONE 80 MG: 80 TABLET, CHEWABLE ORAL at 09:17

## 2023-10-12 RX ADMIN — SODIUM CHLORIDE, PRESERVATIVE FREE 10 ML: 5 INJECTION INTRAVENOUS at 09:19

## 2023-10-12 RX ADMIN — OXYCODONE HYDROCHLORIDE 10 MG: 5 TABLET ORAL at 11:26

## 2023-10-12 RX ADMIN — ACETAMINOPHEN 1000 MG: 500 TABLET ORAL at 09:17

## 2023-10-12 RX ADMIN — ACETAMINOPHEN 1000 MG: 500 TABLET ORAL at 16:43

## 2023-10-12 RX ADMIN — GABAPENTIN 300 MG: 300 CAPSULE ORAL at 12:59

## 2023-10-12 RX ADMIN — GABAPENTIN 300 MG: 300 CAPSULE ORAL at 09:15

## 2023-10-12 RX ADMIN — FAMOTIDINE 20 MG: 20 TABLET, FILM COATED ORAL at 09:15

## 2023-10-12 RX ADMIN — SODIUM CHLORIDE: 9 INJECTION, SOLUTION INTRAVENOUS at 02:01

## 2023-10-12 RX ADMIN — SIMETHICONE 80 MG: 80 TABLET, CHEWABLE ORAL at 21:16

## 2023-10-12 RX ADMIN — PROCHLORPERAZINE EDISYLATE 10 MG: 5 INJECTION INTRAMUSCULAR; INTRAVENOUS at 11:27

## 2023-10-12 RX ADMIN — FAMOTIDINE 20 MG: 20 TABLET, FILM COATED ORAL at 21:16

## 2023-10-12 RX ADMIN — HYDROCHLOROTHIAZIDE 25 MG: 25 TABLET ORAL at 09:16

## 2023-10-12 RX ADMIN — HYDRALAZINE HYDROCHLORIDE 5 MG: 20 INJECTION, SOLUTION INTRAMUSCULAR; INTRAVENOUS at 20:27

## 2023-10-12 RX ADMIN — CELECOXIB 200 MG: 100 CAPSULE ORAL at 09:14

## 2023-10-12 RX ADMIN — SIMETHICONE 80 MG: 80 TABLET, CHEWABLE ORAL at 12:59

## 2023-10-12 ASSESSMENT — PAIN SCALES - GENERAL
PAINLEVEL_OUTOF10: 7
PAINLEVEL_OUTOF10: 6
PAINLEVEL_OUTOF10: 6
PAINLEVEL_OUTOF10: 7
PAINLEVEL_OUTOF10: 5
PAINLEVEL_OUTOF10: 6

## 2023-10-12 ASSESSMENT — PAIN DESCRIPTION - DESCRIPTORS
DESCRIPTORS: DISCOMFORT
DESCRIPTORS: DISCOMFORT
DESCRIPTORS: ACHING;DISCOMFORT
DESCRIPTORS: DISCOMFORT

## 2023-10-12 ASSESSMENT — PAIN DESCRIPTION - LOCATION
LOCATION: ABDOMEN

## 2023-10-12 ASSESSMENT — PAIN DESCRIPTION - ORIENTATION
ORIENTATION: RIGHT;LEFT;LOWER
ORIENTATION: RIGHT;LEFT;LOWER
ORIENTATION: LOWER;RIGHT;LEFT
ORIENTATION: RIGHT;LEFT;LOWER
ORIENTATION: RIGHT;LEFT;LOWER

## 2023-10-12 ASSESSMENT — PAIN - FUNCTIONAL ASSESSMENT: PAIN_FUNCTIONAL_ASSESSMENT: ACTIVITIES ARE NOT PREVENTED

## 2023-10-12 ASSESSMENT — PAIN DESCRIPTION - FREQUENCY: FREQUENCY: CONTINUOUS

## 2023-10-12 ASSESSMENT — PAIN DESCRIPTION - PAIN TYPE
TYPE: SURGICAL PAIN
TYPE: ACUTE PAIN

## 2023-10-12 ASSESSMENT — PAIN DESCRIPTION - ONSET: ONSET: ON-GOING

## 2023-10-12 NOTE — OP NOTE
OPERATIVE NOTE  PATIENT: Frederick Flores Saleem  YOB: 1985  MRN: [id number]  DATE OF PROCEDURE: 10/10/2023     PRE-OP DIAGNOSIS:   Chronic pelvic pain  8cm adnexal cyst  Prior hysterectomy  Prior cystotomy     POST-OP DIAGNOSIS:   Chronic pelvic pain  8cm adnexal cyst  Prior hysterectomy  Prior cystotomy       PROCEDURE:  Robotic assisted aspiration of peritoneal cyst + Extensive lysis of adhesions + Cystoscopy + Repair of sigmoid colon deserosalization     SURGEON: Lori Garcia MD     ASSISTANT: Poppy Burroughs, MD Nate Cavazos DO     ANESTHESIA: General     ESTIMATED BLOOD LOSS (mL): 69BP     COMPLICATIONS: None noted at the end of the procedure     SPECIMENS:   ID Type Source Tests Collected by Time   A : PELVIC WASHING Body Fluid Pelvic Washings CYTOLOGY, NON-GYN Kim Nguyen MD 10/10/2023 0831      INDICATIONS:  39yo with amenorrhea due to hysterectomy (03/02/22) as part of treatment for pelvic pain and fibroid uterus. She has been treated with a Diagnostic laparoscopy, lysis of adhesions, dilatation and curettage, hysteroscopy, Mirena IUD insertion, Robotic incidental cystotomy repair by Urology (02/14/22), under the care of Dr. Thomas Cooley. Due to persistent pain symptoms and a recurrent defect in the bladder, she was then treated with a Laparotomy Total abdominal hysterectomy, Bilateral salpingectomy, Cystoscopy, Cystotomy repair by Urology (03/02/22), by Dr. Thomas Cooley. Pelvic ultrasound demonstrates findings of a 8cm left adnexal cyst, based on pelvic ultrasound (09/08/23). Recommendations were made to proceed with surgical management. She has been advised of the potential risks, benefits and alternatives to the procedure. Informed consent was obtained. FINDINGS:   Surgically absent uterus and cervix.  Normal appearing bilateral ovaries, which are adherent to the ipsilateral pelvic sidewall peritoneum, bladder peritoneum, sigmoid colon and its mesentery and small bowel mesentery and small bowel loops. Small bowel loops adherent to the anterior abdominal wall. Sigmoid colon adherent to the anterior abdominal wall. There was no evidence of ascites. Aside from omental adhesions, small bowel adhesions and large bowel adhesions to the anterior abdominal wall, the omentum, small and large bowel were normal appearing. The diaphragm, stomach and liver appeared smooth. No evidence of lymphadenopathy. DETAILED DESCRIPTION OF PROCEDURE:   The patient was identified and transported to the operating room with IV fluids running. Prophylactic Ancef and Lovenox were administered prior to the procedure. Appropriate monitors were affixed. Bilateral SCDs were on and in place prior to induction of anesthesia. Following induction of general anesthesia, she was intubated without incident, by the anesthesia team. She was placed in the dorsal lithotomy position. She was properly secured prepped and draped in the normal sterile fashion. A surgical timeout was taken to verify the correct patient, procedure, site and precautions. The rosado catheter and OG tube was placed and left in place for the duration of the procedure. A veress needle was introduced into the abdomen in the left upper quadrant at Salguero's point. Entering pressure was low and kinjal appropriately with CO2 insufflation. A 5mm trocar was introduced into the abdominal cavity under optivue visualization. There were no abnormalities noted upon entry into the abdomen. 40cc of subcutaneous 0.25% Bupivacaine, mixed 1:1 with Exparel was administered at the trocar sites. The remaining trocars were measured and placed under direct visualization in the usual fashion. An 8mm camera port was placed to the right of the umbilicus. Three 8mm robotic ports were placed above the umbilicus and measured to have at least 10cm between the trocars. The 5mm trocar was exchanged with a 12mm laparoscopic trocar, through which the [place] was administered.

## 2023-10-12 NOTE — PLAN OF CARE
Problem: Discharge Planning  Goal: Discharge to home or other facility with appropriate resources  Outcome: Progressing     Problem: Pain  Goal: Verbalizes/displays adequate comfort level or baseline comfort level  Outcome: Progressing     Problem: ABCDS Injury Assessment  Goal: Absence of physical injury  Outcome: Progressing     Problem: Safety - Adult  Goal: Free from fall injury  Outcome: Progressing     Problem: Nutrition Deficit:  Goal: Optimize nutritional status  10/12/2023 0400 by Reynaldo Aleman RN  Outcome: Progressing  10/11/2023 1411 by Teo Marcelino MS, RD, LD  Outcome: Progressing  Flowsheets (Taken 10/11/2023 1411)  Nutrient intake appropriate for improving, restoring, or maintaining nutritional needs:   Monitor oral intake, labs, and treatment plans   Assess nutritional status and recommend course of action   Recommend appropriate diets, oral nutritional supplements, and vitamin/mineral supplements

## 2023-10-13 LAB
ALBUMIN SERPL-MCNC: 3 G/DL (ref 3.5–5.2)
ALBUMIN/GLOB SERPL: 1.1 {RATIO} (ref 1–2.5)
ALP SERPL-CCNC: 33 U/L (ref 35–104)
ALT SERPL-CCNC: 11 U/L (ref 5–33)
ANION GAP SERPL CALCULATED.3IONS-SCNC: 8 MMOL/L (ref 9–17)
AST SERPL-CCNC: 20 U/L
BILIRUB SERPL-MCNC: 0.4 MG/DL (ref 0.3–1.2)
BUN SERPL-MCNC: 5 MG/DL (ref 6–20)
CALCIUM SERPL-MCNC: 8.2 MG/DL (ref 8.6–10.4)
CHLORIDE SERPL-SCNC: 104 MMOL/L (ref 98–107)
CO2 SERPL-SCNC: 25 MMOL/L (ref 20–31)
CREAT SERPL-MCNC: 0.6 MG/DL (ref 0.5–0.9)
ERYTHROCYTE [DISTWIDTH] IN BLOOD BY AUTOMATED COUNT: 11.2 % (ref 11.8–14.4)
GFR SERPL CREATININE-BSD FRML MDRD: >60 ML/MIN/1.73M2
GLUCOSE SERPL-MCNC: 86 MG/DL (ref 70–99)
HCT VFR BLD AUTO: 37.4 % (ref 36.3–47.1)
HGB BLD-MCNC: 12.9 G/DL (ref 11.9–15.1)
MCH RBC QN AUTO: 31.5 PG (ref 25.2–33.5)
MCHC RBC AUTO-ENTMCNC: 34.5 G/DL (ref 28.4–34.8)
MCV RBC AUTO: 91.2 FL (ref 82.6–102.9)
NRBC BLD-RTO: 0 PER 100 WBC
PLATELET # BLD AUTO: 197 K/UL (ref 138–453)
PMV BLD AUTO: 9.8 FL (ref 8.1–13.5)
POTASSIUM SERPL-SCNC: 3.2 MMOL/L (ref 3.7–5.3)
PROT SERPL-MCNC: 5.8 G/DL (ref 6.4–8.3)
RBC # BLD AUTO: 4.1 M/UL (ref 3.95–5.11)
SODIUM SERPL-SCNC: 137 MMOL/L (ref 135–144)
WBC OTHER # BLD: 4.8 K/UL (ref 3.5–11.3)

## 2023-10-13 PROCEDURE — 6370000000 HC RX 637 (ALT 250 FOR IP): Performed by: STUDENT IN AN ORGANIZED HEALTH CARE EDUCATION/TRAINING PROGRAM

## 2023-10-13 PROCEDURE — 80053 COMPREHEN METABOLIC PANEL: CPT

## 2023-10-13 PROCEDURE — 1200000000 HC SEMI PRIVATE

## 2023-10-13 PROCEDURE — 85027 COMPLETE CBC AUTOMATED: CPT

## 2023-10-13 PROCEDURE — 2580000003 HC RX 258: Performed by: STUDENT IN AN ORGANIZED HEALTH CARE EDUCATION/TRAINING PROGRAM

## 2023-10-13 PROCEDURE — 99222 1ST HOSP IP/OBS MODERATE 55: CPT | Performed by: HOSPITALIST

## 2023-10-13 PROCEDURE — 36415 COLL VENOUS BLD VENIPUNCTURE: CPT

## 2023-10-13 PROCEDURE — 6360000002 HC RX W HCPCS: Performed by: STUDENT IN AN ORGANIZED HEALTH CARE EDUCATION/TRAINING PROGRAM

## 2023-10-13 PROCEDURE — 6360000002 HC RX W HCPCS

## 2023-10-13 PROCEDURE — 6370000000 HC RX 637 (ALT 250 FOR IP)

## 2023-10-13 RX ORDER — LABETALOL HYDROCHLORIDE 5 MG/ML
20 INJECTION, SOLUTION INTRAVENOUS EVERY 4 HOURS PRN
Status: DISCONTINUED | OUTPATIENT
Start: 2023-10-13 | End: 2023-10-14 | Stop reason: HOSPADM

## 2023-10-13 RX ORDER — POTASSIUM CHLORIDE 20 MEQ/1
40 TABLET, EXTENDED RELEASE ORAL PRN
Status: DISCONTINUED | OUTPATIENT
Start: 2023-10-13 | End: 2023-10-14 | Stop reason: HOSPADM

## 2023-10-13 RX ORDER — POTASSIUM CHLORIDE 20 MEQ/1
40 TABLET, EXTENDED RELEASE ORAL 2 TIMES DAILY WITH MEALS
Status: DISPENSED | OUTPATIENT
Start: 2023-10-13 | End: 2023-10-14

## 2023-10-13 RX ORDER — ENOXAPARIN SODIUM 100 MG/ML
40 INJECTION SUBCUTANEOUS DAILY
Qty: 1.6 ML | Refills: 0 | Status: SHIPPED | OUTPATIENT
Start: 2023-10-13 | End: 2023-10-17

## 2023-10-13 RX ORDER — POTASSIUM CHLORIDE 7.45 MG/ML
10 INJECTION INTRAVENOUS PRN
Status: DISCONTINUED | OUTPATIENT
Start: 2023-10-13 | End: 2023-10-14 | Stop reason: HOSPADM

## 2023-10-13 RX ORDER — KETOROLAC TROMETHAMINE 30 MG/ML
30 INJECTION, SOLUTION INTRAMUSCULAR; INTRAVENOUS ONCE
Status: COMPLETED | OUTPATIENT
Start: 2023-10-13 | End: 2023-10-13

## 2023-10-13 RX ORDER — AMLODIPINE BESYLATE 5 MG/1
5 TABLET ORAL DAILY
Status: DISCONTINUED | OUTPATIENT
Start: 2023-10-13 | End: 2023-10-14 | Stop reason: HOSPADM

## 2023-10-13 RX ORDER — PROCHLORPERAZINE MALEATE 5 MG/1
5 TABLET ORAL EVERY 6 HOURS PRN
Qty: 30 TABLET | Refills: 0 | Status: SHIPPED | OUTPATIENT
Start: 2023-10-13

## 2023-10-13 RX ORDER — GABAPENTIN 300 MG/1
300 CAPSULE ORAL 3 TIMES DAILY
Qty: 90 CAPSULE | Refills: 0 | Status: SHIPPED | OUTPATIENT
Start: 2023-10-13 | End: 2023-11-12

## 2023-10-13 RX ADMIN — SIMETHICONE 80 MG: 80 TABLET, CHEWABLE ORAL at 23:43

## 2023-10-13 RX ADMIN — ACETAMINOPHEN 1000 MG: 500 TABLET ORAL at 23:42

## 2023-10-13 RX ADMIN — POTASSIUM CHLORIDE 40 MEQ: 1500 TABLET, EXTENDED RELEASE ORAL at 16:33

## 2023-10-13 RX ADMIN — GABAPENTIN 300 MG: 300 CAPSULE ORAL at 08:58

## 2023-10-13 RX ADMIN — IBUPROFEN 800 MG: 400 TABLET, FILM COATED ORAL at 17:52

## 2023-10-13 RX ADMIN — AMLODIPINE BESYLATE 5 MG: 5 TABLET ORAL at 10:40

## 2023-10-13 RX ADMIN — KETOROLAC TROMETHAMINE 30 MG: 30 INJECTION, SOLUTION INTRAMUSCULAR; INTRAVENOUS at 16:34

## 2023-10-13 RX ADMIN — ENOXAPARIN SODIUM 40 MG: 100 INJECTION SUBCUTANEOUS at 08:59

## 2023-10-13 RX ADMIN — HYDRALAZINE HYDROCHLORIDE 5 MG: 20 INJECTION, SOLUTION INTRAMUSCULAR; INTRAVENOUS at 09:06

## 2023-10-13 RX ADMIN — OXYCODONE HYDROCHLORIDE 5 MG: 5 TABLET ORAL at 14:36

## 2023-10-13 RX ADMIN — GABAPENTIN 300 MG: 300 CAPSULE ORAL at 23:43

## 2023-10-13 RX ADMIN — SODIUM CHLORIDE, PRESERVATIVE FREE 10 ML: 5 INJECTION INTRAVENOUS at 09:10

## 2023-10-13 RX ADMIN — IBUPROFEN 800 MG: 400 TABLET, FILM COATED ORAL at 09:02

## 2023-10-13 RX ADMIN — DIPHENHYDRAMINE HYDROCHLORIDE 25 MG: 25 TABLET ORAL at 15:29

## 2023-10-13 RX ADMIN — PROCHLORPERAZINE MALEATE 10 MG: 10 TABLET ORAL at 15:29

## 2023-10-13 RX ADMIN — SENNOSIDES 17.2 MG: 8.6 TABLET, COATED ORAL at 23:43

## 2023-10-13 RX ADMIN — ACETAMINOPHEN 1000 MG: 500 TABLET ORAL at 04:12

## 2023-10-13 RX ADMIN — SIMETHICONE 80 MG: 80 TABLET, CHEWABLE ORAL at 08:58

## 2023-10-13 RX ADMIN — HYDROCHLOROTHIAZIDE 25 MG: 25 TABLET ORAL at 08:58

## 2023-10-13 RX ADMIN — SODIUM CHLORIDE, PRESERVATIVE FREE 10 ML: 5 INJECTION INTRAVENOUS at 23:46

## 2023-10-13 RX ADMIN — ACETAMINOPHEN, ASPIRIN, CAFFEINE 1 TABLET: 250; 250; 65 TABLET, FILM COATED ORAL at 09:01

## 2023-10-13 RX ADMIN — POTASSIUM CHLORIDE 40 MEQ: 1500 TABLET, EXTENDED RELEASE ORAL at 09:06

## 2023-10-13 RX ADMIN — FAMOTIDINE 20 MG: 20 TABLET, FILM COATED ORAL at 08:58

## 2023-10-13 RX ADMIN — IBUPROFEN 800 MG: 400 TABLET, FILM COATED ORAL at 04:11

## 2023-10-13 RX ADMIN — SENNOSIDES 17.2 MG: 8.6 TABLET, COATED ORAL at 08:58

## 2023-10-13 RX ADMIN — FAMOTIDINE 20 MG: 20 TABLET, FILM COATED ORAL at 23:43

## 2023-10-13 ASSESSMENT — PAIN DESCRIPTION - LOCATION
LOCATION: ABDOMEN
LOCATION: HEAD
LOCATION: HEAD

## 2023-10-13 ASSESSMENT — PAIN DESCRIPTION - FREQUENCY: FREQUENCY: CONTINUOUS

## 2023-10-13 ASSESSMENT — PAIN DESCRIPTION - DESCRIPTORS
DESCRIPTORS: ACHING
DESCRIPTORS: ACHING;DISCOMFORT

## 2023-10-13 ASSESSMENT — ENCOUNTER SYMPTOMS
ABDOMINAL PAIN: 0
RHINORRHEA: 0
VOMITING: 0
SHORTNESS OF BREATH: 0
BLOOD IN STOOL: 0
BACK PAIN: 0
NAUSEA: 0
COUGH: 0
CHEST TIGHTNESS: 0
CONSTIPATION: 0

## 2023-10-13 ASSESSMENT — PAIN SCALES - GENERAL
PAINLEVEL_OUTOF10: 5
PAINLEVEL_OUTOF10: 8
PAINLEVEL_OUTOF10: 4

## 2023-10-13 ASSESSMENT — PAIN - FUNCTIONAL ASSESSMENT: PAIN_FUNCTIONAL_ASSESSMENT: ACTIVITIES ARE NOT PREVENTED

## 2023-10-13 ASSESSMENT — PAIN DESCRIPTION - PAIN TYPE: TYPE: ACUTE PAIN

## 2023-10-13 ASSESSMENT — PAIN DESCRIPTION - ONSET: ONSET: ON-GOING

## 2023-10-13 NOTE — CONSULTS
[address]     Department of Internal Medicine - Staff Internal Medicine Teaching Service          CONSULT  NOTE / HISTORY AND PHYSICAL EXAMINATION   Date: 10/13/2023  Patient Name: Maria Batista  Date of admission: 10/10/2023  5:28 AM  YOB: 1985  PCP: Rosemary May MD  History Obtained From:  patient, electronic medical record    Consult Reason:     Consult Reason: hypertension     HISTORY OF PRESENTING ILLNESS     The patient is a pleasant 41 y.o. female presents with b/l adnexal cysts, admitted to OBGYN is POD 3 s/p Robotic assisted aspiration of pelvic cyst, extensive lysis of adhesions, cystoscopy, repair of sigmoid colon deserosalization   Blood pressure this admit have been in the range of 346W systolic, she does have history of poorly controlled hypertension, being treated with amlodipine 5 and hydrochlorthiazide 25, unsure if compliant with medications per pcp notes   On our evaluation , she complained of generalised headache rated 10/10, no associated blurring of vision ,lightheadedness,nausea, has been having good urine output , and is not in extreme pain   Denies chest pain , shortness of breath ,abdominal pain , hematuria,   She does not have h/o heart disease/ckd/lung disease   - she is not a known smoker, uses marijuana occasionally and denies any other illicit drug use           Review of Systems:  Review of Systems   Constitutional:  Negative for chills and fever. HENT:  Negative for congestion and rhinorrhea. Respiratory:  Negative for cough, chest tightness and shortness of breath. Cardiovascular:  Negative for chest pain, palpitations and leg swelling. Gastrointestinal:  Negative for abdominal pain, blood in stool, constipation, nausea and vomiting. Genitourinary:  Negative for flank pain and hematuria. Musculoskeletal:  Negative for back pain and myalgias. Skin:  Negative for rash.    Neurological:  Negative for dizziness and 28.4 - 34.8 g/dL    RDW 11.2 (L) 11.8 - 14.4 %    Platelets 884 030 - 259 k/uL    MPV 9.8 8.1 - 13.5 fL    NRBC Automated 0.0 0.0 per 100 WBC   Comprehensive Metabolic Panel    Collection Time: 10/13/23  7:56 AM   Result Value Ref Range    Sodium 137 135 - 144 mmol/L    Potassium 3.2 (L) 3.7 - 5.3 mmol/L    Chloride 104 98 - 107 mmol/L    CO2 25 20 - 31 mmol/L    Anion Gap 8 (L) 9 - 17 mmol/L    Glucose 86 70 - 99 mg/dL    BUN 5 (L) 6 - 20 mg/dL    Creatinine 0.6 0.5 - 0.9 mg/dL    Est, Glom Filt Rate >60 >60 mL/min/1.73m2    Calcium 8.2 (L) 8.6 - 10.4 mg/dL    Total Protein 5.8 (L) 6.4 - 8.3 g/dL    Albumin 3.0 (L) 3.5 - 5.2 g/dL    Albumin/Globulin Ratio 1.1 1.0 - 2.5    Total Bilirubin 0.4 0.3 - 1.2 mg/dL    Alkaline Phosphatase 33 (L) 35 - 104 U/L    ALT 11 5 - 33 U/L    AST 20 <32 U/L       Imaging:   No results found. ASSESSMENT & PLAN     ASSESSMENT / PLAN:     Thank you for allowing us to see Sonya Can in consultation for hypertension     Principal Problem:    Postoperative state  Active Problems:    Robotic assisted aspiration of pelvic cyst, extensive lysis of adhesions, cystoscopy, repair of sigmoid colon deserosalization 10/10/23  Resolved Problems:    * No resolved hospital problems. *    Essential hypertension   -does have history of htn , home meds: hctz and amlo , unsure of compliance   -received hydrochlorthiazide 25 this morning   -initially was 173/106, repeat blood pressure 163/102 after scheduled hctz   -recommend amlodipine 5mg,   -recommend discharging on amlo 5 ,along with home med hctz,   -strongly recommend home blood pressure monitoring and follow up with pcp for further dose titrations   -she may require further evaluation to rule out secondary causes of htn, defer to pcp         Diet:per primary   DVT prophylaxis:per primary   GI prophylaxis:per primary     Nicole Quinonez MD  Internal Medicine Resident, PGY-1  [facility];  [place], [place]  10/13/2023,

## 2023-10-13 NOTE — PLAN OF CARE
Problem: Discharge Planning  Goal: Discharge to home or other facility with appropriate resources  10/13/2023 0641 by Flordia Frankel, RN  Outcome: Progressing  10/12/2023 1804 by Laury Herbert RN  Outcome: Progressing     Problem: Pain  Goal: Verbalizes/displays adequate comfort level or baseline comfort level  10/13/2023 0641 by Flordia Frankel, RN  Outcome: Progressing  10/12/2023 1804 by Laury Herbert RN  Outcome: Progressing     Problem: ABCDS Injury Assessment  Goal: Absence of physical injury  10/13/2023 0641 by Flordia Frankel, RN  Outcome: Progressing  10/12/2023 1804 by Laury Herbert RN  Outcome: Progressing     Problem: Safety - Adult  Goal: Free from fall injury  10/13/2023 0641 by Flordia Frankel, RN  Outcome: Progressing  10/12/2023 1804 by Laury Herbert RN  Outcome: Progressing     Problem: Nutrition Deficit:  Goal: Optimize nutritional status  10/13/2023 0641 by Flordia Frankel, RN  Outcome: Progressing  10/12/2023 1804 by Laury Herbert RN  Outcome: Progressing

## 2023-10-14 VITALS
RESPIRATION RATE: 16 BRPM | SYSTOLIC BLOOD PRESSURE: 135 MMHG | HEIGHT: 61 IN | WEIGHT: 155.87 LBS | DIASTOLIC BLOOD PRESSURE: 95 MMHG | OXYGEN SATURATION: 99 % | BODY MASS INDEX: 29.43 KG/M2 | HEART RATE: 72 BPM | TEMPERATURE: 98.4 F

## 2023-10-14 PROBLEM — G89.18 POSTOPERATIVE PAIN: Status: ACTIVE | Noted: 2023-10-14

## 2023-10-14 PROCEDURE — 6370000000 HC RX 637 (ALT 250 FOR IP)

## 2023-10-14 PROCEDURE — 2580000003 HC RX 258: Performed by: STUDENT IN AN ORGANIZED HEALTH CARE EDUCATION/TRAINING PROGRAM

## 2023-10-14 PROCEDURE — 99233 SBSQ HOSP IP/OBS HIGH 50: CPT | Performed by: HOSPITALIST

## 2023-10-14 PROCEDURE — 6370000000 HC RX 637 (ALT 250 FOR IP): Performed by: STUDENT IN AN ORGANIZED HEALTH CARE EDUCATION/TRAINING PROGRAM

## 2023-10-14 RX ORDER — POTASSIUM CHLORIDE 20 MEQ/1
40 TABLET, EXTENDED RELEASE ORAL 2 TIMES DAILY WITH MEALS
Status: DISCONTINUED | OUTPATIENT
Start: 2023-10-14 | End: 2023-10-14 | Stop reason: HOSPADM

## 2023-10-14 RX ORDER — AMLODIPINE BESYLATE 5 MG/1
5 TABLET ORAL DAILY
Qty: 90 TABLET | Refills: 1 | Status: SHIPPED | OUTPATIENT
Start: 2023-10-14

## 2023-10-14 RX ADMIN — POTASSIUM CHLORIDE 40 MEQ: 1500 TABLET, EXTENDED RELEASE ORAL at 08:52

## 2023-10-14 RX ADMIN — GABAPENTIN 300 MG: 300 CAPSULE ORAL at 08:52

## 2023-10-14 RX ADMIN — AMLODIPINE BESYLATE 5 MG: 5 TABLET ORAL at 08:52

## 2023-10-14 RX ADMIN — ACETAMINOPHEN 1000 MG: 500 TABLET ORAL at 08:52

## 2023-10-14 RX ADMIN — HYDROCHLOROTHIAZIDE 25 MG: 25 TABLET ORAL at 08:52

## 2023-10-14 RX ADMIN — FAMOTIDINE 20 MG: 20 TABLET, FILM COATED ORAL at 08:52

## 2023-10-14 RX ADMIN — SODIUM CHLORIDE, PRESERVATIVE FREE 10 ML: 5 INJECTION INTRAVENOUS at 08:52

## 2023-10-14 NOTE — PLAN OF CARE
Problem: Discharge Planning  Goal: Discharge to home or other facility with appropriate resources  10/14/2023 1127 by Roslyn Montes RN  Outcome: Completed  10/14/2023 0637 by Mariza Rosen RN  Outcome: Progressing     Problem: Pain  Goal: Verbalizes/displays adequate comfort level or baseline comfort level  10/14/2023 1127 by Roslyn Montes RN  Outcome: Completed  10/14/2023 0637 by Mariza Rosen RN  Outcome: Progressing     Problem: ABCDS Injury Assessment  Goal: Absence of physical injury  10/14/2023 1127 by Roslyn Montes RN  Outcome: Completed  10/14/2023 0637 by Mariza Rosen RN  Outcome: Progressing     Problem: Safety - Adult  Goal: Free from fall injury  10/14/2023 1127 by Roslyn Montes RN  Outcome: Completed  10/14/2023 0637 by Mariza Rosen RN  Outcome: Progressing     Problem: Nutrition Deficit:  Goal: Optimize nutritional status  10/14/2023 1127 by Roslyn Montes RN  Outcome: Completed  10/14/2023 0637 by Mariza Rosen RN  Outcome: Progressing

## 2023-10-14 NOTE — DISCHARGE INSTRUCTIONS
[address] [place] [address] ONC/MED SURG  [address] [place] [address]  Dept: [phone]  Loc: [phone]      To whom it may concern,   Please excuse Homero Henry, 1985 from work until Wednesday, October 18, 2023 as she has been recovering in the post operative period. Patient should not be driving for the next two weeks time.     Please don't hesitate to call with any questions or concerns      Sincerely,       Lupe Donovan MD  GynOnc Resident, PGY2  10/14/2023, 8:58 AM

## 2023-10-14 NOTE — DISCHARGE SUMMARY
Discharge teaching and instructions completed with patient using teachback method. AVS reviewed. IV removed. Patient voiced understanding regarding prescriptions, follow up appointments, and care of self at home. All questions answered.

## 2023-10-14 NOTE — PROGRESS NOTES
[facility]  Internal Medicine Teaching Residency Program  Inpatient Daily Progress Note  ______________________________________________________________________________    Patient: Ysabel May  YOB: 1985   [facility]:[id number]    Acct: [de-identified]     Room: [facility]  Admit date: 10/10/2023  Today's date: 10/14/23  Number of days in the hospital: 4    SUBJECTIVE   Admitting Diagnosis: Postoperative state  CC: for surgery   Pt examined at bedside. Chart & results reviewed.    Denies any complaints, headache resolved  Blood pressures well controlled with amlo and hctz      ROS:  Constitutional:  negative for chills, fevers, sweats  Respiratory:  negative for cough, dyspnea on exertion, hemoptysis, shortness of breath, wheezing  Cardiovascular:  negative for chest pain, chest pressure/discomfort, lower extremity edema, palpitations  Gastrointestinal:  negative for abdominal pain, constipation, diarrhea, nausea, vomiting  Neurological:  negative for dizziness, headache    BRIEF HISTORY     The patient is a pleasant 41 y.o. female presents with b/l adnexal cysts, admitted to OBGYN is POD 3 s/p Robotic assisted aspiration of pelvic cyst, extensive lysis of adhesions, cystoscopy, repair of sigmoid colon deserosalization   Blood pressure this admit have been in the range of 137I systolic, she does have history of poorly controlled hypertension, being treated with amlodipine 5 and hydrochlorthiazide 25, unsure if compliant with medications per pcp notes   On our evaluation , she complained of generalised headache rated 10/10, no associated blurring of vision ,lightheadedness,nausea, has been having good urine output , and is not in extreme pain   Denies chest pain , shortness of breath ,abdominal pain , hematuria,   She does not have h/o heart disease/ckd/lung disease   - she is not a known smoker, uses marijuana occasionally and denies any other illicit drug use

## 2023-10-15 NOTE — PROGRESS NOTES
CLINICAL PHARMACY NOTE: MEDS TO BEDS    Total # of Prescriptions Filled: 0   The following medications were delivered to the patient:  See notes    Additional Documentation: patient was discharged without meds,   I called patient and they will  at the pharmacy.

## 2023-10-16 ENCOUNTER — CARE COORDINATION (OUTPATIENT)
Dept: CASE MANAGEMENT | Age: 38
End: 2023-10-16

## 2023-10-16 ENCOUNTER — OFFICE VISIT (OUTPATIENT)
Dept: GASTROENTEROLOGY | Age: 38
End: 2023-10-16
Payer: COMMERCIAL

## 2023-10-16 VITALS
HEART RATE: 99 BPM | HEIGHT: 61 IN | SYSTOLIC BLOOD PRESSURE: 137 MMHG | WEIGHT: 132.38 LBS | BODY MASS INDEX: 24.99 KG/M2 | DIASTOLIC BLOOD PRESSURE: 95 MMHG

## 2023-10-16 DIAGNOSIS — R19.8 IRREGULAR BOWEL HABITS: Primary | ICD-10-CM

## 2023-10-16 DIAGNOSIS — G89.18 POST-OPERATIVE PAIN: Primary | ICD-10-CM

## 2023-10-16 DIAGNOSIS — K59.00 CONSTIPATION, UNSPECIFIED CONSTIPATION TYPE: ICD-10-CM

## 2023-10-16 PROCEDURE — 3075F SYST BP GE 130 - 139MM HG: CPT | Performed by: INTERNAL MEDICINE

## 2023-10-16 PROCEDURE — 99213 OFFICE O/P EST LOW 20 MIN: CPT | Performed by: INTERNAL MEDICINE

## 2023-10-16 PROCEDURE — 3080F DIAST BP >= 90 MM HG: CPT | Performed by: INTERNAL MEDICINE

## 2023-10-16 RX ORDER — DOCUSATE SODIUM 100 MG/1
100 CAPSULE, LIQUID FILLED ORAL 2 TIMES DAILY
Qty: 60 CAPSULE | Refills: 0 | Status: SHIPPED | OUTPATIENT
Start: 2023-10-16 | End: 2023-11-15

## 2023-10-16 ASSESSMENT — ENCOUNTER SYMPTOMS
BLOOD IN STOOL: 0
CHEST TIGHTNESS: 0
EYES NEGATIVE: 1
ABDOMINAL PAIN: 1
RECTAL PAIN: 0
SHORTNESS OF BREATH: 0
VOICE CHANGE: 0
CONSTIPATION: 1
ALLERGIC/IMMUNOLOGIC NEGATIVE: 1
NAUSEA: 0
TROUBLE SWALLOWING: 0
DIARRHEA: 1
COUGH: 0
RESPIRATORY NEGATIVE: 1
VOMITING: 0
WHEEZING: 0

## 2023-10-16 NOTE — CARE COORDINATION
Care Transitions Initial Follow Up Call    Call within 2 business days of discharge: Yes    Patient Current Location:  Home: [address] [place] [address],[phone] [id number]    LPN Care Coordinator contacted the patient by telephone to perform post hospital discharge assessment. Verified name and  with patient as identifiers. Provided introduction to self, and explanation of the LPN Care Coordinator role. Patient: Kurt Mccullough Patient : 1985   MRN: [id number]  Reason for Admission: Postoperative pain   Discharge Date: 10/14/23 RARS: Readmission Risk Score: 9.4      Last Discharge Facility       Date Complaint Diagnosis Description Type Department Provider    10/10/23  Postoperative pain . .. Admission (Discharged) Melvin Brown MD            Was this an external facility discharge? No Discharge Facility: [facility]    Challenges to be reviewed by the provider   Additional needs identified to be addressed with provider: Yes Hospital follow up with you. Did not see any available appointments with you. Can staff address? Method of communication with provider: chart routing. Transitions of Care Initial Call:  Explained the role of Care Transition Nurse and the Transition program, patient is agreeable to follow up calls Post discharge from the [address] [place] [address] to [facility] today. She reports she is feeling good. No c//o of pain at this time. She denies HA sob ( encouraged use of IS but she left it at the hospital) nausea vomiting or diarrhea. She is ambulating with out problems no pain in  legs. Voiding and bowels are moving. Appetite is not all the way back, states I have no problem. Writer encouraged soft bland diet and to remain hydrated. She has four incisions steri strips intact and in place. No redness or warmth coming from site. Denies fever or chills. Reminded to watch for s/s of infection . Discussed PCP HFU there were no appointment available message routed.   Pt does have

## 2023-10-17 ENCOUNTER — TELEPHONE (OUTPATIENT)
Dept: GYNECOLOGIC ONCOLOGY | Age: 38
End: 2023-10-17

## 2023-10-17 NOTE — TELEPHONE ENCOUNTER
Returned call to patient to check on her concern. She states yesterday she had 4 bowel movements with associated cramping and nausea. She is taking 4 senna tablets a day and she was prescribed Colace yesterday. Patient states that she does not have constipation. I have asked the patient to drop down on her senna and take 1 tablet twice a day plenty of fluids she may use the Colace as well if needed for constipation. Patient states her aunt will be given her Lovenox injection today and tomorrow this will complete her therapy. 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 Patient voiced understanding and appreciation.

## 2023-10-17 NOTE — TELEPHONE ENCOUNTER
Pt called asking if she can extend her leave from work until 10/23/23. Pt states she is in a lot of pain with nausea. She is having several BM's per day and is taking all prescribed medications. Also, she would like to know where she can have her remaining injections administered. Pt and family members do not feel comfortable giving injections at home. Please advise pt.

## 2023-10-22 PROBLEM — K66.0 INTRA-ABDOMINAL ADHESIONS: Status: ACTIVE | Noted: 2023-10-22

## 2023-10-23 ENCOUNTER — CARE COORDINATION (OUTPATIENT)
Dept: CASE MANAGEMENT | Age: 38
End: 2023-10-23

## 2023-10-23 NOTE — CARE COORDINATION
Care Transitions Follow Up Call    Patient Current Location:  Home: [address] [facility] [address] [facility]    Care Transition Nurse contacted the patient by telephone to follow up after admission on 10/10/23. Verified name and  with patient as identifiers. Patient: Néstor Salazar  Patient : 1985   MRN: [id number]  Reason for Admission: Pelvic cyst aspiration, Cystoscopy, adhesions repair  Discharge Date: 10/14/23 RARS: Readmission Risk Score: 9.4      Needs to be reviewed by the provider   Additional needs identified to be addressed with provider: No  none             Method of communication with provider: none. Spoke to patient, stated she is doing OK overall, bowels are moving regularly. Sated she does have pain prior to BM. Bowels are formed, soft. Taking Senna prn. Steri strips in place. Incision sites wnl. Appetite is OK. No new/worsening symptoms. Pt has transportation to appts with PCP, post op on 10/26. No needs or concerns at this time. Addressed changes since last contact:  medications-taking stool softener prn, has HFU this week  Discussed follow-up appointments. If no appointment was previously scheduled, appointment scheduling offered: Yes. Is follow up appointment scheduled within 7 days of discharge? Yes. Follow Up  Future Appointments   Date Time Provider [address] [place] [address]   10/26/2023 11:10 AM Kurt Pinto  [address]   10/26/2023  2:00 PM Pam Brizuela PA-C GYN Oncology [address] [facility] Transition Nurse reviewed discharge instructions with patient and discussed any barriers to care and/or understanding of plan of care after discharge. Discussed appropriate site of care based on symptoms and resources available to patient including: PCP  Specialist  When to call Linda Licona. The patient agrees to contact the PCP office for questions related to their healthcare.      Patients top risk factors for readmission: medical condition-pelvic

## 2023-10-26 ENCOUNTER — OFFICE VISIT (OUTPATIENT)
Dept: INTERNAL MEDICINE | Age: 38
End: 2023-10-26
Payer: COMMERCIAL

## 2023-10-26 ENCOUNTER — OFFICE VISIT (OUTPATIENT)
Dept: GYNECOLOGIC ONCOLOGY | Age: 38
End: 2023-10-26

## 2023-10-26 VITALS
HEART RATE: 77 BPM | OXYGEN SATURATION: 100 % | HEIGHT: 61 IN | DIASTOLIC BLOOD PRESSURE: 76 MMHG | BODY MASS INDEX: 25.11 KG/M2 | SYSTOLIC BLOOD PRESSURE: 110 MMHG | WEIGHT: 133 LBS

## 2023-10-26 VITALS
BODY MASS INDEX: 25.28 KG/M2 | WEIGHT: 133.8 LBS | SYSTOLIC BLOOD PRESSURE: 109 MMHG | HEART RATE: 85 BPM | DIASTOLIC BLOOD PRESSURE: 77 MMHG | TEMPERATURE: 98.8 F

## 2023-10-26 DIAGNOSIS — Z98.890 POSTOPERATIVE STATE: ICD-10-CM

## 2023-10-26 DIAGNOSIS — Z09 HOSPITAL DISCHARGE FOLLOW-UP: Primary | ICD-10-CM

## 2023-10-26 DIAGNOSIS — N73.6 PELVIC ADHESIVE DISEASE: Primary | ICD-10-CM

## 2023-10-26 PROCEDURE — 1111F DSCHRG MED/CURRENT MED MERGE: CPT

## 2023-10-26 PROCEDURE — 3074F SYST BP LT 130 MM HG: CPT

## 2023-10-26 PROCEDURE — 3078F DIAST BP <80 MM HG: CPT

## 2023-10-26 PROCEDURE — 99214 OFFICE O/P EST MOD 30 MIN: CPT

## 2023-10-26 PROCEDURE — 99024 POSTOP FOLLOW-UP VISIT: CPT | Performed by: PHYSICIAN ASSISTANT

## 2023-10-26 ASSESSMENT — ENCOUNTER SYMPTOMS
ALLERGIC/IMMUNOLOGIC NEGATIVE: 1
FACIAL SWELLING: 0
ABDOMINAL PAIN: 1
SINUS PAIN: 0
BACK PAIN: 0
CHEST TIGHTNESS: 0
ABDOMINAL PAIN: 0
RESPIRATORY NEGATIVE: 1
CONSTIPATION: 0
VOMITING: 0
NAUSEA: 0
SORE THROAT: 0
WHEEZING: 0
SHORTNESS OF BREATH: 0
DIARRHEA: 0
COUGH: 0
EYES NEGATIVE: 1
COLOR CHANGE: 0

## 2023-10-26 NOTE — PROGRESS NOTES
Attending Physician Statement  I have discussed the care of [facility], including pertinent history and exam findings with the resident. I have reviewed the key elements of all parts of the encounter with the resident. I have seen and examined the patient with the resident and the key elements of all parts of the encounter have been performed by me. I agree with the assessment, and status of the problem list as documented. The plan and orders should include   Orders Placed This Encounter   Procedures    IA DISCHARGE MEDS RECONCILED W/ CURRENT OUTPATIENT MED LIST    and this was also documented by the resident. .      Patient seen and evaluated. She is here for posthospital follow-up visit. She was recently hospitalized for surgical procedure. During the hospitalization she had elevated blood pressure and was started on amlodipine. Patient states that she has been taking her medications as prescribed. Today her blood pressure is well controlled. She has no new complaints. She will follow-up with her regular PCP in 3 months time. The medication list was reviewed with the resident and is up to date. The return visit should be in 3 months .     Lydia Gomez MD  Attending Physician,  Department of Internal Medicine  [address] [place] [address] Internal Medicine  [facility]      10/26/2023, 12:30 PM

## 2023-10-26 NOTE — PROGRESS NOTES
Review of Systems   Constitutional:  Positive for appetite change (decreased). HENT:  Negative. Eyes: Negative. Respiratory: Negative. Cardiovascular: Negative. Gastrointestinal:  Positive for abdominal pain. Endocrine: Negative. Genitourinary:  Positive for pelvic pain (before urination or BM). Musculoskeletal: Negative. Skin: Negative. Neurological: Negative. Hematological:  Bruises/bleeds easily (hands - bruising from IV).
counseled on treatment recommendations she was to proceed with surgical removal of the mass. She ultimately underwent a robotic assisted aspiration of peritoneal cyst, extensive lysis of adhesions, cystoscopy and repair of sigmoid colon deserosalization on 10/10/2023. Intraoperative findings of a surgically absent uterus and cervix. Normal-appearing bilateral ovaries were adherent to the bladder pelvic sidewall peritoneum, bladder peritoneum and sigmoid colon. Small bowel loops adhered to the anterior abdominal wall. Sigmoid colon adherent to the anterior abdominal wall there is no evidence of ascites. Aside from the omental adhesions were small bowel adhesions and large bowel adhesions to the anterior abdominal wall, the omentum small and large bowel are normal appearing. The diaphragm, stomach and liver appeared smooth. There is no evidence of lymphadenopathy. There was an 8 cm cyst noted that was buried under adhesions deep in the pelvis. Peritoneal cyst was then opened and clear fluid was aspirated. Areas of the sigmoid colon that were densely adherent to the anterior abdominal wall with thickened serosa were imbricated with Vicryl sutures. Final pathology of pelvic washings were negative for malignancy. The patient's postoperative course was notable for hypertension. Patient was placed on amlodipine by internal medicine and discharged home in stable condition on postop day 3    Today, she is doing well. She reports using ibuprofen, Tylenol and gabapentin once daily. She reports medications are making her \"sleepy\". She has no abdominal pain. She has pelvic discomfort with defecation and urination. Denies dysuria or hematuria. She is ambulating without concerns. ROS:  I have personally reviewed and agree with the review of systems done by my ancillary staff in the [facility] documentation.     Objective:  Vitals:    10/26/23 1416   BP: 110/76   Site: Left Upper Arm   Position: Sitting

## 2023-10-26 NOTE — PROGRESS NOTES
Post-Discharge Transitional Care Follow Up      Karen Muñoz   YOB: 1985    Date of Office Visit:  10/26/2023  Date of Hospital Admission: 10/10/23  Date of Hospital Discharge: 10/14/23  Readmission Risk Score (high >=14%. Medium >=10%):Readmission Risk Score: 9.4      Care management risk score Rising risk (score 2-5) and Complex Care (Scores >=6): No Risk Score On File     Non face to face  following discharge, date last encounter closed (first attempt may have been earlier): 10/16/2023     Call initiated 2 business days of discharge: Yes     Hospital discharge follow-up  -     RI DISCHARGE MEDS RECONCILED W/ CURRENT OUTPATIENT MED LIST      Medical Decision Making: moderate complexity  Return in 1 month (on 11/26/2023). On this date 10/26/2023 I have spent 30 minutes reviewing previous notes, test results and face to face with the patient discussing the diagnosis and importance of compliance with the treatment plan as well as documenting on the day of the visit. Subjective:   Hospital discharge follow-up had robotic assisted cyst aspiration, lysis of adhesions, cystoscopy by Dr. Landon Marcial. Patient has seen Dr. Fadumo Francois for constipation since the discharge from the hospital, was prescribed docusate, constipation controlled. Blood pressure controlled on Norvasc 5 today no fever, chills. Surgery site okay. Inpatient course: Discharge summary reviewed- see chart. Interval history/Current status:   Bp controlled, norvasc 5  Have not check BP at home  No fever, chills   Dr Landon Marcial appt today. Gi- ok bowel, no constipation, on docusate. Sees Dr Fadumo Francois. Robotic Assisted Cyst Aspiration, Lysis of Adhesions, Cystoscopy 10/10/2023. Pelvic washings negative for malignancy.       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

## 2023-10-30 ENCOUNTER — TELEPHONE (OUTPATIENT)
Dept: GYNECOLOGIC ONCOLOGY | Age: 38
End: 2023-10-30

## 2023-10-30 NOTE — TELEPHONE ENCOUNTER
Pt called asking if she can get a letter for work stating she was able to work from home from 10/23 to 10/27. Please advise.

## 2023-10-31 ENCOUNTER — TELEPHONE (OUTPATIENT)
Dept: GYNECOLOGIC ONCOLOGY | Age: 38
End: 2023-10-31

## 2023-10-31 ENCOUNTER — CARE COORDINATION (OUTPATIENT)
Dept: CASE MANAGEMENT | Age: 38
End: 2023-10-31

## 2023-10-31 NOTE — TELEPHONE ENCOUNTER
Yes ok for letter as patient was continuing to heal from surgery during that time period  Thanks  Chana

## 2023-10-31 NOTE — TELEPHONE ENCOUNTER
Patient called requesting the return to work letter dates are updated from 10/16 through 10/27/2023. 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 She prefers to have this letter through [address] [place] [address] or email it at [email or web address]. please notify patient how she may obtain this letter.    Thank you  Chana

## 2023-10-31 NOTE — CARE COORDINATION
Care Transitions Follow Up Call    Patient Current Location:  Home: [address] [facility]    Care Transition Nurse contacted the patient by telephone to follow up after admission on 10/10/23. Verified name and  with patient as identifiers. Patient: Charlie Curtis  Patient : 1985   MRN: [id number]  Reason for Admission: adnexal cystic structures  Discharge Date: 10/14/23 RARS: Readmission Risk Score: 9.4      Needs to be reviewed by the provider   Additional needs identified to be addressed with provider: No  none             Method of communication with provider: none. Was able to contact Josefina Wei for transitional outreach. She stated that she was having Rt abdominal pain. She stated that it feels like it did prior to surgery. She did update the surgeon and was told that she would benefit from therapy. She said that she will have to call her insurance because she thinks that the therapy will be an out of pocket expense. She denied any triggers that would make the pain worse. She said that sitting does ease the pain a little. She said that when she does go to the bathroom, bowel/bladder, she does have some abdominal discomfort. She denied any fever/chills, N/V,  malaise and her incisions are healing. She will be giving it until the end of the week and if no improvement, she will be asking if she would need an US or scan. She had no further questions or concerns. Addressed changes since last contact:  none  Discussed follow-up appointments. If no appointment was previously scheduled, appointment scheduling offered: Yes. Is follow up appointment scheduled within 7 days of discharge? Yes. Follow Up  No future appointments. Care Transition Nurse reviewed medical action plan with patient and discussed any barriers to care and/or understanding of plan of care after discharge.  Discussed appropriate site of care based on symptoms and resources available to patient including:

## 2023-11-07 ENCOUNTER — CARE COORDINATION (OUTPATIENT)
Dept: CASE MANAGEMENT | Age: 38
End: 2023-11-07

## 2023-11-07 NOTE — CARE COORDINATION
Care Transitions Follow Up Call    Patient Current Location:  Home: [address] [place], [place] [address]e S Coordinator contacted the patient by telephone to follow up after admission on 10/10 . Verified name and  with patient as identifiers. Patient: Lisa Whittington  Patient : 1985   MRN: [id number]  Reason for Admission: post op pain   Discharge Date: 10/14/23 RARS: Readmission Risk Score: 9.4      Needs to be reviewed by the provider   Additional needs identified to be addressed with provider: No  none             Method of communication with provider: none. Subsequent transitional call. Spoke to [employer] today. She reports she still has a little abdominal pain but not to bad. She said she's doing okay her incisions are healing. She denies fever chills nausea / vomiting. She said that she is tired. She spoke with surgeon last week and was told some pain is to be expected. When she does eat she will eat 1/2 the sandwich. Writer suggested that she try protein shakes. She is voiding okay and moving her bowels. She will slowly work her way to going to the gym , she said she would have co-pays if she did therapy. She had no others questions or concerns thanked writer for calling. Addressed changes since last contact:   Therapy pain protein shakes   Discussed follow-up appointments. If no appointment was previously scheduled, appointment scheduling offered: Yes. Is follow up appointment scheduled within 7 days of discharge? Yes. Follow Up  No future appointments. LPN Care Coordinator reviewed discharge instructions, medical action plan, and red flags with patient and discussed any barriers to care and/or understanding of plan of care after discharge. Discussed appropriate site of care based on symptoms and resources available to patient including: PCP  Specialist  Urgent care clinics  When to call Linda Licona.  The patient agrees to contact the PCP office for questions

## 2023-11-14 ENCOUNTER — CARE COORDINATION (OUTPATIENT)
Dept: CASE MANAGEMENT | Age: 38
End: 2023-11-14

## 2023-11-14 NOTE — CARE COORDINATION
Care Transitions Outreach Attempt #1    Call within 2 business days of discharge: Yes   Attempt #1 to reach patient for transitions of care follow up. Unable to reach patient. Left message requesting call back. Patient: Heidy Mcgovern Patient : 1985 MRN: [id number]    Last Discharge Facility       Date Complaint Diagnosis Description Type Department Provider    10/10/23  Postoperative pain . .. Admission (Discharged) Yvette La MD              Noted following upcoming appointments from discharge chart review:   [facility] No future appointments. Plan: final call. Back to work? Pelvic floor rehab? Concerns? Nano Cramer RN BSN [facility]  Care Transitions Nurse  [phone]   [email or web address]

## 2023-11-15 ENCOUNTER — CARE COORDINATION (OUTPATIENT)
Dept: CASE MANAGEMENT | Age: 38
End: 2023-11-15

## 2023-11-15 NOTE — CARE COORDINATION
Care Transitions Outreach Attempt # 2     Call within 2 business days of discharge: Yes   Attempted to reach patient for transitions of care follow up. Unable to reach patient. Left HIPPA compliant  requested a return call. Will resolve episode if no return call. Patient: Crissy Hand Patient : 1985 MRN: [id number]    Last Discharge Facility       Date Complaint Diagnosis Description Type Department Provider    10/10/23  Postoperative pain . .. Admission (Discharged) Saloni Arenas MD              Was this an external facility discharge? No Discharge Facility Name: [facility]    No future appointments.

## 2023-12-01 ENCOUNTER — TELEPHONE (OUTPATIENT)
Dept: GYNECOLOGIC ONCOLOGY | Age: 38
End: 2023-12-01

## 2023-12-01 DIAGNOSIS — R10.2 FEMALE PELVIC PAIN: Primary | ICD-10-CM

## 2023-12-01 NOTE — TELEPHONE ENCOUNTER
Pt called stating she is having pelvic pain again just like before her surgery. The pain comes and goes throughout  the day and has been happening since Thanksgiving. Pt denies any fever, nausea, vomiting or vaginal bleeding. Pt is having some increased BM's. Pt would like to discuss her sx and know if she needs another US. Please advise pt.

## 2023-12-01 NOTE — TELEPHONE ENCOUNTER
notified verbal order for CT Abdomen/Pelvis with IV and oral contrast.Schedule f/u appt with ERASTO Yu once Ct is scheduled. If pain is uncontrolled patient to go to ED. Patient notified. She reports she is not having pain at his time. She states she was earlier but took Gabapentin. She states she will schedule CT next week then call office to notify of date. She voiced understanding of going to ED for uncontrolled pain.

## 2023-12-26 DIAGNOSIS — I10 UNCONTROLLED HYPERTENSION: Primary | ICD-10-CM

## 2023-12-29 ENCOUNTER — HOSPITAL ENCOUNTER (OUTPATIENT)
Dept: CT IMAGING | Age: 38
Discharge: HOME | End: 2023-12-29
Payer: COMMERCIAL

## 2023-12-29 DIAGNOSIS — R10.2 FEMALE PELVIC PAIN: ICD-10-CM

## 2023-12-29 PROCEDURE — 2500000003 HC RX 250 WO HCPCS: Performed by: PHYSICIAN ASSISTANT

## 2023-12-29 PROCEDURE — 6360000004 HC RX CONTRAST MEDICATION: Performed by: PHYSICIAN ASSISTANT

## 2023-12-29 PROCEDURE — 74177 CT ABD & PELVIS W/CONTRAST: CPT

## 2023-12-29 RX ADMIN — BARIUM SULFATE 900 ML: 20 SUSPENSION ORAL at 15:15

## 2023-12-29 RX ADMIN — IOPAMIDOL 75 ML: 755 INJECTION, SOLUTION INTRAVENOUS at 15:14

## 2024-01-03 ENCOUNTER — TELEMEDICINE (OUTPATIENT)
Dept: GYNECOLOGIC ONCOLOGY | Age: 39
End: 2024-01-03
Payer: COMMERCIAL

## 2024-01-03 DIAGNOSIS — N94.9 ADNEXAL CYST: ICD-10-CM

## 2024-01-03 DIAGNOSIS — N73.6 PELVIC ADHESIVE DISEASE: Primary | ICD-10-CM

## 2024-01-03 PROCEDURE — 99212 OFFICE O/P EST SF 10 MIN: CPT | Performed by: PHYSICIAN ASSISTANT

## 2024-01-03 RX ORDER — NORETHINDRONE ACETATE AND ETHINYL ESTRADIOL .02; 1 MG/1; MG/1
1 TABLET ORAL DAILY
Qty: 1 PACKET | Refills: 11 | Status: SHIPPED | OUTPATIENT
Start: 2024-01-03

## 2024-01-03 ASSESSMENT — ENCOUNTER SYMPTOMS
GASTROINTESTINAL NEGATIVE: 1
RESPIRATORY NEGATIVE: 1
BACK PAIN: 0

## 2024-01-03 NOTE — PROGRESS NOTES
1/3/2024    TELEHEALTH EVALUATION -- Audio/Visual   Jose Saleem (:  1985) has requested an audio/video evaluation for the following concern(s): Pelvic pain.    HPI: Patient is a 38-year-old patient who is established with [facility] gynecologic oncology for management of chronic pelvic pain and pelvic cyst.    Of note patient underwent hysterectomy in 2022 for pelvic pain and fibroid uterus.  She had previously undergone a diagnostic laparoscopy, lysis of adhesions, dilation curettage with hysteroscopy and IUD placement.  However due to her persistent pain she then underwent a total abdominal hysterectomy, bilateral salpingectomy, cystoscopy, cystotomy repair in 2022.    Patient had continued concerns of pelvic pain post operatively.  She was further evaluated with serial imaging studies.    MRI pelvis in May 2023 revealed a multiloculated cystic mass measuring 6.7 cm without abnormal enhancement located superior to the urinary bladder.  In the left adnexa there was a cystic structure measuring nearly 10 cm in length which is evidently a hydrosalpinx.  Right adnexa revealed a 1.6 cm hemorrhagic ovarian follicle/cyst.    US guided IR aspiration of pelvic cyst was completed in May 2023    Follow-up pelvic ultrasound in 2023 was limited study however persistence of bilateral cystic adnexal masses appear to be smaller.    Repeat MRI pelvis in 2023 revealed a significant decrease in size of the tubular structure in the left adnexa.  There was persistent fluid in the left adnexa dependent pelvis.  There is interval enlargement of the right adnexal cyst.    A follow-up ultrasound 2023 which revealed the complex indeterminate bilateral adnexal cystic structures.  The tubular structure in the left adnexa was measuring up to 8.3 cm.    Patient was seen and her history was discussed with [facility] gynecologic oncology on 2023.  Patient was counseled on treatment recommendations she

## 2024-01-03 NOTE — PROGRESS NOTES
Review of Systems   Constitutional:  Positive for appetite change (decreased). Negative for fatigue and fever.   HENT:   Negative for lump/mass.    Respiratory: Negative.     Cardiovascular: Negative.    Gastrointestinal: Negative.    Endocrine: Negative.    Genitourinary: Negative.     Musculoskeletal:  Negative for back pain and flank pain.   Skin: Negative.    Neurological: Negative.

## 2024-01-30 ENCOUNTER — TELEPHONE (OUTPATIENT)
Dept: GYNECOLOGIC ONCOLOGY | Age: 39
End: 2024-01-30

## 2024-01-30 DIAGNOSIS — R18.8 PELVIC FLUID COLLECTION: ICD-10-CM

## 2024-01-30 DIAGNOSIS — R10.2 FEMALE PELVIC PAIN: ICD-10-CM

## 2024-01-30 DIAGNOSIS — N73.6 PELVIC ADHESIVE DISEASE: Primary | ICD-10-CM

## 2024-01-30 NOTE — TELEPHONE ENCOUNTER
Called reviewed patient's symptoms.  She is requesting a letter today stating she is working from home today due to pain, please generate letter.      We then discussed her symptoms are consistent with her previous chronic pelvic pain due to pelvic adhesive disease and fluid collections.  At last discussion on 1/3/24 options for conservative management versus IR drainage of pelvic cyst and patient wished to proceed with conservative management on continuous OCPs.  Today, she reports she has not started the OCP as she was unable to afford however plans to  today or tomorrow.  I encouraged patient she may still begin the medication as soon as obtained.  She is requesting to move forward with aspiration of the pelvic fluid collection for symptom management as she states her symptoms are persisting more frequently and increase in intensity. Order placed today and IR coordinator to be contacted for MD review. Patient aware to await confirmation of procedure by IR.

## 2024-01-30 NOTE — TELEPHONE ENCOUNTER
Letter for patient is done and signed. Patient will .    IR has been contacted regarding order that was placed to have fluid drained. Order will be reviewed by doctor.

## 2024-01-30 NOTE — TELEPHONE ENCOUNTER
Patient called and and stated she was in pain and is requesting a letter to work from home, today.

## 2024-02-01 ENCOUNTER — TELEPHONE (OUTPATIENT)
Dept: INTERVENTIONAL RADIOLOGY/VASCULAR | Age: 39
End: 2024-02-01

## 2024-02-01 ENCOUNTER — TELEPHONE (OUTPATIENT)
Dept: GYNECOLOGIC ONCOLOGY | Age: 39
End: 2024-02-01

## 2024-02-01 NOTE — TELEPHONE ENCOUNTER
Spoke to María in regard to IR request. Per Dr. El, recommend f/u ultrasound in 6 months. Both cysts appear stable. She voiced understanding.     N/A

## 2024-02-01 NOTE — TELEPHONE ENCOUNTER
Katelin IR  called regarding IR order for fluid aspiration. She states that  recommends f/u ultrasound in 6 months,both cysts appear stable.

## 2024-02-05 ENCOUNTER — TELEPHONE (OUTPATIENT)
Dept: GYNECOLOGIC ONCOLOGY | Age: 39
End: 2024-02-05

## 2024-02-05 NOTE — TELEPHONE ENCOUNTER
Carla from IR returned call and stated that, on her end, Dr. El recommends F/U in 6 months. I let her know that Dr. Villar had spoken with Dr. El and patient was to be scheduled for procedure. This is noted on telephone encounter. She stated she was not there on Friday, so she will discuss with Dr. El.

## 2024-02-05 NOTE — TELEPHONE ENCOUNTER
Patient called asking if a procedure had been scheduled for her. I explained that Dr. Villar had a conversation with IR Doctor regarding her procedure, and that someone from IR should be reaching out soon.    Called IR. No answer. Left VM with patient's information and that she needed scheduled. I left our office number in case there were any questions.

## 2024-02-07 ENCOUNTER — TELEPHONE (OUTPATIENT)
Dept: INTERVENTIONAL RADIOLOGY/VASCULAR | Age: 39
End: 2024-02-07

## 2024-02-13 ENCOUNTER — HOSPITAL ENCOUNTER (OUTPATIENT)
Dept: ULTRASOUND IMAGING | Age: 39
Discharge: HOME | End: 2024-02-15
Payer: COMMERCIAL

## 2024-02-13 VITALS
HEIGHT: 61 IN | OXYGEN SATURATION: 100 % | HEART RATE: 77 BPM | DIASTOLIC BLOOD PRESSURE: 118 MMHG | TEMPERATURE: 98.1 F | BODY MASS INDEX: 25.11 KG/M2 | RESPIRATION RATE: 12 BRPM | SYSTOLIC BLOOD PRESSURE: 165 MMHG | WEIGHT: 133 LBS

## 2024-02-13 DIAGNOSIS — R18.8 PELVIC FLUID COLLECTION: ICD-10-CM

## 2024-02-13 DIAGNOSIS — N73.6 PELVIC ADHESIVE DISEASE: ICD-10-CM

## 2024-02-13 DIAGNOSIS — R10.2 FEMALE PELVIC PAIN: ICD-10-CM

## 2024-02-13 LAB
CASE NUMBER:: NORMAL
INR PPP: 1
MICROORGANISM/AGENT SPEC: NORMAL
PARTIAL THROMBOPLASTIN TIME: 27.3 SEC (ref 23–36.5)
PLATELET # BLD AUTO: 237 K/UL (ref 138–453)
PROTHROMBIN TIME: 12.8 SEC (ref 11.7–14.9)
SPECIMEN DESCRIPTION: NORMAL
SPECIMEN DESCRIPTION: NORMAL

## 2024-02-13 PROCEDURE — 6360000002 HC RX W HCPCS: Performed by: RADIOLOGY

## 2024-02-13 PROCEDURE — 87205 SMEAR GRAM STAIN: CPT

## 2024-02-13 PROCEDURE — 87077 CULTURE AEROBIC IDENTIFY: CPT

## 2024-02-13 PROCEDURE — 7100000040 HC SPAR PHASE II RECOVERY - FIRST 15 MIN

## 2024-02-13 PROCEDURE — 88305 TISSUE EXAM BY PATHOLOGIST: CPT

## 2024-02-13 PROCEDURE — 87075 CULTR BACTERIA EXCEPT BLOOD: CPT

## 2024-02-13 PROCEDURE — 87076 CULTURE ANAEROBE IDENT EACH: CPT

## 2024-02-13 PROCEDURE — 7100000041 HC SPAR PHASE II RECOVERY - ADDTL 15 MIN

## 2024-02-13 PROCEDURE — 85730 THROMBOPLASTIN TIME PARTIAL: CPT

## 2024-02-13 PROCEDURE — 85610 PROTHROMBIN TIME: CPT

## 2024-02-13 PROCEDURE — 87186 SC STD MICRODIL/AGAR DIL: CPT

## 2024-02-13 PROCEDURE — 87185 SC STD ENZYME DETCJ PER NZM: CPT

## 2024-02-13 PROCEDURE — 2580000003 HC RX 258: Performed by: PHYSICIAN ASSISTANT

## 2024-02-13 PROCEDURE — 85049 AUTOMATED PLATELET COUNT: CPT

## 2024-02-13 PROCEDURE — C1729 CATH, DRAINAGE: HCPCS

## 2024-02-13 PROCEDURE — 88173 CYTOPATH EVAL FNA REPORT: CPT

## 2024-02-13 PROCEDURE — 87070 CULTURE OTHR SPECIMN AEROBIC: CPT

## 2024-02-13 RX ORDER — FENTANYL CITRATE 50 UG/ML
INJECTION, SOLUTION INTRAMUSCULAR; INTRAVENOUS PRN
Status: DISCONTINUED | OUTPATIENT
Start: 2024-02-13 | End: 2024-02-16 | Stop reason: HOSPADM

## 2024-02-13 RX ORDER — SODIUM CHLORIDE 9 MG/ML
INJECTION, SOLUTION INTRAVENOUS CONTINUOUS
Status: DISCONTINUED | OUTPATIENT
Start: 2024-02-13 | End: 2024-02-16 | Stop reason: HOSPADM

## 2024-02-13 RX ORDER — ACETAMINOPHEN 325 MG/1
650 TABLET ORAL EVERY 4 HOURS PRN
Status: DISCONTINUED | OUTPATIENT
Start: 2024-02-13 | End: 2024-02-16 | Stop reason: HOSPADM

## 2024-02-13 RX ADMIN — FENTANYL CITRATE 50 MCG: 50 INJECTION, SOLUTION INTRAMUSCULAR; INTRAVENOUS at 10:30

## 2024-02-13 RX ADMIN — SODIUM CHLORIDE: 9 INJECTION, SOLUTION INTRAVENOUS at 08:39

## 2024-02-13 RX ADMIN — FENTANYL CITRATE 50 MCG: 50 INJECTION, SOLUTION INTRAMUSCULAR; INTRAVENOUS at 10:22

## 2024-02-13 NOTE — PROGRESS NOTES
Discharge instructions given to pt and family. All questions answered at this time. Pt assisted to door with belongings via wheelchair.

## 2024-02-13 NOTE — PROGRESS NOTES
Here for a pelvic fluid aspiration. Consent done. On monitors. Dr El at side. Pelvic fluid accessed and 24 ml of red fluid pulled off. Fluid will be sent to lab.Report called and patient back up to [facility].

## 2024-02-13 NOTE — BRIEF OP NOTE
Brief Postoperative Note    Jose Saleem  YOB: 1985  [id number]    Pre-operative Diagnosis: Adnexal cysts, L>R    Post-operative Diagnosis: Same    Procedure: TV left cyst aspiration    Medications Given: fentanyl    Anesthesia: Local    Surgeons/Assistants: KARIN SCHROEDER MD    Estimated Blood Loss: minimal    Complications: none    Specimens: were obtained    Findings: 24 mls turbid bloody fluid removed from bilobed larger left adnexal cyst via TV US with considerable reduction in size. Pt tolerated well. Samples sent for C/s and cytology.      Electronically signed by KARIN SCHROEDER MD on 2/13/2024 at 10:47 AM

## 2024-02-13 NOTE — H&P
inserted 2/14/22 (02/14/2022), Prothrombin gene mutation (HCC), S/p Diagnostic laparoscopy, hysis of adhesions, hysteroscopy, dilatation and curettage, Mirena IUD insertion (02/14/2022), Snores, Under care of team (09/25/2023), and Under care of team (09/25/2023).   PLANS:   IR drainage pelvic fluid/cyst    ANABELA BLACKWOOD PA-C  Electronically signed 2/13/2024 at 8:46 AM

## 2024-02-14 LAB — SURGICAL PATHOLOGY REPORT: NORMAL

## 2024-02-15 ENCOUNTER — HOSPITAL ENCOUNTER (EMERGENCY)
Age: 39
Discharge: HOME | End: 2024-02-15
Attending: EMERGENCY MEDICINE
Payer: COMMERCIAL

## 2024-02-15 ENCOUNTER — APPOINTMENT (OUTPATIENT)
Dept: CT IMAGING | Age: 39
End: 2024-02-15
Payer: COMMERCIAL

## 2024-02-15 ENCOUNTER — TELEPHONE (OUTPATIENT)
Dept: GYNECOLOGIC ONCOLOGY | Age: 39
End: 2024-02-15

## 2024-02-15 VITALS
WEIGHT: 138.89 LBS | TEMPERATURE: 97.8 F | RESPIRATION RATE: 18 BRPM | BODY MASS INDEX: 26.24 KG/M2 | HEART RATE: 78 BPM | OXYGEN SATURATION: 96 % | SYSTOLIC BLOOD PRESSURE: 146 MMHG | DIASTOLIC BLOOD PRESSURE: 92 MMHG

## 2024-02-15 DIAGNOSIS — N93.9 VAGINAL BLEEDING: Primary | ICD-10-CM

## 2024-02-15 DIAGNOSIS — K59.00 CONSTIPATION, UNSPECIFIED CONSTIPATION TYPE: Primary | ICD-10-CM

## 2024-02-15 LAB
ANION GAP SERPL CALCULATED.3IONS-SCNC: 7 MMOL/L (ref 9–17)
BASOPHILS # BLD: <0.03 K/UL (ref 0–0.2)
BASOPHILS NFR BLD: 0 % (ref 0–2)
BUN SERPL-MCNC: 8 MG/DL (ref 6–20)
CALCIUM SERPL-MCNC: 8.7 MG/DL (ref 8.6–10.4)
CHLORIDE SERPL-SCNC: 105 MMOL/L (ref 98–107)
CO2 SERPL-SCNC: 26 MMOL/L (ref 20–31)
CREAT SERPL-MCNC: 0.6 MG/DL (ref 0.5–0.9)
EOSINOPHIL # BLD: <0.03 K/UL (ref 0–0.44)
EOSINOPHILS RELATIVE PERCENT: 0 % (ref 1–4)
ERYTHROCYTE [DISTWIDTH] IN BLOOD BY AUTOMATED COUNT: 11.6 % (ref 11.8–14.4)
GFR SERPL CREATININE-BSD FRML MDRD: >60 ML/MIN/1.73M2
GLUCOSE SERPL-MCNC: 85 MG/DL (ref 70–99)
HCG SERPL QL: NEGATIVE
HCT VFR BLD AUTO: 42.2 % (ref 36.3–47.1)
HGB BLD-MCNC: 14.4 G/DL (ref 11.9–15.1)
IMM GRANULOCYTES # BLD AUTO: <0.03 K/UL (ref 0–0.3)
IMM GRANULOCYTES NFR BLD: 0 %
LYMPHOCYTES NFR BLD: 1.22 K/UL (ref 1.1–3.7)
LYMPHOCYTES RELATIVE PERCENT: 21 % (ref 24–43)
MCH RBC QN AUTO: 32 PG (ref 25.2–33.5)
MCHC RBC AUTO-ENTMCNC: 34.1 G/DL (ref 28.4–34.8)
MCV RBC AUTO: 93.8 FL (ref 82.6–102.9)
MONOCYTES NFR BLD: 0.54 K/UL (ref 0.1–1.2)
MONOCYTES NFR BLD: 9 % (ref 3–12)
NEUTROPHILS NFR BLD: 70 % (ref 36–65)
NEUTS SEG NFR BLD: 4.06 K/UL (ref 1.5–8.1)
NRBC BLD-RTO: 0 PER 100 WBC
PLATELET # BLD AUTO: 228 K/UL (ref 138–453)
PMV BLD AUTO: 10.2 FL (ref 8.1–13.5)
POTASSIUM SERPL-SCNC: 3.9 MMOL/L (ref 3.7–5.3)
RBC # BLD AUTO: 4.5 M/UL (ref 3.95–5.11)
SODIUM SERPL-SCNC: 138 MMOL/L (ref 135–144)
WBC OTHER # BLD: 5.9 K/UL (ref 3.5–11.3)

## 2024-02-15 PROCEDURE — 80048 BASIC METABOLIC PNL TOTAL CA: CPT

## 2024-02-15 PROCEDURE — 6360000004 HC RX CONTRAST MEDICATION: Performed by: EMERGENCY MEDICINE

## 2024-02-15 PROCEDURE — 96372 THER/PROPH/DIAG INJ SC/IM: CPT

## 2024-02-15 PROCEDURE — 74177 CT ABD & PELVIS W/CONTRAST: CPT

## 2024-02-15 PROCEDURE — 99285 EMERGENCY DEPT VISIT HI MDM: CPT

## 2024-02-15 PROCEDURE — 85025 COMPLETE CBC W/AUTO DIFF WBC: CPT

## 2024-02-15 PROCEDURE — 6360000002 HC RX W HCPCS: Performed by: EMERGENCY MEDICINE

## 2024-02-15 PROCEDURE — 84703 CHORIONIC GONADOTROPIN ASSAY: CPT

## 2024-02-15 RX ORDER — KETOROLAC TROMETHAMINE 30 MG/ML
30 INJECTION, SOLUTION INTRAMUSCULAR; INTRAVENOUS ONCE
Status: COMPLETED | OUTPATIENT
Start: 2024-02-15 | End: 2024-02-15

## 2024-02-15 RX ORDER — SENNOSIDES A AND B 8.6 MG/1
2 TABLET, FILM COATED ORAL 2 TIMES DAILY
Qty: 28 TABLET | Refills: 0 | Status: SHIPPED | OUTPATIENT
Start: 2024-02-15 | End: 2024-02-22

## 2024-02-15 RX ADMIN — KETOROLAC TROMETHAMINE 30 MG: 30 INJECTION, SOLUTION INTRAMUSCULAR; INTRAVENOUS at 12:24

## 2024-02-15 RX ADMIN — IOPAMIDOL 75 ML: 755 INJECTION, SOLUTION INTRAVENOUS at 16:04

## 2024-02-15 ASSESSMENT — PAIN DESCRIPTION - LOCATION
LOCATION: VAGINA
LOCATION: VAGINA

## 2024-02-15 ASSESSMENT — PAIN SCALES - GENERAL
PAINLEVEL_OUTOF10: 7
PAINLEVEL_OUTOF10: 7

## 2024-02-15 ASSESSMENT — PAIN - FUNCTIONAL ASSESSMENT: PAIN_FUNCTIONAL_ASSESSMENT: 0-10

## 2024-02-15 ASSESSMENT — PAIN DESCRIPTION - PAIN TYPE: TYPE: ACUTE PAIN

## 2024-02-15 NOTE — ED PROVIDER NOTES
[facility] ED  Emergency Department Encounter  Emergency Medicine Resident     Pt Name:Jose Saleem  MRN: [id number]  Birthdate 1985  Date of evaluation: 2/15/24  PCP:  Maria Esther Damon MD  Note Started: 12:06 PM EST      CHIEF COMPLAINT       Chief Complaint   Patient presents with    Vaginal Pain    Vaginal Bleeding     24 procedure for cyst removal       HISTORY OF PRESENT ILLNESS  (Location/Symptom, Timing/Onset, Context/Setting, Quality, Duration, Modifying Factors, Severity.)      Jose Saleem is a 38 y.o. female who presents with vaginal bleeding that started this morning after a bowel movement.  Patient states that she recently had cyst removal transvaginally.  She states she has had increased pain this morning as well.  She complains of suprapubic abdominal pain.  She states that she called her doctor and was directed to the emergency department.  She is on antihypertensives but is not yet taken her medication this morning.  Otherwise healthy.  She has not taken anything for her pain yet this morning.    Patient is status post transvaginal left cyst aspiration by IR on 2024.  She states she called her doctor this morning and was sent in for evaluation given the bleeding.    PAST MEDICAL / SURGICAL / SOCIAL / FAMILY HISTORY      has a past medical history of Acne, Benign essential hypertension antepartum, Closed nondisplaced fracture of distal pole of navicular bone of left wrist, COVID-19, COVID-19 vaccine series not administered, Cyst of ovary, Family history of uterine cancer, Iron deficiency anemia, Migraine, Mirena IUD inserted 22, Prothrombin gene mutation (HCC), S/p Diagnostic laparoscopy, hysis of adhesions, hysteroscopy, dilatation and curettage, Mirena IUD insertion, Snores, Under care of team, and Under care of team.       has a past surgical history that includes  section (1/10/2007, 2007, 2012); Cervix surgery; laparoscopy (N/A,  02/14/2022); Dilation and curettage of uterus (N/A, 02/14/2022); Bladder surgery (N/A, 02/14/2022); Hysterectomy, total abdominal (N/A, 03/02/2022); Cystoscopy (N/A, 03/02/2022); Intrauterine device removal (2022); Cystoscopy (10/10/2023); Hysterectomy (N/A, 10/10/2023); and DRAIN OVARIAN CYST VAG APPROACH (2/13/2024).      Social History     Socioeconomic History    Marital status: Single     Spouse name: Not on file    Number of children: Not on file    Years of education: Not on file    Highest education level: Not on file   Occupational History    Not on file   Tobacco Use    Smoking status: Never    Smokeless tobacco: Never   Vaping Use    Vaping Use: Never used   Substance and Sexual Activity    Alcohol use: Yes     Alcohol/week: 3.0 standard drinks of alcohol     Types: 3 Glasses of wine per week     Comment: 3 times per month    Drug use: Not Currently     Frequency: 2.0 times per week     Types: Marijuana (Weed)     Comment: last THC 9/24/2023    Sexual activity: Yes     Partners: Male   Other Topics Concern    Not on file   Social History Narrative    Not on file     Social Determinants of Health     Financial Resource Strain: Low Risk  (5/9/2023)    Overall Financial Resource Strain (CARDIA)     Difficulty of Paying Living Expenses: Not very hard   Food Insecurity: Not on file (5/9/2023)   Transportation Needs: Unknown (5/9/2023)    PRAPARE - Transportation     Lack of Transportation (Medical): Not on file     Lack of Transportation (Non-Medical): No   Physical Activity: Not on file   Stress: Not on file   Social Connections: Not on file   Intimate Partner Violence: Not on file   Housing Stability: Unknown (5/9/2023)    Housing Stability Vital Sign     Unable to Pay for Housing in the Last Year: Not on file     Number of Places Lived in the Last Year: Not on file     Unstable Housing in the Last Year: No       Family History   Problem Relation Age of Onset    Diabetes Paternal Grandmother     Endometrial

## 2024-02-15 NOTE — ED NOTES
Patient had a procedure done for retained products and continues to had vaginal pain and light bleeding.  Patient is under the impression she was not supposed to have pain   Patient was sent here for evaluation  Patient verbalized she had a hysterectomy and should not be having any bleeding  Will continue to monitor patient  White board updated, blankets given

## 2024-02-15 NOTE — ED NOTES
Discharge instructions given by Dr. Iraheta who verbalized patient understanding.  All questions answered.  Patient has no questions for this caregiver

## 2024-02-15 NOTE — PROGRESS NOTES
Gynecologic Oncology Resident Interval Note    CT results reviewed. Left adnexal cyst still present but smaller than previously, as expected given IR guided drainage. Small volume pelvic free fluid present but no evidence of active extravasation.   No acute findings on imaging. Pain is improved with Toradol. See physical exam as documented in Consult note by Dr. Brooks.    Patient stable for discharge home from Gyn Onc standpoint. Gyn Onc will sign off at this time. Please reach out with any other questions of concerns.    Dr Villar updated and in agreement.    Vicki Juarez DO  Gyn Onc Resident, PGY3  [place], [place]  2/15/2024, 5:46 PM

## 2024-02-15 NOTE — ED PROVIDER NOTES
[facility] ED  Emergency Department Encounter  Emergency Medicine Resident     Pt Name:Jose Saleem  MRN: [id number]  Birthdate 1985  Date of evaluation: 2/15/24  PCP:  Maria Esther Damon MD  Note Started: 12:06 PM EST      CHIEF COMPLAINT       Chief Complaint   Patient presents with   • Vaginal Pain   • Vaginal Bleeding     24 procedure for cyst removal       HISTORY OF PRESENT ILLNESS  (Location/Symptom, Timing/Onset, Context/Setting, Quality, Duration, Modifying Factors, Severity.)      Jose Saleem is a 38 y.o. female who presents with vaginal bleeding that started this morning after a bowel movement.  Patient states that she recently had cyst removal transvaginally.  She states she has had increased pain this morning as well.  She complains of suprapubic abdominal pain.  She states that she called her doctor and was directed to the emergency department.  She is on antihypertensives but is not yet taken her medication this morning.  Otherwise healthy.  She has not taken anything for her pain yet this morning.    Patient is status post transvaginal left cyst aspiration by IR on 2024.  She states she called her doctor this morning and was sent in for evaluation given the bleeding.    PAST MEDICAL / SURGICAL / SOCIAL / FAMILY HISTORY      has a past medical history of Acne, Benign essential hypertension antepartum, Closed nondisplaced fracture of distal pole of navicular bone of left wrist, COVID-19, COVID-19 vaccine series not administered, Cyst of ovary, Family history of uterine cancer, Iron deficiency anemia, Migraine, Mirena IUD inserted 22, Prothrombin gene mutation (HCC), S/p Diagnostic laparoscopy, hysis of adhesions, hysteroscopy, dilatation and curettage, Mirena IUD insertion, Snores, Under care of team, and Under care of team.       has a past surgical history that includes  section (1/10/2007, 2007, 2012); Cervix surgery; laparoscopy (N/A,

## 2024-02-15 NOTE — ED NOTES
Patient upset, wants to eat, wants to leave, wants to talk with doctor  Attempted to explain plan of care but wants to speak with doctor  Dr. Iraheta informed

## 2024-02-15 NOTE — DISCHARGE INSTRUCTIONS
You were seen today for vaginal bleeding after procedure.  You had pelvic exam that did not show any bleeding.  Your lab work was normal.  Your CT scan does not show any active bleeding.  You were seen by her OB/GYN team and they recommend follow-up in their office is soon as possible.    If you notice any concerning symptoms please return to the ER immediately. These can include but are not limited to: fevers, chills, shortness of breath, vomiting, weakness of the extremities, changes in your mental status, numbness, pale extremities, or chest pain.     Wound care: none    Diet: You may resume your normal diet     Activity: resume activity as tolerated.     Medications: Continue taking your home medications as previously directed. For pain You may take tylenol 1,000mg by mouth every 6 hours as needed for pain. Do not exceed 4,000mg per day. If you have liver disease don't take tylenol. You may also take ibuprofen 600mg every 6-8 hours as needed for pain. Do not exceed 2,400 mg per day. If you experience stomach pain or you have a history of kidney disease stop taking ibuprofen. You may alternate application of ice and heat 20 minutes at a time as desired.      Follow up: Please follow up with your OB/GYN as soon as possible

## 2024-02-15 NOTE — ED PROVIDER NOTES
Note Started: 12:34 PM HUANG         [facility]     Emergency Department     Faculty Attestation    I performed a history and physical examination of the patient and discussed management with the resident. I reviewed the resident’s note and agree with the documented findings and plan of care. Any areas of disagreement are noted on the chart. I was personally present for the key portions of any procedures. I have documented in the chart those procedures where I was not present during the key portions. I have reviewed the emergency nurses triage note. I agree with the chief complaint, past medical history, past surgical history, allergies, medications, social and family history as documented unless otherwise noted below.        For Physician Assistant/ Nurse Practitioner cases/documentation I have personally evaluated this patient and have completed at least one if not all key elements of the E/M (history, physical exam, and MDM). Additional findings are as noted.  I have personally seen and evaluated the patient.  I find the patient's history and physical exam are consistent with the NP/PA documentation.  I agree with the care provided, treatment rendered, disposition and follow-up plan.    38-year-old female followed by Gyn/Onc for chronic pelvic pain and cysts, with recent IR guided cyst drainage on 2/13 presenting with increased pain and bleeding while trying to have a bowel movement this morning.  Was told to come into the ER to be evaluated.    Exam:  General : Laying on the bed, awake, alert, and in no acute distress  CV : normal rate and regular rhythm  Lungs : Breathing comfortably on room air with no tachypnea, hypoxia, or increased work of breathing      Plan:  Will consult with gynecology/oncology for further recommendations on management of her symptoms and needed workup    Medical Decision Making  Risk  Prescription drug management.      Goldie Lozoya MD   Attending Emergency

## 2024-02-15 NOTE — CONSULTS
Gynecologic Oncology Consult  [facility]    Patient Name: Jose Saleem     Patient : 1985  Room/Bed: [facility]  Admission Date/Time: 2/15/2024 11:39 AM  Primary Care Physician: Maria Esther Damon MD    Consulting Provider: Dr. Lozoya  Reason for Consult: Vaginal Bleeding     CC:   Chief Complaint   Patient presents with    Vaginal Pain    Vaginal Bleeding     24 procedure for cyst removal                HPI: Jose Saleem is a 38 y.o. female  presents to the emergency department complaining of vaginal bleeding.    The patient is a known patient of the gynecologic oncology service.  She was previously referred for a new finding of 8 cm left adnexal cyst by ultrasound on 2023.  The patient is status post JOAQUIN, BS on 3/2/2022.  The patient underwent surgical management for the newfound left adnexal cyst on 10/10/2023.  At the time of surgery, there were significant and extensive pelvic adhesions and an 8 cm peritoneal cyst was noted to be buried deep in the pelvis; this cyst was aspirated and an extensive lysis of adhesions were performed.  Pelvic washings at this time were negative for malignancy.  Most recent CT abdomen pelvis on 2023 revealed newly found bilateral adnexal cyst measuring 5.4 and 3.9 cm respectively.  The continued findings of adnexal cysts with history of significant intra-abdominal and pelvic adhesions were discussed, and decision was made to proceed with IR drainage of cyst per patient request.    2 days ago, on 2024, the patient underwent IR guided drainage of adnexal cyst. 24 milliliters of turbid bloody fluid was removed from a bilobed left adnexal cyst with considerable reduction in size, per brief operative note. Results from fluid have revealed gram negative rods. The procedure was performed transvaginally. Since the procedure the patient states she has been experience intermittent vaginal spotting and lower abdominal pain. She  IR-guided cyst drainage.  Based on physical exam, no leukocytosis, and afebrile state, low suspicion infection. Patient reports positive bowel movements and tolerating solid diet. However, with the patient's significant history for extensive pelvic adhesive disease, results of cyst fluid with gram-negative rods, the recent transvaginal procedure, and her abdominal pain on presentation, will proceed with imaging studies with CT abdomen/pelvis to rule out any underlying process.   - Anticipate discharge pending CT results    - Will continue to follow the patient in the outpatient setting for treatment of chronic pelvic cysts and pain     Patient Active Problem List    Diagnosis Date Noted    Intra-abdominal adhesions 10/22/2023    Postoperative pain 10/14/2023    Robotic assisted aspiration of pelvic cyst, extensive lysis of adhesions, cystoscopy, repair of sigmoid colon deserosalization 10/10/23 10/10/2023    Postoperative state 10/10/2023    Pelvic mass 05/03/2023    Pelvic cyst 05/03/2023     CT abd/pelvis 5/3/23: 12.0 x 5.2 x 6.9cm      Postoperative surgical complication involving genitourinary system associated with genitourinary procedure 03/03/2022    Acquired absence of both cervix and uterus 03/02/2022    Hypercoagulable state (HCC) 02/28/2022    Bladder perforation, intraoperative 02/28/2022    Hx CS x3 02/28/2022    Marijuana use 02/28/2022    Urine ascites 02/28/2022    Intractable abdominal pain 02/26/2022    Lower abdominal pain 02/25/2022    History of cystostomy 02/14/2022     Complicated by incidental cystotomy repaired by Dr. Trell Medellin MD Urology via Robot        Pelvic adhesive disease 02/14/2022    Bilateral nipple discharge 05/19/2021    Folliculitis 05/19/2021    Closed nondisplaced fracture of distal pole of navicular bone of left wrist 10/30/2018    Iron deficiency anemia 07/24/2016    Pelvic pain 07/18/2016     Pelvic US ordered, appt on 7/21/16: unremarkable       Family history of uterine

## 2024-02-15 NOTE — TELEPHONE ENCOUNTER
Patient called and requested a letter to be off of work tomorrow.  Informed writer that she is unable to go to work tomorrow due to having severe pelvic pain from 2/13/24 procedure.  Patient describes hardly being able to walk or drive.  Writer asked if patient having fever, patient denies.  Writer asked if patient taking OTC pain medication.  Patient admits to taking a couple doses of ibuprofen.  Writer states if patient having that much pain then it is recommended to go to ER for evaluation.  Patient states she is going to see how she does today and see if it gets better rather than going to ER.  Patient states she hasn't had a BM, writer instructs patient to take senna 2 tabs twice daily and MOM 30ml daily to have BM as well as prune juice or apple juice.  Also instructs patient to take ibuprofen every 8 hours as well as tylenol every 6 hours not to exceed 4000mg tylenol in 24 hours, as well as using heat packs and ice packs for pain relief.  Writer informs patient that PA will be informed of conversation, and will call back with any additional instructions.  PA updated and agrees.      Writer called patient back and informed her that PA agrees with recommendations and requests that patient call office at 4 with update, but ultimately suggests patient should go to ER if she is in that much discomfort.  Patient sates she is going to have her daughter take her to ER later in day.  Writer confirms pharmacy to send rx for senna and MOM in case patient does not go to ER.  Rx's send to pharmacy of patient's choice.

## 2024-02-15 NOTE — ED NOTES
The following labs were labeled with appropriate pt sticker and tubed to lab:     [] Blue     [x] Lavender   [] on ice  [x] Green/yellow  [x] Green/black [] on ice  [] Grey  [] on ice  [] Yellow  [] Red  [] Pink  [] Type/ Screen  [] ABG  [] VBG    [] COVID-19 swab    [] Rapid  [] PCR  [] Flu swab  [] Peds Viral Panel     [] Urine Sample  [] Fecal Sample  [] Pelvic Cultures  [] Blood Cultures  [] X 2  [] STREP Cultures  [] Wound Cultures

## 2024-02-16 ENCOUNTER — TELEPHONE (OUTPATIENT)
Dept: GYNECOLOGIC ONCOLOGY | Age: 39
End: 2024-02-16

## 2024-02-16 ENCOUNTER — TELEPHONE (OUTPATIENT)
Dept: INFUSION THERAPY | Age: 39
End: 2024-02-16

## 2024-02-16 DIAGNOSIS — A49.8 E COLI INFECTION: Primary | ICD-10-CM

## 2024-02-16 RX ORDER — SULFAMETHOXAZOLE AND TRIMETHOPRIM 800; 160 MG/1; MG/1
1 TABLET ORAL 2 TIMES DAILY
Qty: 28 TABLET | Refills: 0 | Status: SHIPPED | OUTPATIENT
Start: 2024-02-16 | End: 2024-03-01

## 2024-02-16 NOTE — TELEPHONE ENCOUNTER
Lvm to schedule follow up to discuss care plan with Dr. Villar beginning of March (office can schedule in NP slot).

## 2024-02-16 NOTE — TELEPHONE ENCOUNTER
Please notify patient that I have sent an antibiotic therapy to her pharmacy on file due to the drainage from her pelvic cyst positive for infection.  She is to take this antibiotic therapy until completion.    Please also schedule patient a follow up visit with Dr. Villar in March to discuss her care plan.

## 2024-02-16 NOTE — TELEPHONE ENCOUNTER
Called and informed patient of results and instructions.  Patient voiced understanding and appreciation.  Informed writer she will  rx on Monday due to not leaving house today.

## 2024-04-05 ENCOUNTER — HOSPITAL ENCOUNTER (EMERGENCY)
Age: 39
Discharge: HOME | End: 2024-04-05
Attending: EMERGENCY MEDICINE
Payer: COMMERCIAL

## 2024-04-05 ENCOUNTER — TELEPHONE (OUTPATIENT)
Dept: GYNECOLOGIC ONCOLOGY | Age: 39
End: 2024-04-05

## 2024-04-05 VITALS
SYSTOLIC BLOOD PRESSURE: 138 MMHG | TEMPERATURE: 98.5 F | RESPIRATION RATE: 19 BRPM | OXYGEN SATURATION: 100 % | WEIGHT: 145.5 LBS | DIASTOLIC BLOOD PRESSURE: 100 MMHG | HEIGHT: 61 IN | HEART RATE: 74 BPM | BODY MASS INDEX: 27.47 KG/M2

## 2024-04-05 DIAGNOSIS — R42 DIZZINESS: Primary | ICD-10-CM

## 2024-04-05 DIAGNOSIS — R94.31 ACUTE ELECTROCARDIOGRAM CHANGES: ICD-10-CM

## 2024-04-05 LAB
ALBUMIN SERPL-MCNC: 4.4 G/DL (ref 3.5–5.2)
ALBUMIN/GLOB SERPL: 2 {RATIO} (ref 1–2.5)
ALP SERPL-CCNC: 44 U/L (ref 35–104)
ALT SERPL-CCNC: 12 U/L (ref 10–35)
ANION GAP SERPL CALCULATED.3IONS-SCNC: 8 MMOL/L (ref 9–16)
AST SERPL-CCNC: 23 U/L (ref 10–35)
BILIRUB SERPL-MCNC: 0.6 MG/DL (ref 0–1.2)
BUN SERPL-MCNC: 9 MG/DL (ref 6–20)
CALCIUM SERPL-MCNC: 8.9 MG/DL (ref 8.6–10.4)
CHLORIDE SERPL-SCNC: 103 MMOL/L (ref 98–107)
CO2 SERPL-SCNC: 27 MMOL/L (ref 20–31)
CREAT SERPL-MCNC: 0.8 MG/DL (ref 0.5–0.9)
ERYTHROCYTE [DISTWIDTH] IN BLOOD BY AUTOMATED COUNT: 11.7 % (ref 11.8–14.4)
GFR SERPL CREATININE-BSD FRML MDRD: >90 ML/MIN/1.73M2
GLUCOSE SERPL-MCNC: 89 MG/DL (ref 74–99)
HCT VFR BLD AUTO: 44.3 % (ref 36.3–47.1)
HGB BLD-MCNC: 15.1 G/DL (ref 11.9–15.1)
MCH RBC QN AUTO: 31.5 PG (ref 25.2–33.5)
MCHC RBC AUTO-ENTMCNC: 34.1 G/DL (ref 28.4–34.8)
MCV RBC AUTO: 92.3 FL (ref 82.6–102.9)
NRBC BLD-RTO: 0 PER 100 WBC
PLATELET # BLD AUTO: 274 K/UL (ref 138–453)
PMV BLD AUTO: 9.6 FL (ref 8.1–13.5)
POTASSIUM SERPL-SCNC: 3.9 MMOL/L (ref 3.7–5.3)
PROT SERPL-MCNC: 7.3 G/DL (ref 6.6–8.7)
RBC # BLD AUTO: 4.8 M/UL (ref 3.95–5.11)
SODIUM SERPL-SCNC: 138 MMOL/L (ref 136–145)
TROPONIN I SERPL HS-MCNC: <6 NG/L (ref 0–14)
TSH SERPL DL<=0.05 MIU/L-ACNC: 0.35 UIU/ML (ref 0.27–4.2)
WBC OTHER # BLD: 5.3 K/UL (ref 3.5–11.3)

## 2024-04-05 PROCEDURE — 93005 ELECTROCARDIOGRAM TRACING: CPT | Performed by: PEDIATRICS

## 2024-04-05 PROCEDURE — 6370000000 HC RX 637 (ALT 250 FOR IP): Performed by: PEDIATRICS

## 2024-04-05 PROCEDURE — 80053 COMPREHEN METABOLIC PANEL: CPT

## 2024-04-05 PROCEDURE — 85027 COMPLETE CBC AUTOMATED: CPT

## 2024-04-05 PROCEDURE — 99284 EMERGENCY DEPT VISIT MOD MDM: CPT

## 2024-04-05 PROCEDURE — 84484 ASSAY OF TROPONIN QUANT: CPT

## 2024-04-05 PROCEDURE — 84443 ASSAY THYROID STIM HORMONE: CPT

## 2024-04-05 RX ORDER — MECLIZINE HCL 12.5 MG/1
12.5 TABLET ORAL ONCE
Status: COMPLETED | OUTPATIENT
Start: 2024-04-05 | End: 2024-04-05

## 2024-04-05 RX ORDER — AMLODIPINE BESYLATE 10 MG/1
5 TABLET ORAL ONCE
Status: COMPLETED | OUTPATIENT
Start: 2024-04-05 | End: 2024-04-05

## 2024-04-05 RX ADMIN — AMLODIPINE BESYLATE 5 MG: 10 TABLET ORAL at 10:36

## 2024-04-05 RX ADMIN — MECLIZINE 12.5 MG: 12.5 TABLET ORAL at 10:38

## 2024-04-05 ASSESSMENT — PAIN - FUNCTIONAL ASSESSMENT: PAIN_FUNCTIONAL_ASSESSMENT: NONE - DENIES PAIN

## 2024-04-05 NOTE — TELEPHONE ENCOUNTER
Pt called stating that she feels her blood pressure may be elevated and has been feeling dizzy and lightheaded. She asked if she could come in for blood pressure check. Advised she she should notify PCP and go to ED or Urgent Care. Pt reports she is going to [facility] ED and should be there in a few minutes.

## 2024-04-05 NOTE — ED PROVIDER NOTES
Note Started: 10:29 AM EDT         [facility]     Emergency Department     Faculty Attestation    I performed a history and physical examination of the patient and discussed management with the resident. I reviewed the resident’s note and agree with the documented findings and plan of care. Any areas of disagreement are noted on the chart. I was personally present for the key portions of any procedures. I have documented in the chart those procedures where I was not present during the key portions. I have reviewed the emergency nurses triage note. I agree with the chief complaint, past medical history, past surgical history, allergies, medications, social and family history as documented unless otherwise noted below.        For Physician Assistant/ Nurse Practitioner cases/documentation I have personally evaluated this patient and have completed at least one if not all key elements of the E/M (history, physical exam, and MDM). Additional findings are as noted.  I have personally seen and evaluated the patient.  I find the patient's history and physical exam are consistent with the NP/PA documentation.  I agree with the care provided, treatment rendered, disposition and follow-up plan.    38-year-old female with history of anemia presenting with sudden onset dizziness at work this morning.  Describes it as room spinning.  Did not fall or pass out.  Has not had dizziness like this before.  No medications taken this morning.  No recent travel, illness, or change in daily activities.    Exam:  General : Laying on the bed, awake, alert, and in no acute distress  CV : normal rate and regular rhythm  Lungs : Breathing comfortably on room air with no tachypnea, hypoxia, or increased work of breathing  Neuro: Awake, alert, answering questions appropriately.  EOMs intact without obvious nystagmus.    DDx: BPPV, symptomatic anemia, electrolyte disturbance, atypical ACS    Plan:  CBC, BMP, thyroid (patient

## 2024-04-05 NOTE — ED PROVIDER NOTES
[facility] ED  Emergency Department Encounter  Emergency Medicine Resident     Pt Name:Jose Saleem  MRN: [id number]  Birthdate 1985  Date of evaluation: 4/5/24  PCP:  Maria Esther Damon MD    10:29 AM EDT     CHIEF COMPLAINT       No chief complaint on file.      HISTORY OF PRESENT ILLNESS  (Location/Symptom, Timing/Onset, Context/Setting, Quality, Duration, Modifying Factors, Severity.)      Jose Saleem is a 38 y.o. female who presents with dizziness.  She was brought in by private car for this dizziness.  She states that it did not feel like room was spinning she states that it feels like her head felt like she was going to pass out.  She then later states that she feels like the room is spinning.  At home she states that she is this is set off by what she feels to be significant mental stress in her life.    She reports multiple stressors at home from work to school to money to home life.    Patient is agreeable for laboratory workup.  She denies chest pain headache or changes in vision at this time.  She states that she no longer feels dizzy as bad as it was.  She states that is about a 1 out of 10 dizziness at this time.  No medications taken prior to arrival.  She states that she is on antihypertensive medication that she is noncompliant with.  She did not take her antihypertensive medication today as she was supposed to.  She states that she either takes her amlodipine or an HCTZ pill.  She never takes both.  She states that her primary care doctor told her that she can take whichever 1 she wants during that day.    PAST MEDICAL / SURGICAL / SOCIAL / FAMILY HISTORY      has a past medical history of Acne, Benign essential hypertension antepartum, Closed nondisplaced fracture of distal pole of navicular bone of left wrist, COVID-19, COVID-19 vaccine series not administered, Cyst of ovary, Family history of uterine cancer, Iron deficiency anemia, Migraine, Mirena IUD inserted  standard drinks of alcohol     Types: 3 Glasses of wine per week     Comment: 3 times per month   • Drug use: Not Currently     Frequency: 2.0 times per week     Types: Marijuana (Weed)     Comment: last THC 2023   • Sexual activity: Yes     Partners: Male   Other Topics Concern   • Not on file   Social History Narrative   • Not on file     Social Determinants of Health     Financial Resource Strain: Low Risk  (2023)    Overall Financial Resource Strain (CARDIA)    • Difficulty of Paying Living Expenses: Not very hard   Food Insecurity: Not on file (2023)   Transportation Needs: Unknown (2023)    PRAPARE - Transportation    • Lack of Transportation (Medical): Not on file    • Lack of Transportation (Non-Medical): No   Physical Activity: Not on file   Stress: Not on file   Social Connections: Not on file   Intimate Partner Violence: Not on file   Housing Stability: Unknown (2023)    Housing Stability Vital Sign    • Unable to Pay for Housing in the Last Year: Not on file    • Number of Places Lived in the Last Year: Not on file    • Unstable Housing in the Last Year: No       Family History   Problem Relation Age of Onset   • Diabetes Paternal Grandmother    • Endometrial Cancer Paternal Grandmother    • No Known Problems Mother    • No Known Problems Father    • Hypertension Maternal Grandmother    • Breast Cancer Neg Hx    • Cancer Neg Hx    • Colon Cancer Neg Hx    • Eclampsia Neg Hx    • Ovarian Cancer Neg Hx    •  Labor Neg Hx    • Spont Abortions Neg Hx    • Stroke Neg Hx        Allergies:  Seasonal, Depo-provera [medroxyprogesterone acetate], and Mirena [levonorgestrel]    Home Medications:  Prior to Admission medications    Medication Sig Start Date End Date Taking? Authorizing Provider   norethindrone-ethinyl estradiol (MICROGESTIN) 1-20 MG-MCG per tablet Take 1 tablet by mouth daily 1/3/24   Chana Dietrich PA-C   amLODIPine (NORVASC) 5 MG tablet Take 1 tablet by mouth daily

## 2024-04-05 NOTE — DISCHARGE INSTRUCTIONS
You were offere to stay in the hospital, but you declined.  Please return to the ED if you continue to be dizzy.  Please continue to take your blood pressure medication every single day    Meclizine is a medication for dizziness.  You can take this as needed over the next 1 day.  However if you continue to be dizzy you will need to return to the emergency department.    Please feel free return to the hospital if your symptoms worsen or any new concerning symptoms develop.  Follow-up with your primary care physician as needed for all other the concerns.

## 2024-04-07 LAB
EKG ATRIAL RATE: 78 BPM
EKG P AXIS: 54 DEGREES
EKG P-R INTERVAL: 182 MS
EKG Q-T INTERVAL: 388 MS
EKG QRS DURATION: 86 MS
EKG QTC CALCULATION (BAZETT): 442 MS
EKG R AXIS: 54 DEGREES
EKG T AXIS: 38 DEGREES
EKG VENTRICULAR RATE: 78 BPM

## 2024-04-24 ENCOUNTER — TELEPHONE (OUTPATIENT)
Dept: GYNECOLOGIC ONCOLOGY | Age: 39
End: 2024-04-24

## 2024-04-24 ENCOUNTER — HOSPITAL ENCOUNTER (OUTPATIENT)
Age: 39
Setting detail: SPECIMEN
Discharge: HOME | End: 2024-04-24

## 2024-04-24 DIAGNOSIS — R35.0 URINARY FREQUENCY: Primary | ICD-10-CM

## 2024-04-24 DIAGNOSIS — R19.00 PELVIC MASS: ICD-10-CM

## 2024-04-24 DIAGNOSIS — R10.2 PELVIC PAIN: Primary | ICD-10-CM

## 2024-04-24 DIAGNOSIS — R35.0 URINARY FREQUENCY: ICD-10-CM

## 2024-04-24 LAB
BACTERIA URNS QL MICRO: ABNORMAL
BILIRUB UR QL STRIP: NEGATIVE
CASTS #/AREA URNS LPF: ABNORMAL /LPF (ref 0–8)
CLARITY UR: ABNORMAL
COLOR UR: YELLOW
EPI CELLS #/AREA URNS HPF: ABNORMAL /HPF (ref 0–5)
GLUCOSE UR STRIP-MCNC: NEGATIVE MG/DL
HGB UR QL STRIP.AUTO: NEGATIVE
KETONES UR STRIP-MCNC: NEGATIVE MG/DL
LEUKOCYTE ESTERASE UR QL STRIP: NEGATIVE
NITRITE UR QL STRIP: POSITIVE
PH UR STRIP: 8.5 [PH] (ref 5–8)
PROT UR STRIP-MCNC: ABNORMAL MG/DL
RBC #/AREA URNS HPF: ABNORMAL /HPF (ref 0–4)
SP GR UR STRIP: 1.02 (ref 1–1.03)
UROBILINOGEN UR STRIP-ACNC: NORMAL EU/DL (ref 0–1)
WBC #/AREA URNS HPF: ABNORMAL /HPF (ref 0–5)

## 2024-04-24 RX ORDER — IBUPROFEN 800 MG/1
800 TABLET ORAL EVERY 8 HOURS PRN
Qty: 60 TABLET | Refills: 5 | Status: SHIPPED | OUTPATIENT
Start: 2024-04-24

## 2024-04-24 NOTE — TELEPHONE ENCOUNTER
Patient called stating that she is having pelvic pain and UTI Symptoms. She is requesting something for the pain to be sent to her pharmacy on file.    I called patient back to let her know that there is an active order for a urine culture. She voiced understanding and stated she will go, today, to leave a urine sample.

## 2024-04-24 NOTE — TELEPHONE ENCOUNTER
Called patient and d/w her  Called in ibuprofen for her  She will call for urine culture results in the days ahead  I ordered a sonogram  She will call for US results in the days ahead  She will fu with me in the weeks, months ahead    cvl

## 2024-04-26 DIAGNOSIS — N39.0 URINARY TRACT INFECTION WITHOUT HEMATURIA, SITE UNSPECIFIED: Primary | ICD-10-CM

## 2024-04-26 RX ORDER — NITROFURANTOIN 25; 75 MG/1; MG/1
100 CAPSULE ORAL 2 TIMES DAILY
Qty: 7 CAPSULE | Refills: 0 | Status: SHIPPED | OUTPATIENT
Start: 2024-04-26 | End: 2024-04-30

## 2024-05-20 ENCOUNTER — TELEPHONE (OUTPATIENT)
Dept: GYNECOLOGIC ONCOLOGY | Age: 39
End: 2024-05-20

## 2024-05-23 ENCOUNTER — OFFICE VISIT (OUTPATIENT)
Dept: GYNECOLOGIC ONCOLOGY | Age: 39
End: 2024-05-23
Payer: COMMERCIAL

## 2024-05-23 VITALS
WEIGHT: 144.8 LBS | TEMPERATURE: 98.7 F | BODY MASS INDEX: 27.36 KG/M2 | OXYGEN SATURATION: 97 % | DIASTOLIC BLOOD PRESSURE: 96 MMHG | SYSTOLIC BLOOD PRESSURE: 143 MMHG | HEART RATE: 69 BPM

## 2024-05-23 DIAGNOSIS — N83.201 BILATERAL OVARIAN CYSTS: ICD-10-CM

## 2024-05-23 DIAGNOSIS — N83.202 BILATERAL OVARIAN CYSTS: ICD-10-CM

## 2024-05-23 DIAGNOSIS — R10.2 PELVIC PAIN: Primary | ICD-10-CM

## 2024-05-23 PROCEDURE — 3077F SYST BP >= 140 MM HG: CPT | Performed by: OBSTETRICS & GYNECOLOGY

## 2024-05-23 PROCEDURE — 99214 OFFICE O/P EST MOD 30 MIN: CPT | Performed by: OBSTETRICS & GYNECOLOGY

## 2024-05-23 PROCEDURE — 3080F DIAST BP >= 90 MM HG: CPT | Performed by: OBSTETRICS & GYNECOLOGY

## 2024-05-23 ASSESSMENT — ENCOUNTER SYMPTOMS
BLOOD IN STOOL: 0
DIARRHEA: 0
ABDOMINAL DISTENTION: 0
VOICE CHANGE: 0
RECTAL PAIN: 0
WHEEZING: 0
TROUBLE SWALLOWING: 0
CONSTIPATION: 0
EYE PROBLEMS: 0
BACK PAIN: 0
ABDOMINAL PAIN: 0
HEMOPTYSIS: 0
SHORTNESS OF BREATH: 0
CHEST TIGHTNESS: 0
SCLERAL ICTERUS: 0
NAUSEA: 0
COUGH: 0
SORE THROAT: 0
VOMITING: 0

## 2024-05-23 NOTE — PROGRESS NOTES
Gyn Oncology Attending Note    Patient seen and fully evaluated by me today  She declines vaginal/pelvic pain today  Chart notes records labs imaging and history revd by me today in detail  Patient counseled by me today at length  She tells me that she understands all we discussed today  She tells me that I have answered her questions to her satisfaction    She tells me that she has chronic ovarian cysts on the right side  She tells me that she will consider RSO (possible BSO) in future if her ovaries continue to cause a significant reduction in the quality of her life.  We discussed surgical risks and downstream consequences in detail today  I spent 30 minutes today in the office managing this case    General physical examination is normal today    Plan:    FU with me in October 2023  US now to evaluate ovaries again  Call for test results in 1-2 weeks  Call or return sooner chloé Valadez MD  
Review of Systems   Constitutional:  Negative for appetite change, chills, diaphoresis, fatigue, fever and unexpected weight change.   HENT:   Negative for hearing loss, lump/mass, mouth sores, nosebleeds, sore throat, tinnitus, trouble swallowing and voice change.    Eyes:  Negative for eye problems and icterus.   Respiratory:  Negative for chest tightness, cough, hemoptysis, shortness of breath and wheezing.    Cardiovascular:  Negative for chest pain, leg swelling and palpitations.   Gastrointestinal:  Negative for abdominal distention, abdominal pain, blood in stool, constipation, diarrhea, nausea, rectal pain and vomiting.   Endocrine: Negative for hot flashes.   Genitourinary:  Negative for bladder incontinence, difficulty urinating, dyspareunia, dysuria, frequency, hematuria, menstrual problem, nocturia, pelvic pain, vaginal bleeding and vaginal discharge.    Musculoskeletal:  Negative for arthralgias, back pain, flank pain, gait problem, myalgias, neck pain and neck stiffness.   Skin:  Negative for itching, rash and wound.   Neurological:  Positive for headaches (occasional). Negative for dizziness, extremity weakness, gait problem, light-headedness, numbness, seizures and speech difficulty.   Hematological:  Negative for adenopathy. Does not bruise/bleed easily.   Psychiatric/Behavioral:  Negative for confusion, decreased concentration, depression, sleep disturbance and suicidal ideas. The patient is not nervous/anxious.        
pea sized amount to face nightly 45 g 3    hydroquinone 4 % cream Apply 2 months on, 2 months off to dark spots 28 g 2    ferrous sulfate (IRON 325) 325 (65 Fe) MG tablet Take 1 tablet by mouth daily (with breakfast) 30 tablet 5     Current Facility-Administered Medications   Medication Dose Route Frequency Provider Last Rate Last Admin    lidocaine 1 % injection 5 mg  5 mg IntraDERmal Once Mihir Dominguez PA-C           Allergies   Allergen Reactions    Seasonal Other (See Comments)     Allergic rhinnitis    Depo-Provera [Medroxyprogesterone Acetate] Rash    Mirena [Levonorgestrel] Rash       Vitals:    05/23/24 0844 05/23/24 0845   BP: (!) 158/102 (!) 143/96   Site: Right Upper Arm Right Upper Arm   Position: Sitting Sitting   Cuff Size: Medium Adult Medium Adult   Pulse: 68 69   Temp: 98.7 °F (37.1 °C)    TempSrc: Infrared    SpO2: 97%    Weight: 65.7 kg (144 lb 12.8 oz)        Physical Examination:  Physical Exam  Constitutional:       Appearance: Normal appearance. She is normal weight.   HENT:      Head: Normocephalic and atraumatic.   Cardiovascular:      Rate and Rhythm: Normal rate and regular rhythm.   Pulmonary:      Effort: Pulmonary effort is normal. No respiratory distress.      Breath sounds: Normal breath sounds. No wheezing.   Abdominal:      General: Abdomen is flat.      Palpations: Abdomen is soft. There is no mass.      Tenderness: There is no abdominal tenderness. There is no guarding.   Genitourinary:     Comments: Patient declined pelvic exam today  Neurological:      Mental Status: She is alert.         Assessment:         Diagnosis Orders   1. Pelvic pain               Plan: 37 yo female with chronic pelvic pain   - VSS   - S/p JOAQUIN, BS 2022 and IR drainage of left ovarian cyst in Feb 2024   - She reports continued, intermittent RLQ pain   - Encouraged completion of US previously ordered to assess for any recurrent ovarian cysts   - Discussed continued surveillance with regular follow up and

## 2024-06-29 ENCOUNTER — HOSPITAL ENCOUNTER (OUTPATIENT)
Dept: ULTRASOUND IMAGING | Age: 39
End: 2024-06-29
Attending: OBSTETRICS & GYNECOLOGY
Payer: COMMERCIAL

## 2024-06-29 DIAGNOSIS — R19.00 PELVIC MASS: ICD-10-CM

## 2024-06-29 DIAGNOSIS — R10.2 PELVIC PAIN: ICD-10-CM

## 2024-06-29 PROCEDURE — 76856 US EXAM PELVIC COMPLETE: CPT

## 2024-07-01 NOTE — RESULT ENCOUNTER NOTE
Gyn Oncology Note    Called patient and d/w her the results  She will fu in office this summer to discuss surgery she tells me  She will call to confirm appt    She will get a work excuse letter    No heavy lifting, no stair climbing    cvl

## 2024-07-02 ENCOUNTER — TELEPHONE (OUTPATIENT)
Dept: GYNECOLOGIC ONCOLOGY | Age: 39
End: 2024-07-02

## 2024-07-02 NOTE — TELEPHONE ENCOUNTER
Writer attempted to call patient. No answer and no voicemail was set up.    Letter was mailed to patient regarding work restrictions, copy was scanned into patient media.

## 2024-07-17 ENCOUNTER — OFFICE VISIT (OUTPATIENT)
Dept: INTERNAL MEDICINE | Age: 39
End: 2024-07-17
Payer: COMMERCIAL

## 2024-07-17 VITALS
HEIGHT: 61 IN | BODY MASS INDEX: 28.58 KG/M2 | SYSTOLIC BLOOD PRESSURE: 136 MMHG | TEMPERATURE: 97.9 F | WEIGHT: 151.4 LBS | OXYGEN SATURATION: 95 % | DIASTOLIC BLOOD PRESSURE: 90 MMHG | HEART RATE: 87 BPM

## 2024-07-17 DIAGNOSIS — D68.52 HETEROZYGOUS FOR PROTHROMBIN G20210A MUTATION (HCC): ICD-10-CM

## 2024-07-17 DIAGNOSIS — I10 PRIMARY HYPERTENSION: Primary | ICD-10-CM

## 2024-07-17 PROCEDURE — 99212 OFFICE O/P EST SF 10 MIN: CPT

## 2024-07-17 PROCEDURE — G8419 CALC BMI OUT NRM PARAM NOF/U: HCPCS | Performed by: INTERNAL MEDICINE

## 2024-07-17 PROCEDURE — 99213 OFFICE O/P EST LOW 20 MIN: CPT | Performed by: INTERNAL MEDICINE

## 2024-07-17 PROCEDURE — 3080F DIAST BP >= 90 MM HG: CPT | Performed by: INTERNAL MEDICINE

## 2024-07-17 PROCEDURE — G8427 DOCREV CUR MEDS BY ELIG CLIN: HCPCS | Performed by: INTERNAL MEDICINE

## 2024-07-17 PROCEDURE — 1036F TOBACCO NON-USER: CPT | Performed by: INTERNAL MEDICINE

## 2024-07-17 PROCEDURE — 3075F SYST BP GE 130 - 139MM HG: CPT | Performed by: INTERNAL MEDICINE

## 2024-07-17 RX ORDER — HYDROCHLOROTHIAZIDE 25 MG/1
25 TABLET ORAL EVERY MORNING
Qty: 90 TABLET | Refills: 1 | Status: SHIPPED | OUTPATIENT
Start: 2024-07-17

## 2024-07-17 RX ORDER — AMLODIPINE BESYLATE 5 MG/1
5 TABLET ORAL DAILY
Qty: 90 TABLET | Refills: 1 | Status: SHIPPED | OUTPATIENT
Start: 2024-07-17

## 2024-07-17 SDOH — ECONOMIC STABILITY: INCOME INSECURITY: HOW HARD IS IT FOR YOU TO PAY FOR THE VERY BASICS LIKE FOOD, HOUSING, MEDICAL CARE, AND HEATING?: NOT HARD AT ALL

## 2024-07-17 SDOH — ECONOMIC STABILITY: FOOD INSECURITY: WITHIN THE PAST 12 MONTHS, THE FOOD YOU BOUGHT JUST DIDN'T LAST AND YOU DIDN'T HAVE MONEY TO GET MORE.: NEVER TRUE

## 2024-07-17 SDOH — ECONOMIC STABILITY: FOOD INSECURITY: WITHIN THE PAST 12 MONTHS, YOU WORRIED THAT YOUR FOOD WOULD RUN OUT BEFORE YOU GOT MONEY TO BUY MORE.: NEVER TRUE

## 2024-07-17 ASSESSMENT — ENCOUNTER SYMPTOMS
WHEEZING: 0
COUGH: 0
SHORTNESS OF BREATH: 0

## 2024-07-17 ASSESSMENT — PATIENT HEALTH QUESTIONNAIRE - PHQ9
SUM OF ALL RESPONSES TO PHQ QUESTIONS 1-9: 2
2. FEELING DOWN, DEPRESSED OR HOPELESS: SEVERAL DAYS
SUM OF ALL RESPONSES TO PHQ9 QUESTIONS 1 & 2: 2
1. LITTLE INTEREST OR PLEASURE IN DOING THINGS: SEVERAL DAYS

## 2024-07-17 NOTE — PROGRESS NOTES
cystotomy repaired by Dr. Trell Medellin MD Urology via Robot     Snradha     denies apnea    Under care of team 2023    PCP - DR. ZAIDI - LAST VISIT 2023    Under care of team 2023    OB/GYN - DR. SOUZA- LAST VISIT 2023       Past Surgical History:   Procedure Laterality Date    BLADDER SURGERY N/A 2022    XI ROBOTIC ASSISTED LAPAROSCOPIC INCIDENTAL CYSTOTOMY REPAIR performed by Trell Medellin MD at [facility] OR    CERVIX SURGERY      cerclage     SECTION  1/10/2007, 2007, 2012    LTCS and classical CS on 12 @ 23 weeks    CYSTOSCOPY N/A 2022    CYSTOSCOPY REPAIR OF BLADDER LACERATION/DEFECT performed by Omar Ordaz MD at [facility] OR    CYSTOSCOPY  10/10/2023    ROBOTIC ASSISTED ASPIRATION OF PELVIC CYST, EXTENSIVE LYSIS OF ADHESIONS, CYSTOSCOPY, REPAIR OF SIGMOID COLON DESEROSALIZATION    DILATION AND CURETTAGE OF UTERUS N/A 2022    DILATATION AND CURETTAGE HYSTEROSCOPY, POSSIBLE MIRENA IUD performed by Jaspal Souza MD at [facility] OR    DRAIN OVARIAN CYST VAG APPROACH  2024    DRAIN OVARIAN CYST VAG APPROACH 2024 [facility] ULTRASOUND    HYSTERECTOMY (CERVIX STATUS UNKNOWN) N/A 10/10/2023    XI ROBOTIC ASSISTED ASPIRATION OF PELVIC CYST, EXTENSIVE LYSIS OF ADHESIONS, CYSTOSCOPY, REPAIR OF SIGMOID COLON DESEROSALIZATION performed by Flora Villar MD at [facility] OR    HYSTERECTOMY, TOTAL ABDOMINAL (CERVIX REMOVED) N/A 2022    TOTAL ABDOMINAL HYSTERECTOMY , BILATERAL SALPINGECTOMY performed by Jaspal Souza MD at [facility] OR    INTRAUTERINE DEVICE REMOVAL      LAPAROSCOPY N/A 2022    DIAGNOSTIC LAPAROSCROPY performed by Jaspal Souza MD at [facility] OR         ALLERGIES      Allergies   Allergen Reactions    Seasonal Other (See Comments)     Allergic rhinnitis    Depo-Provera [Medroxyprogesterone Acetate] Rash    Mirena [Levonorgestrel] Rash       MEDICATIONS:      Current Outpatient Medications on File Prior to Visit   Medication Sig Dispense Refill    amLODIPine

## 2024-09-06 ENCOUNTER — APPOINTMENT (OUTPATIENT)
Dept: GENERAL RADIOLOGY | Age: 39
End: 2024-09-06
Payer: OTHER MISCELLANEOUS

## 2024-09-06 ENCOUNTER — HOSPITAL ENCOUNTER (EMERGENCY)
Age: 39
Discharge: HOME | End: 2024-09-06
Attending: EMERGENCY MEDICINE
Payer: OTHER MISCELLANEOUS

## 2024-09-06 VITALS
SYSTOLIC BLOOD PRESSURE: 158 MMHG | RESPIRATION RATE: 20 BRPM | TEMPERATURE: 97.7 F | HEART RATE: 75 BPM | DIASTOLIC BLOOD PRESSURE: 110 MMHG | OXYGEN SATURATION: 100 %
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
## 2024-09-06 PROCEDURE — 99283 EMERGENCY DEPT VISIT LOW MDM: CPT

## 2024-09-06 PROCEDURE — 6370000000 HC RX 637 (ALT 250 FOR IP)

## 2024-09-06 PROCEDURE — 73130 X-RAY EXAM OF HAND: CPT

## 2024-09-06 RX ORDER — ACETAMINOPHEN 325 MG/1
650 TABLET ORAL EVERY 6 HOURS PRN
Qty: 40 TABLET | Refills: 0 | Status: SHIPPED | OUTPATIENT
Start: 2024-09-06 | End: 2024-09-16

## 2024-09-06 RX ORDER — ACETAMINOPHEN 325 MG/1
650 TABLET ORAL ONCE
Status: COMPLETED | OUTPATIENT
Start: 2024-09-06 | End: 2024-09-06

## 2024-09-06 RX ORDER — IBUPROFEN 400 MG/1
400 TABLET, FILM COATED ORAL ONCE
Status: COMPLETED | OUTPATIENT
Start: 2024-09-06 | End: 2024-09-06

## 2024-09-06 RX ORDER — IBUPROFEN 400 MG/1
400 TABLET, FILM COATED ORAL EVERY 6 HOURS PRN
Qty: 40 TABLET | Refills: 0 | Status: SHIPPED | OUTPATIENT
Start: 2024-09-06 | End: 2024-09-16

## 2024-09-06 RX ADMIN — IBUPROFEN 400 MG: 400 TABLET, FILM COATED ORAL at 09:55

## 2024-09-06 RX ADMIN — ACETAMINOPHEN 650 MG: 325 TABLET ORAL at 09:55

## 2024-09-06 ASSESSMENT — PAIN SCALES - GENERAL
PAINLEVEL_OUTOF10: 7
PAINLEVEL_OUTOF10: 7

## 2024-09-06 ASSESSMENT — ENCOUNTER SYMPTOMS
SHORTNESS OF BREATH: 0
VOMITING: 0
NAUSEA: 0
ABDOMINAL PAIN: 0
BACK PAIN: 0

## 2024-09-06 ASSESSMENT — PAIN - FUNCTIONAL ASSESSMENT: PAIN_FUNCTIONAL_ASSESSMENT: 0-10
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
## 2024-09-06 NOTE — ED NOTES
Resident at bedside to evaluate pt.   During examination pt took a phone call and walked out to use the restroom.
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
## 2024-09-13 ENCOUNTER — OFFICE VISIT (OUTPATIENT)
Dept: INTERNAL MEDICINE | Age: 39
End: 2024-09-13

## 2024-09-13 VITALS
BODY MASS INDEX: 28.89 KG/M2 | SYSTOLIC BLOOD PRESSURE: 159 MMHG | TEMPERATURE: 97.7 F | WEIGHT: 153 LBS | HEIGHT: 61 IN | HEART RATE: 70 BPM | DIASTOLIC BLOOD PRESSURE: 95 MMHG | OXYGEN SATURATION: 100 %
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
## 2024-09-13 RX ORDER — CYCLOBENZAPRINE HCL 5 MG
5 TABLET ORAL NIGHTLY PRN
Qty: 10 TABLET | Refills: 0 | Status: SHIPPED | OUTPATIENT
Start: 2024-09-13 | End: 2024-09-23

## 2024-09-13 ASSESSMENT — PATIENT HEALTH QUESTIONNAIRE - PHQ9
8. MOVING OR SPEAKING SO SLOWLY THAT OTHER PEOPLE COULD HAVE NOTICED. OR THE OPPOSITE, BEING SO FIGETY OR RESTLESS THAT YOU HAVE BEEN MOVING AROUND A LOT MORE THAN USUAL: NOT AT ALL
1. LITTLE INTEREST OR PLEASURE IN DOING THINGS: SEVERAL DAYS
9. THOUGHTS THAT YOU WOULD BE BETTER OFF DEAD, OR OF HURTING YOURSELF: NOT AT ALL
SUM OF ALL RESPONSES TO PHQ QUESTIONS 1-9: 9
3. TROUBLE FALLING OR STAYING ASLEEP: MORE THAN HALF THE DAYS
7. TROUBLE CONCENTRATING ON THINGS, SUCH AS READING THE NEWSPAPER OR WATCHING TELEVISION: SEVERAL DAYS
SUM OF ALL RESPONSES TO PHQ9 QUESTIONS 1 & 2: 2
2. FEELING DOWN, DEPRESSED OR HOPELESS: SEVERAL DAYS
10. IF YOU CHECKED OFF ANY PROBLEMS, HOW DIFFICULT HAVE THESE PROBLEMS MADE IT FOR YOU TO DO YOUR WORK, TAKE CARE OF THINGS AT HOME, OR GET ALONG WITH OTHER PEOPLE: NOT DIFFICULT AT ALL
5. POOR APPETITE OR OVEREATING: MORE THAN HALF THE DAYS
SUM OF ALL RESPONSES TO PHQ QUESTIONS 1-9: 9
4. FEELING TIRED OR HAVING LITTLE ENERGY: MORE THAN HALF THE DAYS
6. FEELING BAD ABOUT YOURSELF - OR THAT YOU ARE A FAILURE OR HAVE LET YOURSELF OR YOUR FAMILY DOWN: NOT AT ALL

## 2024-10-09 ENCOUNTER — OFFICE VISIT (OUTPATIENT)
Dept: GYNECOLOGIC ONCOLOGY | Age: 39
End: 2024-10-09
Payer: COMMERCIAL

## 2024-10-09 VITALS
WEIGHT: 154.4 LBS | HEART RATE: 77 BPM | SYSTOLIC BLOOD PRESSURE: 154 MMHG | TEMPERATURE: 98.2 F | DIASTOLIC BLOOD PRESSURE: 98 MMHG | OXYGEN SATURATION: 100 % | BODY MASS INDEX: 29.17 KG/M2

## 2024-10-09 DIAGNOSIS — N83.201 CYST OF RIGHT OVARY: Primary | ICD-10-CM

## 2024-10-09 PROCEDURE — 3080F DIAST BP >= 90 MM HG: CPT | Performed by: OBSTETRICS & GYNECOLOGY

## 2024-10-09 PROCEDURE — G8419 CALC BMI OUT NRM PARAM NOF/U: HCPCS | Performed by: OBSTETRICS & GYNECOLOGY

## 2024-10-09 PROCEDURE — 1036F TOBACCO NON-USER: CPT | Performed by: OBSTETRICS & GYNECOLOGY

## 2024-10-09 PROCEDURE — G8484 FLU IMMUNIZE NO ADMIN: HCPCS | Performed by: OBSTETRICS & GYNECOLOGY

## 2024-10-09 PROCEDURE — G8427 DOCREV CUR MEDS BY ELIG CLIN: HCPCS | Performed by: OBSTETRICS & GYNECOLOGY

## 2024-10-09 PROCEDURE — 99214 OFFICE O/P EST MOD 30 MIN: CPT | Performed by: OBSTETRICS & GYNECOLOGY

## 2024-10-09 PROCEDURE — 3077F SYST BP >= 140 MM HG: CPT | Performed by: OBSTETRICS & GYNECOLOGY

## 2024-10-09 ASSESSMENT — ENCOUNTER SYMPTOMS
ABDOMINAL PAIN: 0
VOICE CHANGE: 0
CONSTIPATION: 0
SORE THROAT: 0
COUGH: 0
HEMOPTYSIS: 0
BACK PAIN: 0
RECTAL PAIN: 0
TROUBLE SWALLOWING: 0
ABDOMINAL DISTENTION: 0
BLOOD IN STOOL: 0
SHORTNESS OF BREATH: 0
SCLERAL ICTERUS: 0
VOMITING: 0
NAUSEA: 0
CHEST TIGHTNESS: 0
EYE PROBLEMS: 0
WHEEZING: 0
DIARRHEA: 0

## 2024-10-09 NOTE — PROGRESS NOTES
Gyn Oncology Note    Patient returns today for surveillance  She has a history of chronic pelvic pain  Hysterectomy due to heavy bleeding in past  History of ovarian cysts  US ordered again today    She declines vaginal/pelvic exam today  Physical exam today reveals a healthy looking woman in no distress  Physical exam unremarkable    I revd her notes, history, labs, imaging and records in detail today  I counseled her extensively about her history and options  I spent 25 minutes managing this case in the office today  She wants no surgical intervention at this time    Her pelvic pain this AM was relieved by a good BM today she tells me.    Plan:    Repeat US  FU with me in 4-6 months  Call for US results in two weeks  Call or return sooner as needed    TIGRE Valadez MD

## 2024-10-15 ENCOUNTER — HOSPITAL ENCOUNTER (OUTPATIENT)
Dept: PHYSICAL THERAPY | Age: 39
Setting detail: THERAPIES SERIES
Discharge: HOME | End: 2024-10-15

## 2024-10-15 NOTE — FLOWSHEET NOTE
[x] [facility]  Outpatient Rehabilitation &  Therapy  [address]  [phone]  [phone] [] [facility]  Outpatient Rehabilitation &  Therapy  [address]  P: [phone]  F: [phone] [] [facility]  Outpatient Rehabilitation &  Therapy  [address]  P: [phone]  F: [phone] [] [facility]  Outpatient Rehabilitation &  Therapy  [address]  [phone]  [phone] [] [facility]  Outpatient Rehabilitation &  Therapy  [address]   P: [phone]  F: [phone]  [] [facility]  Outpatient Rehabilitation &  Therapy  [address]  P: [phone]  F: [phone] [] [facility]  Outpatient Rehabilitation &  Therapy  [address]  P: [phone]  F: [phone] [] [facility]  Outpatient Rehabilitation &  Therapy  [address]  P: [phone]  F: [phone] [] [facility]  Outpatient Rehabilitation &  Therapy  [address]  P: [phone]  F: [phone]  [] [facility] Outpatient Rehabilitation &  Therapy  [address]  P: [phone]  F: [phone]     Therapy Cancel/No Show note    Date: 10/15/2024  Patient: Jose MENDEZ Stiven  : 1985  MRN: [id number]    Cancels/No Shows to date: 0     For today's appointment patient:    [x]  Cancelled    [] Rescheduled appointment    [] No-show     Reason given by patient:    [x]  Patient ill    []  Conflicting appointment    [] No transportation      [] Conflict with work    [] No reason given    [] Weather related    [] COVID-19    [] Other:      Comments:  10/15 CX pt not feeling well      [] Next appointment was confirmed    Electronically signed by: Win Fitzpatrick PT

## 2024-10-23 ENCOUNTER — HOSPITAL ENCOUNTER (OUTPATIENT)
Dept: PHYSICAL THERAPY | Age: 39
Setting detail: THERAPIES SERIES
Discharge: HOME | End: 2024-10-23

## 2024-10-23 NOTE — FLOWSHEET NOTE
[x] [facility]  Outpatient Rehabilitation &  Therapy  [address]  [phone]  [phone] [] [facility]  Outpatient Rehabilitation &  Therapy  [address]  P: [phone]  F: [phone] [] [facility]  Outpatient Rehabilitation &  Therapy  [address]  P: [phone]  F: [phone] [] [facility]  Outpatient Rehabilitation &  Therapy  [address]  [phone]  [phone] [] [facility]  Outpatient Rehabilitation &  Therapy  [address]   P: [phone]  F: [phone]  [] [facility]  Outpatient Rehabilitation &  Therapy  [address]  P: [phone]  F: [phone] [] [facility]  Outpatient Rehabilitation &  Therapy  [address]  P: [phone]  F: [phone] [] [facility]  Outpatient Rehabilitation &  Therapy  [address]  P: [phone]  F: [phone] [] [facility]  Outpatient Rehabilitation &  Therapy  [address]  P: [phone]  F: [phone]  [] [facility] Outpatient Rehabilitation &  Therapy  [address]  P: [phone]  F: [phone]     Therapy Cancel/No Show note    Date: 10/23/2024  Patient: Jose Saleem  : 1985  MRN: [id number]      This cancellation does not count against the patients attendance record.    Discipline cancelled:    [x] Physical Therapy    [] Occupational Therapy    [] Speech Therapy      Clinic canceled today's appointment due to:    []  Therapist ill    [x]  Other:      Comments:  Therapist off      [x] Reached patient via phone on 10/17/2024 - notified pt that clinic was cx'ing appt;  patient reported she was taking out the trash and would call back to reschedule    [] Left message on voicemail    [x] Unable to reach patient due to:

## 2024-11-07 ENCOUNTER — HOSPITAL ENCOUNTER (OUTPATIENT)
Dept: PHYSICAL THERAPY | Age: 39
Setting detail: THERAPIES SERIES
Discharge: HOME | End: 2024-11-07
Payer: OTHER MISCELLANEOUS

## 2024-11-07 PROCEDURE — 97161 PT EVAL LOW COMPLEX 20 MIN: CPT
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
## 2024-11-18 ENCOUNTER — HOSPITAL ENCOUNTER (OUTPATIENT)
Dept: PHYSICAL THERAPY | Age: 39
Setting detail: THERAPIES SERIES
Discharge: HOME | End: 2024-11-18
Payer: OTHER MISCELLANEOUS

## 2024-11-18 NOTE — FLOWSHEET NOTE
[x] [facility]  Outpatient Rehabilitation &  Therapy  [address]  [phone]  [phone] [] [facility]  Outpatient Rehabilitation &  Therapy  [address]  P: [phone]  F: [phone] [] [facility]  Outpatient Rehabilitation &  Therapy  [address]  P: [phone]  F: [phone] [] [facility]  Outpatient Rehabilitation &  Therapy  [address]  [phone]  [phone] [] [facility]  Outpatient Rehabilitation &  Therapy  [address]   P: [phone]  F: [phone]  [] [facility]  Outpatient Rehabilitation &  Therapy  [address]  P: [phone]  F: [phone] [] [facility]  Outpatient Rehabilitation &  Therapy  [address]  P: [phone]  F: [phone] [] [facility]  Outpatient Rehabilitation &  Therapy  [address]  P: [phone]  F: [phone] [] [facility]  Outpatient Rehabilitation &  Therapy  [address]  P: [phone]  F: [phone]  [] [facility] Outpatient Rehabilitation &  Therapy  [address]  P: [phone]  F: [phone]     Therapy Cancel/No Show note    Date: 2024  Patient: Jose Saleem  : 1985  MRN: [id number]    Cancels/No Shows to date: 1 cx / 0 ns    For today's appointment patient:    [x]  Cancelled    [] Rescheduled appointment    [] No-show     Reason given by patient:    []  Patient ill    []  Conflicting appointment    [] No transportation      [] Conflict with work    [] No reason given    [] Weather related    [] COVID-19    [x] Other:      Comments:  Personal matter.      [x] Next appointment was confirmed    Electronically signed by: Shawn Zaldivar, PT

## 2024-11-25 ENCOUNTER — HOSPITAL ENCOUNTER (OUTPATIENT)
Dept: PHYSICAL THERAPY | Age: 39
Setting detail: THERAPIES SERIES
Discharge: HOME | End: 2024-11-25
Payer: OTHER MISCELLANEOUS

## 2024-11-25 NOTE — FLOWSHEET NOTE
[x] [facility]  Outpatient Rehabilitation &  Therapy  [address]  [phone]  [phone] [] [facility]  Outpatient Rehabilitation &  Therapy  [address]  P: [phone]  F: [phone] [] [facility]  Outpatient Rehabilitation &  Therapy  [address]  P: [phone]  F: [phone] [] [facility]  Outpatient Rehabilitation &  Therapy  [address]  [phone]  [phone] [] [facility]  Outpatient Rehabilitation &  Therapy  [address]   P: [phone]  F: [phone]  [] [facility]  Outpatient Rehabilitation &  Therapy  [address]  P: [phone]  F: [phone] [] [facility]  Outpatient Rehabilitation &  Therapy  [address]  P: [phone]  F: [phone] [] [facility]  Outpatient Rehabilitation &  Therapy  [address]  P: [phone]  F: [phone] [] [facility]  Outpatient Rehabilitation &  Therapy  [address]  P: [phone]  F: [phone]  [] [facility] Outpatient Rehabilitation &  Therapy  [address]  P: [phone]  F: [phone]     Therapy Cancel/No Show note    Date: 2024  Patient: Jose Saleem  : 1985  MRN: [id number]    Cancels/No Shows to date: 2 cx / 0 ns    For today's appointment patient:    [x]  Cancelled    [] Rescheduled appointment    [] No-show     Reason given by patient:    [x]  Patient ill    []  Conflicting appointment    [] No transportation      [] Conflict with work    [] No reason given    [] Weather related    [] COVID-19    [x] Other:      Comments:  Reports she has the flu.      [x] Next appointment was confirmed    Electronically signed by: Shawn Zaldivar, PT

## 2024-11-26 ENCOUNTER — APPOINTMENT (OUTPATIENT)
Dept: CT IMAGING | Age: 39
End: 2024-11-26
Payer: COMMERCIAL

## 2024-11-26 ENCOUNTER — HOSPITAL ENCOUNTER (EMERGENCY)
Age: 39
Discharge: HOME | End: 2024-11-26
Attending: EMERGENCY MEDICINE
Payer: COMMERCIAL

## 2024-11-26 VITALS
DIASTOLIC BLOOD PRESSURE: 98 MMHG | TEMPERATURE: 97.3 F | OXYGEN SATURATION: 99 % | WEIGHT: 150 LBS | HEART RATE: 69 BPM | BODY MASS INDEX: 28.32 KG/M2 | HEIGHT: 61 IN | SYSTOLIC BLOOD PRESSURE: 150 MMHG | RESPIRATION RATE: 18 BRPM

## 2024-11-26 DIAGNOSIS — R10.31 RIGHT LOWER QUADRANT ABDOMINAL PAIN: ICD-10-CM

## 2024-11-26 DIAGNOSIS — N30.00 ACUTE CYSTITIS WITHOUT HEMATURIA: ICD-10-CM

## 2024-11-26 DIAGNOSIS — R11.2 NAUSEA AND VOMITING, UNSPECIFIED VOMITING TYPE: Primary | ICD-10-CM

## 2024-11-26 LAB
ALBUMIN SERPL-MCNC: 4.3 G/DL (ref 3.5–5.2)
ALBUMIN/GLOB SERPL: 1.5 {RATIO} (ref 1–2.5)
ALP SERPL-CCNC: 50 U/L (ref 35–104)
ALT SERPL-CCNC: 10 U/L (ref 10–35)
ANION GAP SERPL CALCULATED.3IONS-SCNC: 12 MMOL/L (ref 9–16)
AST SERPL-CCNC: 30 U/L (ref 10–35)
BACTERIA URNS QL MICRO: ABNORMAL
BASOPHILS # BLD: <0.03 K/UL (ref 0–0.2)
BASOPHILS NFR BLD: 0 % (ref 0–2)
BILIRUB SERPL-MCNC: 0.9 MG/DL (ref 0–1.2)
BILIRUB UR QL STRIP: NEGATIVE
BUN SERPL-MCNC: 12 MG/DL (ref 6–20)
CALCIUM SERPL-MCNC: 8.9 MG/DL (ref 8.6–10.4)
CASTS #/AREA URNS LPF: ABNORMAL /LPF (ref 0–8)
CHLORIDE SERPL-SCNC: 105 MMOL/L (ref 98–107)
CLARITY UR: CLEAR
CO2 SERPL-SCNC: 23 MMOL/L (ref 20–31)
COLOR UR: YELLOW
CREAT SERPL-MCNC: 0.8 MG/DL (ref 0.6–0.9)
EOSINOPHIL # BLD: 0.03 K/UL (ref 0–0.44)
EOSINOPHILS RELATIVE PERCENT: 1 % (ref 1–4)
EPI CELLS #/AREA URNS HPF: ABNORMAL /HPF (ref 0–5)
ERYTHROCYTE [DISTWIDTH] IN BLOOD BY AUTOMATED COUNT: 11.9 % (ref 11.8–14.4)
GFR, ESTIMATED: >90 ML/MIN/1.73M2
GLUCOSE SERPL-MCNC: 89 MG/DL (ref 74–99)
GLUCOSE UR STRIP-MCNC: NEGATIVE MG/DL
HCT VFR BLD AUTO: 39.8 % (ref 36.3–47.1)
HGB BLD-MCNC: 13.6 G/DL (ref 11.9–15.1)
HGB UR QL STRIP.AUTO: NEGATIVE
IMM GRANULOCYTES # BLD AUTO: <0.03 K/UL (ref 0–0.3)
IMM GRANULOCYTES NFR BLD: 0 %
KETONES UR STRIP-MCNC: ABNORMAL MG/DL
LACTIC ACID, WHOLE BLOOD: 0.9 MMOL/L (ref 0.7–2.1)
LEUKOCYTE ESTERASE UR QL STRIP: NEGATIVE
LIPASE SERPL-CCNC: 27 U/L (ref 13–60)
LYMPHOCYTES NFR BLD: 1.59 K/UL (ref 1.1–3.7)
LYMPHOCYTES RELATIVE PERCENT: 34 % (ref 24–43)
MCH RBC QN AUTO: 31.7 PG (ref 25.2–33.5)
MCHC RBC AUTO-ENTMCNC: 34.2 G/DL (ref 28.4–34.8)
MCV RBC AUTO: 92.8 FL (ref 82.6–102.9)
MONOCYTES NFR BLD: 0.45 K/UL (ref 0.1–1.2)
MONOCYTES NFR BLD: 10 % (ref 3–12)
NEUTROPHILS NFR BLD: 55 % (ref 36–65)
NEUTS SEG NFR BLD: 2.58 K/UL (ref 1.5–8.1)
NITRITE UR QL STRIP: POSITIVE
NRBC BLD-RTO: 0 PER 100 WBC
PH UR STRIP: 6 [PH] (ref 5–8)
PLATELET # BLD AUTO: 235 K/UL (ref 138–453)
PMV BLD AUTO: 10.2 FL (ref 8.1–13.5)
POTASSIUM SERPL-SCNC: 3.4 MMOL/L (ref 3.7–5.3)
PROT SERPL-MCNC: 7.2 G/DL (ref 6.6–8.7)
PROT UR STRIP-MCNC: NEGATIVE MG/DL
RBC # BLD AUTO: 4.29 M/UL (ref 3.95–5.11)
RBC #/AREA URNS HPF: ABNORMAL /HPF (ref 0–4)
SODIUM SERPL-SCNC: 140 MMOL/L (ref 136–145)
SP GR UR STRIP: 1.08 (ref 1–1.03)
UROBILINOGEN UR STRIP-ACNC: NORMAL EU/DL (ref 0–1)
WBC #/AREA URNS HPF: ABNORMAL /HPF (ref 0–5)
WBC OTHER # BLD: 4.7 K/UL (ref 3.5–11.3)

## 2024-11-26 PROCEDURE — 83605 ASSAY OF LACTIC ACID: CPT

## 2024-11-26 PROCEDURE — 83690 ASSAY OF LIPASE: CPT

## 2024-11-26 PROCEDURE — 96374 THER/PROPH/DIAG INJ IV PUSH: CPT

## 2024-11-26 PROCEDURE — 96375 TX/PRO/DX INJ NEW DRUG ADDON: CPT

## 2024-11-26 PROCEDURE — 2500000003 HC RX 250 WO HCPCS

## 2024-11-26 PROCEDURE — 6360000002 HC RX W HCPCS

## 2024-11-26 PROCEDURE — 6360000004 HC RX CONTRAST MEDICATION

## 2024-11-26 PROCEDURE — 85025 COMPLETE CBC W/AUTO DIFF WBC: CPT

## 2024-11-26 PROCEDURE — 74177 CT ABD & PELVIS W/CONTRAST: CPT

## 2024-11-26 PROCEDURE — 6370000000 HC RX 637 (ALT 250 FOR IP)

## 2024-11-26 PROCEDURE — 81001 URINALYSIS AUTO W/SCOPE: CPT

## 2024-11-26 PROCEDURE — 80053 COMPREHEN METABOLIC PANEL: CPT

## 2024-11-26 PROCEDURE — 99285 EMERGENCY DEPT VISIT HI MDM: CPT

## 2024-11-26 PROCEDURE — 2580000003 HC RX 258

## 2024-11-26 RX ORDER — ONDANSETRON 2 MG/ML
4 INJECTION INTRAMUSCULAR; INTRAVENOUS ONCE
Status: COMPLETED | OUTPATIENT
Start: 2024-11-26 | End: 2024-11-26

## 2024-11-26 RX ORDER — IOPAMIDOL 755 MG/ML
75 INJECTION, SOLUTION INTRAVASCULAR
Status: COMPLETED | OUTPATIENT
Start: 2024-11-26 | End: 2024-11-26

## 2024-11-26 RX ORDER — CEPHALEXIN 500 MG/1
500 CAPSULE ORAL 2 TIMES DAILY
Qty: 14 CAPSULE | Refills: 0 | Status: SHIPPED | OUTPATIENT
Start: 2024-11-26 | End: 2024-12-03

## 2024-11-26 RX ORDER — CEPHALEXIN 500 MG/1
500 CAPSULE ORAL ONCE
Status: COMPLETED | OUTPATIENT
Start: 2024-11-26 | End: 2024-11-26

## 2024-11-26 RX ADMIN — CEPHALEXIN 500 MG: 500 CAPSULE ORAL at 10:36

## 2024-11-26 RX ADMIN — IOPAMIDOL 75 ML: 755 INJECTION, SOLUTION INTRAVENOUS at 07:50

## 2024-11-26 RX ADMIN — FAMOTIDINE 20 MG: 10 INJECTION, SOLUTION INTRAVENOUS at 07:00

## 2024-11-26 RX ADMIN — ONDANSETRON 4 MG: 2 INJECTION INTRAMUSCULAR; INTRAVENOUS at 07:01

## 2024-11-26 ASSESSMENT — ENCOUNTER SYMPTOMS
NAUSEA: 1
ABDOMINAL PAIN: 1
VOMITING: 1

## 2024-11-26 ASSESSMENT — PAIN DESCRIPTION - DESCRIPTORS: DESCRIPTORS: ACHING

## 2024-11-26 ASSESSMENT — PAIN SCALES - GENERAL: PAINLEVEL_OUTOF10: 10

## 2024-11-26 ASSESSMENT — PAIN DESCRIPTION - LOCATION: LOCATION: HEAD

## 2024-11-26 ASSESSMENT — LIFESTYLE VARIABLES
HOW OFTEN DO YOU HAVE A DRINK CONTAINING ALCOHOL: PATIENT DECLINED
HOW MANY STANDARD DRINKS CONTAINING ALCOHOL DO YOU HAVE ON A TYPICAL DAY: PATIENT DECLINED

## 2024-11-26 NOTE — ED TRIAGE NOTES
Pt presents to ED from home c/o n/v, headache, hot flashes, and feeling unwell since 23 NOV 2024. Pt denies diarrhea, cold/flu sx, LOC, CP, SOB, fever, chills, injury/trauma, change in bowel/bladder function, loss of appetite,  or any other sx.     Pt is A&OX4,  IV established, changed into gown and given warm blankets. Labs drawn, labeled, and sent to lab. Pt vitals show HTN but otherwise WNL.White board updated, and patient is updated on plan of care.

## 2024-11-26 NOTE — ED NOTES
Pt given warm blanket       The following labs were labeled with appropriate pt sticker and tubed to lab:     [] Blue     [] Lavender   [] on ice  [] Green/yellow  [x] Green/black [] on ice  [] Grey  [] on ice  [] Yellow  [] Red  [] Pink  [] Type/ Screen  [] ABG  [] VBG    [] COVID-19 swab    [] Rapid  [] PCR  [] Flu swab  [] Peds Viral Panel     [] Urine Sample  [] Fecal Sample  [] Pelvic Cultures  [] Blood Cultures  [] X 2  [] STREP Cultures  [] Wound Cultures

## 2024-11-26 NOTE — ED PROVIDER NOTES
[facility]  FACULTY HANDOFF     7:45 AM EST  Handoff taken on the following patient from prior Attending Physician:  Pt Name: Jose Saleem  PCP:  Maria Esther Damon MD    Attestation  I was available and discussed any additional care issues that arose and coordinated the management plans with the resident(s) caring for the patient during my duty period. Any areas of disagreement with resident's documentation of care or procedures are noted on the chart. I was personally present for the key portions of any/all procedures during my duty period. I have documented in the chart those procedures where I was not present during the key portions.         CHIEF COMPLAINT       Chief Complaint   Patient presents with    Nausea    Vomiting         CURRENT MEDICATIONS     Previous Medications  Previous Medications    ACETAMINOPHEN (TYLENOL) 325 MG TABLET    Take 2 tablets by mouth every 6 hours as needed for Pain    ACETAMINOPHEN (TYLENOL) 500 MG TABLET    Take 2 tablets by mouth every 6 hours as needed for Pain    AMLODIPINE (NORVASC) 5 MG TABLET    Take 1 tablet by mouth daily    BENZOYL PEROXIDE 5 % EXTERNAL LIQUID    Wash affected areas once daily    CLINDAMYCIN (CLEOCIN T) 1 % LOTION    Apply to affected areas daily    FERROUS SULFATE (IRON 325) 325 (65 FE) MG TABLET    Take 1 tablet by mouth daily (with breakfast)    HANDICAP PLACARD MISC    by Does not apply route Chronic Pelvic pain   Length of handicap placard- 12 months    HYDROCHLOROTHIAZIDE (HYDRODIURIL) 25 MG TABLET    Take 1 tablet by mouth every morning    HYDROQUINONE 4 % CREAM    Apply 2 months on, 2 months off to dark spots    IBUPROFEN (ADVIL;MOTRIN) 800 MG TABLET    Take 1 tablet by mouth every 8 hours as needed for Pain    NORETHINDRONE-ETHINYL ESTRADIOL (MICROGESTIN) 1-20 MG-MCG PER TABLET    Take 1 tablet by mouth daily    PROCHLORPERAZINE (COMPAZINE) 5 MG TABLET    Take 1 tablet by mouth every 6 hours as needed for Nausea

## 2024-11-26 NOTE — DISCHARGE INSTRUCTIONS
You are seen in the emergency department for abdominal pain, nausea, vomiting.  Is most likely associated with your ovarian cyst, however cannot rule out viral infection.  You are also found to have a urinary tract infection and were started on a course of antibiotics which she should take till it is completely gone.  Please follow-up with your gynecologist within the next week for ongoing management of your chronic pelvic pain.  You can use Tylenol and Motrin as needed for discomfort.

## 2024-11-26 NOTE — ED PROVIDER NOTES
[facility] ED  Emergency Department Encounter  Emergency Medicine Resident     Pt Name:Jose Saleem  MRN: [id number]  Birthdate 1985  Date of evaluation: 24  PCP:  Maria Esther Damon MD  Note Started: 6:40 AM EST      CHIEF COMPLAINT       Chief Complaint   Patient presents with    Nausea    Vomiting       HISTORY OF PRESENT ILLNESS  (Location/Symptom, Timing/Onset, Context/Setting, Quality, Duration, Modifying Factors, Severity.)      Jose Saleem is a 39 y.o. female presenting with complaint of nausea and vomiting that began on .  She reports experiencing approximately 10 episodes of vomiting since onset, described as acidic in nature and without any blood.  Associated symptoms include significant headache and generalized bodyaches.  The patient denies any fever, chest pain or specific abdominal pain, though she mentions feeling overall unwell.  She experienced hot flashes and states that her blood pressure feels elevated at times.  The patient ate sausage and pepper pizza on , after which the symptoms began.  She has a history of an ovarian cyst that was drained earlier this year.  The patient has no recent procedural history involving the esophagus.  She denies any urinary or vaginal complaints.    PAST MEDICAL / SURGICAL / SOCIAL / FAMILY HISTORY      has a past medical history of Acne, Benign essential hypertension antepartum, Closed nondisplaced fracture of distal pole of navicular bone of left wrist, COVID-19, COVID-19 vaccine series not administered, Cyst of ovary, Family history of uterine cancer, Iron deficiency anemia, Migraine, Mirena IUD inserted 22, Prothrombin gene mutation (HCC), S/p Diagnostic laparoscopy, hysis of adhesions, hysteroscopy, dilatation and curettage, Mirena IUD insertion, Snores, Under care of team, and Under care of team.       has a past surgical history that includes  section (1/10/2007, 2007, 2012); Cervix  surgery; laparoscopy (N/A, 02/14/2022); Dilation and curettage of uterus (N/A, 02/14/2022); Bladder surgery (N/A, 02/14/2022); Hysterectomy, total abdominal (N/A, 03/02/2022); Cystoscopy (N/A, 03/02/2022); Intrauterine device removal (2022); Cystoscopy (10/10/2023); Hysterectomy (N/A, 10/10/2023); and DRAIN OVARIAN CYST VAG APPROACH (2/13/2024).      Social History     Socioeconomic History    Marital status: Single     Spouse name: Not on file    Number of children: Not on file    Years of education: Not on file    Highest education level: Not on file   Occupational History    Not on file   Tobacco Use    Smoking status: Never    Smokeless tobacco: Never   Vaping Use    Vaping status: Never Used   Substance and Sexual Activity    Alcohol use: Yes     Alcohol/week: 3.0 standard drinks of alcohol     Types: 3 Glasses of wine per week     Comment: 3 times per month    Drug use: Not Currently     Frequency: 2.0 times per week     Types: Marijuana (Weed)     Comment: last THC 9/24/2023    Sexual activity: Yes     Partners: Male   Other Topics Concern    Not on file   Social History Narrative    Not on file     Social Determinants of Health     Financial Resource Strain: Low Risk  (7/17/2024)    Overall Financial Resource Strain (CARDIA)     Difficulty of Paying Living Expenses: Not hard at all   Food Insecurity: No Food Insecurity (7/17/2024)    Hunger Vital Sign     Worried About Running Out of Food in the Last Year: Never true     Ran Out of Food in the Last Year: Never true   Transportation Needs: Unknown (7/17/2024)    PRAPARE - Transportation     Lack of Transportation (Medical): Not on file     Lack of Transportation (Non-Medical): No   Physical Activity: Not on file   Stress: Not on file   Social Connections: Not on file   Intimate Partner Violence: Not on file   Housing Stability: Unknown (7/17/2024)    Housing Stability Vital Sign     Unable to Pay for Housing in the Last Year: Not on file     Number of Places

## 2024-11-26 NOTE — ED PROVIDER NOTES
[facility] ED  Emergency Department  Emergency Medicine Resident Sign-out     Care of Jose Saleem was assumed from Dr. Santana and is being seen for Nausea and Vomiting   .  The patient's initial evaluation and plan have been discussed with the prior provider who initially evaluated the patient.     EMERGENCY DEPARTMENT COURSE / MEDICAL DECISION MAKING:       MEDICATIONS GIVEN:  Orders Placed This Encounter   Medications    famotidine (PEPCID) 20 mg in sodium chloride (PF) 0.9 % 10 mL injection    ondansetron (ZOFRAN) injection 4 mg    iopamidol (ISOVUE-370) 76 % injection 75 mL    cephALEXin (KEFLEX) capsule 500 mg     Order Specific Question:   Antimicrobial Indications     Answer:   Urinary Tract Infection    cephALEXin (KEFLEX) 500 MG capsule     Sig: Take 1 capsule by mouth 2 times daily for 7 days     Dispense:  14 capsule     Refill:  0       LABS / RADIOLOGY:     Labs Reviewed   COMPREHENSIVE METABOLIC PANEL - Abnormal; Notable for the following components:       Result Value    Potassium 3.4 (*)     All other components within normal limits   URINALYSIS WITH REFLEX TO CULTURE - Abnormal; Notable for the following components:    Ketones, Urine MODERATE (*)     Specific Gravity, UA 1.077 (*)     Nitrite, Urine POSITIVE (*)     All other components within normal limits   MICROSCOPIC URINALYSIS - Abnormal; Notable for the following components:    Bacteria, UA MANY (*)     All other components within normal limits   CBC WITH AUTO DIFFERENTIAL   LIPASE   LACTIC ACID       CT ABDOMEN PELVIS W IV CONTRAST Additional Contrast? None   Final Result   1. No acute inflammatory or obstructive process seen in the abdomen pelvis.   2. Possible right-sided corpus luteal cyst that could account for patient's   pain.             RECENT VITALS:     Temp: 97.3 °F (36.3 °C),  Pulse: 69, Respirations: 18, BP: (!) 150/98, SpO2: 99 %    This patient is a 39 y.o. Female with   ED Course as of 11/26/24 1026

## 2024-11-26 NOTE — ED NOTES
The following labs were labeled with appropriate pt sticker and tubed to lab:     [x] Blue     [x] Lavender   [] on ice  [x] Green/yellow  [x] Green/black [] on ice  [] Ortiz  [] on ice  [x] Yellow  [] Red  [] Pink  [] Type/ Screen  [] ABG  [] VBG    [] COVID-19 swab    [] Rapid  [] PCR  [] Flu swab  [] Peds Viral Panel     [] Urine Sample  [] Fecal Sample  [] Pelvic Cultures  [] Blood Cultures  [] X 2  [] STREP Cultures  [] Wound Cultures

## 2024-11-26 NOTE — ED PROVIDER NOTES
[facility]     Emergency Department     Faculty Attestation    I performed a history and physical examination of the patient and discussed management with the resident. I have reviewed and agree with the resident’s findings including all diagnostic interpretations, and treatment plans as written. Any areas of disagreement are noted on the chart. I was personally present for the key portions of any procedures. I have documented in the chart those procedures where I was not present during the key portions. I have reviewed the emergency nurses triage note. I agree with the chief complaint, past medical history, past surgical history, allergies, medications, social and family history as documented unless otherwise noted below. Documentation of the HPI, Physical Exam and Medical Decision Making performed by scribjanel is based on my personal performance of the HPI, PE and MDM. For Physician Assistant/ Nurse Practitioner cases/documentation I have personally evaluated this patient and have completed at least one if not all key elements of the E/M (history, physical exam, and MDM). Additional findings are as noted.    Note Started: 6:44 AM EST     40 yo F vomit, aches, \"feels hot\", no known fever, no abdominal pain,   PE vss gcs 15 Carmela RN escort for exam; mild tenderness rlq, + guard, no rebound, no distension, no rigidity,   --- ct pending, day shift sign out,   EKG Interpretation    Interpreted by me      CRITICAL CARE: There was a high probability of clinically significant/life threatening deterioration in this patient's condition which required my urgent intervention.  Total critical care time was 5 minutes.  This excludes any time for separately reportable procedures.       Petr Carreno DO  11/26/24 0654       Petr Carrera DO  11/26/24 0659

## 2025-01-03 ENCOUNTER — TELEPHONE (OUTPATIENT)
Dept: GYNECOLOGIC ONCOLOGY | Age: 40
End: 2025-01-03

## 2025-01-23 ENCOUNTER — HOSPITAL ENCOUNTER (EMERGENCY)
Age: 40
Discharge: HOME | End: 2025-01-23
Attending: EMERGENCY MEDICINE
Payer: COMMERCIAL

## 2025-01-23 VITALS
TEMPERATURE: 99 F | DIASTOLIC BLOOD PRESSURE: 85 MMHG | HEART RATE: 72 BPM | RESPIRATION RATE: 13 BRPM | OXYGEN SATURATION: 99 % | SYSTOLIC BLOOD PRESSURE: 153 MMHG

## 2025-01-23 DIAGNOSIS — I10 PRIMARY HYPERTENSION: ICD-10-CM

## 2025-01-23 DIAGNOSIS — J11.1 INFLUENZA WITH RESPIRATORY MANIFESTATION OTHER THAN PNEUMONIA: ICD-10-CM

## 2025-01-23 DIAGNOSIS — I15.9 SECONDARY HYPERTENSION: Primary | ICD-10-CM

## 2025-01-23 LAB
FLUAV RNA RESP QL NAA+PROBE: DETECTED
FLUBV RNA RESP QL NAA+PROBE: NOT DETECTED
SARS-COV-2 RNA RESP QL NAA+PROBE: NOT DETECTED
SOURCE: ABNORMAL
SPECIMEN DESCRIPTION: ABNORMAL

## 2025-01-23 PROCEDURE — 96375 TX/PRO/DX INJ NEW DRUG ADDON: CPT

## 2025-01-23 PROCEDURE — 96374 THER/PROPH/DIAG INJ IV PUSH: CPT

## 2025-01-23 PROCEDURE — 2580000003 HC RX 258: Performed by: EMERGENCY MEDICINE

## 2025-01-23 PROCEDURE — 93005 ELECTROCARDIOGRAM TRACING: CPT | Performed by: EMERGENCY MEDICINE

## 2025-01-23 PROCEDURE — 99284 EMERGENCY DEPT VISIT MOD MDM: CPT

## 2025-01-23 PROCEDURE — 6360000002 HC RX W HCPCS: Performed by: EMERGENCY MEDICINE

## 2025-01-23 PROCEDURE — 87636 SARSCOV2 & INF A&B AMP PRB: CPT

## 2025-01-23 RX ORDER — DIPHENHYDRAMINE HYDROCHLORIDE 50 MG/ML
25 INJECTION INTRAMUSCULAR; INTRAVENOUS ONCE
Status: COMPLETED | OUTPATIENT
Start: 2025-01-23 | End: 2025-01-23

## 2025-01-23 RX ORDER — AMLODIPINE BESYLATE 5 MG/1
5 TABLET ORAL DAILY
Qty: 30 TABLET | Refills: 0 | Status: SHIPPED | OUTPATIENT
Start: 2025-01-23

## 2025-01-23 RX ORDER — PROCHLORPERAZINE EDISYLATE 5 MG/ML
10 INJECTION INTRAMUSCULAR; INTRAVENOUS ONCE
Status: DISCONTINUED | OUTPATIENT
Start: 2025-01-23 | End: 2025-01-23 | Stop reason: HOSPADM

## 2025-01-23 RX ORDER — DEXAMETHASONE SODIUM PHOSPHATE 10 MG/ML
10 INJECTION, SOLUTION INTRAMUSCULAR; INTRAVENOUS ONCE
Status: COMPLETED | OUTPATIENT
Start: 2025-01-23 | End: 2025-01-23

## 2025-01-23 RX ORDER — HYDROCHLOROTHIAZIDE 25 MG/1
25 TABLET ORAL EVERY MORNING
Qty: 30 TABLET | Refills: 0 | Status: SHIPPED | OUTPATIENT
Start: 2025-01-23

## 2025-01-23 RX ORDER — 0.9 % SODIUM CHLORIDE 0.9 %
1000 INTRAVENOUS SOLUTION INTRAVENOUS ONCE
Status: COMPLETED | OUTPATIENT
Start: 2025-01-23 | End: 2025-01-23

## 2025-01-23 RX ADMIN — SODIUM CHLORIDE 1000 ML: 9 INJECTION, SOLUTION INTRAVENOUS at 20:26

## 2025-01-23 RX ADMIN — DIPHENHYDRAMINE HYDROCHLORIDE 25 MG: 50 INJECTION INTRAMUSCULAR; INTRAVENOUS at 20:28

## 2025-01-23 RX ADMIN — DEXAMETHASONE SODIUM PHOSPHATE 10 MG: 10 INJECTION, SOLUTION INTRAMUSCULAR; INTRAVENOUS at 20:28

## 2025-01-24 LAB
EKG ATRIAL RATE: 73 BPM
EKG P-R INTERVAL: 168 MS
EKG Q-T INTERVAL: 372 MS
EKG QRS DURATION: 80 MS
EKG QTC CALCULATION (BAZETT): 409 MS
EKG R AXIS: 52 DEGREES
EKG T AXIS: 49 DEGREES
EKG VENTRICULAR RATE: 73 BPM

## 2025-01-24 NOTE — ED PROVIDER NOTES
Sig: Take 1 tablet by mouth every morning     Dispense:  30 tablet     Refill:  0     CONSULTS:  None    FINAL IMPRESSION      1. Secondary hypertension    2. Influenza with respiratory manifestation other than pneumonia    3. Primary hypertension          DISPOSITION/PLAN   DISPOSITION Decision To Discharge 01/23/2025 09:42:24 PM   DISPOSITION CONDITION Stable           PATIENT REFERRED TO:  Maria Esther Damon MD  [facility] [place] [address]  [phone]          DISCHARGE MEDICATIONS:  Current Discharge Medication List        Rui Hicks MD  Attending Emergency Physician  [employer] voice recognition software used in portions of this document.                   Rui Hicks MD  01/23/25 6669

## 2025-01-24 NOTE — ED NOTES
Pt presents to ED via private auto with c/o headache and hypertension. Pt states hx of hypertension. Pt states she has not been taking her meds. Pt afebrile, vitals stable. Pt able to ambulate without assist.  Even, non-labored breathing. Pt given lunch box.

## 2025-01-29 ENCOUNTER — HOSPITAL ENCOUNTER (OUTPATIENT)
Age: 40
Setting detail: THERAPIES SERIES
Discharge: HOME | End: 2025-01-29

## 2025-01-29 NOTE — FLOWSHEET NOTE
[x] [facility]  Outpatient Rehabilitation &  Therapy  [address]  [phone]  [phone] [] [facility]  Outpatient Rehabilitation &  Therapy  [address]  P: [phone]  F: [phone] [] [facility]  Outpatient Rehabilitation &  Therapy  [address]  P: [phone]  F: [phone] [] [facility]  Outpatient Rehabilitation &  Therapy  [address]  [phone]  [phone] [] [facility]  Outpatient Rehabilitation &  Therapy  [address]   P: [phone]  F: [phone]  [] [facility]  Outpatient Rehabilitation &  Therapy  [address]  P: [phone]  F: [phone] [] [facility]  Outpatient Rehabilitation &  Therapy  [address]  P: [phone]  F: [phone] [] [facility]  Outpatient Rehabilitation &  Therapy  [address]  P: [phone]  F: [phone] [] [facility]  Outpatient Rehabilitation &  Therapy  [address]  P: [phone]  F: [phone]  [] [facility] Outpatient Rehabilitation &  Therapy  [address]  P: [phone]  F: [phone]     Therapy Cancel/No Show note    Date: 2025  Patient: Jose Saleem  : 1985  MRN: [id number]    Cancels/No Shows to date: 3 cx / 0 ns    For today's appointment patient:    [x]  Cancelled    [] Rescheduled appointment    [] No-show     Reason given by patient:    []  Patient ill    []  Conflicting appointment    [] No transportation      [x] Conflict with work    [] No reason given    [] Weather related    [] COVID-19    [x] Other:      Comments:  States she had to go into work, will call back to reschedule. If no call from patient by 25, will discharge.       [] Next appointment was confirmed    Electronically signed by:  0 = independent

## 2025-03-08 ENCOUNTER — HOSPITAL ENCOUNTER (EMERGENCY)
Age: 40
Discharge: HOME | End: 2025-03-08
Attending: EMERGENCY MEDICINE
Payer: COMMERCIAL

## 2025-03-08 VITALS
DIASTOLIC BLOOD PRESSURE: 100 MMHG | OXYGEN SATURATION: 99 % | WEIGHT: 145 LBS | BODY MASS INDEX: 27.38 KG/M2 | RESPIRATION RATE: 16 BRPM | HEART RATE: 88 BPM | SYSTOLIC BLOOD PRESSURE: 130 MMHG | TEMPERATURE: 98.1 F | HEIGHT: 61 IN

## 2025-03-08 DIAGNOSIS — S00.83XA TRAUMATIC HEMATOMA OF FOREHEAD, INITIAL ENCOUNTER: ICD-10-CM

## 2025-03-08 DIAGNOSIS — S09.90XA INJURY OF HEAD, INITIAL ENCOUNTER: Primary | ICD-10-CM

## 2025-03-08 PROCEDURE — 99282 EMERGENCY DEPT VISIT SF MDM: CPT

## 2025-03-08 ASSESSMENT — PAIN SCALES - GENERAL: PAINLEVEL_OUTOF10: 7

## 2025-03-08 ASSESSMENT — PAIN - FUNCTIONAL ASSESSMENT: PAIN_FUNCTIONAL_ASSESSMENT: 0-10

## 2025-03-09 NOTE — DISCHARGE INSTRUCTIONS
Ice as often as possible.  Use over-the-counter medications to help manage headache or pain.  If you develop nausea, vomiting, feeling off balance, headache, any neurological concerns please return to the emergency department for reevaluation.
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
## 2025-04-03 ENCOUNTER — TELEPHONE (OUTPATIENT)
Dept: INTERNAL MEDICINE | Age: 40
End: 2025-04-03

## 2025-04-11 ENCOUNTER — TELEPHONE (OUTPATIENT)
Dept: GYNECOLOGIC ONCOLOGY | Age: 40
End: 2025-04-11

## 2025-05-05 ENCOUNTER — HOSPITAL ENCOUNTER (EMERGENCY)
Age: 40
Discharge: HOME | End: 2025-05-05
Attending: EMERGENCY MEDICINE
Payer: COMMERCIAL

## 2025-05-05 VITALS
DIASTOLIC BLOOD PRESSURE: 101 MMHG | SYSTOLIC BLOOD PRESSURE: 152 MMHG | HEART RATE: 77 BPM | RESPIRATION RATE: 16 BRPM | TEMPERATURE: 97.7 F | OXYGEN SATURATION: 100 %

## 2025-05-05 DIAGNOSIS — M54.6 ACUTE LEFT-SIDED THORACIC BACK PAIN: Primary | ICD-10-CM

## 2025-05-05 PROCEDURE — 6370000000 HC RX 637 (ALT 250 FOR IP)

## 2025-05-05 PROCEDURE — 99283 EMERGENCY DEPT VISIT LOW MDM: CPT

## 2025-05-05 RX ORDER — METHOCARBAMOL 750 MG/1
750 TABLET, FILM COATED ORAL NIGHTLY
Qty: 10 TABLET | Refills: 0 | Status: SHIPPED | OUTPATIENT
Start: 2025-05-05 | End: 2025-05-05

## 2025-05-05 RX ORDER — LIDOCAINE 4 G/G
1 PATCH TOPICAL DAILY
Status: DISCONTINUED | OUTPATIENT
Start: 2025-05-05 | End: 2025-05-05 | Stop reason: HOSPADM

## 2025-05-05 RX ORDER — METHOCARBAMOL 500 MG/1
500 TABLET, FILM COATED ORAL ONCE
Status: COMPLETED | OUTPATIENT
Start: 2025-05-05 | End: 2025-05-05

## 2025-05-05 RX ORDER — IBUPROFEN 800 MG/1
800 TABLET, FILM COATED ORAL ONCE
Status: COMPLETED | OUTPATIENT
Start: 2025-05-05 | End: 2025-05-05

## 2025-05-05 RX ORDER — ACETAMINOPHEN 500 MG
1000 TABLET ORAL EVERY 8 HOURS PRN
Qty: 15 TABLET | Refills: 1 | Status: SHIPPED | OUTPATIENT
Start: 2025-05-05 | End: 2025-05-05

## 2025-05-05 RX ORDER — LIDOCAINE 50 MG/G
1 PATCH TOPICAL DAILY
Qty: 10 PATCH | Refills: 0 | Status: SHIPPED | OUTPATIENT
Start: 2025-05-05 | End: 2025-05-05

## 2025-05-05 RX ORDER — METHOCARBAMOL 750 MG/1
750 TABLET, FILM COATED ORAL NIGHTLY
Qty: 10 TABLET | Refills: 0 | Status: SHIPPED | OUTPATIENT
Start: 2025-05-05 | End: 2025-05-15

## 2025-05-05 RX ORDER — IBUPROFEN 800 MG/1
800 TABLET, FILM COATED ORAL EVERY 8 HOURS PRN
Qty: 15 TABLET | Refills: 0 | Status: SHIPPED | OUTPATIENT
Start: 2025-05-05 | End: 2025-05-10

## 2025-05-05 RX ORDER — ACETAMINOPHEN 500 MG
1000 TABLET ORAL ONCE
Status: COMPLETED | OUTPATIENT
Start: 2025-05-05 | End: 2025-05-05

## 2025-05-05 RX ORDER — LIDOCAINE 50 MG/G
1 PATCH TOPICAL DAILY
Qty: 10 PATCH | Refills: 0 | Status: SHIPPED | OUTPATIENT
Start: 2025-05-05 | End: 2025-05-15

## 2025-05-05 RX ORDER — ACETAMINOPHEN 500 MG
1000 TABLET ORAL EVERY 8 HOURS PRN
Qty: 15 TABLET | Refills: 1 | Status: SHIPPED | OUTPATIENT
Start: 2025-05-05 | End: 2025-05-10

## 2025-05-05 RX ORDER — IBUPROFEN 800 MG/1
800 TABLET, FILM COATED ORAL EVERY 8 HOURS PRN
Qty: 15 TABLET | Refills: 0 | Status: SHIPPED | OUTPATIENT
Start: 2025-05-05 | End: 2025-05-05

## 2025-05-05 RX ADMIN — IBUPROFEN 800 MG: 800 TABLET, FILM COATED ORAL at 10:25

## 2025-05-05 RX ADMIN — ACETAMINOPHEN 1000 MG: 500 TABLET ORAL at 10:25

## 2025-05-05 RX ADMIN — METHOCARBAMOL 500 MG: 500 TABLET ORAL at 10:25

## 2025-05-05 ASSESSMENT — PAIN - FUNCTIONAL ASSESSMENT: PAIN_FUNCTIONAL_ASSESSMENT: 0-10

## 2025-05-05 ASSESSMENT — PAIN SCALES - GENERAL: PAINLEVEL_OUTOF10: 8

## 2025-05-05 ASSESSMENT — PAIN DESCRIPTION - LOCATION: LOCATION: BACK

## 2025-05-05 NOTE — DISCHARGE INSTRUCTIONS
You are seen in the emerged part for your left sided pain.  This could possibly be kidney stone however you declined a CT abdomen pelvis at this time if your symptoms worsen or not improved with the pain medications please return for this evaluation.    You are prescribed a muscle relaxer please do not operate heavy machinery including driving with it.  I would just take it once at night.    For pain use acetaminophen (Tylenol) or ibuprofen (Motrin / Advil), unless prescribed medications that have acetaminophen or ibuprofen (or similar medications) in it.  You can take over the counter acetaminophen tablets (1 - 2 tablets of the 500-mg strength every 6 hours) or ibuprofen tablets (2 tablets every 4 hours).    PLEASE RETURN TO THE EMERGENCY DEPARTMENT IMMEDIATELY for worsening symptoms, inability to urinate, worsening of blood in your urine, or if you develop any concerning symptoms such as: high fever not relieved by acetaminophen (Tylenol) and/or ibuprofen (Motrin / Advil), chills, shortness of breath, chest pain, feeling of your heart fluttering or racing, persistent nausea and/or vomiting, vomiting up blood, blood in your stool, numbness, loss of consciousness, weakness or tingling in the arms or legs or change in color of the extremities, changes in mental status, persistent headache, blurry vision, loss of bladder / bowel control, unable to follow up with your physician, or other any other care or concern.

## 2025-05-05 NOTE — ED PROVIDER NOTES
[facility] EMERGENCY DEPARTMENT     Emergency Department     Faculty Attestation        I performed a history and physical examination of the patient and discussed management with the resident. I reviewed the resident’s note and agree with the documented findings and plan of care. Any areas of disagreement are noted on the chart. I was personally present for the key portions of any procedures. I have documented in the chart those procedures where I was not present during the key portions. I have reviewed the emergency nurses triage note. I agree with the chief complaint, past medical history, past surgical history, allergies, medications, social and family history as documented unless otherwise noted below.    For mid-level providers such as nurse practitioners as well as physicians assistants:    I have personally seen and evaluated the patient.    I find the patient's history and physical exam are consistent with NP/PA documentation.  I agree with the care provided, treatment rendered, disposition, & follow-up plan.     Additional findings are as noted.    Vital Signs: BP (!) 152/101   Pulse 77   Temp 97.7 °F (36.5 °C)   Resp 16   LMP  (LMP Unknown)   SpO2 100%   PCP:  Ruperto Oliver MD    Pertinent Comments:       Patient with left paraspinal tenderness.  Pain is reproducible with movement.  She denies fevers chills nausea vomiting diarrhea hematuria dysuria vaginally vaginal discharge. Patient declined any workup here.    Critical Care  None          Jakob Phillips MD    Attending Emergency Medicine Physician            Ricky Phillips MD  05/05/25 [id number]

## 2025-05-05 NOTE — ED NOTES
Pt to ED via triage with c/o left lower back pain. Denies any injury or trauma. Denies nausea, dysuria, cp, sob. Pt is a/ox4, ambulatory, RR even and non labored on room air, call light in reach.

## 2025-05-05 NOTE — ED PROVIDER NOTES
[facility] EMERGENCY DEPARTMENT  Emergency Department Encounter  Emergency Medicine Resident     Pt Name:Jose Saleem  MRN: [id number]  Birthdate 1985  Date of evaluation: 25  PCP:  Ruperto Oliver MD  Note Started: 10:00 AM EDT      CHIEF COMPLAINT       No chief complaint on file.      HISTORY OF PRESENT ILLNESS  (Location/Symptom, Timing/Onset, Context/Setting, Quality, Duration, Modifying Factors, Severity.)      Jose Saleem is a 39 y.o. female who presents with back pain.  Patient states that she was sitting down this past Wednesday or Thursday when she started having a left-sided back pain.  Denies any fevers chills urinary symptoms including frequency burning or blood in her urine.  Patient states that she has a prior history of hysterectomy said no vaginal bleeding or discharge.  Patient has any nausea vomiting or abdominal pain.    Patient denies any urinary bowel incontinence saddle anesthesia lower extremity pain weakness numbness tingling.  Patient denies any midline back pain or trauma to her back.    PAST MEDICAL / SURGICAL / SOCIAL / FAMILY HISTORY      has a past medical history of Acne, Benign essential hypertension antepartum, Closed nondisplaced fracture of distal pole of navicular bone of left wrist, COVID-19, COVID-19 vaccine series not administered, Cyst of ovary, Family history of uterine cancer, Iron deficiency anemia, Migraine, Mirena IUD inserted 22, Prothrombin gene mutation, S/p Diagnostic laparoscopy, hysis of adhesions, hysteroscopy, dilatation and curettage, Mirena IUD insertion, Snores, Under care of team, and Under care of team.       has a past surgical history that includes  section (1/10/2007, 2007, 2012); Cervix surgery; laparoscopy (N/A, 2022); Dilation and curettage of uterus (N/A, 2022); Bladder surgery (N/A, 2022); Hysterectomy, total abdominal (N/A, 2022); Cystoscopy (N/A, 2022);

## 2025-06-03 ENCOUNTER — OFFICE VISIT (OUTPATIENT)
Dept: GYNECOLOGIC ONCOLOGY | Age: 40
End: 2025-06-03
Payer: COMMERCIAL

## 2025-06-03 VITALS
HEIGHT: 61 IN | BODY MASS INDEX: 29.27 KG/M2 | DIASTOLIC BLOOD PRESSURE: 96 MMHG | OXYGEN SATURATION: 99 % | WEIGHT: 155 LBS | HEART RATE: 86 BPM | SYSTOLIC BLOOD PRESSURE: 133 MMHG

## 2025-06-03 DIAGNOSIS — R10.2 PELVIC PAIN: Primary | ICD-10-CM

## 2025-06-03 PROCEDURE — G8419 CALC BMI OUT NRM PARAM NOF/U: HCPCS | Performed by: PHYSICIAN ASSISTANT

## 2025-06-03 PROCEDURE — 1036F TOBACCO NON-USER: CPT | Performed by: PHYSICIAN ASSISTANT

## 2025-06-03 PROCEDURE — 99214 OFFICE O/P EST MOD 30 MIN: CPT | Performed by: PHYSICIAN ASSISTANT

## 2025-06-03 PROCEDURE — G8427 DOCREV CUR MEDS BY ELIG CLIN: HCPCS | Performed by: PHYSICIAN ASSISTANT

## 2025-06-03 PROCEDURE — 3075F SYST BP GE 130 - 139MM HG: CPT | Performed by: PHYSICIAN ASSISTANT

## 2025-06-03 PROCEDURE — 3080F DIAST BP >= 90 MM HG: CPT | Performed by: PHYSICIAN ASSISTANT

## 2025-06-03 ASSESSMENT — ENCOUNTER SYMPTOMS
EYES NEGATIVE: 1
VOMITING: 1
BACK PAIN: 1
NAUSEA: 1
RESPIRATORY NEGATIVE: 1

## 2025-06-03 NOTE — PROGRESS NOTES
Review of Systems   Constitutional:  Positive for chills.   HENT:  Negative.     Eyes: Negative.    Respiratory: Negative.     Cardiovascular: Negative.    Gastrointestinal:  Positive for nausea and vomiting.   Endocrine: Negative.    Genitourinary:  Positive for pelvic pain (left).    Musculoskeletal:  Positive for back pain (left lower).   Skin: Negative.    Neurological:  Positive for headaches.   Hematological: Negative.

## 2025-06-03 NOTE — PROGRESS NOTES
[facility] Gynecologic Oncology  [address] [facility] [address]  [place] [address]    Jose Saleem is a 39 y.o. female who presents for follow up     CC/HPI: Patient is a pre menopausal patient who was referred to [facility] gynecologic oncology for further evaluation and treatment recommendations regarding pelvic pain and pelvic cyst.    Of note the patient does have a history of a hysterectomy in March 2022 for pelvic pain and fibroid uterus.  She had previously undergone a diagnostic laparoscopy, lysis of adhesions, dilation curettage with hysteroscopy and IUD placement.  Her bleeding concerns in February 2022.  However due to her persistent pain she then underwent a total abdominal hysterectomy, bilateral self pinch ectomy, cystoscopy, cystotomy repair in March 2022.    Patient had continued concerns of pelvic pain post operatively.  She was further evaluated with serial imaging studies.    MRI pelvis in May 2023 revealed a multiloculated cystic mass measuring 6.7 cm without abnormal enhancement located superior to the urinary bladder.  In the left adnexa there was a cystic structure measuring nearly 10 cm in length which is evidently a hydrosalpinx.  Right adnexa revealed a 1.6 cm hemorrhagic ovarian follicle/cyst.    Follow-up pelvic ultrasound in June 2023 was limited study however persistence of bilateral cystic adnexal masses appear to be smaller.    Repeat MRI pelvis in July 29, 2023 revealed a significant decrease in size of the tubular structure in the left adnexa.  There was persistent fluid in the left adnexa dependent pelvis.  There is interval enlargement of the right adnexal cyst.    A follow-up ultrasound September 2023 which revealed the complex indeterminate bilateral adnexal cystic structures.  The tubular structure in the left adnexa was measuring up to 8.3 cm.    Patient was seen and her history was discussed with [facility] gynecologic oncology on 9/14/2023.  Patient was counseled on

## 2025-06-24 ENCOUNTER — HOSPITAL ENCOUNTER (OUTPATIENT)
Age: 40
Setting detail: SPECIMEN
Discharge: HOME | End: 2025-06-24

## 2025-06-24 ENCOUNTER — HOSPITAL ENCOUNTER (OUTPATIENT)
Dept: ULTRASOUND IMAGING | Age: 40
Discharge: HOME | End: 2025-06-26
Payer: COMMERCIAL

## 2025-06-24 DIAGNOSIS — R10.2 PELVIC PAIN: ICD-10-CM

## 2025-06-24 PROCEDURE — 76856 US EXAM PELVIC COMPLETE: CPT

## 2025-06-25 ENCOUNTER — RESULTS FOLLOW-UP (OUTPATIENT)
Dept: GYNECOLOGIC ONCOLOGY | Age: 40
End: 2025-06-25

## 2025-06-25 DIAGNOSIS — N39.0 URINARY TRACT INFECTION WITHOUT HEMATURIA, SITE UNSPECIFIED: Primary | ICD-10-CM

## 2025-06-25 DIAGNOSIS — R10.2 PELVIC PAIN: ICD-10-CM

## 2025-06-26 LAB
MICROORGANISM SPEC CULT: ABNORMAL
SERVICE CMNT-IMP: ABNORMAL
SPECIMEN DESCRIPTION: ABNORMAL

## 2025-06-26 RX ORDER — CEPHALEXIN 500 MG/1
500 CAPSULE ORAL 2 TIMES DAILY
Qty: 14 CAPSULE | Refills: 0 | Status: SHIPPED | OUTPATIENT
Start: 2025-06-26 | End: 2025-07-03

## (undated) DEVICE — SUTURE NONABSORBABLE MONOFILAMENT 3-0 PS-1 18 IN BLK ETHILON 1663H

## (undated) DEVICE — MITT SURG PREP L ADH DISPOSABLE

## (undated) DEVICE — GLOVE SURG SZ 65 THK91MIL LTX FREE SYN POLYISOPRENE

## (undated) DEVICE — GLOVE SURG SZ 55 CRM LTX FREE POLYISOPRENE POLYMER BEAD ANTI

## (undated) DEVICE — SUTURE VCRL SZ 0 L27IN ABSRB UD L36MM CT-1 1/2 CIR J260H

## (undated) DEVICE — MARKER,SKIN,WI/RULER AND LABELS: Brand: MEDLINE

## (undated) DEVICE — 3M™ STERI-STRIP™ COMPOUND BENZOIN TINCTURE 40 BAGS/CARTON 4 CARTONS/CASE C1544: Brand: 3M™ STERI-STRIP™

## (undated) DEVICE — TROCAR: Brand: KII FIOS FIRST ENTRY

## (undated) DEVICE — AIRSEAL 12 MM ACCESS PORT AND PALM GRIP OBTURATOR WITH BLADELESS OPTICAL TIP, 120 MM LENGTH: Brand: AIRSEAL

## (undated) DEVICE — PAD PT POS 36 IN SURGYPAD DISP

## (undated) DEVICE — TOTAL TRAY, 16FR 10ML SIL FOLEY, URN: Brand: MEDLINE

## (undated) DEVICE — SUTURE V-LOC 180 SZ 3-0 L6IN ABSRB GRN V-20 L26MM 1/2 CIR VLOCL0604

## (undated) DEVICE — SVMMC GYN ROBOTIC PK

## (undated) DEVICE — NEEDLE HYPO 25GA L1.5IN BLU POLYPR HUB S STL REG BVL STR

## (undated) DEVICE — ADHESIVE SKIN CLOSURE TOP 36 CC HI VISC DERMBND MINI

## (undated) DEVICE — DRAPE,UNDRBUT,WHT GRAD PCH,CAPPORT,20/CS: Brand: MEDLINE

## (undated) DEVICE — GOWN,SIRUS,NONRNF,SETINSLV,XL,20/CS: Brand: MEDLINE

## (undated) DEVICE — CATHETER URETH 16FR BLLN 5CC SIL ALLY W/ SIL HYDRGEL 2 W F

## (undated) DEVICE — SUTURE ABSORBABLE BRAIDED 0 CT-1 8X27 IN UD VICRYL JJ41G

## (undated) DEVICE — SOLUTION ANTIFOG VIS SYS CLEARIFY LAPSCP

## (undated) DEVICE — INSUFFLATION NEEDLE TO ESTABLISH PNEUMOPERITONEUM.: Brand: INSUFFLATION NEEDLE

## (undated) DEVICE — SYRINGE,EAR/ULCER, 2 OZ, STERILE: Brand: MEDLINE

## (undated) DEVICE — CYSTO/BLADDER IRRIGATION SET, REGULATING CLAMP

## (undated) DEVICE — STRAP,POSITIONING,KNEE/BODY,FOAM,4X60": Brand: MEDLINE

## (undated) DEVICE — TRI-LUMEN FILTERED TUBE SET WITH ACTIVATED CHARCOAL FILTER: Brand: AIRSEAL

## (undated) DEVICE — SET ENDOSCP SEAL HYSTEROSCOPE RIG OUTFLO CHN DISP MYOSURE

## (undated) DEVICE — SUTURE VCRL + SZ 3-0 L27IN ABSRB WHT CT-1 1/2 CIR VCP258H

## (undated) DEVICE — APPLICATOR MEDICATED 26 CC SOLUTION HI LT ORNG CHLORAPREP

## (undated) DEVICE — COVER OR TBL W40XL90IN ABSRB STD AND GRIPPY BK SAHARA

## (undated) DEVICE — SPONGE,PEANUT,XRAY,ST,SM,3/8",5/CARD: Brand: MEDLINE INDUSTRIES, INC.

## (undated) DEVICE — DRAPE,REIN 53X77,STERILE: Brand: MEDLINE

## (undated) DEVICE — DEVICE TRCR 12X9X3IN WHT CLSR DISP OMNICLOSE

## (undated) DEVICE — DRAPE, SLUSH XL, 44X66, STERILE: Brand: MEDLINE

## (undated) DEVICE — SYRINGE, LUER LOCK, 10ML: Brand: MEDLINE

## (undated) DEVICE — APPLICATOR ENDOSCP L34CM W/ S STL CANN PLAS OBT STYL FOR

## (undated) DEVICE — SPONGE,LAP,4"X18",XR,ST,5/PK,40PK/CS: Brand: MEDLINE INDUSTRIES, INC.

## (undated) DEVICE — SYRINGE,CONTROL,LL,FINGER,GRIP: Brand: MEDLINE INDUSTRIES, INC.

## (undated) DEVICE — CANNULA SEAL

## (undated) DEVICE — STRIP SKIN CLSR W0.25XL4IN WHT SPUNBOUND FBR NYL HI ADH

## (undated) DEVICE — TUBING, SUCTION, 9/32" X 20', STRAIGHT: Brand: MEDLINE INDUSTRIES, INC.

## (undated) DEVICE — 3M™ IOBAN™ 2 ANTIMICROBIAL INCISE DRAPE 6650EZ: Brand: IOBAN™ 2

## (undated) DEVICE — GOWN,AURORA,NONREINFORCED,LARGE: Brand: MEDLINE

## (undated) DEVICE — DRAIN WND SIL FLAT RADIOPAQUE 10MM FULL FLUTED

## (undated) DEVICE — 1LYRTR 16FR10ML100%SIL UMS SNP: Brand: MEDLINE INDUSTRIES, INC.

## (undated) DEVICE — SUTURE VCRL SZ 3-0 L27IN ABSRB UD L26MM SH 1/2 CIR J416H

## (undated) DEVICE — SEAL

## (undated) DEVICE — INSTRUMENT REPROC SEAL/DIVIDE LAP BLUNT TP LIGASURE NANO-COAT 5MMX37CM

## (undated) DEVICE — BLADELESS OBTURATOR: Brand: WECK VISTA

## (undated) DEVICE — SUTURE MCRYL SZ 4-0 L18IN ABSRB UD L16MM PC-3 3/8 CIR PRIM Y845G

## (undated) DEVICE — RESERVOIR,SUCTION,100CC,SILICONE: Brand: MEDLINE

## (undated) DEVICE — SOLUTION SCRB 4OZ 4% CHG H2O AIDED FOR PREOPERATIVE SKIN

## (undated) DEVICE — PLUG,CATHETER,DRAINAGE PROTECTOR,TUBE: Brand: MEDLINE

## (undated) DEVICE — Z DISCONTINUED BY MEDLINE USE 2711682 TRAY SKIN PREP DRY W/ PREM GLV

## (undated) DEVICE — GLOVE SURG SZ 65 L12IN FNGR THK79MIL GRN LTX FREE

## (undated) DEVICE — ELECTRO LUBE IS A SINGLE PATIENT USE DEVICE THAT IS INTENDED TO BE USED ON ELECTROSURGICAL ELECTRODES TO REDUCE STICKING.: Brand: KEY SURGICAL ELECTRO LUBE

## (undated) DEVICE — GLOVE ORANGE PI 8   MSG9080

## (undated) DEVICE — GLOVE ORANGE PI 7   MSG9070

## (undated) DEVICE — SOLUTION SCRB 4OZ 2% CHG FOR SURG SCRBBING HND WSH

## (undated) DEVICE — CONTAINER,SPECIMEN,4OZ,OR STRL: Brand: MEDLINE

## (undated) DEVICE — TROCAR: Brand: KII® SLEEVE

## (undated) DEVICE — COVER,LIGHT HANDLE,FLX,2/PK: Brand: MEDLINE INDUSTRIES, INC.

## (undated) DEVICE — GLOVE EXAM SM L9.5IN FNGR THK3.6MIL PALM THK2.8MIL OFF WHT

## (undated) DEVICE — GLOVE SURG SZ 6 THK91MIL LTX FREE SYN POLYISOPRENE ANTI

## (undated) DEVICE — SUTURE COAT VCRL SZ 0 L18IN ABSRB UD W/O NDL POLYGLACTIN J112T

## (undated) DEVICE — Device

## (undated) DEVICE — LEGGINGS, PAIR, 31X48, STERILE: Brand: MEDLINE

## (undated) DEVICE — COVER LT HNDL BLU PLAS

## (undated) DEVICE — GAUZE,SPONGE,FLUFF,6"X6.75",STRL,5/TRAY: Brand: MEDLINE

## (undated) DEVICE — ARM DRAPE

## (undated) DEVICE — 3M™ STERI-DRAPE™ ISOLATION BAG, 10 PER CARTON / 4 CARTONS PER CASE, 1003: Brand: 3M™ STERI-DRAPE™

## (undated) DEVICE — DRESSING TRNSPAR W5XL4.5IN FLM SHT SEMIPERMEABLE WIND

## (undated) DEVICE — UNDERPANTS MAT L XL SEAMLESS CLR CODE WAISTBAND KNIT

## (undated) DEVICE — AIRSEAL 8 MM ACCESS PORT AND LOW PROFILE OBTURATOR WITH BLADELESS OPTICAL TIP, 120 MM LENGTH: Brand: AIRSEAL

## (undated) DEVICE — INTENDED FOR TISSUE SEPARATION, AND OTHER PROCEDURES THAT REQUIRE A SHARP SURGICAL BLADE TO PUNCTURE OR CUT.: Brand: BARD-PARKER ® CARBON RIB-BACK BLADES

## (undated) DEVICE — TOWEL,OR,DSP,ST,NATURAL,DLX,4/PK,20PK/CS: Brand: MEDLINE

## (undated) DEVICE — GARMENT,MEDLINE,DVT,INT,CALF,MED, GEN2: Brand: MEDLINE

## (undated) DEVICE — SUTURE PERMAHAND SZ 3-0 L30IN NONABSORBABLE BLK SH L26MM K832H

## (undated) DEVICE — GLOVE ORANGE PI 8 1/2   MSG9085

## (undated) DEVICE — BAG WASTE MYSOSURE FLUENT

## (undated) DEVICE — DRAPE,LAP,CHOLE,W/TROUGHS,STERILE: Brand: MEDLINE

## (undated) DEVICE — NEEDLE SPINAL 22GA L3.5IN SPINOCAN

## (undated) DEVICE — PLUMEPORT SEO LAPAROSCOPIC SMOKE FILTRATION DEVICE: Brand: PLUMEPORT

## (undated) DEVICE — 6 ML SYRINGE LUER-LOCK TIP: Brand: MONOJECT

## (undated) DEVICE — GLOVE,EXAM,NITRILE,RESTORE,OAT SENSE,L: Brand: MEDLINE

## (undated) DEVICE — BANDAGE,GAUZE,BULKEE II,4.5"X4.1YD,STRL: Brand: MEDLINE

## (undated) DEVICE — GLOVE ORANGE PI 7 1/2   MSG9075

## (undated) DEVICE — NEPTUNE E-SEP SMOKE EVACUATION PENCIL, COATED, 70MM BLADE, PUSH BUTTON SWITCH: Brand: NEPTUNE E-SEP

## (undated) DEVICE — TIP COVER ACCESSORY

## (undated) DEVICE — STRIP,CLOSURE,WOUND,MEDI-STRIP,1/2X4: Brand: MEDLINE

## (undated) DEVICE — PACK LAP BASIC

## (undated) DEVICE — SOLUTION IRRIG 3000ML STRL H2O USP UROMATIC PLAS CONT

## (undated) DEVICE — COUNTER NDL 10 COUNT HLD 20 FOAM BLK SGL MAG

## (undated) DEVICE — PROTECTOR ULN NRV PUR FOAM HK LOOP STRP ANATOMICALLY

## (undated) DEVICE — DEVICE INTRAUTERNE CONTRACEPTIVE MIRENA

## (undated) DEVICE — PAD,SANITARY,11 IN,MAXI,W/WINGS,N-STRL: Brand: MEDLINE

## (undated) DEVICE — GLOVE EXAM M L95IN FNGR THK35MIL PALM THK24MIL OFF WHT

## (undated) DEVICE — SYRINGE MED 10ML TRNSLUC BRL PLUNG BLK MRK POLYPR CTRL

## (undated) DEVICE — PAD,NON-ADHERENT,3X8,STERILE,LF,1/PK: Brand: MEDLINE

## (undated) DEVICE — SEALER ENDOSCP NANO COAT OPN DIV CRV L JAW LIGASURE IMPACT

## (undated) DEVICE — AGENT HEMOSTATIC SURGIFLOW MATRIX KIT W/THROMBIN

## (undated) DEVICE — SUTURE VCRL SZ 2-0 L27IN ABSRB UD L26MM SH 1/2 CIR J417H